# Patient Record
Sex: FEMALE | Race: BLACK OR AFRICAN AMERICAN | Employment: PART TIME | URBAN - METROPOLITAN AREA
[De-identification: names, ages, dates, MRNs, and addresses within clinical notes are randomized per-mention and may not be internally consistent; named-entity substitution may affect disease eponyms.]

---

## 2017-03-28 ENCOUNTER — HOSPITAL ENCOUNTER (EMERGENCY)
Age: 42
Discharge: HOME | End: 2017-03-28
Attending: EMERGENCY MEDICINE
Payer: MEDICARE

## 2017-03-28 ENCOUNTER — APPOINTMENT (OUTPATIENT)
Dept: GENERAL RADIOLOGY | Age: 42
End: 2017-03-28
Payer: MEDICARE

## 2017-03-28 VITALS
SYSTOLIC BLOOD PRESSURE: 121 MMHG | OXYGEN SATURATION: 100 % | TEMPERATURE: 97.4 F | BODY MASS INDEX: 22.96 KG/M2 | RESPIRATION RATE: 16 BRPM | DIASTOLIC BLOOD PRESSURE: 79 MMHG | HEART RATE: 72 BPM | WEIGHT: 134.48 LBS | HEIGHT: 64 IN

## 2017-03-28 DIAGNOSIS — S92.511A CLOSED DISPLACED FRACTURE OF PROXIMAL PHALANX OF LESSER TOE OF RIGHT FOOT, INITIAL ENCOUNTER: Primary | ICD-10-CM

## 2017-03-28 PROCEDURE — G0382 LEV 3 HOSP TYPE B ED VISIT: HCPCS

## 2017-03-28 PROCEDURE — 6370000000 HC RX 637 (ALT 250 FOR IP): Performed by: EMERGENCY MEDICINE

## 2017-03-28 PROCEDURE — 73630 X-RAY EXAM OF FOOT: CPT

## 2017-03-28 RX ORDER — HYDROCODONE BITARTRATE AND ACETAMINOPHEN 5; 325 MG/1; MG/1
1 TABLET ORAL ONCE
Status: COMPLETED | OUTPATIENT
Start: 2017-03-28 | End: 2017-03-28

## 2017-03-28 RX ORDER — HYDROCODONE BITARTRATE AND ACETAMINOPHEN 5; 325 MG/1; MG/1
1 TABLET ORAL EVERY 8 HOURS PRN
Qty: 10 TABLET | Refills: 0 | Status: SHIPPED | OUTPATIENT
Start: 2017-03-28 | End: 2018-01-29 | Stop reason: ALTCHOICE

## 2017-03-28 RX ADMIN — HYDROCODONE BITARTRATE AND ACETAMINOPHEN 1 TABLET: 5; 325 TABLET ORAL at 19:41

## 2017-03-28 ASSESSMENT — PAIN DESCRIPTION - FREQUENCY: FREQUENCY: CONTINUOUS

## 2017-03-28 ASSESSMENT — ENCOUNTER SYMPTOMS
SHORTNESS OF BREATH: 0
ABDOMINAL PAIN: 0
COUGH: 0
RHINORRHEA: 0
SORE THROAT: 0

## 2017-03-28 ASSESSMENT — PAIN SCALES - GENERAL
PAINLEVEL_OUTOF10: 10
PAINLEVEL_OUTOF10: 10

## 2017-03-28 ASSESSMENT — PAIN DESCRIPTION - ORIENTATION: ORIENTATION: LEFT;RIGHT

## 2017-03-28 ASSESSMENT — PAIN DESCRIPTION - LOCATION: LOCATION: FOOT

## 2017-03-28 ASSESSMENT — PAIN DESCRIPTION - DESCRIPTORS: DESCRIPTORS: SHARP

## 2017-03-28 ASSESSMENT — PAIN DESCRIPTION - PAIN TYPE: TYPE: ACUTE PAIN

## 2017-09-18 ENCOUNTER — APPOINTMENT (OUTPATIENT)
Dept: GENERAL RADIOLOGY | Age: 42
End: 2017-09-18
Payer: MEDICARE

## 2017-09-18 ENCOUNTER — HOSPITAL ENCOUNTER (EMERGENCY)
Age: 42
Discharge: HOME | End: 2017-09-18
Attending: EMERGENCY MEDICINE
Payer: MEDICARE

## 2017-09-18 VITALS
BODY MASS INDEX: 22.47 KG/M2 | HEART RATE: 62 BPM | SYSTOLIC BLOOD PRESSURE: 154 MMHG | HEIGHT: 64 IN | RESPIRATION RATE: 14 BRPM | WEIGHT: 131.6 LBS | DIASTOLIC BLOOD PRESSURE: 90 MMHG | TEMPERATURE: 98.9 F | OXYGEN SATURATION: 100 %

## 2017-09-18 DIAGNOSIS — L02.811 ABSCESS OF SCALP: Primary | ICD-10-CM

## 2017-09-18 DIAGNOSIS — Z76.0 ENCOUNTER FOR MEDICATION REFILL: ICD-10-CM

## 2017-09-18 DIAGNOSIS — J40 BRONCHITIS: ICD-10-CM

## 2017-09-18 PROCEDURE — 71020 XR CHEST STANDARD TWO VW: CPT

## 2017-09-18 PROCEDURE — 99283 EMERGENCY DEPT VISIT LOW MDM: CPT

## 2017-09-18 PROCEDURE — 2500000003 HC RX 250 WO HCPCS: Performed by: NURSE PRACTITIONER

## 2017-09-18 RX ORDER — VALSARTAN AND HYDROCHLOROTHIAZIDE 160; 12.5 MG/1; MG/1
1 TABLET, FILM COATED ORAL DAILY
Qty: 30 TABLET | Refills: 0 | Status: SHIPPED | OUTPATIENT
Start: 2017-09-18 | End: 2018-01-18 | Stop reason: SDUPTHER

## 2017-09-18 RX ORDER — CEPHALEXIN 500 MG/1
500 CAPSULE ORAL 3 TIMES DAILY
Qty: 30 CAPSULE | Refills: 0 | Status: SHIPPED | OUTPATIENT
Start: 2017-09-18 | End: 2017-09-28

## 2017-09-18 RX ORDER — ALBUTEROL SULFATE 90 UG/1
2 AEROSOL, METERED RESPIRATORY (INHALATION) EVERY 6 HOURS PRN
Qty: 1 INHALER | Refills: 0 | Status: SHIPPED | OUTPATIENT
Start: 2017-09-18 | End: 2021-09-03

## 2017-09-18 RX ORDER — ACETAMINOPHEN AND CODEINE PHOSPHATE 300; 30 MG/1; MG/1
1 TABLET ORAL EVERY 4 HOURS PRN
Qty: 15 TABLET | Refills: 0 | Status: SHIPPED | OUTPATIENT
Start: 2017-09-18 | End: 2018-01-29 | Stop reason: ALTCHOICE

## 2017-09-18 RX ORDER — BENZONATATE 100 MG/1
100 CAPSULE ORAL 3 TIMES DAILY PRN
Qty: 21 CAPSULE | Refills: 0 | Status: SHIPPED | OUTPATIENT
Start: 2017-09-18 | End: 2017-09-25

## 2017-09-18 RX ORDER — FLUCONAZOLE 150 MG/1
150 TABLET ORAL ONCE
Qty: 1 TABLET | Refills: 0 | Status: SHIPPED | OUTPATIENT
Start: 2017-09-18 | End: 2017-09-18

## 2017-09-18 RX ORDER — LIDOCAINE HYDROCHLORIDE 10 MG/ML
20 INJECTION, SOLUTION INFILTRATION; PERINEURAL ONCE
Status: COMPLETED | OUTPATIENT
Start: 2017-09-18 | End: 2017-09-18

## 2017-09-18 RX ORDER — DOXYCYCLINE HYCLATE 100 MG
100 TABLET ORAL 2 TIMES DAILY
Qty: 20 TABLET | Refills: 0 | Status: SHIPPED | OUTPATIENT
Start: 2017-09-18 | End: 2017-09-28

## 2017-09-18 RX ADMIN — LIDOCAINE HYDROCHLORIDE 20 ML: 10 INJECTION, SOLUTION INFILTRATION; PERINEURAL at 15:02

## 2017-09-18 ASSESSMENT — ENCOUNTER SYMPTOMS
COUGH: 1
WHEEZING: 0
SHORTNESS OF BREATH: 0
SINUS PRESSURE: 1
RHINORRHEA: 1
SORE THROAT: 0

## 2017-09-18 ASSESSMENT — PAIN SCALES - GENERAL
PAINLEVEL_OUTOF10: 0
PAINLEVEL_OUTOF10: 8

## 2017-09-18 ASSESSMENT — PAIN DESCRIPTION - LOCATION: LOCATION: HEAD

## 2017-09-18 ASSESSMENT — PAIN DESCRIPTION - PAIN TYPE: TYPE: ACUTE PAIN

## 2018-01-18 ENCOUNTER — HOSPITAL ENCOUNTER (EMERGENCY)
Age: 43
Discharge: HOME | End: 2018-01-18
Attending: EMERGENCY MEDICINE
Payer: MEDICARE

## 2018-01-18 VITALS
OXYGEN SATURATION: 98 % | HEIGHT: 64 IN | TEMPERATURE: 97.7 F | RESPIRATION RATE: 18 BRPM | HEART RATE: 88 BPM | DIASTOLIC BLOOD PRESSURE: 98 MMHG | SYSTOLIC BLOOD PRESSURE: 145 MMHG

## 2018-01-18 DIAGNOSIS — J06.9 UPPER RESPIRATORY TRACT INFECTION, UNSPECIFIED TYPE: Primary | ICD-10-CM

## 2018-01-18 PROCEDURE — 99283 EMERGENCY DEPT VISIT LOW MDM: CPT

## 2018-01-18 PROCEDURE — 6370000000 HC RX 637 (ALT 250 FOR IP): Performed by: NURSE PRACTITIONER

## 2018-01-18 RX ORDER — ACETAMINOPHEN 500 MG
1000 TABLET ORAL ONCE
Status: COMPLETED | OUTPATIENT
Start: 2018-01-18 | End: 2018-01-18

## 2018-01-18 RX ORDER — FLUTICASONE PROPIONATE 50 MCG
1 SPRAY, SUSPENSION (ML) NASAL DAILY
Qty: 1 BOTTLE | Refills: 0 | Status: SHIPPED | OUTPATIENT
Start: 2018-01-18 | End: 2018-01-29 | Stop reason: SDUPTHER

## 2018-01-18 RX ORDER — VALSARTAN AND HYDROCHLOROTHIAZIDE 160; 12.5 MG/1; MG/1
1 TABLET, FILM COATED ORAL DAILY
Qty: 30 TABLET | Refills: 0 | Status: SHIPPED | OUTPATIENT
Start: 2018-01-18 | End: 2018-01-29 | Stop reason: SDUPTHER

## 2018-01-18 RX ORDER — LORATADINE 10 MG/1
10 TABLET ORAL DAILY
Qty: 14 TABLET | Refills: 0 | Status: SHIPPED | OUTPATIENT
Start: 2018-01-18 | End: 2018-01-29 | Stop reason: SDUPTHER

## 2018-01-18 RX ORDER — ACETAMINOPHEN 500 MG
1000 TABLET ORAL EVERY 6 HOURS PRN
Qty: 30 TABLET | Refills: 0 | Status: SHIPPED | OUTPATIENT
Start: 2018-01-18 | End: 2018-01-29 | Stop reason: ALTCHOICE

## 2018-01-18 RX ADMIN — ACETAMINOPHEN 1000 MG: 500 TABLET ORAL at 10:37

## 2018-01-18 ASSESSMENT — ENCOUNTER SYMPTOMS
ABDOMINAL PAIN: 0
SINUS PRESSURE: 1
COUGH: 1
VOMITING: 0
NAUSEA: 0

## 2018-01-18 ASSESSMENT — PAIN DESCRIPTION - DESCRIPTORS: DESCRIPTORS: CONSTANT

## 2018-01-18 ASSESSMENT — PAIN DESCRIPTION - FREQUENCY: FREQUENCY: CONTINUOUS

## 2018-01-18 ASSESSMENT — PAIN DESCRIPTION - PROGRESSION: CLINICAL_PROGRESSION: NOT CHANGED

## 2018-01-18 ASSESSMENT — PAIN DESCRIPTION - LOCATION: LOCATION: GENERALIZED

## 2018-01-18 ASSESSMENT — PAIN SCALES - GENERAL
PAINLEVEL_OUTOF10: 10
PAINLEVEL_OUTOF10: 10

## 2018-01-18 ASSESSMENT — PAIN DESCRIPTION - PAIN TYPE: TYPE: ACUTE PAIN

## 2018-01-18 NOTE — ED PROVIDER NOTES
[facility] ED  Emergency Department Encounter  Mid Level Provider     Pt Name: Franklin Spring  MRN: [id number]  [place] 1975  Date of evaluation: 18  PCP:  No primary care provider on file. CHIEF COMPLAINT       Chief Complaint   Patient presents with    Generalized Body Aches     Pt reports generalized body aches for the past few days    URI     Pt reports nasal congestion, head congestion, and productive cough of green sputum for the past few days    Medication Refill     Pt reports that she needs her Diovan refilled       HISTORY OF PRESENT ILLNESS  (Location/Symptom, Timing/Onset, Context/Setting, Quality, Duration, Modifying Factors, Severity.)      Franklin Spring is a 36 y.o. female who presents with cough, cold, nasal congestion for 2 days. Patient with subjective fever, chills. Patient reports she has chronic myalgias from fibromyalgia. Patient is a daily smoker. Patient also requesting a refill on her prescription for Diovan. I spoke with patient regarding the need for PCP and asked her if I arrange an appointment which she attend and she reports yes. I will have  meet with her. Patient is alert without acute distress. PAST MEDICAL / SURGICAL / SOCIAL / FAMILY HISTORY      has a past medical history of Fibromyalgia; Headache; Hyperlipidemia; Lupus; and Thyroid disease. has a past surgical history that includes  section; Foot surgery; and Hysterectomy. Social History     Social History    Marital status: Single     Spouse name: N/A    Number of children: N/A    Years of education: N/A     Occupational History    Not on file.      Social History Main Topics    Smoking status: Current Every Day Smoker     Packs/day: 1.00     Types: Cigarettes    Smokeless tobacco: Not on file    Alcohol use No    Drug use: No    Sexual activity: Not on file     Other Topics Concern    Not on file     Social History Narrative    No narrative appears well-developed and well-nourished. No distress. HENT:   Head: Normocephalic and atraumatic. Nasal congestion   Eyes:   Conjunctivitis bilaterally, appears allergic   Neck: Normal range of motion. Neck supple. Cardiovascular: Normal rate, regular rhythm and normal heart sounds. Pulmonary/Chest: Effort normal and breath sounds normal. No respiratory distress. She has no wheezes. Abdominal: Soft. There is no tenderness. Neurological: She is alert and oriented to person, place, and time. Skin: Skin is warm and dry. Psychiatric: She has a normal mood and affect. Her behavior is normal. Judgment and thought content normal.   Nursing note and vitals reviewed. DIFFERENTIAL  DIAGNOSIS   URI, Encounter for medication refill    PLAN (Akira Anand / Kay Schaffer / EKG):  Orders Placed This Encounter   Procedures    Inpatient consult to Social Work       MEDICATIONS ORDERED:  Orders Placed This Encounter   Medications    valsartan-hydrochlorothiazide (DIOVAN HCT) 160-12.5 MG per tablet     Sig: Take 1 tablet by mouth daily     Dispense:  30 tablet     Refill:  0    fluticasone (FLONASE) 50 MCG/ACT nasal spray     Si spray by Nasal route daily     Dispense:  1 Bottle     Refill:  0    loratadine (CLARITIN) 10 MG tablet     Sig: Take 1 tablet by mouth daily     Dispense:  14 tablet     Refill:  0    acetaminophen (TYLENOL) tablet 1,000 mg    acetaminophen (APAP EXTRA STRENGTH) 500 MG tablet     Sig: Take 2 tablets by mouth every 6 hours as needed for Pain     Dispense:  30 tablet     Refill:  0       Controlled Substances Monitoring:      DIAGNOSTIC RESULTS / [facility]     RADIOLOGY:   I directly visualized (with the attending physician) the following  images and reviewed the radiologist interpretations:  No results found. No orders to display       LABS:  No results found for this visit on 18. EMERGENCY DEPARTMENT COURSE:  Patient advised of course of care.   Patient

## 2018-01-18 NOTE — ED NOTES
Pt arrived to ED alert and oriented x4. Pt reports generalized body aches for the past few days. Pt ambulates with steady gait and moving all extremities without difficulty. Pt reports URI symptoms stating that she has nasal congestion, head congestion, and a cough. Pt reports productive cough of green sputum, no cough noted in triage. Pt reports that she needs her Diovan refilled, reports that she does not have a doctor to refill it. RR even and unlabored. NAD noted. Will continue to monitor.      Josi Hassan RN  01/18/18 7039

## 2018-01-29 ENCOUNTER — OFFICE VISIT (OUTPATIENT)
Dept: INTERNAL MEDICINE | Age: 43
End: 2018-01-29
Payer: MEDICARE

## 2018-01-29 VITALS
WEIGHT: 130 LBS | BODY MASS INDEX: 22.2 KG/M2 | SYSTOLIC BLOOD PRESSURE: 116 MMHG | DIASTOLIC BLOOD PRESSURE: 74 MMHG | HEART RATE: 87 BPM | HEIGHT: 64 IN

## 2018-01-29 DIAGNOSIS — I10 ESSENTIAL HYPERTENSION: Primary | ICD-10-CM

## 2018-01-29 DIAGNOSIS — E03.9 HYPOTHYROIDISM, UNSPECIFIED TYPE: ICD-10-CM

## 2018-01-29 DIAGNOSIS — J30.9 ALLERGIC SINUSITIS: ICD-10-CM

## 2018-01-29 DIAGNOSIS — F17.200 SMOKING: ICD-10-CM

## 2018-01-29 DIAGNOSIS — Z13.1 DIABETES MELLITUS SCREENING: ICD-10-CM

## 2018-01-29 DIAGNOSIS — Z13.220 SCREENING FOR HYPERLIPIDEMIA: ICD-10-CM

## 2018-01-29 PROCEDURE — G8427 DOCREV CUR MEDS BY ELIG CLIN: HCPCS | Performed by: INTERNAL MEDICINE

## 2018-01-29 PROCEDURE — G8484 FLU IMMUNIZE NO ADMIN: HCPCS | Performed by: INTERNAL MEDICINE

## 2018-01-29 PROCEDURE — 4004F PT TOBACCO SCREEN RCVD TLK: CPT | Performed by: INTERNAL MEDICINE

## 2018-01-29 PROCEDURE — 99203 OFFICE O/P NEW LOW 30 MIN: CPT | Performed by: INTERNAL MEDICINE

## 2018-01-29 PROCEDURE — G8420 CALC BMI NORM PARAMETERS: HCPCS | Performed by: INTERNAL MEDICINE

## 2018-01-29 RX ORDER — NAPROXEN 500 MG/1
500 TABLET ORAL
COMMUNITY
Start: 2017-03-30 | End: 2018-01-29 | Stop reason: ALTCHOICE

## 2018-01-29 RX ORDER — AMITRIPTYLINE HYDROCHLORIDE 25 MG/1
25 TABLET, FILM COATED ORAL NIGHTLY
Qty: 30 TABLET | Refills: 3 | Status: SHIPPED | OUTPATIENT
Start: 2018-01-29 | End: 2018-02-20

## 2018-01-29 RX ORDER — LORATADINE 10 MG/1
10 TABLET ORAL DAILY
Qty: 14 TABLET | Refills: 0 | Status: SHIPPED | OUTPATIENT
Start: 2018-01-29 | End: 2021-09-03

## 2018-01-29 RX ORDER — VALSARTAN AND HYDROCHLOROTHIAZIDE 160; 12.5 MG/1; MG/1
1 TABLET, FILM COATED ORAL DAILY
Qty: 30 TABLET | Refills: 3 | Status: SHIPPED | OUTPATIENT
Start: 2018-01-29 | End: 2018-09-18 | Stop reason: SDUPTHER

## 2018-01-29 RX ORDER — FLUTICASONE PROPIONATE 50 MCG
1 SPRAY, SUSPENSION (ML) NASAL DAILY
Qty: 1 BOTTLE | Refills: 0 | Status: SHIPPED | OUTPATIENT
Start: 2018-01-29 | End: 2021-09-03

## 2018-01-29 NOTE — PROGRESS NOTES
Foot surgery; and Hysterectomy. Medications:    Current Outpatient Prescriptions   Medication Sig Dispense Refill    valsartan-hydrochlorothiazide (DIOVAN HCT) 160-12.5 MG per tablet Take 1 tablet by mouth daily 30 tablet 0    fluticasone (FLONASE) 50 MCG/ACT nasal spray 1 spray by Nasal route daily 1 Bottle 0    loratadine (CLARITIN) 10 MG tablet Take 1 tablet by mouth daily 14 tablet 0    acetaminophen (APAP EXTRA STRENGTH) 500 MG tablet Take 2 tablets by mouth every 6 hours as needed for Pain 30 tablet 0    albuterol sulfate HFA (PROVENTIL HFA) 108 (90 Base) MCG/ACT inhaler Inhale 2 puffs into the lungs every 6 hours as needed for Wheezing or Shortness of Breath 1 Inhaler 0    naproxen (NAPROSYN) 500 MG tablet Take 500 mg by mouth      acetaminophen-codeine (TYLENOL/CODEINE #3) 300-30 MG per tablet Take 1 tablet by mouth every 4 hours as needed for Pain 15 tablet 0    HYDROcodone-acetaminophen (NORCO) 5-325 MG per tablet Take 1 tablet by mouth every 8 hours as needed for Pain . 10 tablet 0     No current facility-administered medications for this visit. Allergies:  Bactrim [sulfamethoxazole-trimethoprim]    Social History:   reports that she has been smoking Cigarettes. She has a 23.00 pack-year smoking history. She has never used smokeless tobacco. She reports that she does not drink alcohol or use drugs. Family History: family history includes Heart Disease in her father, paternal grandfather, and paternal grandmother; High Blood Pressure in her maternal grandfather and mother; Stroke in her maternal grandmother. REVIEW OF SYSTEMS:      Constitutional: Negative for fever and fatigue. HENT: Negative for congestion and sore throat. Eyes: Negative for eye pain and visual disturbance. Respiratory: Negative for chest tightness and shortness of breath. Cardiovascular: Negative for chest pain and orthopnea .    Gastrointestinal: Negative for vomiting, abdominal pain, constipation and diarrhea. Endocrine: Negative for cold intolerance, heat intolerance, polydipsia and polyuria. Genitourinary: Negative for dysuria and frequency. Musculoskeletal: Negative for  Myalgia, . Neurological: Negative for dizziness, weakness and headaches. PHYSICAL EXAM:        /74 (Site: Left Arm, Position: Sitting, Cuff Size: Medium Adult)   Pulse 87   Ht 5' 4\" (1.626 m)   Wt 130 lb (59 kg)   BMI 22.31 kg/m²      General appearance - well appearing, not in  distress. Mental status - alert and cooperative . Head: Normocephalic, without obvious abnormality, atraumatic. Eye: PERRL, conjunctiva/corneas clear, EOM's intact. Neck - Supple, no significant adenopathy . Chest - Clear to auscultation, no wheezes, rales or rhonchi, symmetric air entry. Heart -  regular rhythm, normal S1, S2, no murmurs. Abdomen - Soft, nontender, nondistended, no masses or organomegaly. Neurological - Alert, oriented, normal speech, no focal findings or movement disorder noted. Musculoskeletal - No joint tenderness, deformity or swelling. Extremities -  No pedal edema, no clubbing or cyanosis. Labs:       Chemistry        Component Value Date/Time     08/16/2016 2350    K 3.8 08/16/2016 2350     08/16/2016 2350    CO2 22 08/16/2016 2350    BUN 15 08/16/2016 2350    CREATININE 0.78 08/16/2016 2350        Component Value Date/Time    CALCIUM 9.2 08/16/2016 2350    ALKPHOS 62 08/16/2016 2350    AST 18 08/16/2016 2350    ALT 13 08/16/2016 2350    BILITOT 0.16 (L) 08/16/2016 2350          No results for input(s): GLUCOSE in the last 72 hours.   Lab Results   Component Value Date    WBC 5.3 08/16/2016    HGB 12.0 08/16/2016    HCT 36.1 08/16/2016    MCV 90.2 08/16/2016     (L) 08/16/2016         Health Maintenance Due   Topic Date Due    HIV screen  09/24/1989    DTaP/Tdap/Td vaccine (1 - Tdap) 09/24/1993    Pneumococcal med risk (1 of 1 - PPSV23) 09/24/1993    Cervical cancer screen 09/24/1995    Lipid screen  09/24/2014    Potassium monitoring  08/16/2017    Creatinine monitoring  08/16/2017    Flu vaccine (1) 09/01/2017        ASSESSMENT AND PLAN    1. Essential hypertension    - CBC Auto Differential; Future  - Comprehensive Metabolic Panel; Future  - Microalbumin, Ur; Future  - valsartan-hydrochlorothiazide (DIOVAN HCT) 160-12.5 MG per tablet; Take 1 tablet by mouth daily  Dispense: 30 tablet; Refill: 3    2. Allergic sinusitis    - fluticasone (FLONASE) 50 MCG/ACT nasal spray; 1 spray by Nasal route daily  Dispense: 1 Bottle; Refill: 0  - loratadine (CLARITIN) 10 MG tablet; Take 1 tablet by mouth daily  Dispense: 14 tablet; Refill: 0    3. Screening for hyperlipidemia    - Lipid, Fasting; Future    4. Hypothyroidism, unspecified type    - TSH with Reflex; Future    5. Diabetes mellitus screening    - Hemoglobin A1C; Future    6. Smoking        · Labs as ordered. · Continue valsartan hydrochlorothiazide 160/12.5 mg daily. · Continue Flonase and loratadine. · Patient was advised to see a psychiatrist but she does not want . · Sleep hygiene was discussed with her. · Braxton Jeter received counseling on the following healthy behaviors: exercise, medication adherence and tobacco cessation  · Discussed use, benefit, and side effects of prescribed medications. Barriers to medication compliance addressed. All patient questions answered. Pt voiced understanding. · The return visit should be in 4 weeks      Rahul Joshua MD  Attending and Faculty Internal Medicine  [facility]  [place] [id number] [facility]  [address] [place] [address]  Dept: [phone]  1/29/18      This note is created with the assistance of a speech-recognition program. While intending to generate a document that actually reflects the content of the visit, the document can still have some mistakes which may not have been identified and corrected by editing.

## 2018-01-29 NOTE — PROGRESS NOTES
Visit Information    Have you changed or started any medications since your last visit including any over-the-counter medicines, vitamins, or herbal medicines? no   Are you having any side effects from any of your medications? -  no  Have you stopped taking any of your medications? Is so, why? -  no    Have you seen any other physician or provider since your last visit? No  Have you had any other diagnostic tests since your last visit? No  Have you been seen in the emergency room and/or had an admission to a hospital since we last saw you? Yes - Records Obtained  Have you had your routine dental cleaning in the past 6 months? no    Have you activated your [facility] account? If not, what are your barriers?  No: pending     No care team member to display    Medical History Review  Past Medical, Family, and Social History reviewed and does not contribute to the patient presenting condition    Health Maintenance   Topic Date Due    HIV screen  09/24/1989    DTaP/Tdap/Td vaccine (1 - Tdap) 09/24/1993    Pneumococcal med risk (1 of 1 - PPSV23) 09/24/1993    Cervical cancer screen  09/24/1995    Lipid screen  09/24/2014    Potassium monitoring  08/16/2017    Creatinine monitoring  08/16/2017    Flu vaccine (1) 09/01/2017

## 2018-02-08 ENCOUNTER — HOSPITAL ENCOUNTER (EMERGENCY)
Age: 43
Discharge: HOME | End: 2018-02-08
Attending: EMERGENCY MEDICINE
Payer: MEDICARE

## 2018-02-08 ENCOUNTER — APPOINTMENT (OUTPATIENT)
Dept: GENERAL RADIOLOGY | Age: 43
End: 2018-02-08
Payer: MEDICARE

## 2018-02-08 VITALS
TEMPERATURE: 97.8 F | OXYGEN SATURATION: 98 % | WEIGHT: 130 LBS | DIASTOLIC BLOOD PRESSURE: 80 MMHG | BODY MASS INDEX: 22.31 KG/M2 | HEART RATE: 89 BPM | RESPIRATION RATE: 15 BRPM | SYSTOLIC BLOOD PRESSURE: 114 MMHG

## 2018-02-08 DIAGNOSIS — J06.9 UPPER RESPIRATORY TRACT INFECTION, UNSPECIFIED TYPE: Primary | ICD-10-CM

## 2018-02-08 LAB
DIRECT EXAM: NORMAL
EKG ATRIAL RATE: 86 BPM
EKG P AXIS: 70 DEGREES
EKG P-R INTERVAL: 162 MS
EKG Q-T INTERVAL: 370 MS
EKG QRS DURATION: 94 MS
EKG QTC CALCULATION (BAZETT): 442 MS
EKG R AXIS: 62 DEGREES
EKG T AXIS: 36 DEGREES
EKG VENTRICULAR RATE: 86 BPM
Lab: NORMAL
POC TROPONIN I: 0 NG/ML (ref 0–0.1)
POC TROPONIN INTERP: NORMAL
SPECIMEN DESCRIPTION: NORMAL
STATUS: NORMAL

## 2018-02-08 PROCEDURE — 99285 EMERGENCY DEPT VISIT HI MDM: CPT

## 2018-02-08 PROCEDURE — 93005 ELECTROCARDIOGRAM TRACING: CPT

## 2018-02-08 PROCEDURE — 87804 INFLUENZA ASSAY W/OPTIC: CPT

## 2018-02-08 PROCEDURE — 6360000002 HC RX W HCPCS: Performed by: EMERGENCY MEDICINE

## 2018-02-08 PROCEDURE — 96374 THER/PROPH/DIAG INJ IV PUSH: CPT

## 2018-02-08 PROCEDURE — 84484 ASSAY OF TROPONIN QUANT: CPT

## 2018-02-08 PROCEDURE — 71046 X-RAY EXAM CHEST 2 VIEWS: CPT

## 2018-02-08 RX ORDER — BENZONATATE 100 MG/1
100 CAPSULE ORAL 3 TIMES DAILY PRN
Qty: 30 CAPSULE | Refills: 0 | Status: SHIPPED | OUTPATIENT
Start: 2018-02-08 | End: 2018-02-15

## 2018-02-08 RX ORDER — ACETAMINOPHEN 500 MG
1000 TABLET ORAL EVERY 8 HOURS PRN
Qty: 30 TABLET | Refills: 0 | Status: SHIPPED | OUTPATIENT
Start: 2018-02-08 | End: 2018-07-27

## 2018-02-08 RX ORDER — KETOROLAC TROMETHAMINE 30 MG/ML
30 INJECTION, SOLUTION INTRAMUSCULAR; INTRAVENOUS ONCE
Status: COMPLETED | OUTPATIENT
Start: 2018-02-08 | End: 2018-02-08

## 2018-02-08 RX ORDER — PSEUDOEPHEDRINE HYDROCHLORIDE 30 MG/1
30 TABLET ORAL EVERY 6 HOURS PRN
Qty: 20 TABLET | Refills: 0 | Status: SHIPPED | OUTPATIENT
Start: 2018-02-08 | End: 2018-02-15

## 2018-02-08 RX ADMIN — KETOROLAC TROMETHAMINE 30 MG: 30 INJECTION, SOLUTION INTRAMUSCULAR at 13:27

## 2018-02-08 ASSESSMENT — ENCOUNTER SYMPTOMS
STRIDOR: 0
TROUBLE SWALLOWING: 0
RHINORRHEA: 1
NAUSEA: 0
VOMITING: 0
COUGH: 1
SORE THROAT: 1
SINUS PRESSURE: 1
VOICE CHANGE: 0
DIARRHEA: 0
ABDOMINAL PAIN: 0
CONSTIPATION: 0
WHEEZING: 0
SHORTNESS OF BREATH: 0
PHOTOPHOBIA: 0

## 2018-02-08 ASSESSMENT — PAIN SCALES - GENERAL: PAINLEVEL_OUTOF10: 10

## 2018-02-08 NOTE — ED PROVIDER NOTES
[facility] ED  Emergency Department Encounter  Emergency Medicine Resident     Pt Name: Lydia Mcdaniel  MRN: [id number]  [place] 1975  Date of evaluation: 18  PCP:  No primary care provider on file. CHIEF COMPLAINT       Chief Complaint   Patient presents with    Palpitations     pt with c/o waking up today with watery eyes, nasal congestion, cough, chest pain and states her Zaire Mage is racing. \"    URI    Chest Pain       HISTORY OF PRESENT ILLNESS  (Location/Symptom, Timing/Onset, Context/Setting, Quality, Duration, Modifying Factors, Severity.)      Lydia Mcdaniel is a 36 y.o. female who presents with One-day history of viral flulike illness. Patient states that she woke up this morning with watery eyes, nasal congestion, rhinorrhea, cough, sore throat. Patient states that her cough is productive of phlegm. She denies any associated shortness of breath. She also reports generalized myalgias particularly in her upper back, neck and shoulders as well as her bilateral thighs. Patient also reports chest wall pain that is reproducible with palpation. She describes as a soreness in his electric shock. She states that she feels like it is associated with palpitations. She denies any lower extremity edema. She states that she feels some chills but denies any diaphoresis or fevers. She denies a significant cardiac illness and states that she has a history of  lupus and fibromyalgia. Patient denies any other acute complaints upon presentation to the emergency room today including abdominal pain, nausea, vomiting or any other complaints. PAST MEDICAL / SURGICAL / SOCIAL / FAMILY HISTORY      has a past medical history of Fibromyalgia; Headache; Hyperlipidemia; Hypertension; Hypothyroidism; Lupus; and Thyroid disease. has a past surgical history that includes  section; Foot surgery; and Hysterectomy.     Social History     Social History    Marital status: distension. There is no tenderness. There is no rebound and no guarding. Neurological: She is alert and oriented to person, place, and time. Skin: She is not diaphoretic. DIFFERENTIAL  DIAGNOSIS     PLAN (LABS / IMAGING / EKG):  Orders Placed This Encounter   Procedures    RAPID INFLUENZA A/B ANTIGENS    XR CHEST STANDARD (2 VW)    POCT troponin       MEDICATIONS ORDERED:  Orders Placed This Encounter   Medications    ketorolac (TORADOL) injection 30 mg    acetaminophen (APAP EXTRA STRENGTH) 500 MG tablet     Sig: Take 2 tablets by mouth every 8 hours as needed for Pain     Dispense:  30 tablet     Refill:  0    pseudoephedrine (DECONGESTANT) 30 MG tablet     Sig: Take 1 tablet by mouth every 6 hours as needed for Congestion     Dispense:  20 tablet     Refill:  0    benzonatate (TESSALON PERLES) 100 MG capsule     Sig: Take 1 capsule by mouth 3 times daily as needed for Cough     Dispense:  30 capsule     Refill:  0         DIAGNOSTIC RESULTS / EMERGENCY DEPARTMENT COURSE / MDM     LABS:  Results for orders placed or performed during the hospital encounter of 02/08/18   RAPID INFLUENZA A/B ANTIGENS   Result Value Ref Range    Specimen Description . NASOPHARYNGEAL SWAB     Special Requests NOT REPORTED     Direct Exam PRESUMPTIVE NEGATIVE for Influenza A + B antigens. Direct Exam       PCR testing to confirm this result is available upon request.  Specimen will be    Direct Exam        saved in the laboratory for 7 days. Please call [phone] if PCR testing is    Direct Exam  indicated. Direct Exam       [address] [phone]    Status FINAL 02/08/2018    POCT troponin   Result Value Ref Range    POC Troponin I 0.00 0.00 - 0.10 ng/mL    POC Troponin Interp       The Troponin-I (POC) results cannot be compared to the Troponin-T results.        RADIOLOGY:    Xr Chest Standard (2 Vw)    Result Date: 2/8/2018  EXAMINATION: TWO VIEWS OF THE CHEST 2/8/2018 1:38 pm COMPARISON: September 18, 2017 HISTORY: Reason for exam:->Cough, CP FINDINGS: The lungs are without acute focal process. There is no effusion or pneumothorax. The cardiomediastinal silhouette is normal. The osseous structures are intact without acute process. Stable negative chest.       EKG    EKG Interpretation    Interpreted by me    Rhythm: normal sinus   Rate: normal  Axis: normal  Ectopy: none  Conduction: normal  ST Segments: no acute change  T Waves: no acute change  Q Waves: none    Clinical Impression: nonspecific    All EKG's are interpreted by the Emergency Department Physician who either signs or Co-signs this chart in the absence of a cardiologist.    EMERGENCY DEPARTMENT COURSE:    Patient resting comfortably at bedside patient's symptoms consistent with viral flulike illness. Patient will be assessed with chest x-ray and EKG. Patient treated with Toradol    Results patient's imaging and laboratory results unremarkable. Patient will be discharged home with prescription for Tylenol, Sudafed and Tessalon Perles. Patient is in agreement with this plan. She has been encouraged to follow up as needed. PROCEDURES:  None    CONSULTS:  None    CRITICAL CARE:  None    FINAL IMPRESSION      1. Upper respiratory tract infection, unspecified type          DISPOSITION / PLAN     DISPOSITION      D/C    PATIENT REFERRED TO:  No follow-up provider specified.     DISCHARGE MEDICATIONS:  New Prescriptions    ACETAMINOPHEN (APAP EXTRA STRENGTH) 500 MG TABLET    Take 2 tablets by mouth every 8 hours as needed for Pain    BENZONATATE (TESSALON PERLES) 100 MG CAPSULE    Take 1 capsule by mouth 3 times daily as needed for Cough    PSEUDOEPHEDRINE (DECONGESTANT) 30 MG TABLET    Take 1 tablet by mouth every 6 hours as needed for Congestion       Lebron Phan MD  Emergency Medicine Resident    (Please note that portions of this note were completed with a voice recognition program.  Efforts were made to edit the dictations but occasionally words are mis-transcribed.)       Brodie Spatz, MD  Resident  02/08/18 2081

## 2018-02-20 ENCOUNTER — HOSPITAL ENCOUNTER (EMERGENCY)
Age: 43
Discharge: HOME | End: 2018-02-20
Attending: EMERGENCY MEDICINE
Payer: MEDICARE

## 2018-02-20 ENCOUNTER — APPOINTMENT (OUTPATIENT)
Dept: GENERAL RADIOLOGY | Age: 43
End: 2018-02-20
Payer: MEDICARE

## 2018-02-20 VITALS
DIASTOLIC BLOOD PRESSURE: 86 MMHG | OXYGEN SATURATION: 96 % | WEIGHT: 130 LBS | HEIGHT: 64 IN | BODY MASS INDEX: 22.2 KG/M2 | SYSTOLIC BLOOD PRESSURE: 133 MMHG | RESPIRATION RATE: 18 BRPM | HEART RATE: 77 BPM | TEMPERATURE: 97.7 F

## 2018-02-20 DIAGNOSIS — R07.89 CHEST WALL PAIN: Primary | ICD-10-CM

## 2018-02-20 LAB
EKG ATRIAL RATE: 81 BPM
EKG P AXIS: 69 DEGREES
EKG P-R INTERVAL: 176 MS
EKG Q-T INTERVAL: 384 MS
EKG QRS DURATION: 94 MS
EKG QTC CALCULATION (BAZETT): 446 MS
EKG R AXIS: 63 DEGREES
EKG T AXIS: -2 DEGREES
EKG VENTRICULAR RATE: 81 BPM

## 2018-02-20 PROCEDURE — 96372 THER/PROPH/DIAG INJ SC/IM: CPT

## 2018-02-20 PROCEDURE — 6370000000 HC RX 637 (ALT 250 FOR IP): Performed by: EMERGENCY MEDICINE

## 2018-02-20 PROCEDURE — 6360000002 HC RX W HCPCS: Performed by: EMERGENCY MEDICINE

## 2018-02-20 PROCEDURE — 71046 X-RAY EXAM CHEST 2 VIEWS: CPT

## 2018-02-20 PROCEDURE — 99285 EMERGENCY DEPT VISIT HI MDM: CPT

## 2018-02-20 PROCEDURE — 93005 ELECTROCARDIOGRAM TRACING: CPT

## 2018-02-20 RX ORDER — ACETAMINOPHEN AND CODEINE PHOSPHATE 300; 30 MG/1; MG/1
1 TABLET ORAL ONCE
Status: COMPLETED | OUTPATIENT
Start: 2018-02-20 | End: 2018-02-20

## 2018-02-20 RX ORDER — KETOROLAC TROMETHAMINE 30 MG/ML
30 INJECTION, SOLUTION INTRAMUSCULAR; INTRAVENOUS ONCE
Status: COMPLETED | OUTPATIENT
Start: 2018-02-20 | End: 2018-02-20

## 2018-02-20 RX ORDER — ACETAMINOPHEN AND CODEINE PHOSPHATE 300; 30 MG/1; MG/1
1 TABLET ORAL EVERY 6 HOURS PRN
Qty: 12 TABLET | Refills: 0 | Status: SHIPPED | OUTPATIENT
Start: 2018-02-20 | End: 2018-02-23

## 2018-02-20 RX ORDER — KETOROLAC TROMETHAMINE 30 MG/ML
30 INJECTION, SOLUTION INTRAMUSCULAR; INTRAVENOUS ONCE
Status: DISCONTINUED | OUTPATIENT
Start: 2018-02-20 | End: 2018-02-20

## 2018-02-20 RX ORDER — IBUPROFEN 600 MG/1
600 TABLET ORAL EVERY 6 HOURS PRN
Qty: 20 TABLET | Refills: 0 | Status: SHIPPED | OUTPATIENT
Start: 2018-02-20 | End: 2018-07-27

## 2018-02-20 RX ADMIN — KETOROLAC TROMETHAMINE 30 MG: 30 INJECTION, SOLUTION INTRAMUSCULAR at 05:50

## 2018-02-20 RX ADMIN — ACETAMINOPHEN AND CODEINE PHOSPHATE 1 TABLET: 300; 30 TABLET ORAL at 05:45

## 2018-02-20 ASSESSMENT — PAIN SCALES - GENERAL
PAINLEVEL_OUTOF10: 10

## 2018-02-20 ASSESSMENT — PAIN DESCRIPTION - ORIENTATION
ORIENTATION: RIGHT
ORIENTATION: RIGHT

## 2018-02-20 ASSESSMENT — PAIN DESCRIPTION - LOCATION
LOCATION: CHEST;SHOULDER
LOCATION: CHEST;SHOULDER

## 2018-02-20 ASSESSMENT — PAIN DESCRIPTION - DESCRIPTORS
DESCRIPTORS: ACHING;THROBBING;CONSTANT
DESCRIPTORS: ACHING;THROBBING;CONSTANT

## 2018-02-20 ASSESSMENT — PAIN DESCRIPTION - PAIN TYPE
TYPE: ACUTE PAIN
TYPE: ACUTE PAIN

## 2018-02-20 NOTE — ED PROVIDER NOTES
[address] ED  eMERGENCY dEPARTMENT eNCOUnter    Pt Name: Marianne Ruiz  MRN: [id number]  [place] 1975  Date of evaluation: 18  CHIEF COMPLAINT       Chief Complaint   Patient presents with    Shoulder Pain     right    Chest Pain     HISTORY OF PRESENT ILLNESS   HPI  29-year-old female with past medical history as listed below presents the ER with right anterior chest wall pain and shoulder pain. Patient states his symptoms been ongoing for several days. Pt states that the pain is made worse with palpation. Patient denies sharp pleuritic pain, shortness of breath, nausea or vomiting. Patient denies recent URI or cough. REVIEW OF SYSTEMS     Review of Systems   All other systems reviewed and are negative.     PAST MEDICAL HISTORY     Past Medical History:   Diagnosis Date    Fibromyalgia     Headache     migraines    Hyperlipidemia     Hypertension     Hypothyroidism     Lupus     Thyroid disease      SURGICAL HISTORY       Past Surgical History:   Procedure Laterality Date     SECTION      FOOT SURGERY      right    HYSTERECTOMY       CURRENT MEDICATIONS       Discharge Medication List as of 2018  5:46 AM      CONTINUE these medications which have NOT CHANGED    Details   acetaminophen (APAP EXTRA STRENGTH) 500 MG tablet Take 2 tablets by mouth every 8 hours as needed for Pain, Disp-30 tablet, R-0Print      valsartan-hydrochlorothiazide (DIOVAN HCT) 160-12.5 MG per tablet Take 1 tablet by mouth daily, Disp-30 tablet, R-3Normal      fluticasone (FLONASE) 50 MCG/ACT nasal spray 1 spray by Nasal route daily, Disp-1 Bottle, R-0Normal      loratadine (CLARITIN) 10 MG tablet Take 1 tablet by mouth daily, Disp-14 tablet, R-0Normal      albuterol sulfate HFA (PROVENTIL HFA) 108 (90 Base) MCG/ACT inhaler Inhale 2 puffs into the lungs every 6 hours as needed for Wheezing or Shortness of Breath, Disp-1 Inhaler, R-0Print           ALLERGIES     is allergic to bactrim [sulfamethoxazole-trimethoprim]. FAMILY HISTORY     indicated that the status of her mother is unknown. She indicated that the status of her father is unknown. She indicated that the status of her maternal grandmother is unknown. She indicated that the status of her maternal grandfather is unknown. She indicated that the status of her paternal grandmother is unknown. She indicated that the status of her paternal grandfather is unknown. SOCIAL HISTORY      reports that she has been smoking Cigarettes. She has a 23.00 pack-year smoking history. She has never used smokeless tobacco. She reports that she does not drink alcohol or use drugs. PHYSICAL EXAM     INITIAL VITALS: /86   Pulse 77   Temp 97.7 °F (36.5 °C) (Oral)   Resp 18   Ht 5' 4\" (1.626 m)   Wt 130 lb (59 kg)   SpO2 96%   BMI 22.31 kg/m²    Physical Exam   Constitutional: She is oriented to person, place, and time. She appears well-developed and well-nourished. No distress. HENT:   Head: Normocephalic and atraumatic. Nose: Nose normal.   Mouth/Throat: Oropharynx is clear and moist.   Eyes: Conjunctivae and EOM are normal. Pupils are equal, round, and reactive to light. Neck: Normal range of motion. Neck supple. Cardiovascular: Normal rate, regular rhythm, normal heart sounds and intact distal pulses. Exam reveals no gallop and no friction rub. No murmur heard. Pulmonary/Chest: Effort normal and breath sounds normal. No stridor. No respiratory distress. She has no wheezes. She has no rales. She exhibits tenderness. Abdominal: Soft. Bowel sounds are normal. She exhibits no distension. There is no tenderness. There is no rebound and no guarding. Musculoskeletal: Normal range of motion. She exhibits no edema, tenderness or deformity. Neurological: She is alert and oriented to person, place, and time. No cranial nerve deficit. Skin: Skin is warm and dry. No rash noted. She is not diaphoretic. No erythema.    Psychiatric: She has a normal mood and affect. Her behavior is normal. Judgment and thought content normal.   Nursing note and vitals reviewed. MEDICAL DECISION MAKING:   [facility]    Patient with mild improvement of symptoms. Informed patient to follow-up with primary care physician. Provided patient list.    Procedures    DIAGNOSTIC RESULTS   EKG: All EKG's are interpreted by the Emergency Department Physician who either signs or Co-signs this chart in the absence of a cardiologist.    Sinus @ 81, , QRS 94, QTc 446. Nonspecific ST changes. Abnormal EKG. RADIOLOGY:All plain film, CT, MRI, and formal ultrasound images (except ED bedside ultrasound) are read by the radiologist, see reports below, unless otherwise noted in MDM or here. XR CHEST STANDARD (2 VW)   Final Result   No radiographic evidence of acute cardiopulmonary disease. LABS: All lab results were reviewed by myself, and all abnormals are listed below. Labs Reviewed - No data to display  EMERGENCY DEPARTMENT COURSE:     Vitals:    Vitals:    02/20/18 0515   BP: 133/86   Pulse: 77   Resp: 18   Temp: 97.7 °F (36.5 °C)   TempSrc: Oral   SpO2: 96%   Weight: 130 lb (59 kg)   Height: 5' 4\" (1.626 m)     The patient was given the following medications while in the emergency department:  Orders Placed This Encounter   Medications    DISCONTD: ketorolac (TORADOL) injection 30 mg    acetaminophen-codeine (TYLENOL #3) 300-30 MG per tablet 1 tablet     Order Specific Question:   Please select a reason the therapeutic interchange was not accepted: Answer:   Bernadette Anderson for Pharmacy to [address] [place] [address]: sodium chloride (OCEAN, BABY AYR) 0.65 % nasal spray 1 spray    acetaminophen-codeine (TYLENOL/CODEINE #3) 300-30 MG per tablet     Sig: Take 1 tablet by mouth every 6 hours as needed for Pain for up to 3 days . Take lowest dose possible to manage pain.      Dispense:  12 tablet     Refill:  0    ibuprofen (ADVIL;MOTRIN) 600 MG tablet     Sig: Take 1

## 2018-04-27 ENCOUNTER — TELEPHONE (OUTPATIENT)
Dept: INTERNAL MEDICINE | Age: 43
End: 2018-04-27

## 2018-07-06 ENCOUNTER — TELEPHONE (OUTPATIENT)
Dept: INTERNAL MEDICINE | Age: 43
End: 2018-07-06

## 2018-07-27 ENCOUNTER — HOSPITAL ENCOUNTER (EMERGENCY)
Age: 43
Discharge: HOME | End: 2018-07-27
Attending: EMERGENCY MEDICINE
Payer: MEDICARE

## 2018-07-27 ENCOUNTER — APPOINTMENT (OUTPATIENT)
Dept: GENERAL RADIOLOGY | Age: 43
End: 2018-07-27
Payer: MEDICARE

## 2018-07-27 VITALS
HEART RATE: 80 BPM | TEMPERATURE: 98.6 F | SYSTOLIC BLOOD PRESSURE: 128 MMHG | OXYGEN SATURATION: 98 % | BODY MASS INDEX: 22.31 KG/M2 | RESPIRATION RATE: 16 BRPM | WEIGHT: 130 LBS | DIASTOLIC BLOOD PRESSURE: 87 MMHG

## 2018-07-27 DIAGNOSIS — M79.672 LEFT FOOT PAIN: Primary | ICD-10-CM

## 2018-07-27 PROCEDURE — 73630 X-RAY EXAM OF FOOT: CPT

## 2018-07-27 PROCEDURE — 96372 THER/PROPH/DIAG INJ SC/IM: CPT

## 2018-07-27 PROCEDURE — 6360000002 HC RX W HCPCS: Performed by: PHYSICIAN ASSISTANT

## 2018-07-27 PROCEDURE — 99283 EMERGENCY DEPT VISIT LOW MDM: CPT

## 2018-07-27 RX ORDER — KETOROLAC TROMETHAMINE 30 MG/ML
30 INJECTION, SOLUTION INTRAMUSCULAR; INTRAVENOUS ONCE
Status: COMPLETED | OUTPATIENT
Start: 2018-07-27 | End: 2018-07-27

## 2018-07-27 RX ORDER — HYDROCODONE BITARTRATE AND ACETAMINOPHEN 5; 325 MG/1; MG/1
1 TABLET ORAL EVERY 8 HOURS PRN
Qty: 6 TABLET | Refills: 0 | Status: SHIPPED | OUTPATIENT
Start: 2018-07-27 | End: 2018-07-29

## 2018-07-27 RX ORDER — IBUPROFEN 800 MG/1
800 TABLET ORAL EVERY 8 HOURS PRN
Qty: 20 TABLET | Refills: 0 | Status: SHIPPED | OUTPATIENT
Start: 2018-07-27 | End: 2019-07-09

## 2018-07-27 RX ADMIN — KETOROLAC TROMETHAMINE 30 MG: 30 INJECTION, SOLUTION INTRAMUSCULAR at 12:07

## 2018-07-27 ASSESSMENT — ENCOUNTER SYMPTOMS
VOMITING: 0
BACK PAIN: 0
WHEEZING: 0
COLOR CHANGE: 0
COUGH: 0
NAUSEA: 0

## 2018-07-27 ASSESSMENT — PAIN SCALES - GENERAL
PAINLEVEL_OUTOF10: 10
PAINLEVEL_OUTOF10: 10

## 2018-07-27 ASSESSMENT — PAIN DESCRIPTION - PAIN TYPE: TYPE: ACUTE PAIN

## 2018-07-27 ASSESSMENT — PAIN DESCRIPTION - ORIENTATION: ORIENTATION: LEFT

## 2018-07-27 ASSESSMENT — PAIN DESCRIPTION - LOCATION: LOCATION: FOOT

## 2018-07-27 NOTE — ED PROVIDER NOTES
[facility]  ED  Emergency Department Encounter  Mid Level Provider     Pt Name: Anastacio Joya  MRN: [id number]  [place] 1975  Date of evaluation: 18  PCP:  No primary care provider on file. CHIEF COMPLAINT       Chief Complaint   Patient presents with    Foot Injury     left foot, day before yesterday, rolled it then dropped something on it. HISTORY OF PRESENT ILLNESS  (Location/Symptom, Timing/Onset, Context/Setting, Quality, Duration, Modifying Factors, Severity.)      Anastacio Joya is a 36 y.o. female who presents with Left foot pain. Patient states that over the past week she has injured her left foot multiple times. The first time was at work when she dropped a car part on it, then she accidentally kicked a table while cleaning at home and then finally yesterday she hit it on a car door. Patient has not been taking anything for the pain. She denies any history of previous fracture. Patient has noted some swelling to the area. She does have a history of bunions. No fevers or chills. The pain is worse when she is walking. Patient reports that she has been told that she has gout in the past but then she was told that it wasn't really doubt that it was arthritis. PAST MEDICAL / SURGICAL / SOCIAL / FAMILY HISTORY      has a past medical history of Fibromyalgia; Headache; Hyperlipidemia; Hypertension; Hypothyroidism; Lupus; and Thyroid disease. has a past surgical history that includes  section; Foot surgery; and Hysterectomy. Social History     Social History    Marital status: Single     Spouse name: N/A    Number of children: N/A    Years of education: N/A     Occupational History    Not on file.      Social History Main Topics    Smoking status: Current Every Day Smoker     Packs/day: 1.00     Years: 23.00     Types: Cigarettes    Smokeless tobacco: Never Used    Alcohol use No    Drug use: No    Sexual activity: Not on file     Other Topics Concern    Not on file     Social History Narrative    No narrative on file       Family History   Problem Relation Age of Onset    High Blood Pressure Mother     Heart Disease Father     Stroke Maternal Grandmother     High Blood Pressure Maternal Grandfather     Heart Disease Paternal Grandmother     Heart Disease Paternal Grandfather        Allergies:  Bactrim [sulfamethoxazole-trimethoprim]    Home Medications:  Prior to Admission medications    Medication Sig Start Date End Date Taking? Authorizing Provider   ibuprofen (ADVIL;MOTRIN) 800 MG tablet Take 1 tablet by mouth every 8 hours as needed for Pain 7/27/18  Yes Romana Hoist, PA-C   HYDROcodone-acetaminophen (NORCO) 5-325 MG per tablet Take 1 tablet by mouth every 8 hours as needed for Pain for up to 2 days. . 7/27/18 7/29/18 Yes Romana Hoist, PA-C   valsartan-hydrochlorothiazide (DIOVAN HCT) 160-12.5 MG per tablet Take 1 tablet by mouth daily 1/29/18   Nishit Cosette Gosselin, MD   fluticasone (FLONASE) 50 MCG/ACT nasal spray 1 spray by Nasal route daily 1/29/18   Nishit Cosette Gosselin, MD   loratadine (CLARITIN) 10 MG tablet Take 1 tablet by mouth daily 1/29/18   Manuel Cool MD   albuterol sulfate HFA (PROVENTIL HFA) 108 (90 Base) MCG/ACT inhaler Inhale 2 puffs into the lungs every 6 hours as needed for Wheezing or Shortness of Breath 9/18/17   EFRAIN Faulkner CNP       patient's medication list has been reviewed as entered by the nursing staff. REVIEW OF SYSTEMS    (2-9 systems for level 4, 10 or more for level 5)      Review of Systems   Constitutional: Negative for chills and fever. Respiratory: Negative for cough and wheezing. Cardiovascular: Negative for chest pain and palpitations. Gastrointestinal: Negative for nausea and vomiting. Musculoskeletal: Positive for joint swelling and myalgias. Negative for back pain. Skin: Negative for color change and wound.        PHYSICAL EXAM   (up to 7 for level 4, 8 or more for level 5)      INITIAL VITALS:  weight is 130 lb (59 kg). Her temperature is 98.6 °F (37 °C). Her blood pressure is 128/87 and her pulse is 80. Her respiration is 16 and oxygen saturation is 98%. Physical Exam   Constitutional: She is oriented to person, place, and time. She appears well-developed and well-nourished. HENT:   Head: Normocephalic and atraumatic. Right Ear: External ear normal.   Left Ear: External ear normal.   Eyes: Right eye exhibits no discharge. Left eye exhibits no discharge. No scleral icterus. Neck: No tracheal deviation present. Pulmonary/Chest: Effort normal. No stridor. No respiratory distress. Musculoskeletal: Normal range of motion. She exhibits no edema. Over the MTP joint of the left great toe there is swelling with erythema. There is pain on palpation of this area. There is no pain on palpation of the rest of the foot or ankle. There is no lymphangitis. There is no swelling to the plantar surface of the foot. There are no lesions or ulcerations between the toes. Dorsalis pedis pulses palpable. Neurological: She is alert and oriented to person, place, and time. Coordination normal.   Skin: Skin is warm and dry. She is not diaphoretic. Psychiatric: She has a normal mood and affect. Her behavior is normal.       DIFFERENTIAL  DIAGNOSIS       Gout, fracture, sprain, contusion    PLAN (LABS / IMAGING / EKG):  Orders Placed This Encounter   Procedures    XR FOOT LEFT (MIN 3 VIEWS)    Post-op shoe       MEDICATIONS ORDERED:  Orders Placed This Encounter   Medications    ibuprofen (ADVIL;MOTRIN) 800 MG tablet     Sig: Take 1 tablet by mouth every 8 hours as needed for Pain     Dispense:  20 tablet     Refill:  0    HYDROcodone-acetaminophen (NORCO) 5-325 MG per tablet     Sig: Take 1 tablet by mouth every 8 hours as needed for Pain for up to 2 days. .     Dispense:  6 tablet     Refill:  0    ketorolac (TORADOL) injection 30 mg       Controlled Substances Monitoring:

## 2018-07-27 NOTE — ED TRIAGE NOTES
Patient arrived to unit with complaints of left foot injury,  Reports rolling foot day before yesterday, and then dropping a heavy objet on it. 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 Patient denies this should be workers comp. Alert and oriented, and ambulated to unit. Gait steady.

## 2018-09-18 DIAGNOSIS — I10 ESSENTIAL HYPERTENSION: ICD-10-CM

## 2018-09-19 RX ORDER — VALSARTAN AND HYDROCHLOROTHIAZIDE 160; 12.5 MG/1; MG/1
TABLET, FILM COATED ORAL
Qty: 30 TABLET | Refills: 0 | Status: SHIPPED | OUTPATIENT
Start: 2018-09-19 | End: 2021-09-03

## 2019-02-06 ENCOUNTER — APPOINTMENT (OUTPATIENT)
Dept: GENERAL RADIOLOGY | Age: 44
End: 2019-02-06
Payer: MEDICAID

## 2019-02-06 ENCOUNTER — HOSPITAL ENCOUNTER (EMERGENCY)
Age: 44
Discharge: HOME | End: 2019-02-06
Attending: EMERGENCY MEDICINE
Payer: MEDICAID

## 2019-02-06 VITALS
TEMPERATURE: 96.8 F | OXYGEN SATURATION: 99 % | SYSTOLIC BLOOD PRESSURE: 146 MMHG | BODY MASS INDEX: 22.31 KG/M2 | HEIGHT: 64 IN | DIASTOLIC BLOOD PRESSURE: 98 MMHG | HEART RATE: 84 BPM | RESPIRATION RATE: 16 BRPM

## 2019-02-06 DIAGNOSIS — R10.11 ABDOMINAL PAIN, RIGHT UPPER QUADRANT: ICD-10-CM

## 2019-02-06 DIAGNOSIS — R07.89 CHEST WALL PAIN: Primary | ICD-10-CM

## 2019-02-06 LAB
ABSOLUTE EOS #: 0.09 K/UL (ref 0–0.44)
ABSOLUTE IMMATURE GRANULOCYTE: <0.03 K/UL (ref 0–0.3)
ABSOLUTE LYMPH #: 1.95 K/UL (ref 1.1–3.7)
ABSOLUTE MONO #: 0.47 K/UL (ref 0.1–1.2)
ALBUMIN SERPL-MCNC: 4.3 G/DL (ref 3.5–5.2)
ALBUMIN/GLOBULIN RATIO: 1.3 (ref 1–2.5)
ALP BLD-CCNC: 67 U/L (ref 35–104)
ALT SERPL-CCNC: 12 U/L (ref 5–33)
ANION GAP SERPL CALCULATED.3IONS-SCNC: 12 MMOL/L (ref 9–17)
AST SERPL-CCNC: 18 U/L
BASOPHILS # BLD: 1 % (ref 0–2)
BASOPHILS ABSOLUTE: 0.04 K/UL (ref 0–0.2)
BILIRUB SERPL-MCNC: 0.19 MG/DL (ref 0.3–1.2)
BUN BLDV-MCNC: 9 MG/DL (ref 6–20)
BUN/CREAT BLD: ABNORMAL (ref 9–20)
CALCIUM SERPL-MCNC: 9.7 MG/DL (ref 8.6–10.4)
CHLORIDE BLD-SCNC: 105 MMOL/L (ref 98–107)
CO2: 22 MMOL/L (ref 20–31)
CREAT SERPL-MCNC: 0.68 MG/DL (ref 0.5–0.9)
DIFFERENTIAL TYPE: ABNORMAL
EKG ATRIAL RATE: 67 BPM
EKG P AXIS: 62 DEGREES
EKG P-R INTERVAL: 172 MS
EKG Q-T INTERVAL: 412 MS
EKG QRS DURATION: 94 MS
EKG QTC CALCULATION (BAZETT): 435 MS
EKG R AXIS: 50 DEGREES
EKG T AXIS: 25 DEGREES
EKG VENTRICULAR RATE: 67 BPM
EOSINOPHILS RELATIVE PERCENT: 2 % (ref 1–4)
GFR AFRICAN AMERICAN: >60 ML/MIN
GFR NON-AFRICAN AMERICAN: >60 ML/MIN
GFR SERPL CREATININE-BSD FRML MDRD: ABNORMAL ML/MIN/{1.73_M2}
GFR SERPL CREATININE-BSD FRML MDRD: ABNORMAL ML/MIN/{1.73_M2}
GLUCOSE BLD-MCNC: 94 MG/DL (ref 70–99)
HCT VFR BLD CALC: 41.2 % (ref 36.3–47.1)
HEMOGLOBIN: 13.4 G/DL (ref 11.9–15.1)
IMMATURE GRANULOCYTES: 0 %
LYMPHOCYTES # BLD: 38 % (ref 24–43)
MCH RBC QN AUTO: 29.6 PG (ref 25.2–33.5)
MCHC RBC AUTO-ENTMCNC: 32.5 G/DL (ref 28.4–34.8)
MCV RBC AUTO: 90.9 FL (ref 82.6–102.9)
MONOCYTES # BLD: 9 % (ref 3–12)
NRBC AUTOMATED: 0 PER 100 WBC
PDW BLD-RTO: 13.3 % (ref 11.8–14.4)
PLATELET # BLD: 137 K/UL (ref 138–453)
PLATELET ESTIMATE: ABNORMAL
PMV BLD AUTO: 12.7 FL (ref 8.1–13.5)
POTASSIUM SERPL-SCNC: 3.8 MMOL/L (ref 3.7–5.3)
RBC # BLD: 4.53 M/UL (ref 3.95–5.11)
RBC # BLD: ABNORMAL 10*6/UL
SEG NEUTROPHILS: 50 % (ref 36–65)
SEGMENTED NEUTROPHILS ABSOLUTE COUNT: 2.64 K/UL (ref 1.5–8.1)
SODIUM BLD-SCNC: 139 MMOL/L (ref 135–144)
TOTAL PROTEIN: 7.7 G/DL (ref 6.4–8.3)
TROPONIN INTERP: NORMAL
TROPONIN T: NORMAL NG/ML
TROPONIN, HIGH SENSITIVITY: <6 NG/L (ref 0–14)
WBC # BLD: 5.2 K/UL (ref 3.5–11.3)
WBC # BLD: ABNORMAL 10*3/UL

## 2019-02-06 PROCEDURE — 85025 COMPLETE CBC W/AUTO DIFF WBC: CPT

## 2019-02-06 PROCEDURE — 6360000002 HC RX W HCPCS: Performed by: STUDENT IN AN ORGANIZED HEALTH CARE EDUCATION/TRAINING PROGRAM

## 2019-02-06 PROCEDURE — 99285 EMERGENCY DEPT VISIT HI MDM: CPT

## 2019-02-06 PROCEDURE — 93005 ELECTROCARDIOGRAM TRACING: CPT

## 2019-02-06 PROCEDURE — 71046 X-RAY EXAM CHEST 2 VIEWS: CPT

## 2019-02-06 PROCEDURE — 80053 COMPREHEN METABOLIC PANEL: CPT

## 2019-02-06 PROCEDURE — 96374 THER/PROPH/DIAG INJ IV PUSH: CPT

## 2019-02-06 PROCEDURE — 6370000000 HC RX 637 (ALT 250 FOR IP): Performed by: STUDENT IN AN ORGANIZED HEALTH CARE EDUCATION/TRAINING PROGRAM

## 2019-02-06 PROCEDURE — 84484 ASSAY OF TROPONIN QUANT: CPT

## 2019-02-06 RX ORDER — CYCLOBENZAPRINE HCL 5 MG
5 TABLET ORAL 2 TIMES DAILY PRN
Qty: 5 TABLET | Refills: 0 | Status: SHIPPED | OUTPATIENT
Start: 2019-02-06 | End: 2019-02-08

## 2019-02-06 RX ORDER — ACETAMINOPHEN 325 MG/1
650 TABLET ORAL ONCE
Status: COMPLETED | OUTPATIENT
Start: 2019-02-06 | End: 2019-02-06

## 2019-02-06 RX ORDER — KETOROLAC TROMETHAMINE 30 MG/ML
30 INJECTION, SOLUTION INTRAMUSCULAR; INTRAVENOUS ONCE
Status: COMPLETED | OUTPATIENT
Start: 2019-02-06 | End: 2019-02-06

## 2019-02-06 RX ORDER — CYCLOBENZAPRINE HCL 10 MG
10 TABLET ORAL ONCE
Status: COMPLETED | OUTPATIENT
Start: 2019-02-06 | End: 2019-02-06

## 2019-02-06 RX ADMIN — ACETAMINOPHEN 650 MG: 325 TABLET ORAL at 03:20

## 2019-02-06 RX ADMIN — CYCLOBENZAPRINE HYDROCHLORIDE 10 MG: 10 TABLET, FILM COATED ORAL at 03:46

## 2019-02-06 RX ADMIN — KETOROLAC TROMETHAMINE 30 MG: 30 INJECTION INTRAMUSCULAR; INTRAVENOUS at 03:20

## 2019-02-06 ASSESSMENT — ENCOUNTER SYMPTOMS
BACK PAIN: 0
ABDOMINAL PAIN: 1
COUGH: 1
VOMITING: 0
SHORTNESS OF BREATH: 0
NAUSEA: 0
RHINORRHEA: 0
WHEEZING: 0
PHOTOPHOBIA: 0

## 2019-02-06 ASSESSMENT — PAIN SCALES - GENERAL
PAINLEVEL_OUTOF10: 10
PAINLEVEL_OUTOF10: 10

## 2019-02-06 ASSESSMENT — PAIN DESCRIPTION - LOCATION: LOCATION: CHEST

## 2019-02-06 ASSESSMENT — PAIN DESCRIPTION - PAIN TYPE: TYPE: ACUTE PAIN

## 2019-02-06 ASSESSMENT — PAIN DESCRIPTION - DESCRIPTORS: DESCRIPTORS: TIGHTNESS;ACHING

## 2019-02-20 ENCOUNTER — APPOINTMENT (OUTPATIENT)
Dept: GENERAL RADIOLOGY | Age: 44
End: 2019-02-20
Payer: MEDICARE

## 2019-02-20 ENCOUNTER — HOSPITAL ENCOUNTER (EMERGENCY)
Age: 44
Discharge: HOME | End: 2019-02-20
Attending: EMERGENCY MEDICINE
Payer: MEDICARE

## 2019-02-20 VITALS
SYSTOLIC BLOOD PRESSURE: 163 MMHG | DIASTOLIC BLOOD PRESSURE: 96 MMHG | HEIGHT: 64 IN | HEART RATE: 73 BPM | BODY MASS INDEX: 23.9 KG/M2 | RESPIRATION RATE: 18 BRPM | OXYGEN SATURATION: 99 % | TEMPERATURE: 98.5 F | WEIGHT: 140 LBS

## 2019-02-20 DIAGNOSIS — N39.0 URINARY TRACT INFECTION WITHOUT HEMATURIA, SITE UNSPECIFIED: ICD-10-CM

## 2019-02-20 DIAGNOSIS — R07.9 CHEST PAIN, UNSPECIFIED TYPE: Primary | ICD-10-CM

## 2019-02-20 LAB
-: ABNORMAL
AMORPHOUS: ABNORMAL
ANION GAP SERPL CALCULATED.3IONS-SCNC: 13 MMOL/L (ref 9–17)
BACTERIA: ABNORMAL
BILIRUBIN URINE: NEGATIVE
BUN BLDV-MCNC: 14 MG/DL (ref 6–20)
BUN/CREAT BLD: ABNORMAL (ref 9–20)
CALCIUM SERPL-MCNC: 9.4 MG/DL (ref 8.6–10.4)
CASTS UA: ABNORMAL /LPF (ref 0–2)
CASTS UA: ABNORMAL /LPF (ref 0–2)
CHLORIDE BLD-SCNC: 107 MMOL/L (ref 98–107)
CO2: 20 MMOL/L (ref 20–31)
COLOR: YELLOW
COMMENT UA: ABNORMAL
CREAT SERPL-MCNC: 0.69 MG/DL (ref 0.5–0.9)
CRYSTALS, UA: ABNORMAL /HPF
DIRECT EXAM: NORMAL
EPITHELIAL CELLS UA: ABNORMAL /HPF (ref 0–5)
GFR AFRICAN AMERICAN: >60 ML/MIN
GFR NON-AFRICAN AMERICAN: >60 ML/MIN
GFR SERPL CREATININE-BSD FRML MDRD: ABNORMAL ML/MIN/{1.73_M2}
GFR SERPL CREATININE-BSD FRML MDRD: ABNORMAL ML/MIN/{1.73_M2}
GLUCOSE BLD-MCNC: 102 MG/DL (ref 70–99)
GLUCOSE URINE: NEGATIVE
HCG(URINE) PREGNANCY TEST: NEGATIVE
HCT VFR BLD CALC: 41.4 % (ref 36.3–47.1)
HEMOGLOBIN: 13.6 G/DL (ref 11.9–15.1)
KETONES, URINE: NEGATIVE
LEUKOCYTE ESTERASE, URINE: ABNORMAL
Lab: NORMAL
MCH RBC QN AUTO: 29.6 PG (ref 25.2–33.5)
MCHC RBC AUTO-ENTMCNC: 32.9 G/DL (ref 28.4–34.8)
MCV RBC AUTO: 90.2 FL (ref 82.6–102.9)
MUCUS: ABNORMAL
NITRITE, URINE: NEGATIVE
NRBC AUTOMATED: 0 PER 100 WBC
OTHER OBSERVATIONS UA: ABNORMAL
PDW BLD-RTO: 13.9 % (ref 11.8–14.4)
PH UA: 5.5 (ref 5–8)
PLATELET # BLD: 181 K/UL (ref 138–453)
PMV BLD AUTO: 12.7 FL (ref 8.1–13.5)
POTASSIUM SERPL-SCNC: 4 MMOL/L (ref 3.7–5.3)
PROTEIN UA: NEGATIVE
RBC # BLD: 4.59 M/UL (ref 3.95–5.11)
RBC UA: ABNORMAL /HPF (ref 0–2)
RENAL EPITHELIAL, UA: ABNORMAL /HPF
SODIUM BLD-SCNC: 140 MMOL/L (ref 135–144)
SPECIFIC GRAVITY UA: 1.02 (ref 1–1.03)
SPECIMEN DESCRIPTION: NORMAL
TRICHOMONAS: ABNORMAL
TROPONIN INTERP: NORMAL
TROPONIN T: NORMAL NG/ML
TROPONIN, HIGH SENSITIVITY: <6 NG/L (ref 0–14)
TURBIDITY: CLEAR
URINE HGB: NEGATIVE
UROBILINOGEN, URINE: NORMAL
WBC # BLD: 3.5 K/UL (ref 3.5–11.3)
WBC UA: ABNORMAL /HPF (ref 0–5)
YEAST: ABNORMAL

## 2019-02-20 PROCEDURE — 84484 ASSAY OF TROPONIN QUANT: CPT

## 2019-02-20 PROCEDURE — 80048 BASIC METABOLIC PNL TOTAL CA: CPT

## 2019-02-20 PROCEDURE — 93005 ELECTROCARDIOGRAM TRACING: CPT

## 2019-02-20 PROCEDURE — 2580000003 HC RX 258: Performed by: EMERGENCY MEDICINE

## 2019-02-20 PROCEDURE — 99284 EMERGENCY DEPT VISIT MOD MDM: CPT

## 2019-02-20 PROCEDURE — 6370000000 HC RX 637 (ALT 250 FOR IP): Performed by: EMERGENCY MEDICINE

## 2019-02-20 PROCEDURE — 87804 INFLUENZA ASSAY W/OPTIC: CPT

## 2019-02-20 PROCEDURE — 84703 CHORIONIC GONADOTROPIN ASSAY: CPT

## 2019-02-20 PROCEDURE — 71046 X-RAY EXAM CHEST 2 VIEWS: CPT

## 2019-02-20 PROCEDURE — 81001 URINALYSIS AUTO W/SCOPE: CPT

## 2019-02-20 PROCEDURE — 85027 COMPLETE CBC AUTOMATED: CPT

## 2019-02-20 RX ORDER — ACETAMINOPHEN 500 MG
1000 TABLET ORAL ONCE
Status: COMPLETED | OUTPATIENT
Start: 2019-02-20 | End: 2019-02-20

## 2019-02-20 RX ORDER — CEPHALEXIN 500 MG/1
500 CAPSULE ORAL 4 TIMES DAILY
Qty: 28 CAPSULE | Refills: 0 | Status: SHIPPED | OUTPATIENT
Start: 2019-02-20 | End: 2019-02-27

## 2019-02-20 RX ORDER — CEPHALEXIN 500 MG/1
500 CAPSULE ORAL ONCE
Status: COMPLETED | OUTPATIENT
Start: 2019-02-20 | End: 2019-02-20

## 2019-02-20 RX ORDER — 0.9 % SODIUM CHLORIDE 0.9 %
1000 INTRAVENOUS SOLUTION INTRAVENOUS ONCE
Status: COMPLETED | OUTPATIENT
Start: 2019-02-20 | End: 2019-02-20

## 2019-02-20 RX ADMIN — SODIUM CHLORIDE 1000 ML: 9 INJECTION, SOLUTION INTRAVENOUS at 08:22

## 2019-02-20 RX ADMIN — ACETAMINOPHEN 1000 MG: 500 TABLET ORAL at 07:19

## 2019-02-20 RX ADMIN — CEPHALEXIN 500 MG: 500 CAPSULE ORAL at 08:53

## 2019-02-20 ASSESSMENT — ENCOUNTER SYMPTOMS
ABDOMINAL DISTENTION: 0
WHEEZING: 0
RHINORRHEA: 0
COUGH: 1
SHORTNESS OF BREATH: 0
VOMITING: 0
SORE THROAT: 0
NAUSEA: 1
CONSTIPATION: 0
DIARRHEA: 1

## 2019-02-20 ASSESSMENT — HEART SCORE
ECG: 0
ECG: 0

## 2019-02-20 ASSESSMENT — PAIN DESCRIPTION - LOCATION: LOCATION: CHEST

## 2019-02-20 ASSESSMENT — PAIN DESCRIPTION - PAIN TYPE: TYPE: ACUTE PAIN

## 2019-02-20 ASSESSMENT — PAIN SCALES - GENERAL
PAINLEVEL_OUTOF10: 10
PAINLEVEL_OUTOF10: 7

## 2019-02-21 LAB
EKG ATRIAL RATE: 72 BPM
EKG P AXIS: 69 DEGREES
EKG P-R INTERVAL: 184 MS
EKG Q-T INTERVAL: 422 MS
EKG QRS DURATION: 90 MS
EKG QTC CALCULATION (BAZETT): 462 MS
EKG R AXIS: 61 DEGREES
EKG T AXIS: 32 DEGREES
EKG VENTRICULAR RATE: 72 BPM

## 2019-03-17 ENCOUNTER — APPOINTMENT (OUTPATIENT)
Dept: GENERAL RADIOLOGY | Age: 44
End: 2019-03-17
Payer: MEDICARE

## 2019-03-17 ENCOUNTER — HOSPITAL ENCOUNTER (EMERGENCY)
Age: 44
Discharge: HOME | End: 2019-03-18
Attending: EMERGENCY MEDICINE
Payer: MEDICARE

## 2019-03-17 VITALS
RESPIRATION RATE: 20 BRPM | SYSTOLIC BLOOD PRESSURE: 103 MMHG | HEART RATE: 75 BPM | DIASTOLIC BLOOD PRESSURE: 68 MMHG | OXYGEN SATURATION: 99 % | TEMPERATURE: 98.1 F

## 2019-03-17 DIAGNOSIS — R07.89 CHEST WALL PAIN: Primary | ICD-10-CM

## 2019-03-17 LAB
ABSOLUTE EOS #: 0.04 K/UL (ref 0–0.44)
ABSOLUTE IMMATURE GRANULOCYTE: <0.03 K/UL (ref 0–0.3)
ABSOLUTE LYMPH #: 1.92 K/UL (ref 1.1–3.7)
ABSOLUTE MONO #: 0.51 K/UL (ref 0.1–1.2)
ANION GAP SERPL CALCULATED.3IONS-SCNC: 15 MMOL/L (ref 9–17)
BASOPHILS # BLD: 1 % (ref 0–2)
BASOPHILS ABSOLUTE: 0.03 K/UL (ref 0–0.2)
BUN BLDV-MCNC: 23 MG/DL (ref 6–20)
BUN/CREAT BLD: ABNORMAL (ref 9–20)
CALCIUM SERPL-MCNC: 9.4 MG/DL (ref 8.6–10.4)
CHLORIDE BLD-SCNC: 102 MMOL/L (ref 98–107)
CO2: 19 MMOL/L (ref 20–31)
CREAT SERPL-MCNC: 0.84 MG/DL (ref 0.5–0.9)
D-DIMER QUANTITATIVE: 0.33 MG/L FEU
DIFFERENTIAL TYPE: ABNORMAL
EOSINOPHILS RELATIVE PERCENT: 1 % (ref 1–4)
GFR AFRICAN AMERICAN: >60 ML/MIN
GFR NON-AFRICAN AMERICAN: >60 ML/MIN
GFR SERPL CREATININE-BSD FRML MDRD: ABNORMAL ML/MIN/{1.73_M2}
GFR SERPL CREATININE-BSD FRML MDRD: ABNORMAL ML/MIN/{1.73_M2}
GLUCOSE BLD-MCNC: 84 MG/DL (ref 70–99)
HCT VFR BLD CALC: 39.4 % (ref 36.3–47.1)
HEMOGLOBIN: 13 G/DL (ref 11.9–15.1)
IMMATURE GRANULOCYTES: 0 %
LYMPHOCYTES # BLD: 34 % (ref 24–43)
MCH RBC QN AUTO: 30.4 PG (ref 25.2–33.5)
MCHC RBC AUTO-ENTMCNC: 33 G/DL (ref 28.4–34.8)
MCV RBC AUTO: 92.1 FL (ref 82.6–102.9)
MONOCYTES # BLD: 9 % (ref 3–12)
NRBC AUTOMATED: 0 PER 100 WBC
PDW BLD-RTO: 13.7 % (ref 11.8–14.4)
PLATELET # BLD: 136 K/UL (ref 138–453)
PLATELET ESTIMATE: ABNORMAL
PMV BLD AUTO: 13.6 FL (ref 8.1–13.5)
POTASSIUM SERPL-SCNC: 4.1 MMOL/L (ref 3.7–5.3)
RBC # BLD: 4.28 M/UL (ref 3.95–5.11)
RBC # BLD: ABNORMAL 10*6/UL
SEG NEUTROPHILS: 56 % (ref 36–65)
SEGMENTED NEUTROPHILS ABSOLUTE COUNT: 3.17 K/UL (ref 1.5–8.1)
SODIUM BLD-SCNC: 136 MMOL/L (ref 135–144)
TROPONIN INTERP: NORMAL
TROPONIN T: NORMAL NG/ML
TROPONIN, HIGH SENSITIVITY: <6 NG/L (ref 0–14)
WBC # BLD: 5.7 K/UL (ref 3.5–11.3)
WBC # BLD: ABNORMAL 10*3/UL

## 2019-03-17 PROCEDURE — 80048 BASIC METABOLIC PNL TOTAL CA: CPT

## 2019-03-17 PROCEDURE — 6370000000 HC RX 637 (ALT 250 FOR IP): Performed by: EMERGENCY MEDICINE

## 2019-03-17 PROCEDURE — 93005 ELECTROCARDIOGRAM TRACING: CPT

## 2019-03-17 PROCEDURE — 85025 COMPLETE CBC W/AUTO DIFF WBC: CPT

## 2019-03-17 PROCEDURE — 99285 EMERGENCY DEPT VISIT HI MDM: CPT

## 2019-03-17 PROCEDURE — 84484 ASSAY OF TROPONIN QUANT: CPT

## 2019-03-17 PROCEDURE — 71046 X-RAY EXAM CHEST 2 VIEWS: CPT

## 2019-03-17 PROCEDURE — 85379 FIBRIN DEGRADATION QUANT: CPT

## 2019-03-17 RX ORDER — ASPIRIN 81 MG/1
324 TABLET, CHEWABLE ORAL ONCE
Status: COMPLETED | OUTPATIENT
Start: 2019-03-17 | End: 2019-03-17

## 2019-03-17 RX ORDER — LIDOCAINE 4 G/G
1 PATCH TOPICAL ONCE
Status: DISCONTINUED | OUTPATIENT
Start: 2019-03-17 | End: 2019-03-18 | Stop reason: HOSPADM

## 2019-03-17 RX ORDER — KETOROLAC TROMETHAMINE 30 MG/ML
30 INJECTION, SOLUTION INTRAMUSCULAR; INTRAVENOUS ONCE
Status: COMPLETED | OUTPATIENT
Start: 2019-03-17 | End: 2019-03-18

## 2019-03-17 RX ORDER — BENZONATATE 100 MG/1
100 CAPSULE ORAL 3 TIMES DAILY PRN
Status: DISCONTINUED | OUTPATIENT
Start: 2019-03-17 | End: 2019-03-18 | Stop reason: HOSPADM

## 2019-03-17 RX ADMIN — ASPIRIN 81 MG 324 MG: 81 TABLET ORAL at 23:11

## 2019-03-17 RX ADMIN — BENZONATATE 100 MG: 100 CAPSULE ORAL at 23:11

## 2019-03-17 ASSESSMENT — PAIN DESCRIPTION - LOCATION: LOCATION: CHEST

## 2019-03-17 ASSESSMENT — PAIN DESCRIPTION - PAIN TYPE: TYPE: ACUTE PAIN

## 2019-03-17 ASSESSMENT — PAIN DESCRIPTION - ORIENTATION: ORIENTATION: MID

## 2019-03-17 ASSESSMENT — PAIN DESCRIPTION - FREQUENCY: FREQUENCY: CONTINUOUS

## 2019-03-17 ASSESSMENT — PAIN DESCRIPTION - DESCRIPTORS: DESCRIPTORS: ACHING;TIGHTNESS;SHARP

## 2019-03-17 ASSESSMENT — PAIN SCALES - GENERAL: PAINLEVEL_OUTOF10: 8

## 2019-03-18 PROCEDURE — 96374 THER/PROPH/DIAG INJ IV PUSH: CPT

## 2019-03-18 PROCEDURE — 6370000000 HC RX 637 (ALT 250 FOR IP): Performed by: EMERGENCY MEDICINE

## 2019-03-18 PROCEDURE — 6360000002 HC RX W HCPCS: Performed by: EMERGENCY MEDICINE

## 2019-03-18 RX ORDER — BENZONATATE 100 MG/1
100 CAPSULE ORAL 3 TIMES DAILY PRN
Qty: 30 CAPSULE | Refills: 0 | Status: SHIPPED | OUTPATIENT
Start: 2019-03-18 | End: 2019-03-25

## 2019-03-18 RX ORDER — LIDOCAINE 50 MG/G
1 PATCH TOPICAL DAILY
Qty: 15 PATCH | Refills: 0 | Status: SHIPPED | OUTPATIENT
Start: 2019-03-18 | End: 2021-09-03

## 2019-03-18 RX ADMIN — KETOROLAC TROMETHAMINE 30 MG: 30 INJECTION, SOLUTION INTRAMUSCULAR at 00:04

## 2019-03-18 ASSESSMENT — ENCOUNTER SYMPTOMS
COUGH: 1
SINUS PRESSURE: 0
EYE REDNESS: 0
ABDOMINAL PAIN: 0
WHEEZING: 0
DIARRHEA: 0
NAUSEA: 0
CONSTIPATION: 0
SINUS PAIN: 0
VOMITING: 0
RHINORRHEA: 0
BACK PAIN: 0
APNEA: 0
COLOR CHANGE: 0
SHORTNESS OF BREATH: 1

## 2019-03-18 ASSESSMENT — HEART SCORE: ECG: 0

## 2019-03-18 ASSESSMENT — PAIN SCALES - GENERAL: PAINLEVEL_OUTOF10: 8

## 2019-03-19 LAB
EKG ATRIAL RATE: 80 BPM
EKG ATRIAL RATE: 87 BPM
EKG P AXIS: -80 DEGREES
EKG P AXIS: 57 DEGREES
EKG P-R INTERVAL: 166 MS
EKG P-R INTERVAL: 182 MS
EKG Q-T INTERVAL: 376 MS
EKG Q-T INTERVAL: 396 MS
EKG QRS DURATION: 94 MS
EKG QRS DURATION: 94 MS
EKG QTC CALCULATION (BAZETT): 452 MS
EKG QTC CALCULATION (BAZETT): 456 MS
EKG R AXIS: -58 DEGREES
EKG R AXIS: 45 DEGREES
EKG T AXIS: -50 DEGREES
EKG T AXIS: 28 DEGREES
EKG VENTRICULAR RATE: 80 BPM
EKG VENTRICULAR RATE: 87 BPM

## 2019-04-27 ENCOUNTER — APPOINTMENT (OUTPATIENT)
Dept: GENERAL RADIOLOGY | Age: 44
End: 2019-04-27
Payer: MEDICARE

## 2019-04-27 ENCOUNTER — HOSPITAL ENCOUNTER (EMERGENCY)
Age: 44
Discharge: HOME | End: 2019-04-27
Attending: EMERGENCY MEDICINE
Payer: MEDICARE

## 2019-04-27 VITALS
HEART RATE: 76 BPM | TEMPERATURE: 98.6 F | WEIGHT: 140 LBS | OXYGEN SATURATION: 98 % | SYSTOLIC BLOOD PRESSURE: 98 MMHG | HEIGHT: 64 IN | DIASTOLIC BLOOD PRESSURE: 70 MMHG | BODY MASS INDEX: 23.9 KG/M2 | RESPIRATION RATE: 21 BRPM

## 2019-04-27 DIAGNOSIS — B34.9 VIRAL ILLNESS: ICD-10-CM

## 2019-04-27 DIAGNOSIS — R07.89 CHEST WALL PAIN: Primary | ICD-10-CM

## 2019-04-27 LAB
ABSOLUTE EOS #: 0.05 K/UL (ref 0–0.4)
ABSOLUTE IMMATURE GRANULOCYTE: 0 K/UL (ref 0–0.3)
ABSOLUTE LYMPH #: 1.89 K/UL (ref 1–4.8)
ABSOLUTE MONO #: 0.23 K/UL (ref 0.1–0.8)
ANION GAP SERPL CALCULATED.3IONS-SCNC: 14 MMOL/L (ref 9–17)
BASOPHILS # BLD: 0 % (ref 0–2)
BASOPHILS ABSOLUTE: 0 K/UL (ref 0–0.2)
BUN BLDV-MCNC: 17 MG/DL (ref 6–20)
BUN/CREAT BLD: ABNORMAL (ref 9–20)
CALCIUM SERPL-MCNC: 9.2 MG/DL (ref 8.6–10.4)
CHLORIDE BLD-SCNC: 101 MMOL/L (ref 98–107)
CO2: 20 MMOL/L (ref 20–31)
CREAT SERPL-MCNC: 0.82 MG/DL (ref 0.5–0.9)
DIFFERENTIAL TYPE: NORMAL
EOSINOPHILS RELATIVE PERCENT: 1 % (ref 1–4)
GFR AFRICAN AMERICAN: >60 ML/MIN
GFR NON-AFRICAN AMERICAN: >60 ML/MIN
GFR SERPL CREATININE-BSD FRML MDRD: ABNORMAL ML/MIN/{1.73_M2}
GFR SERPL CREATININE-BSD FRML MDRD: ABNORMAL ML/MIN/{1.73_M2}
GLUCOSE BLD-MCNC: 103 MG/DL (ref 70–99)
HCT VFR BLD CALC: 41 % (ref 36.3–47.1)
HEMOGLOBIN: 13.2 G/DL (ref 11.9–15.1)
IMMATURE GRANULOCYTES: 0 %
LYMPHOCYTES # BLD: 41 % (ref 24–44)
MCH RBC QN AUTO: 29.5 PG (ref 25.2–33.5)
MCHC RBC AUTO-ENTMCNC: 32.2 G/DL (ref 28.4–34.8)
MCV RBC AUTO: 91.7 FL (ref 82.6–102.9)
MONOCYTES # BLD: 5 % (ref 1–7)
MORPHOLOGY: NORMAL
NRBC AUTOMATED: 0 PER 100 WBC
PDW BLD-RTO: 13.8 % (ref 11.8–14.4)
PLATELET # BLD: NORMAL K/UL (ref 138–453)
PLATELET ESTIMATE: NORMAL
PLATELET, FLUORESCENCE: 140 K/UL (ref 138–453)
PLATELET, IMMATURE FRACTION: 13.9 % (ref 1.1–10.3)
PMV BLD AUTO: NORMAL FL (ref 8.1–13.5)
POTASSIUM SERPL-SCNC: 4.3 MMOL/L (ref 3.7–5.3)
RBC # BLD: 4.47 M/UL (ref 3.95–5.11)
RBC # BLD: NORMAL 10*6/UL
SEG NEUTROPHILS: 53 % (ref 36–66)
SEGMENTED NEUTROPHILS ABSOLUTE COUNT: 2.43 K/UL (ref 1.8–7.7)
SODIUM BLD-SCNC: 135 MMOL/L (ref 135–144)
TROPONIN INTERP: NORMAL
TROPONIN T: NORMAL NG/ML
TROPONIN, HIGH SENSITIVITY: <6 NG/L (ref 0–14)
WBC # BLD: 4.6 K/UL (ref 3.5–11.3)
WBC # BLD: NORMAL 10*3/UL

## 2019-04-27 PROCEDURE — 6360000002 HC RX W HCPCS: Performed by: EMERGENCY MEDICINE

## 2019-04-27 PROCEDURE — 6370000000 HC RX 637 (ALT 250 FOR IP): Performed by: EMERGENCY MEDICINE

## 2019-04-27 PROCEDURE — 96375 TX/PRO/DX INJ NEW DRUG ADDON: CPT

## 2019-04-27 PROCEDURE — 93005 ELECTROCARDIOGRAM TRACING: CPT

## 2019-04-27 PROCEDURE — 85055 RETICULATED PLATELET ASSAY: CPT

## 2019-04-27 PROCEDURE — 71046 X-RAY EXAM CHEST 2 VIEWS: CPT

## 2019-04-27 PROCEDURE — 80048 BASIC METABOLIC PNL TOTAL CA: CPT

## 2019-04-27 PROCEDURE — 99285 EMERGENCY DEPT VISIT HI MDM: CPT

## 2019-04-27 PROCEDURE — 2580000003 HC RX 258: Performed by: EMERGENCY MEDICINE

## 2019-04-27 PROCEDURE — 84484 ASSAY OF TROPONIN QUANT: CPT

## 2019-04-27 PROCEDURE — 96374 THER/PROPH/DIAG INJ IV PUSH: CPT

## 2019-04-27 PROCEDURE — 85025 COMPLETE CBC W/AUTO DIFF WBC: CPT

## 2019-04-27 RX ORDER — BENZONATATE 100 MG/1
100 CAPSULE ORAL 3 TIMES DAILY PRN
Qty: 30 CAPSULE | Refills: 0 | Status: SHIPPED | OUTPATIENT
Start: 2019-04-27 | End: 2019-05-04

## 2019-04-27 RX ORDER — 0.9 % SODIUM CHLORIDE 0.9 %
1000 INTRAVENOUS SOLUTION INTRAVENOUS ONCE
Status: COMPLETED | OUTPATIENT
Start: 2019-04-27 | End: 2019-04-27

## 2019-04-27 RX ORDER — BENZONATATE 100 MG/1
200 CAPSULE ORAL ONCE
Status: COMPLETED | OUTPATIENT
Start: 2019-04-27 | End: 2019-04-27

## 2019-04-27 RX ORDER — IBUPROFEN 600 MG/1
600 TABLET ORAL EVERY 6 HOURS PRN
Qty: 30 TABLET | Refills: 0 | Status: SHIPPED | OUTPATIENT
Start: 2019-04-27 | End: 2019-07-09

## 2019-04-27 RX ORDER — KETOROLAC TROMETHAMINE 30 MG/ML
30 INJECTION, SOLUTION INTRAMUSCULAR; INTRAVENOUS ONCE
Status: COMPLETED | OUTPATIENT
Start: 2019-04-27 | End: 2019-04-27

## 2019-04-27 RX ORDER — ACETAMINOPHEN 325 MG/1
650 TABLET ORAL EVERY 6 HOURS PRN
Qty: 120 TABLET | Refills: 0 | Status: SHIPPED | OUTPATIENT
Start: 2019-04-27 | End: 2021-06-16 | Stop reason: SDUPTHER

## 2019-04-27 RX ORDER — ONDANSETRON 2 MG/ML
4 INJECTION INTRAMUSCULAR; INTRAVENOUS ONCE
Status: COMPLETED | OUTPATIENT
Start: 2019-04-27 | End: 2019-04-27

## 2019-04-27 RX ADMIN — ONDANSETRON 4 MG: 2 INJECTION INTRAMUSCULAR; INTRAVENOUS at 17:54

## 2019-04-27 RX ADMIN — KETOROLAC TROMETHAMINE 30 MG: 30 INJECTION, SOLUTION INTRAMUSCULAR at 17:54

## 2019-04-27 RX ADMIN — BENZONATATE 200 MG: 100 CAPSULE ORAL at 17:54

## 2019-04-27 RX ADMIN — SODIUM CHLORIDE 1000 ML: 9 INJECTION, SOLUTION INTRAVENOUS at 17:54

## 2019-04-27 ASSESSMENT — PAIN DESCRIPTION - PAIN TYPE: TYPE: ACUTE PAIN

## 2019-04-27 ASSESSMENT — ENCOUNTER SYMPTOMS
SORE THROAT: 0
ABDOMINAL PAIN: 0
CONSTIPATION: 0
SHORTNESS OF BREATH: 1
NAUSEA: 1
DIARRHEA: 0
COUGH: 1
VOMITING: 1
BACK PAIN: 0

## 2019-04-27 ASSESSMENT — PAIN DESCRIPTION - ORIENTATION: ORIENTATION: LEFT;MID

## 2019-04-27 ASSESSMENT — PAIN SCALES - GENERAL: PAINLEVEL_OUTOF10: 7

## 2019-04-27 ASSESSMENT — PAIN DESCRIPTION - FREQUENCY: FREQUENCY: CONTINUOUS

## 2019-04-27 ASSESSMENT — PAIN DESCRIPTION - LOCATION: LOCATION: CHEST

## 2019-04-27 ASSESSMENT — PAIN DESCRIPTION - DESCRIPTORS: DESCRIPTORS: ACHING;SHARP

## 2019-04-27 NOTE — ED PROVIDER NOTES
[facility]     Emergency Department     Faculty Attestation    I performed a history and physical examination of the patient and discussed management with the resident. I have reviewed and agree with the residents findings including all diagnostic interpretations, and treatment plans as written at the time of my review. Any areas of disagreement are noted on the chart. I was personally present for the key portions of any procedures. I have documented in the chart those procedures where I was not present during the key portions. Documentation of the HPI, Physical Exam and Medical Decision Making performed by gary is based on my personal performance of the HPI, PE and MDM. For Physician Assistant/ Nurse Practitioner cases/documentation I have personally evaluated this patient and have completed at least one if not all key elements of the E/M (history, physical exam, and MDM). Additional findings are as noted. Primary Care Physician: No primary care provider on file. History: This is a 39 y.o. female who presents to the Emergency Department with complaint of chest pain and shortness of breath. The patient states that she has these symptoms when he gets wet in the rain. His episode started earlier this morning. Scribes a sharp pain. She denies any cough. She last night she said she had nausea and vomiting. This morning she denies any nausea vomiting. Patient denies any abdominal pain. Patient denies any recent long travel. Physical:   height is 5' 4\" (1.626 m) and weight is 140 lb (63.5 kg). Her blood pressure is 92/64 and her pulse is 90. Her respiration is 20 and oxygen saturation is 97%.   Lungs are clear to auscultation bilaterally, heart regular rate and rhythm, abdomen is soft nontender    Impression: Chest pain, shortness of breath    Plan: Chest x-ray, EKG, CBC, BMP, troponin    The patient has been seen recently here and at another emergency department for some complaints. She had negative d-dimer at that time and a negative cardiac workup. EKG Interpretation    Interpreted by me  Normal sinus rhythm with ventricular rate of 88, normal WI interval, normal QRS duration, nonspecific T-wave abnormality, normal axis  Impression: Normal sinus rhythm, nonspecific T-wave abnormality  Compared to EKG of March 17, 2019      (Please note that portions of this note were completed with a voice recognition program.  Efforts were made to edit the dictations but occasionally words are mis-transcribed.)    Jackie Dumont.  Jamal Pappas MD, [facility]  Attending Emergency Medicine Physician        Marlene Drummond MD  04/27/19 Vikram Velázquez

## 2019-04-27 NOTE — ED TRIAGE NOTES
Pt ambultory to room 30 with c/o CP, SOB, and generalized body aches. Pt reports a hx of Lupus, and states \"it could be a flare. \" Pt reports she has been naseated, but denies emesis, diarrhea, or constipation. Pt reports abd pain on LLQ upon palpation. Pt reports she does not currently see a doctor or take medications for Lupus. Pt reports she has been experiencing these symptoms since Thursday. Pt placed on continuous cardiac monitoring, BP, Pulse ox. EKG obtained. IV established and labs drawn. Pt alert and oriented x4, talking in complete sentences, respirations even and unlabored. Pt acting age appropriate. White board updated, will continue to monitor.

## 2019-04-27 NOTE — ED PROVIDER NOTES
Yes Narciso Stevens MD   lidocaine (LIDODERM) 5 % Place 1 patch onto the skin daily 12 hours on, 12 hours off. 3/18/19   Clayton Maria MD   valsartan-hydrochlorothiazide (DIOVAN-HCT) 160-12.5 MG per tablet take 1 tablet once daily 9/19/18   Radha Odonnell MD   ibuprofen (ADVIL;MOTRIN) 800 MG tablet Take 1 tablet by mouth every 8 hours as needed for Pain 7/27/18   Anai Oropeza PA-C   fluticasone (FLONASE) 50 MCG/ACT nasal spray 1 spray by Nasal route daily 1/29/18   Anam Corral MD   loratadine (CLARITIN) 10 MG tablet Take 1 tablet by mouth daily 1/29/18   Anam Corral MD   albuterol sulfate HFA (PROVENTIL HFA) 108 (90 Base) MCG/ACT inhaler Inhale 2 puffs into the lungs every 6 hours as needed for Wheezing or Shortness of Breath 9/18/17   EFRAIN Morrison - CNP       REVIEW OF SYSTEMS    (2-9 systems for level 4, 10 or more for level 5)      Review of Systems   Constitutional: Positive for activity change, appetite change, chills and fatigue. Negative for fever. HENT: Negative for sore throat. Eyes: Negative for visual disturbance. Respiratory: Positive for cough and shortness of breath. Cardiovascular: Positive for chest pain. Negative for palpitations and leg swelling. Gastrointestinal: Positive for nausea and vomiting. Negative for abdominal pain, constipation and diarrhea. Genitourinary: Negative for dysuria. Musculoskeletal: Positive for myalgias. Negative for back pain and neck pain. Skin: Negative for rash. Neurological: Negative for dizziness, weakness and headaches. Psychiatric/Behavioral: Negative for confusion. PHYSICAL EXAM   (up to 7 for level 4, 8 or more for level 5)      INITIAL VITALS:   BP 98/70   Pulse 76   Temp 98.6 °F (37 °C) (Oral)   Resp 21   Ht 5' 4\" (1.626 m)   Wt 140 lb (63.5 kg)   SpO2 98%   BMI 24.03 kg/m²     Physical Exam   Constitutional: She is oriented to person, place, and time. She appears well-developed and well-nourished.  No distress. Ill but not toxic   HENT:   Head: Normocephalic and atraumatic. Mouth/Throat: Oropharynx is clear and moist.   Eyes: Pupils are equal, round, and reactive to light. Conjunctivae and EOM are normal.   Neck: Normal range of motion. Neck supple. No thyromegaly present. Cardiovascular: Normal rate, regular rhythm, normal heart sounds and intact distal pulses. Exam reveals no gallop and no friction rub. No murmur heard. Pulmonary/Chest: Effort normal and breath sounds normal. No respiratory distress. She exhibits tenderness (reproducible to anterior chest wall, and paraspinal thoracic ). Abdominal: Soft. There is no tenderness. Musculoskeletal: Normal range of motion. She exhibits no edema or tenderness. Neurological: She is alert and oriented to person, place, and time. No cranial nerve deficit or sensory deficit. Skin: Skin is warm and dry. Capillary refill takes less than 2 seconds. No rash noted. She is not diaphoretic. Psychiatric: Thought content normal.   Nursing note and vitals reviewed.       DIFFERENTIAL  DIAGNOSIS     PLAN (LABS / IMAGING / EKG):  Orders Placed This Encounter   Procedures    XR CHEST STANDARD (2 VW)    CBC WITH AUTO DIFFERENTIAL    Basic Metabolic Panel    Troponin    Immature Platelet Fraction    EKG 12 Lead    Insert peripheral IV       MEDICATIONS ORDERED:  Orders Placed This Encounter   Medications    0.9 % sodium chloride bolus    ketorolac (TORADOL) injection 30 mg    benzonatate (TESSALON) capsule 200 mg    ondansetron (ZOFRAN) injection 4 mg    ibuprofen (ADVIL;MOTRIN) 600 MG tablet     Sig: Take 1 tablet by mouth every 6 hours as needed for Pain     Dispense:  30 tablet     Refill:  0    acetaminophen (TYLENOL) 325 MG tablet     Sig: Take 2 tablets by mouth every 6 hours as needed for Pain     Dispense:  120 tablet     Refill:  0    benzonatate (TESSALON PERLES) 100 MG capsule     Sig: Take 1 capsule by mouth 3 times daily as needed for Cough Dispense:  30 capsule     Refill:  0       DDX: ACS, pericarditis, PE, aortic dissection, pneumonia, costochondritis, viral infection    DIAGNOSTIC RESULTS / EMERGENCY DEPARTMENT COURSE / MDM     LABS:  Results for orders placed or performed during the hospital encounter of 04/27/19   CBC WITH AUTO DIFFERENTIAL   Result Value Ref Range    WBC 4.6 3.5 - 11.3 k/uL    RBC 4.47 3.95 - 5.11 m/uL    Hemoglobin 13.2 11.9 - 15.1 g/dL    Hematocrit 41.0 36.3 - 47.1 %    MCV 91.7 82.6 - 102.9 fL    MCH 29.5 25.2 - 33.5 pg    MCHC 32.2 28.4 - 34.8 g/dL    RDW 13.8 11.8 - 14.4 %    Platelets See Reflexed IPF Result 138 - 453 k/uL    MPV NOT REPORTED 8.1 - 13.5 fL    NRBC Automated 0.0 0.0 per 100 WBC    Differential Type NOT REPORTED     WBC Morphology NOT REPORTED     RBC Morphology NOT REPORTED     Platelet Estimate NOT REPORTED     Immature Granulocytes 0 0 %    Seg Neutrophils 53 36 - 66 %    Lymphocytes 41 24 - 44 %    Monocytes 5 1 - 7 %    Eosinophils % 1 1 - 4 %    Basophils 0 0 - 2 %    Absolute Immature Granulocyte 0.00 0.00 - 0.30 k/uL    Segs Absolute 2.43 1.8 - 7.7 k/uL    Absolute Lymph # 1.89 1.0 - 4.8 k/uL    Absolute Mono # 0.23 0.1 - 0.8 k/uL    Absolute Eos # 0.05 0.0 - 0.4 k/uL    Basophils # 0.00 0.0 - 0.2 k/uL    Morphology Normal    Basic Metabolic Panel   Result Value Ref Range    Glucose 103 (H) 70 - 99 mg/dL    BUN 17 6 - 20 mg/dL    CREATININE 0.82 0.50 - 0.90 mg/dL    Bun/Cre Ratio NOT REPORTED 9 - 20    Calcium 9.2 8.6 - 10.4 mg/dL    Sodium 135 135 - 144 mmol/L    Potassium 4.3 3.7 - 5.3 mmol/L    Chloride 101 98 - 107 mmol/L    CO2 20 20 - 31 mmol/L    Anion Gap 14 9 - 17 mmol/L    GFR Non-African American >60 >60 mL/min    GFR African American >60 >60 mL/min    GFR Comment          GFR Staging NOT REPORTED    Troponin   Result Value Ref Range    Troponin, High Sensitivity <6 0 - 14 ng/L    Troponin T NOT REPORTED <0.03 ng/mL    Troponin Interp NOT REPORTED    Immature Platelet Fraction Result Value Ref Range    Platelet, Immature Fraction 13.9 (H) 1.1 - 10.3 %    Platelet, Fluorescence 140 138 - 453 k/uL       RADIOLOGY:  XR CHEST STANDARD (2 VW)   Final Result   Clear lungs. EKG  None    All EKG's are interpreted by the Emergency Department Physician who either signs or Co-signs this chart in the absence of a cardiologist.    MDM/EMERGENCY DEPARTMENT COURSE:  Patient is a 27-year-old female with history of lupus and hypertension presenting with cough, myalgias, fatigue, chest pain shortness of breath. On exam appears ill but not toxic. Borderline hypotensive, afebrile. Neurologically intact, heart sounds regular lungs clear bilaterally, no friction rub, chest wall is tender to palpation, abdomen soft nontender. Lower extremities no edema or calf tenderness. Impression is viral infection however will rule out ACS with troponin, EKG, give symptomatic medications and fluids and reassess. Will not pursue d-dimer at this time as patient denies any other risk factors and had negative d-dimer at outside hospital.    7:36 PM  Patient reassessed, resting comfortably, no distress. Blood pressure remains borderline hypotensive however resting comfortably and not tachycardic. Afebrile. X-ray no pneumonia. Labs are unremarkable. Negative troponin. Low risk for ACS. Likely viral syndrome, costochondritis. Patient was given follow-up information for family practice clinic, Tylenol and Motrin and Tessalon Perles. Instructed to drink lots of fluids, get rest.  Return if worsening symptoms or any other concerns. Stable for discharge. PROCEDURES:  None    CONSULTS:  None    CRITICAL CARE:  None    FINAL IMPRESSION      1. Chest wall pain    2.  Viral illness          DISPOSITION / PLAN     DISPOSITION  Decision to discharge      PATIENT REFERRED TO:  [facility]  [address]    If symptoms worsen    [address] [facility] [address] [place] [id number] [phone]  Schedule an appointment as soon as possible for a visit         DISCHARGE MEDICATIONS:  Discharge Medication List as of 4/27/2019  7:06 PM      START taking these medications    Details   !! ibuprofen (ADVIL;MOTRIN) 600 MG tablet Take 1 tablet by mouth every 6 hours as needed for Pain, Disp-30 tablet, R-0Print      acetaminophen (TYLENOL) 325 MG tablet Take 2 tablets by mouth every 6 hours as needed for Pain, Disp-120 tablet, R-0Print      benzonatate (TESSALON PERLES) 100 MG capsule Take 1 capsule by mouth 3 times daily as needed for Cough, Disp-30 capsule, R-0Print       !! - Potential duplicate medications found. Please discuss with provider. Lorraine Granados MD  Emergency Medicine Resident    (Please note that portions of thisnote were completed with a voice recognition program.  Efforts were made to edit the dictations but occasionally words are mis-transcribed. )        Lorraine Granados MD  Resident  04/27/19 [id number]

## 2019-04-29 LAB
EKG ATRIAL RATE: 88 BPM
EKG P AXIS: 63 DEGREES
EKG P-R INTERVAL: 164 MS
EKG Q-T INTERVAL: 370 MS
EKG QRS DURATION: 94 MS
EKG QTC CALCULATION (BAZETT): 447 MS
EKG R AXIS: 60 DEGREES
EKG T AXIS: 48 DEGREES
EKG VENTRICULAR RATE: 88 BPM

## 2019-05-11 ENCOUNTER — APPOINTMENT (OUTPATIENT)
Dept: GENERAL RADIOLOGY | Age: 44
End: 2019-05-11
Payer: MEDICARE

## 2019-05-11 ENCOUNTER — HOSPITAL ENCOUNTER (EMERGENCY)
Age: 44
Discharge: HOME | End: 2019-05-11
Attending: EMERGENCY MEDICINE
Payer: MEDICARE

## 2019-05-11 VITALS
DIASTOLIC BLOOD PRESSURE: 89 MMHG | BODY MASS INDEX: 22.88 KG/M2 | RESPIRATION RATE: 20 BRPM | TEMPERATURE: 98.2 F | OXYGEN SATURATION: 100 % | WEIGHT: 134 LBS | SYSTOLIC BLOOD PRESSURE: 131 MMHG | HEIGHT: 64 IN | HEART RATE: 68 BPM

## 2019-05-11 DIAGNOSIS — R07.9 CHEST PAIN, UNSPECIFIED TYPE: Primary | ICD-10-CM

## 2019-05-11 DIAGNOSIS — R07.89 CHEST WALL PAIN: ICD-10-CM

## 2019-05-11 LAB
-: NORMAL
ABSOLUTE EOS #: 0.05 K/UL (ref 0–0.44)
ABSOLUTE IMMATURE GRANULOCYTE: <0.03 K/UL (ref 0–0.3)
ABSOLUTE LYMPH #: 1.74 K/UL (ref 1.1–3.7)
ABSOLUTE MONO #: 0.49 K/UL (ref 0.1–1.2)
ANION GAP SERPL CALCULATED.3IONS-SCNC: 10 MMOL/L (ref 9–17)
BASOPHILS # BLD: 1 % (ref 0–2)
BASOPHILS ABSOLUTE: 0.04 K/UL (ref 0–0.2)
BUN BLDV-MCNC: 10 MG/DL (ref 6–20)
BUN/CREAT BLD: ABNORMAL (ref 9–20)
CALCIUM SERPL-MCNC: 9.2 MG/DL (ref 8.6–10.4)
CHLORIDE BLD-SCNC: 103 MMOL/L (ref 98–107)
CO2: 23 MMOL/L (ref 20–31)
CREAT SERPL-MCNC: 0.69 MG/DL (ref 0.5–0.9)
D-DIMER QUANTITATIVE: 0.27 MG/L FEU
DIFFERENTIAL TYPE: ABNORMAL
EOSINOPHILS RELATIVE PERCENT: 1 % (ref 1–4)
GFR AFRICAN AMERICAN: >60 ML/MIN
GFR NON-AFRICAN AMERICAN: >60 ML/MIN
GFR SERPL CREATININE-BSD FRML MDRD: ABNORMAL ML/MIN/{1.73_M2}
GFR SERPL CREATININE-BSD FRML MDRD: ABNORMAL ML/MIN/{1.73_M2}
GLUCOSE BLD-MCNC: 85 MG/DL (ref 70–99)
HCT VFR BLD CALC: 42.6 % (ref 36.3–47.1)
HEMOGLOBIN: 13.5 G/DL (ref 11.9–15.1)
IMMATURE GRANULOCYTES: 0 %
LYMPHOCYTES # BLD: 31 % (ref 24–43)
MCH RBC QN AUTO: 29.7 PG (ref 25.2–33.5)
MCHC RBC AUTO-ENTMCNC: 31.7 G/DL (ref 28.4–34.8)
MCV RBC AUTO: 93.6 FL (ref 82.6–102.9)
MONOCYTES # BLD: 9 % (ref 3–12)
NRBC AUTOMATED: 0 PER 100 WBC
PDW BLD-RTO: 14.6 % (ref 11.8–14.4)
PLATELET # BLD: ABNORMAL K/UL (ref 138–453)
PLATELET ESTIMATE: ABNORMAL
PLATELET, FLUORESCENCE: 178 K/UL (ref 138–453)
PLATELET, IMMATURE FRACTION: 8 % (ref 1.1–10.3)
PMV BLD AUTO: ABNORMAL FL (ref 8.1–13.5)
POTASSIUM SERPL-SCNC: 5.6 MMOL/L (ref 3.7–5.3)
RBC # BLD: 4.55 M/UL (ref 3.95–5.11)
RBC # BLD: ABNORMAL 10*6/UL
REASON FOR REJECTION: NORMAL
SEG NEUTROPHILS: 59 % (ref 36–65)
SEGMENTED NEUTROPHILS ABSOLUTE COUNT: 3.33 K/UL (ref 1.5–8.1)
SODIUM BLD-SCNC: 136 MMOL/L (ref 135–144)
TROPONIN INTERP: NORMAL
TROPONIN T: NORMAL NG/ML
TROPONIN, HIGH SENSITIVITY: <6 NG/L (ref 0–14)
WBC # BLD: 5.7 K/UL (ref 3.5–11.3)
WBC # BLD: ABNORMAL 10*3/UL
ZZ NTE CLEAN UP: ORDERED TEST: NORMAL
ZZ NTE WITH NAME CLEAN UP: SPECIMEN SOURCE: NORMAL

## 2019-05-11 PROCEDURE — 85379 FIBRIN DEGRADATION QUANT: CPT

## 2019-05-11 PROCEDURE — 84484 ASSAY OF TROPONIN QUANT: CPT

## 2019-05-11 PROCEDURE — 6370000000 HC RX 637 (ALT 250 FOR IP): Performed by: EMERGENCY MEDICINE

## 2019-05-11 PROCEDURE — 71046 X-RAY EXAM CHEST 2 VIEWS: CPT

## 2019-05-11 PROCEDURE — 85025 COMPLETE CBC W/AUTO DIFF WBC: CPT

## 2019-05-11 PROCEDURE — 93005 ELECTROCARDIOGRAM TRACING: CPT

## 2019-05-11 PROCEDURE — 80048 BASIC METABOLIC PNL TOTAL CA: CPT

## 2019-05-11 PROCEDURE — 99285 EMERGENCY DEPT VISIT HI MDM: CPT

## 2019-05-11 PROCEDURE — 6360000002 HC RX W HCPCS: Performed by: EMERGENCY MEDICINE

## 2019-05-11 PROCEDURE — 85055 RETICULATED PLATELET ASSAY: CPT

## 2019-05-11 PROCEDURE — 96374 THER/PROPH/DIAG INJ IV PUSH: CPT

## 2019-05-11 RX ORDER — NAPROXEN 250 MG/1
250 TABLET ORAL 2 TIMES DAILY WITH MEALS
Qty: 60 TABLET | Refills: 5 | Status: SHIPPED | OUTPATIENT
Start: 2019-05-11 | End: 2019-07-09

## 2019-05-11 RX ORDER — KETOROLAC TROMETHAMINE 15 MG/ML
15 INJECTION, SOLUTION INTRAMUSCULAR; INTRAVENOUS ONCE
Status: COMPLETED | OUTPATIENT
Start: 2019-05-11 | End: 2019-05-11

## 2019-05-11 RX ORDER — ACETAMINOPHEN 325 MG/1
650 TABLET ORAL ONCE
Status: COMPLETED | OUTPATIENT
Start: 2019-05-11 | End: 2019-05-11

## 2019-05-11 RX ADMIN — KETOROLAC TROMETHAMINE 15 MG: 15 INJECTION, SOLUTION INTRAMUSCULAR; INTRAVENOUS at 20:42

## 2019-05-11 RX ADMIN — ACETAMINOPHEN 650 MG: 325 TABLET ORAL at 17:43

## 2019-05-11 ASSESSMENT — PAIN DESCRIPTION - DESCRIPTORS
DESCRIPTORS: ACHING
DESCRIPTORS: SHARP

## 2019-05-11 ASSESSMENT — PAIN SCALES - GENERAL
PAINLEVEL_OUTOF10: 10
PAINLEVEL_OUTOF10: 8
PAINLEVEL_OUTOF10: 10
PAINLEVEL_OUTOF10: 3

## 2019-05-11 ASSESSMENT — ENCOUNTER SYMPTOMS
COUGH: 1
ALLERGIC/IMMUNOLOGIC NEGATIVE: 1
EYES NEGATIVE: 1
GASTROINTESTINAL NEGATIVE: 1

## 2019-05-11 ASSESSMENT — PAIN DESCRIPTION - LOCATION
LOCATION: ABDOMEN
LOCATION: CHEST

## 2019-05-11 ASSESSMENT — HEART SCORE: ECG: 1

## 2019-05-11 ASSESSMENT — PAIN DESCRIPTION - FREQUENCY: FREQUENCY: INTERMITTENT

## 2019-05-11 NOTE — ED PROVIDER NOTES
[facility]  ED  Emergency Department Encounter  EmergencyMedicine Resident     Pt Radha Dickinson  MRN: [id number]  [place] 1975  Date of evaluation: 19  PCP:  No primary care provider on file. CHIEF COMPLAINT       Chief Complaint   Patient presents with    Chest Pain       HISTORY OF PRESENT ILLNESS  (Location/Symptom, Timing/Onset, Context/Setting, Quality, Duration, Modifying Factors, Severity.)      Dannie Ferrara is a 39 y.o. female who presents with chief complaint chest pain. Patient states that chest pain is located in the midsternal region, sharp in nature, nonradiating. She states that chest pain started gradually about 2 months ago, is almost constant, comes at least once or twice a day, worsened with deep breaths, walking long distances, coughing. Patient denies any previous episodes of chest pain like this and states that symptomshave been getting worse since they started. She reports associated nonproductive cough for the past 3 months, intermittent episodes of diaphoresis which she says might be secondary to her menopause so she is going through right now. She denies any nausea, vomiting,  numbness, radiation to the back, worsening with exertion, syncopalepisodes, tearing or ripping nature, lower extremity swelling, recent surgery, previous history of blood clots,shortness of breath, hemoptysis, history of cancer, trauma, fever. PAST MEDICAL / SURGICAL / SOCIAL /FAMILY HISTORY      has a past medical history of Fibromyalgia, Headache, Hyperlipidemia, Hypertension, Hypothyroidism, Lupus, and Thyroid disease. has a past surgical history that includes  section; Foot surgery; and Hysterectomy.     Social History     Socioeconomic History    Marital status: Single     Spouse name: Not on file    Number of children: Not on file    Years of education: Not on file    Highest education level: Not on file   Occupational History    Not on file Social Needs    Financial resource strain: Not on file    Food insecurity:     Worry: Not on file     Inability: Not on file    Transportation needs:     Medical: Not on file     Non-medical: Not on file   Tobacco Use    Smoking status: Current Every Day Smoker     Packs/day: 0.50     Years: 23.00     Pack years: 11.50     Types: Cigarettes    Smokeless tobacco: Never Used   Substance and Sexual Activity    Alcohol use: No    Drug use: No    Sexual activity: Not on file   Lifestyle    Physical activity:     Days per week: Not on file     Minutes per session: Not on file    Stress: Not on file   Relationships    Social connections:     Talks on phone: Not on file     Gets together: Not on file     Attends [employer] service: Not on file     Active member of club or organization: Not on file     Attends meetings of clubs or organizations: Not on file     Relationship status: Not on file    Intimate partner violence:     Fear of current or ex partner: Not on file     Emotionally abused: Not on file     Physically abused: Not on file     Forced sexual activity: Not on file   Other Topics Concern    Not on file   Social History Narrative    Not on file       Family History   Problem Relation Age of Onset    High Blood Pressure Mother     Heart Disease Father     Stroke Maternal Grandmother     High Blood Pressure Maternal Grandfather     Heart Disease Paternal Grandmother     Heart Disease Paternal Grandfather        Allergies:  Bactrim [sulfamethoxazole-trimethoprim]    Home Medications:  Prior to Admission medications    Medication Sig Start Date End Date Taking?  Authorizing Provider   naproxen (NAPROSYN) 250 MG tablet Take 1 tablet by mouth 2 times daily (with meals) 5/11/19  Yes Shelli Schuster MD   valsartan-hydrochlorothiazide (DIOVAN-HCT) 160-12.5 MG per tablet take 1 tablet once daily 9/19/18  Yes Susy Quijano MD   ibuprofen (ADVIL;MOTRIN) 600 MG tablet Take 1 tablet by mouth every tracheal deviation present. Cardiovascular: Normal rate, regular rhythm and normal heart sounds. Exam reveals no gallop and no friction rub. No murmur heard. Pulmonary/Chest: No respiratory distress. She has no wheezes. She exhibits tenderness. She exhibits no mass, no laceration, no crepitus, no edema, no deformity, no swelling and no retraction. Abdominal: Soft. Bowel sounds are normal. She exhibits no distension. There is no tenderness. There is no guarding. Musculoskeletal: Normal range of motion. She exhibits no edema or deformity. Lymphadenopathy:     She has no cervical adenopathy. Neurological: She is alert and oriented to person, place, and time. No cranial nerve deficit. She exhibits normal muscle tone. Skin: Skin is warm and dry. No rash noted. She is not diaphoretic. Nursing note and vitals reviewed. DIFFERENTIAL  DIAGNOSIS     PLAN (LABS / IMAGING / EKG):  Orders Placed This Encounter   Procedures    XR CHEST STANDARD (2 VW)    D-Dimer, Quantitative    SPECIMEN REJECTION    PREVIOUS SPECIMEN    CBC Auto Differential    Basic Metabolic Panel    PREVIOUS SPECIMEN    Troponin    Immature Platelet Fraction    EKG 12 Lead       MEDICATIONSORDERED:  Orders Placed This Encounter   Medications    acetaminophen (TYLENOL) tablet 650 mg    naproxen (NAPROSYN) 250 MG tablet     Sig: Take 1 tablet by mouth 2 times daily (with meals)     Dispense:  60 tablet     Refill:  5    ketorolac (TORADOL) injection 15 mg       DDX: ACS, Pulmonary Embolism, Aortic Dissection, Pneumothorax, Musculoskeletal chest pain, Pneumonia, Neoplasm, Pericarditis, Tamponade    DIAGNOSTIC RESULTS / EMERGENCY DEPARTMENT COURSE / MDM     LABS:  Results for orders placed or performed during the hospital encounter of 05/11/19   D-Dimer, Quantitative   Result Value Ref Range    D-Dimer, Quant 0.27 mg/L FEU   SPECIMEN REJECTION   Result Value Ref Range    Specimen Source . BLOOD     Ordered Test CBC BMP TROP months . On presentation, blood pressure is 137/102, heart rate is 74,respiratory rate is 20, patient looks comfortable and is in no acute distress at this time. Physical exam is remarkable for midsternal tenderness to palpation pulsatile deformities noted. EKG is normal.  We have a obtained an EKG, we will proceed with a chest xray, CBC, BMP, troponin, d-dimer  treat with Tylenol and then re-assess. patient has a history of lupus, hypertension and is a smoker. RADIOLOGY:  Xr Chest Standard (2 Vw)    Result Date: 5/11/2019  EXAMINATION: TWO XRAY VIEWS OF THE CHEST 5/11/2019 5:44 pm COMPARISON: 427 HISTORY: ORDERING SYSTEM PROVIDED HISTORY: chest pain TECHNOLOGIST PROVIDED HISTORY: chest pain Ordering Physician Provided Reason for Exam: chest pain Acuity: Unknown Type of Exam: Unknown FINDINGS: Normal heart size and pulmonary vasculature. No focal consolidations, pleural effusions, or pneumothorax. Unremarkable. EKG  EKG Interpretation    Rhythm: normal sinus   Rate: normal  Axis: normal  Conduction: normal  ST Segments: normal  T Waves: normal  Q Waves: normal    Clinical Impression: Normal EKG. Dottie Lefort, MD      All EKG's are interpreted by the Emergency Department Physician who either signs or Co-signs this chart in the absence of a cardiologist.    Heart Score    Heart Score for chest pain patients  History: Slightly Suspicious  ECG: Non-Specifc repolarization disturbance/LBTB/PM  Patient Age: < 45 years  *Risk factors for Atherosclerotic disease: Cigarette smoking, Hypertension  Risk Factors: 1 or 2 risk factors  Troponin: < 1X normal limit  Heart Score Total: 2    Score 0 - 3 = 2.5%  MACE over next 6 wks = Discharge home  Score 4 - 6 = 20.3%  MACE over next 6 wks = Obs admit  Score 7 - 10 = 72.7%  MACE over next 6 wks = Early invasive Rx        PROCEDURES:  None    CONSULTS:  None    CRITICAL CARE:  None    FINAL IMPRESSION      1. Chest pain, unspecified type    2.  Chest wall pain DISPOSITION / PLAN     DISPOSITION Decision To Discharge 05/11/2019 08:21:32 PM      PATIENT REFERRED TO:  [facility] ED  [address] [place] [address]  [phone]  Go to   If symptoms worsen      DISCHARGE MEDICATIONS:  New Prescriptions    NAPROXEN (NAPROSYN) 250 MG TABLET    Take 1 tablet by mouth 2 times daily (with meals)       Manuela Dao MD  Emergency Medicine Resident    (Please note that portions ofthis note were completed with a voice recognition program.  Efforts were made to edit the dictations but occasionally words are mis-transcribed.)       Manuela Dao MD  Resident  05/11/19 2035

## 2019-05-11 NOTE — ED PROVIDER NOTES
[facility] ED     Emergency Department     Faculty Attestation        I performed a history and physical examination of the patient and discussed management with the resident. I reviewed the residents note and agree with the documented findings and plan of care. Any areas of disagreement are noted on the chart. I was personally present for the key portions of any procedures. I have documented in the chart those procedures where I was not present during the key portions. I have reviewed the emergency nurses triage note. I agree with the chief complaint, past medical history, past surgical history, allergies, medications, social and family history as documented unless otherwise noted below. For Physician Assistant/ Nurse Practitioner cases/documentation I have personally evaluated this patient and have completed at least one if not all key elements of the E/M (history, physical exam, and MDM). Additional findings are as noted. Vital Signs: BP: (!) 137/102  Pulse: 74  Resp: 20  Temp: 98.2 °F (36.8 °C)    PCP:  No primary care provider on file. Pertinent Comments:           EKG Interpretation    Interpreted by emergency department physician    Rhythm: normal sinus   Rate: normal at 76 bpm  Axis: normal  Conduction: normal  ST Segments: no acute change  T Waves: no acute change  Q Waves: no acute change    Clinical Impression:  nonspecific EKG. Critical Care  None      (Please note that portions of this note were completed with a voice recognition program. Efforts were made to edit the dictations but occasionally words are mis-transcribed.  Whenever words are used in this note in any gender, they shall be construed as though they were used in the gender appropriate to the circumstances; and whenever words are used in this note in the singular or plural form, they shall be construed as though they were used in the form appropriate to the

## 2019-05-13 LAB
EKG ATRIAL RATE: 76 BPM
EKG P AXIS: 75 DEGREES
EKG P-R INTERVAL: 184 MS
EKG Q-T INTERVAL: 418 MS
EKG QRS DURATION: 100 MS
EKG QTC CALCULATION (BAZETT): 470 MS
EKG R AXIS: 58 DEGREES
EKG T AXIS: 46 DEGREES
EKG VENTRICULAR RATE: 76 BPM

## 2019-07-09 ENCOUNTER — HOSPITAL ENCOUNTER (EMERGENCY)
Age: 44
Discharge: HOME | End: 2019-07-09
Attending: EMERGENCY MEDICINE
Payer: MEDICARE

## 2019-07-09 ENCOUNTER — APPOINTMENT (OUTPATIENT)
Dept: GENERAL RADIOLOGY | Age: 44
End: 2019-07-09
Payer: MEDICARE

## 2019-07-09 VITALS
TEMPERATURE: 98.1 F | HEIGHT: 64 IN | WEIGHT: 140 LBS | RESPIRATION RATE: 22 BRPM | OXYGEN SATURATION: 99 % | HEART RATE: 77 BPM | SYSTOLIC BLOOD PRESSURE: 103 MMHG | DIASTOLIC BLOOD PRESSURE: 71 MMHG | BODY MASS INDEX: 23.9 KG/M2

## 2019-07-09 DIAGNOSIS — S90.32XA CONTUSION OF LEFT FOOT, INITIAL ENCOUNTER: Primary | ICD-10-CM

## 2019-07-09 PROCEDURE — 99283 EMERGENCY DEPT VISIT LOW MDM: CPT

## 2019-07-09 PROCEDURE — 73630 X-RAY EXAM OF FOOT: CPT

## 2019-07-09 PROCEDURE — 96372 THER/PROPH/DIAG INJ SC/IM: CPT

## 2019-07-09 PROCEDURE — 6360000002 HC RX W HCPCS: Performed by: PHYSICIAN ASSISTANT

## 2019-07-09 RX ORDER — NAPROXEN 500 MG/1
500 TABLET ORAL 2 TIMES DAILY
Qty: 20 TABLET | Refills: 0 | OUTPATIENT
Start: 2019-07-09 | End: 2021-11-16

## 2019-07-09 RX ORDER — KETOROLAC TROMETHAMINE 15 MG/ML
15 INJECTION, SOLUTION INTRAMUSCULAR; INTRAVENOUS ONCE
Status: COMPLETED | OUTPATIENT
Start: 2019-07-09 | End: 2019-07-09

## 2019-07-09 RX ADMIN — KETOROLAC TROMETHAMINE 15 MG: 15 INJECTION, SOLUTION INTRAMUSCULAR; INTRAVENOUS at 11:39

## 2019-07-09 ASSESSMENT — PAIN SCALES - GENERAL
PAINLEVEL_OUTOF10: 10
PAINLEVEL_OUTOF10: 10

## 2019-07-09 ASSESSMENT — PAIN DESCRIPTION - DESCRIPTORS: DESCRIPTORS: DULL

## 2019-07-09 ASSESSMENT — PAIN DESCRIPTION - LOCATION: LOCATION: FOOT

## 2019-07-09 ASSESSMENT — ENCOUNTER SYMPTOMS
ABDOMINAL PAIN: 0
COLOR CHANGE: 0
SORE THROAT: 0
SHORTNESS OF BREATH: 0
COUGH: 0
BACK PAIN: 0
NAUSEA: 0
VOMITING: 0
DIARRHEA: 0

## 2019-07-09 ASSESSMENT — PAIN DESCRIPTION - ONSET: ONSET: AWAKENED FROM SLEEP

## 2019-07-09 ASSESSMENT — PAIN DESCRIPTION - FREQUENCY: FREQUENCY: CONTINUOUS

## 2019-07-09 ASSESSMENT — PAIN DESCRIPTION - PAIN TYPE: TYPE: ACUTE PAIN

## 2019-07-09 ASSESSMENT — PAIN DESCRIPTION - ORIENTATION: ORIENTATION: LEFT

## 2019-07-09 NOTE — ED PROVIDER NOTES
[facility]     Emergency Department     Faculty Attestation    I performed a history and physical examination of the patient and discussed management with the resident. I have reviewed and agree with the residents findings including all diagnostic interpretations, and treatment plans as written at the time of my review. Any areas of disagreement are noted on the chart. I was personally present for the key portions of any procedures. I have documented in the chart those procedures where I was not present during the key portions. For Physician Assistant/ Nurse Practitioner cases/documentation I have personally evaluated this patient and have completed at least one if not all key elements of the E/M (history, physical exam, and MDM). Additional findings are as noted. Patient presents emergency room complaint of foot pain after striking on the bed frame. She has tenderness across the toe. X-rays will be obtained. (Please note that portions of this note were completed with a voice recognition program.  Efforts were made to edit the dictations but occasionally words are mis-transcribed.)    Wing Russo.  Houston Nascimento MD, [facility]  Attending Emergency Medicine Physician         Melissa Gonzalez MD  07/09/19 1204
mouth every 6 hours as needed for Pain    ALBUTEROL SULFATE HFA (PROVENTIL HFA) 108 (90 BASE) MCG/ACT INHALER    Inhale 2 puffs into the lungs every 6 hours as needed for Wheezing or Shortness of Breath    FLUTICASONE (FLONASE) 50 MCG/ACT NASAL SPRAY    1 spray by Nasal route daily    LIDOCAINE (LIDODERM) 5 %    Place 1 patch onto the skin daily 12 hours on, 12 hours off. LORATADINE (CLARITIN) 10 MG TABLET    Take 1 tablet by mouth daily    VALSARTAN-HYDROCHLOROTHIAZIDE (DIOVAN-HCT) 160-12.5 MG PER TABLET    take 1 tablet once daily       ALLERGIES     Bactrim [sulfamethoxazole-trimethoprim]  Reviewed  FAMILY HISTORY           Problem Relation Age of Onset    High Blood Pressure Mother     Heart Disease Father     Stroke Maternal Grandmother     High Blood Pressure Maternal Grandfather     Heart Disease Paternal Grandmother     Heart Disease Paternal Grandfather      Family Status   Relation Name Status    Mother  (Not Specified)    Father  (Not Specified)    MGM  (Not Specified)    MGF  (Not Specified)    PGM  (Not Specified)    PGF  (Not Specified)        SOCIAL HISTORY      reports that she has been smoking cigarettes. She has a 11.50 pack-year smoking history. She has never used smokeless tobacco. She reports that she does not drink alcohol or use drugs. PHYSICAL EXAM    (up to 7 for level 4, 8 or more for level 5)     Vitals:    07/09/19 1130   BP: 103/71   Pulse: 77   Resp: 22   Temp: 98.1 °F (36.7 °C)   TempSrc: Oral   SpO2: 99%   Weight: 140 lb (63.5 kg)   Height: 5' 4\" (1.626 m)       Physical Exam   Constitutional: She is oriented to person, place, and time. She appears well-developed and well-nourished. No distress. HENT:   Head: Normocephalic and atraumatic. Eyes: Pupils are equal, round, and reactive to light. Conjunctivae and EOM are normal.   Neck: Normal range of motion. Neck supple. No meningeal signs.    Cardiovascular: Normal rate, regular rhythm, normal heart sounds and

## 2019-11-11 ENCOUNTER — HOSPITAL ENCOUNTER (EMERGENCY)
Age: 44
Discharge: HOME | End: 2019-11-11
Attending: EMERGENCY MEDICINE
Payer: MEDICARE

## 2019-11-11 VITALS
RESPIRATION RATE: 15 BRPM | HEART RATE: 79 BPM | OXYGEN SATURATION: 98 % | SYSTOLIC BLOOD PRESSURE: 107 MMHG | DIASTOLIC BLOOD PRESSURE: 74 MMHG | TEMPERATURE: 98.2 F

## 2019-11-11 DIAGNOSIS — H10.31 ACUTE CONJUNCTIVITIS OF RIGHT EYE, UNSPECIFIED ACUTE CONJUNCTIVITIS TYPE: Primary | ICD-10-CM

## 2019-11-11 DIAGNOSIS — H01.001 BLEPHARITIS OF RIGHT UPPER EYELID, UNSPECIFIED TYPE: ICD-10-CM

## 2019-11-11 PROCEDURE — 96374 THER/PROPH/DIAG INJ IV PUSH: CPT

## 2019-11-11 PROCEDURE — 6360000002 HC RX W HCPCS: Performed by: STUDENT IN AN ORGANIZED HEALTH CARE EDUCATION/TRAINING PROGRAM

## 2019-11-11 PROCEDURE — 99282 EMERGENCY DEPT VISIT SF MDM: CPT

## 2019-11-11 PROCEDURE — 6370000000 HC RX 637 (ALT 250 FOR IP): Performed by: STUDENT IN AN ORGANIZED HEALTH CARE EDUCATION/TRAINING PROGRAM

## 2019-11-11 RX ORDER — IBUPROFEN 800 MG/1
800 TABLET ORAL EVERY 8 HOURS PRN
Qty: 30 TABLET | Refills: 0 | Status: SHIPPED | OUTPATIENT
Start: 2019-11-11 | End: 2021-06-16 | Stop reason: SDUPTHER

## 2019-11-11 RX ORDER — ACETAMINOPHEN 500 MG
1000 TABLET ORAL ONCE
Status: COMPLETED | OUTPATIENT
Start: 2019-11-11 | End: 2019-11-11

## 2019-11-11 RX ORDER — ERYTHROMYCIN 5 MG/G
OINTMENT OPHTHALMIC ONCE
Status: COMPLETED | OUTPATIENT
Start: 2019-11-11 | End: 2019-11-11

## 2019-11-11 RX ORDER — KETOROLAC TROMETHAMINE 30 MG/ML
30 INJECTION, SOLUTION INTRAMUSCULAR; INTRAVENOUS ONCE
Status: COMPLETED | OUTPATIENT
Start: 2019-11-11 | End: 2019-11-11

## 2019-11-11 RX ORDER — ERYTHROMYCIN 5 MG/G
1 OINTMENT OPHTHALMIC EVERY 6 HOURS
Qty: 3.5 G | Refills: 0 | Status: SHIPPED | OUTPATIENT
Start: 2019-11-11 | End: 2019-11-18

## 2019-11-11 RX ADMIN — ERYTHROMYCIN: 5 OINTMENT OPHTHALMIC at 08:16

## 2019-11-11 RX ADMIN — KETOROLAC TROMETHAMINE 30 MG: 30 INJECTION, SOLUTION INTRAMUSCULAR at 08:11

## 2019-11-11 RX ADMIN — ACETAMINOPHEN 1000 MG: 500 TABLET ORAL at 08:11

## 2019-11-11 ASSESSMENT — PAIN DESCRIPTION - ONSET: ONSET: ON-GOING

## 2019-11-11 ASSESSMENT — ENCOUNTER SYMPTOMS
NAUSEA: 0
BACK PAIN: 0
EYE PAIN: 1
ABDOMINAL PAIN: 0
SHORTNESS OF BREATH: 0
EYE DISCHARGE: 1
EYE ITCHING: 1
COUGH: 1
EYE REDNESS: 1
SORE THROAT: 0
VOMITING: 0
RHINORRHEA: 0
PHOTOPHOBIA: 0

## 2019-11-11 ASSESSMENT — PAIN DESCRIPTION - PAIN TYPE: TYPE: ACUTE PAIN

## 2019-11-11 ASSESSMENT — PAIN DESCRIPTION - LOCATION: LOCATION: EYE

## 2019-11-11 ASSESSMENT — PAIN DESCRIPTION - ORIENTATION: ORIENTATION: RIGHT

## 2019-11-11 ASSESSMENT — PAIN DESCRIPTION - FREQUENCY: FREQUENCY: INTERMITTENT

## 2019-11-11 ASSESSMENT — PAIN SCALES - GENERAL
PAINLEVEL_OUTOF10: 6
PAINLEVEL_OUTOF10: 6

## 2019-11-11 ASSESSMENT — PAIN DESCRIPTION - DESCRIPTORS: DESCRIPTORS: ACHING

## 2021-06-16 ENCOUNTER — APPOINTMENT (OUTPATIENT)
Dept: GENERAL RADIOLOGY | Age: 46
End: 2021-06-16
Payer: MEDICARE

## 2021-06-16 ENCOUNTER — HOSPITAL ENCOUNTER (EMERGENCY)
Age: 46
Discharge: HOME | End: 2021-06-16
Attending: EMERGENCY MEDICINE
Payer: MEDICARE

## 2021-06-16 VITALS
SYSTOLIC BLOOD PRESSURE: 162 MMHG | DIASTOLIC BLOOD PRESSURE: 92 MMHG | RESPIRATION RATE: 16 BRPM | OXYGEN SATURATION: 98 % | TEMPERATURE: 97.2 F | HEART RATE: 82 BPM

## 2021-06-16 DIAGNOSIS — Z23 NEED FOR TETANUS BOOSTER: ICD-10-CM

## 2021-06-16 DIAGNOSIS — S61.216A LACERATION OF RIGHT LITTLE FINGER WITHOUT FOREIGN BODY WITHOUT DAMAGE TO NAIL, INITIAL ENCOUNTER: Primary | ICD-10-CM

## 2021-06-16 DIAGNOSIS — S61.214A LACERATION OF RIGHT RING FINGER WITHOUT FOREIGN BODY WITHOUT DAMAGE TO NAIL, INITIAL ENCOUNTER: ICD-10-CM

## 2021-06-16 PROCEDURE — 73130 X-RAY EXAM OF HAND: CPT

## 2021-06-16 PROCEDURE — 90715 TDAP VACCINE 7 YRS/> IM: CPT | Performed by: EMERGENCY MEDICINE

## 2021-06-16 PROCEDURE — 99283 EMERGENCY DEPT VISIT LOW MDM: CPT

## 2021-06-16 PROCEDURE — 29130 APPL FINGER SPLINT STATIC: CPT

## 2021-06-16 PROCEDURE — 6370000000 HC RX 637 (ALT 250 FOR IP): Performed by: EMERGENCY MEDICINE

## 2021-06-16 PROCEDURE — 90471 IMMUNIZATION ADMIN: CPT | Performed by: EMERGENCY MEDICINE

## 2021-06-16 PROCEDURE — 6360000002 HC RX W HCPCS: Performed by: EMERGENCY MEDICINE

## 2021-06-16 RX ORDER — CEPHALEXIN 500 MG/1
500 CAPSULE ORAL 2 TIMES DAILY
Qty: 14 CAPSULE | Refills: 0 | Status: SHIPPED | OUTPATIENT
Start: 2021-06-16 | End: 2021-06-23

## 2021-06-16 RX ORDER — ACETAMINOPHEN 500 MG
1000 TABLET ORAL ONCE
Status: COMPLETED | OUTPATIENT
Start: 2021-06-16 | End: 2021-06-16

## 2021-06-16 RX ORDER — IBUPROFEN 800 MG/1
800 TABLET ORAL ONCE
Status: COMPLETED | OUTPATIENT
Start: 2021-06-16 | End: 2021-06-16

## 2021-06-16 RX ORDER — ACETAMINOPHEN 500 MG
1000 TABLET ORAL EVERY 8 HOURS PRN
Qty: 30 TABLET | Refills: 0 | Status: SHIPPED | OUTPATIENT
Start: 2021-06-16 | End: 2021-09-03

## 2021-06-16 RX ORDER — IBUPROFEN 800 MG/1
800 TABLET ORAL EVERY 8 HOURS PRN
Qty: 30 TABLET | Refills: 0 | Status: SHIPPED | OUTPATIENT
Start: 2021-06-16 | End: 2021-09-03

## 2021-06-16 RX ADMIN — IBUPROFEN 800 MG: 800 TABLET, FILM COATED ORAL at 16:23

## 2021-06-16 RX ADMIN — TETANUS TOXOID, REDUCED DIPHTHERIA TOXOID AND ACELLULAR PERTUSSIS VACCINE, ADSORBED 0.5 ML: 5; 2.5; 8; 8; 2.5 SUSPENSION INTRAMUSCULAR at 16:24

## 2021-06-16 RX ADMIN — ACETAMINOPHEN 1000 MG: 500 TABLET ORAL at 16:23

## 2021-06-16 ASSESSMENT — PAIN SCALES - GENERAL: PAINLEVEL_OUTOF10: 10

## 2021-06-16 NOTE — ED NOTES
Discharge instructions given. Verbalized understanding. All questions answered.        Elida Pedroza RN  06/16/21 1215

## 2021-06-16 NOTE — ED PROVIDER NOTES
[facility]     Emergency Department     Faculty Attestation    I performed a history and physical examination of the patient and discussed management with the resident. I reviewed the residents note and agree with the documented findings and plan of care. Any areas of disagreement are noted on the chart. I was personally present for the key portions of any procedures. I have documented in the chart those procedures where I was not present during the key portions. I have reviewed the emergency nurses triage note. I agree with the chief complaint, past medical history, past surgical history, allergies, medications, social and family history as documented unless otherwise noted below. For Physician Assistant/ Nurse Practitioner cases/documentation I have personally evaluated this patient and have completed at least one if not all key elements of the E/M (history, physical exam, and MDM). Additional findings are as noted. I have personally seen and evaluated the patient. I find the patient's history and physical exam are consistent with the NP/PA documentation. I agree with the care provided, treatment rendered, disposition and follow-up plan. 41-year-old female presenting with laceration to the right fourth and fifth digits. She sustained these last night while tender as being a roast with 2  knife. She describes excruciating pain in her pinky finger, and difficulty bending the finger. No prior injury to this area. Exam:  General: Sitting on the bed, awake, alert and in no acute distress  Skin: Horizontal laceration across the right fourth and fifth digits, proximal to the PIP joint. Plan:  Given >12h from initial injury and possible contaminated wound with raw meat, discussed closure by secondary intention with the patient. Will keep area from gaping with Steri-Strips, but leave mostly open to prevent infection.   X-ray obtained showing no foreign bodies in the lacerations, no fracture. Will have her follow-up with hand surgery.         Alethea Sahu MD   Attending Emergency  Physician    (Please note that portions of this note were completed with a voice recognition program. Efforts were made to edit the dictations but occasionally words are mis-transcribed.)              Alethea Sahu MD  06/16/21 1926

## 2021-06-16 NOTE — ED NOTES
Medicated for pain to fingers on right hand  Pain 10/10    Ice pack given to patient earlier for right hand       Malathi Mueller RN  06/16/21 7575

## 2021-06-16 NOTE — ED PROVIDER NOTES
and Sexual Activity    Alcohol use: No    Drug use: No    Sexual activity: Not on file   Other Topics Concern    Not on file   Social History Narrative    Not on file     Social Determinants of Health     Financial Resource Strain:     Difficulty of Paying Living Expenses:    Food Insecurity:     Worried About Running Out of Food in the Last Year:     [address] in the Last Year:    Transportation Needs:     Lack of Transportation (Medical):  Lack of Transportation (Non-Medical):    Physical Activity:     Days of Exercise per Week:     Minutes of Exercise per Session:    Stress:     Feeling of Stress :    Social Connections:     Frequency of Communication with Friends and Family:     Frequency of Social Gatherings with Friends and Family:     Attends [employer] Services:     Active Member of Clubs or Organizations:     Attends Club or Organization Meetings:     Marital Status:    Intimate Partner Violence:     Fear of Current or Ex-Partner:     Emotionally Abused:     Physically Abused:     Sexually Abused:        Family History   Problem Relation Age of Onset    High Blood Pressure Mother     Heart Disease Father     Stroke Maternal Grandmother     High Blood Pressure Maternal Grandfather     Heart Disease Paternal Grandmother     Heart Disease Paternal Grandfather        Allergies:    Bactrim [sulfamethoxazole-trimethoprim]    Home Medications:  Prior to Admission medications    Medication Sig Start Date End Date Taking?  Authorizing Provider   acetaminophen (TYLENOL) 500 MG tablet Take 2 tablets by mouth every 8 hours as needed for Pain 6/16/21  Yes Carmen Beyer DO   ibuprofen (ADVIL;MOTRIN) 800 MG tablet Take 1 tablet by mouth every 8 hours as needed for Pain 6/16/21  Yes Carmen Beyer DO   cephALEXin (KEFLEX) 500 MG capsule Take 1 capsule by mouth 2 times daily for 7 days 6/16/21 6/23/21 Yes Carmen Beyer DO   naproxen (NAPROSYN) 500 MG tablet Take 1 tablet by mouth 2 times daily 7/9/19 6/16/21  [address], [place]   lidocaine (LIDODERM) 5 % Place 1 patch onto the skin daily 12 hours on, 12 hours off. 3/18/19   Susy Mariee MD   valsartan-hydrochlorothiazide (DIOVAN-HCT) 160-12.5 MG per tablet take 1 tablet once daily 9/19/18   Anam Barbosa MD   fluticasone (FLONASE) 50 MCG/ACT nasal spray 1 spray by Nasal route daily 1/29/18   Bebo Hunter MD   loratadine (CLARITIN) 10 MG tablet Take 1 tablet by mouth daily 1/29/18   Anam Barbosa MD   albuterol sulfate HFA (PROVENTIL HFA) 108 (90 Base) MCG/ACT inhaler Inhale 2 puffs into the lungs every 6 hours as needed for Wheezing or Shortness of Breath 9/18/17   EFRAIN Rosales - CNP       REVIEW OF SYSTEMS    (2-9 systems for level 4, 10 or more for level 5)    Review of Systems   Musculoskeletal: Positive for myalgias. Skin: Positive for wound. Allergic/Immunologic: Negative for immunocompromised state. Neurological: Negative for weakness and numbness. Hematological: Does not bruise/bleed easily. PHYSICAL EXAM   (up to 7 for level 4, 8 or more for level 5)    VITALS:   Vitals:    06/16/21 1535   BP: (!) 162/92   Pulse: 82   Resp: 16   Temp: 97.2 °F (36.2 °C)   TempSrc: Oral   SpO2: 98%       Physical Exam  Vitals and nursing note reviewed. Constitutional:       General: She is not in acute distress. Appearance: She is well-developed. She is not diaphoretic. HENT:      Head: Normocephalic and atraumatic. Eyes:      Conjunctiva/sclera: Conjunctivae normal.   Cardiovascular:      Rate and Rhythm: Normal rate and regular rhythm. Pulses: Normal pulses. Pulmonary:      Effort: Pulmonary effort is normal. No respiratory distress. Musculoskeletal:         General: Tenderness and signs of injury present. No swelling or deformity. Right hand: Laceration and tenderness present. No swelling, deformity or bony tenderness. Decreased range of motion. Normal strength. Normal sensation. Normal capillary refill. Normal pulse. Hands:       Cervical back: Normal range of motion. Comments: About 1 cm laceration over the proximal fifth digit on the palmar aspect and about half a centimeter laceration over the proximal fourth digit on the palmar aspect on the right hand with no active bleeding. No obvious foreign body on exploration. No tendon exposure or involvement. Range of motion limited secondary to pain but she is able to bend her finger both at the PIP and DIP   Skin:     General: Skin is warm and dry. Findings: Laceration and wound present. Neurological:      General: No focal deficit present. Mental Status: She is alert.    Psychiatric:         Behavior: Behavior normal.         DIFFERENTIAL  DIAGNOSIS   PLAN (LABS / IMAGING / EKG):  Orders Placed This Encounter   Procedures    XR HAND RIGHT (MIN 3 VIEWS)    Apply ice to affected area    Wound care    [facility] nursing order (specify)       MEDICATIONS ORDERED:  Orders Placed This Encounter   Medications    Tetanus-Diphth-Acell Pertussis (BOOSTRIX) injection 0.5 mL    acetaminophen (TYLENOL) tablet 1,000 mg    ibuprofen (ADVIL;MOTRIN) tablet 800 mg    acetaminophen (TYLENOL) 500 MG tablet     Sig: Take 2 tablets by mouth every 8 hours as needed for Pain     Dispense:  30 tablet     Refill:  0    ibuprofen (ADVIL;MOTRIN) 800 MG tablet     Sig: Take 1 tablet by mouth every 8 hours as needed for Pain     Dispense:  30 tablet     Refill:  0    cephALEXin (KEFLEX) 500 MG capsule     Sig: Take 1 capsule by mouth 2 times daily for 7 days     Dispense:  14 capsule     Refill:  0     DIAGNOSTIC RESULTS / EMERGENCYDEPARTMENT COURSE / MDM   LABS:  Labs Reviewed - No data to display    RADIOLOGY:  XR HAND RIGHT (MIN 3 VIEWS)    Result Date: 6/16/2021  EXAMINATION: THREE XRAY VIEWS OF THE RIGHT HAND 6/16/2021 4:11 pm COMPARISON: Previous three-view study of the right hand from 08/14/2016 HISTORY: [address] [facility] HISTORY: trauma, laceration, pain 5th digit TECHNOLOGIST PROVIDED HISTORY: trauma, laceration, pain Additional history of thyroid disease, fibromyalgia and lupus. FINDINGS: No acute fracture. No dislocation. No marked soft tissue swelling. Probable old boxer's fracture distal 5th metacarpal, but no significant soft tissue swelling, focal laceration or radiopaque foreign body appreciated. Mild unchanged deformity distal medial middle phalanx 4th digit. No marked soft tissue swelling elsewhere; 2 tiny radiodensities noted overlying the soft tissue adjacent to the 1st distal interphalangeal joint, which could represent tiny foreign bodies, on or within a skin crease. Tiny well corticated bony density along the medial aspect PIP 3rd digit may represent an old avulsion fracture. No significant radiographic abnormality 5th digit, as above. No fracture or dislocation. No marked soft tissue swelling. Findings including 2 tiny radiodensities thumb, as above. EMERGENCY DEPARTMENT COURSE:  ED Course as of Jun 16 1702   Wed Jun 16, 2021   1622 Wound washed out with sterile saline and betadine, steri strips applied, bandage applied. Will await XR hand to make decision about splinting     [AO]   1630 XR HAND RIGHT (MIN 3 VIEWS) [AO]      ED Course User Index  [AO] Vidal Gaitando 1721, DO       MDM  Number of Diagnoses or Management Options  Laceration of right little finger without foreign body without damage to nail, initial encounter  Laceration of right ring finger without foreign body without damage to nail, initial encounter  Need for tetanus booster  Diagnosis management comments: 54-year-old female presents emergency department with fourth and fifth right digit palmar aspect laceration that is greater than 12 hours old. Range of motion somewhat limited secondary to pain, however she is able to move at DIP and PIP. Area soaked in sterile saline and Betadine, wound explored. No tendon exposure or involvement.

## 2021-06-16 NOTE — ED NOTES
Bed: 36  Expected date:   Expected time:   Means of arrival:   Comments:     Lizzette Caicedo RN  06/16/21 4295

## 2021-07-10 ENCOUNTER — HOSPITAL ENCOUNTER (EMERGENCY)
Age: 46
Discharge: HOME | End: 2021-07-10
Attending: EMERGENCY MEDICINE
Payer: MEDICARE

## 2021-07-10 VITALS
HEIGHT: 64 IN | BODY MASS INDEX: 22.2 KG/M2 | SYSTOLIC BLOOD PRESSURE: 114 MMHG | OXYGEN SATURATION: 97 % | WEIGHT: 130 LBS | RESPIRATION RATE: 18 BRPM | HEART RATE: 62 BPM | DIASTOLIC BLOOD PRESSURE: 78 MMHG | TEMPERATURE: 97.9 F

## 2021-07-10 DIAGNOSIS — M54.2 NECK PAIN: Primary | ICD-10-CM

## 2021-07-10 DIAGNOSIS — M62.838 SPASM OF MUSCLE: ICD-10-CM

## 2021-07-10 PROCEDURE — 6360000002 HC RX W HCPCS: Performed by: STUDENT IN AN ORGANIZED HEALTH CARE EDUCATION/TRAINING PROGRAM

## 2021-07-10 PROCEDURE — 96372 THER/PROPH/DIAG INJ SC/IM: CPT

## 2021-07-10 PROCEDURE — 6370000000 HC RX 637 (ALT 250 FOR IP): Performed by: STUDENT IN AN ORGANIZED HEALTH CARE EDUCATION/TRAINING PROGRAM

## 2021-07-10 PROCEDURE — 99285 EMERGENCY DEPT VISIT HI MDM: CPT

## 2021-07-10 RX ORDER — PREDNISONE 10 MG/1
TABLET ORAL
Qty: 3 TABLET | Refills: 0 | Status: SHIPPED | OUTPATIENT
Start: 2021-07-10 | End: 2021-07-10 | Stop reason: SDUPTHER

## 2021-07-10 RX ORDER — PREDNISONE 20 MG/1
30 TABLET ORAL ONCE
Status: COMPLETED | OUTPATIENT
Start: 2021-07-10 | End: 2021-07-10

## 2021-07-10 RX ORDER — PREDNISONE 20 MG/1
TABLET ORAL
Qty: 6 TABLET | Refills: 0 | Status: SHIPPED | OUTPATIENT
Start: 2021-07-10 | End: 2021-07-20

## 2021-07-10 RX ORDER — CYCLOBENZAPRINE HCL 10 MG
5 TABLET ORAL NIGHTLY PRN
Qty: 5 TABLET | Refills: 0 | Status: SHIPPED | OUTPATIENT
Start: 2021-07-10 | End: 2021-07-20

## 2021-07-10 RX ORDER — ORPHENADRINE CITRATE 30 MG/ML
60 INJECTION INTRAMUSCULAR; INTRAVENOUS ONCE
Status: DISCONTINUED | OUTPATIENT
Start: 2021-07-10 | End: 2021-07-10

## 2021-07-10 RX ORDER — ORPHENADRINE CITRATE 30 MG/ML
30 INJECTION INTRAMUSCULAR; INTRAVENOUS ONCE
Status: DISCONTINUED | OUTPATIENT
Start: 2021-07-10 | End: 2021-07-10 | Stop reason: HOSPADM

## 2021-07-10 RX ADMIN — ORPHENADRINE CITRATE 30 MG: 30 INJECTION INTRAMUSCULAR; INTRAVENOUS at 07:09

## 2021-07-10 RX ADMIN — PREDNISONE 30 MG: 20 TABLET ORAL at 07:09

## 2021-07-10 ASSESSMENT — PAIN DESCRIPTION - FREQUENCY: FREQUENCY: CONTINUOUS

## 2021-07-10 ASSESSMENT — PAIN DESCRIPTION - PROGRESSION: CLINICAL_PROGRESSION: GRADUALLY WORSENING

## 2021-07-10 ASSESSMENT — PAIN DESCRIPTION - LOCATION: LOCATION: NECK

## 2021-07-10 ASSESSMENT — PAIN SCALES - GENERAL: PAINLEVEL_OUTOF10: 10

## 2021-07-10 ASSESSMENT — PAIN DESCRIPTION - PAIN TYPE: TYPE: CHRONIC PAIN

## 2021-07-10 ASSESSMENT — PAIN - FUNCTIONAL ASSESSMENT: PAIN_FUNCTIONAL_ASSESSMENT: PREVENTS OR INTERFERES SOME ACTIVE ACTIVITIES AND ADLS

## 2021-07-10 ASSESSMENT — PAIN DESCRIPTION - ONSET: ONSET: ON-GOING

## 2021-07-10 NOTE — ED PROVIDER NOTES
[facility]  ED  Emergency Department Encounter  EmergencyMedicine Resident     Pt Chidi Husain  MRN: [id number]  [place]tr[place] 1975  Date of evaluation: 7/10/21  PCP:  No primary care provider on file. This patient was evaluated in the Emergency Department for symptoms described in the history of present illness. The patient was evaluated in the context of the global COVID-19 pandemic, which necessitated consideration that the patient might be at risk for infection with the SARS-CoV-2 virus that causes COVID-19. Institutional protocols and algorithms that pertain to the evaluation of patients at risk for COVID-19 are in a state of rapid change based on information released by regulatory bodies including the CDC and federal and state organizations. These policies and algorithms were followed during the patient's care in the ED. CHIEF COMPLAINT       Chief Complaint   Patient presents with    Neck Pain    Back Pain       HISTORY OF PRESENT ILLNESS  (Location/Symptom, Timing/Onset, Context/Setting, Quality, Duration, Modifying Factors, Severity.)      Cristina Bright is a 54 y.o. female who presents with neck and back pain. She has a history of lupus and states that she has fibromyalgia. She states that the pain has been going on for several weeks to months. She says that she \"just cannot take it anymore\" however has not acutely worsened. Patient is not having any focal neurologic deficits, she is not having chest pain nausea vomiting dizziness drowsiness abdominal pain or other constitutional symptoms. She has not had any blurry vision. Her neck and back pain is limited to her paraspinal musculature, is nonradiating and not associated with symptoms. PAST MEDICAL / SURGICAL / SOCIAL / FAMILY HISTORY      has a past medical history of Fibromyalgia, Headache, Hyperlipidemia, Hypertension, Hypothyroidism, Lupus ([address] and Thyroid disease.        has a past surgical history that includes  section; Foot surgery; and Hysterectomy. Social History     Socioeconomic History    Marital status: Single     Spouse name: Not on file    Number of children: Not on file    Years of education: Not on file    Highest education level: Not on file   Occupational History    Not on file   Tobacco Use    Smoking status: Current Every Day Smoker     Packs/day: 0.50     Years: 23.00     Pack years: 11.50     Types: Cigarettes    Smokeless tobacco: Never Used   Substance and Sexual Activity    Alcohol use: No    Drug use: No    Sexual activity: Not on file   Other Topics Concern    Not on file   Social History Narrative    Not on file     Social Determinants of Health     Financial Resource Strain:     Difficulty of Paying Living Expenses:    Food Insecurity:     Worried About Running Out of Food in the Last Year:     Ran Out of Food in the Last Year:    Transportation Needs:     Lack of Transportation (Medical):      Lack of Transportation (Non-Medical):    Physical Activity:     Days of Exercise per Week:     Minutes of Exercise per Session:    Stress:     Feeling of Stress :    Social Connections:     Frequency of Communication with Friends and Family:     Frequency of Social Gatherings with Friends and Family:     Attends [employer] Services:     Active Member of Clubs or Organizations:     Attends Club or Organization Meetings:     Marital Status:    Intimate Partner Violence:     Fear of Current or Ex-Partner:     Emotionally Abused:     Physically Abused:     Sexually Abused:        Family History   Problem Relation Age of Onset    High Blood Pressure Mother     Heart Disease Father     Stroke Maternal Grandmother     High Blood Pressure Maternal Grandfather     Heart Disease Paternal Grandmother     Heart Disease Paternal Grandfather        Allergies:  Bactrim [sulfamethoxazole-trimethoprim]    Home Medications:  Prior to Admission medications    Medication Sig Start Date End Date Taking? Authorizing Provider   cyclobenzaprine (FLEXERIL) 10 MG tablet Take 0.5 tablets by mouth nightly as needed for Muscle spasms 7/10/21 7/20/21 Yes Pat Castillo DO   predniSONE (DELTASONE) 20 MG tablet Take 2 tablets by mouth once daily for 3 days 7/10/21 7/20/21 Yes Pat Castillo DO   acetaminophen (TYLENOL) 500 MG tablet Take 2 tablets by mouth every 8 hours as needed for Pain 6/16/21   Carmen Guzman DO   ibuprofen (ADVIL;MOTRIN) 800 MG tablet Take 1 tablet by mouth every 8 hours as needed for Pain 6/16/21   Carmen Guzman DO   naproxen (NAPROSYN) 500 MG tablet Take 1 tablet by mouth 2 times daily 7/9/19 6/16/21  [facility] Naif, PAJordynC   lidocaine (LIDODERM) 5 % Place 1 patch onto the skin daily 12 hours on, 12 hours off. 3/18/19   Cody Kirkpatrick MD   valsartan-hydrochlorothiazide (DIOVAN-HCT) 160-12.5 MG per tablet take 1 tablet once daily 9/19/18   Anam Colin MD   fluticasone (FLONASE) 50 MCG/ACT nasal spray 1 spray by Nasal route daily 1/29/18   Anam Colin MD   loratadine (CLARITIN) 10 MG tablet Take 1 tablet by mouth daily 1/29/18   Anam Colin MD   albuterol sulfate HFA (PROVENTIL HFA) 108 (90 Base) MCG/ACT inhaler Inhale 2 puffs into the lungs every 6 hours as needed for Wheezing or Shortness of Breath 9/18/17   Johan Spencer, APRN - CNP       REVIEW OF SYSTEMS    (2-9 systems for level 4, 10 or more for level 5)      Review of Systems    PHYSICAL EXAM   (up to 7 for level 4, 8 or more for level 5)      INITIAL VITALS:   /78   Pulse 62   Temp 97.9 °F (36.6 °C) (Oral)   Resp 18   Ht 5' 4\" (1.626 m)   Wt 130 lb (59 kg)   SpO2 97%   BMI 22.31 kg/m²     Physical Exam    DIFFERENTIAL  DIAGNOSIS     PLAN (LABS / IMAGING / EKG):  No orders of the defined types were placed in this encounter.       MEDICATIONS ORDERED:  Orders Placed This Encounter   Medications    DISCONTD: orphenadrine (NORFLEX) injection 60 mg    predniSONE (Gi Muncmartha) R-0Print      predniSONE (DELTASONE) 10 MG tablet Take 4 tablets by mouth once daily for 5 days, Disp-3 tablet, R-0Print             Royer Bar,   Emergency Medicine Resident    (Please note that portions of thisnote were completed with a voice recognition program.  Efforts were made to edit the dictations but occasionally words are mis-transcribed.)        Cassia Castorena DO  Resident  07/10/21 5830

## 2021-07-10 NOTE — ED PROVIDER NOTES
[facility]     Emergency Department     Faculty Attestation    I performed a history and physical examination of the patient and discussed management with the resident. I have reviewed and agree with the residents findings including all diagnostic interpretations, and treatment plans as written. Any areas of disagreement are noted on the chart. I was personally present for the key portions of any procedures. I have documented in the chart those procedures where I was not present during the key portions. I have reviewed the emergency nurses triage note. I agree with the chief complaint, past medical history, past surgical history, allergies, medications, social and family history as documented unless otherwise noted below. Documentation of the HPI, Physical Exam and Medical Decision Making performed by scribyana is based on my personal performance of the HPI, PE and MDM. For Physician Assistant/ Nurse Practitioner cases/documentation I have personally evaluated this patient and have completed at least one if not all key elements of the E/M (history, physical exam, and MDM). Additional findings are as noted.     55 yo F c/o diffuse neck & back pain without injury pt relates she has fibromyalgia & sees rheumatology at [facility], no fever, no vomit, no fall, no sensory change  \\ pt denies urinary retention or complaint, no hx ivda, no sensory changes,   pe Jacqueline RN escort for exam // vss  gcs 15, no cervical thoracic or lumbar spinal tenderness, crepitus or deformity, diffuse trapezius tenderness, no deformity, skin intact, no finding of infection or injury, abdomen non tender, no distension, no rigidity, hand grasp strong =,     -I feel pt having musculoskeletal type process, patient is afebrile atraumatic, will treat symptomatically, & stress specialist follow up    EKG Interpretation    Interpreted by me      CRITICAL CARE: There was a high probability of clinically significant/life threatening deterioration in this patient's condition which required my urgent intervention. Total critical care time was 0 minutes. This excludes any time for separately reportable procedures.        [address], DO  07/10/21 [facility] DO  07/10/21 2223

## 2021-07-10 NOTE — ED NOTES
Writer to bedside to wake pt up, pt notified she was discharged. Pt provided box lunch.       Arnulfo Estrada RN  07/10/21 1022

## 2021-07-10 NOTE — PROGRESS NOTES
I was assigned to this patient in error. I did not evaluate or treat this patient during this emergency department encounter.     Neri Batres DO, PGY-2  Emergency Medicine Resident  7/10/2021     6:40 AM

## 2021-07-10 NOTE — ED NOTES
Dr. Octavia Dover and Dr. Mary Antunez @ bedside to assess patient.       Mya Mcarthur RN  07/10/21 4014

## 2021-09-02 PROCEDURE — 10060 I&D ABSCESS SIMPLE/SINGLE: CPT

## 2021-09-02 PROCEDURE — 96374 THER/PROPH/DIAG INJ IV PUSH: CPT

## 2021-09-02 PROCEDURE — 99285 EMERGENCY DEPT VISIT HI MDM: CPT

## 2021-09-02 PROCEDURE — 96375 TX/PRO/DX INJ NEW DRUG ADDON: CPT

## 2021-09-02 PROCEDURE — 96376 TX/PRO/DX INJ SAME DRUG ADON: CPT

## 2021-09-03 ENCOUNTER — APPOINTMENT (OUTPATIENT)
Dept: CT IMAGING | Age: 46
End: 2021-09-03
Payer: MEDICARE

## 2021-09-03 ENCOUNTER — HOSPITAL ENCOUNTER (EMERGENCY)
Age: 46
Discharge: HOME | End: 2021-09-03
Attending: EMERGENCY MEDICINE
Payer: MEDICARE

## 2021-09-03 VITALS
SYSTOLIC BLOOD PRESSURE: 134 MMHG | OXYGEN SATURATION: 99 % | HEART RATE: 74 BPM | BODY MASS INDEX: 23.12 KG/M2 | DIASTOLIC BLOOD PRESSURE: 79 MMHG | RESPIRATION RATE: 19 BRPM | WEIGHT: 134.7 LBS | TEMPERATURE: 97 F

## 2021-09-03 DIAGNOSIS — L05.01 PILONIDAL CYST WITH ABSCESS: Primary | ICD-10-CM

## 2021-09-03 LAB
ABSOLUTE EOS #: 0.1 K/UL (ref 0–0.44)
ABSOLUTE IMMATURE GRANULOCYTE: <0.03 K/UL (ref 0–0.3)
ABSOLUTE LYMPH #: 2.29 K/UL (ref 1.1–3.7)
ABSOLUTE MONO #: 0.46 K/UL (ref 0.1–1.2)
ANION GAP SERPL CALCULATED.3IONS-SCNC: 11 MMOL/L (ref 9–17)
BASOPHILS # BLD: 1 % (ref 0–2)
BASOPHILS ABSOLUTE: 0.04 K/UL (ref 0–0.2)
BUN BLDV-MCNC: 16 MG/DL (ref 6–20)
BUN/CREAT BLD: ABNORMAL (ref 9–20)
CALCIUM SERPL-MCNC: 8.2 MG/DL (ref 8.6–10.4)
CHLORIDE BLD-SCNC: 106 MMOL/L (ref 98–107)
CO2: 24 MMOL/L (ref 20–31)
CREAT SERPL-MCNC: 0.65 MG/DL (ref 0.5–0.9)
DIFFERENTIAL TYPE: NORMAL
EOSINOPHILS RELATIVE PERCENT: 2 % (ref 1–4)
GFR AFRICAN AMERICAN: >60 ML/MIN
GFR NON-AFRICAN AMERICAN: >60 ML/MIN
GFR SERPL CREATININE-BSD FRML MDRD: ABNORMAL ML/MIN/{1.73_M2}
GFR SERPL CREATININE-BSD FRML MDRD: ABNORMAL ML/MIN/{1.73_M2}
GLUCOSE BLD-MCNC: 90 MG/DL (ref 70–99)
HCT VFR BLD CALC: 40.4 % (ref 36.3–47.1)
HEMOGLOBIN: 12.7 G/DL (ref 11.9–15.1)
IMMATURE GRANULOCYTES: 0 %
LYMPHOCYTES # BLD: 42 % (ref 24–43)
MCH RBC QN AUTO: 29.3 PG (ref 25.2–33.5)
MCHC RBC AUTO-ENTMCNC: 31.4 G/DL (ref 28.4–34.8)
MCV RBC AUTO: 93.3 FL (ref 82.6–102.9)
MONOCYTES # BLD: 8 % (ref 3–12)
NRBC AUTOMATED: 0 PER 100 WBC
PDW BLD-RTO: 13.7 % (ref 11.8–14.4)
PLATELET # BLD: 174 K/UL (ref 138–453)
PLATELET ESTIMATE: NORMAL
PMV BLD AUTO: 12.5 FL (ref 8.1–13.5)
POTASSIUM SERPL-SCNC: 4.2 MMOL/L (ref 3.7–5.3)
RBC # BLD: 4.33 M/UL (ref 3.95–5.11)
RBC # BLD: NORMAL 10*6/UL
SEG NEUTROPHILS: 47 % (ref 36–65)
SEGMENTED NEUTROPHILS ABSOLUTE COUNT: 2.61 K/UL (ref 1.5–8.1)
SODIUM BLD-SCNC: 141 MMOL/L (ref 135–144)
WBC # BLD: 5.5 K/UL (ref 3.5–11.3)
WBC # BLD: NORMAL 10*3/UL

## 2021-09-03 PROCEDURE — 80048 BASIC METABOLIC PNL TOTAL CA: CPT

## 2021-09-03 PROCEDURE — 6360000004 HC RX CONTRAST MEDICATION: Performed by: STUDENT IN AN ORGANIZED HEALTH CARE EDUCATION/TRAINING PROGRAM

## 2021-09-03 PROCEDURE — 72193 CT PELVIS W/DYE: CPT

## 2021-09-03 PROCEDURE — 96375 TX/PRO/DX INJ NEW DRUG ADDON: CPT

## 2021-09-03 PROCEDURE — 96376 TX/PRO/DX INJ SAME DRUG ADON: CPT

## 2021-09-03 PROCEDURE — 85025 COMPLETE CBC W/AUTO DIFF WBC: CPT

## 2021-09-03 PROCEDURE — 96374 THER/PROPH/DIAG INJ IV PUSH: CPT

## 2021-09-03 PROCEDURE — 6360000002 HC RX W HCPCS: Performed by: STUDENT IN AN ORGANIZED HEALTH CARE EDUCATION/TRAINING PROGRAM

## 2021-09-03 RX ORDER — ACETAMINOPHEN 500 MG
500 TABLET ORAL EVERY 6 HOURS PRN
Qty: 360 TABLET | Refills: 1 | Status: SHIPPED | OUTPATIENT
Start: 2021-09-03 | End: 2021-09-03 | Stop reason: SDUPTHER

## 2021-09-03 RX ORDER — CLINDAMYCIN HYDROCHLORIDE 300 MG/1
300 CAPSULE ORAL 3 TIMES DAILY
Qty: 21 CAPSULE | Refills: 0 | Status: SHIPPED | OUTPATIENT
Start: 2021-09-03 | End: 2021-09-10

## 2021-09-03 RX ORDER — FENTANYL CITRATE 50 UG/ML
25 INJECTION, SOLUTION INTRAMUSCULAR; INTRAVENOUS ONCE
Status: COMPLETED | OUTPATIENT
Start: 2021-09-03 | End: 2021-09-03

## 2021-09-03 RX ORDER — IBUPROFEN 400 MG/1
400 TABLET ORAL EVERY 6 HOURS PRN
Qty: 28 TABLET | Refills: 0 | Status: SHIPPED | OUTPATIENT
Start: 2021-09-03 | End: 2021-11-14

## 2021-09-03 RX ORDER — KETOROLAC TROMETHAMINE 30 MG/ML
30 INJECTION, SOLUTION INTRAMUSCULAR; INTRAVENOUS ONCE
Status: COMPLETED | OUTPATIENT
Start: 2021-09-03 | End: 2021-09-03

## 2021-09-03 RX ORDER — KETOROLAC TROMETHAMINE 15 MG/ML
15 INJECTION, SOLUTION INTRAMUSCULAR; INTRAVENOUS ONCE
Status: DISCONTINUED | OUTPATIENT
Start: 2021-09-03 | End: 2021-09-03

## 2021-09-03 RX ORDER — ACETAMINOPHEN 500 MG
500 TABLET ORAL EVERY 6 HOURS PRN
Qty: 28 TABLET | Refills: 0 | Status: SHIPPED | OUTPATIENT
Start: 2021-09-03 | End: 2021-11-14

## 2021-09-03 RX ADMIN — IOPAMIDOL 75 ML: 755 INJECTION, SOLUTION INTRAVENOUS at 03:11

## 2021-09-03 RX ADMIN — FENTANYL CITRATE 25 MCG: 50 INJECTION, SOLUTION INTRAMUSCULAR; INTRAVENOUS at 04:45

## 2021-09-03 RX ADMIN — KETOROLAC TROMETHAMINE 30 MG: 30 INJECTION, SOLUTION INTRAMUSCULAR; INTRAVENOUS at 00:48

## 2021-09-03 RX ADMIN — KETOROLAC TROMETHAMINE 30 MG: 30 INJECTION, SOLUTION INTRAMUSCULAR; INTRAVENOUS at 02:45

## 2021-09-03 ASSESSMENT — PAIN SCALES - GENERAL
PAINLEVEL_OUTOF10: 10

## 2021-09-03 ASSESSMENT — PAIN DESCRIPTION - FREQUENCY: FREQUENCY: CONTINUOUS

## 2021-09-03 ASSESSMENT — ENCOUNTER SYMPTOMS
SHORTNESS OF BREATH: 0
RECTAL PAIN: 0
BLOOD IN STOOL: 0
ABDOMINAL PAIN: 0
SORE THROAT: 0
DIARRHEA: 0
COUGH: 0
VOMITING: 0
RHINORRHEA: 0
NAUSEA: 0
ANAL BLEEDING: 0

## 2021-09-03 ASSESSMENT — PAIN DESCRIPTION - DESCRIPTORS: DESCRIPTORS: CONSTANT;SHARP

## 2021-09-03 ASSESSMENT — PAIN DESCRIPTION - PAIN TYPE: TYPE: ACUTE PAIN

## 2021-09-03 ASSESSMENT — PAIN DESCRIPTION - ORIENTATION: ORIENTATION: MID;UPPER

## 2021-09-03 ASSESSMENT — PAIN DESCRIPTION - LOCATION: LOCATION: BUTTOCKS

## 2021-09-03 NOTE — ED PROVIDER NOTES
[facility]     Emergency Department     Faculty Attestation    I performed a history and physical examination of the patient and discussed management with the resident. I have reviewed and agree with the residents findings including all diagnostic interpretations, and treatment plans as written. Any areas of disagreement are noted on the chart. I was personally present for the key portions of any procedures. I have documented in the chart those procedures where I was not present during the key portions. I have reviewed the emergency nurses triage note. I agree with the chief complaint, past medical history, past surgical history, allergies, medications, social and family history as documented unless otherwise noted below. Documentation of the HPI, Physical Exam and Medical Decision Making performed by archieibyana is based on my personal performance of the HPI, PE and MDM. For Physician Assistant/ Nurse Practitioner cases/documentation I have personally evaluated this patient and have completed at least one if not all key elements of the E/M (history, physical exam, and MDM). Additional findings are as noted. 53 yo F buttock swelling x 3 days, no fever, hx lupus,   pe vss Dr Audrey Smith escort for exam, 4 cm draining abscess superior to r buttock surrounding induration,   Pt non toxic,     Referred to pcp > [facility], abx started,   Small incision made over area of swelling \ fluctuance, drained small amount yellow pus, follow up stressed,     EKG Interpretation    Interpreted by me      CRITICAL CARE: There was a high probability of clinically significant/life threatening deterioration in this patient's condition which required my urgent intervention. Total critical care time was 5 minutes. This excludes any time for separately reportable procedures.        U-Landen 24, DO  09/03/21 [address], DO  09/03/21 0347

## 2021-09-03 NOTE — ED PROVIDER NOTES
[facility] ED  Emergency Department Encounter  EmergencyMedicine Resident     Pt Gage Ogden  MRN: [id number]  [place] 1975  Date of evaluation: 9/3/21  PCP:  No primary care provider on file. This patient was evaluated in the Emergency Department for symptoms described in the history of present illness. The patient was evaluated in the context of the global COVID-19 pandemic, which necessitated consideration that the patient might be at risk for infection with the SARS-CoV-2 virus that causes COVID-19. Institutional protocols and algorithms that pertain to the evaluation of patients at risk for COVID-19 are in a state of rapid change based on information released by regulatory bodies including the CDC and federal and state organizations. These policies and algorithms were followed during the patient's care in the ED. CHIEF COMPLAINT       Chief Complaint   Patient presents with    Abscess     thinks she was stung by a bee 3 days ago, continuous pain     HISTORY OF PRESENT ILLNESS  (Location/Symptom, Timing/Onset, Context/Setting, Quality, Duration, Modifying Factors, Severity.)      Rosana Cobb is a 54 y.o. female who presents with pain and drainage from wound site on her buttocks. Patient states she believes she was stung by a bee several days ago, as she felt a sharp shooting pain in the region which has persisted since that time. Patient has not taken any medications. Patient has a history of lupus, is not on any immunosuppressants. She states the area has been draining a thin bloody discharge for the past 2 days. Denying fevers at home, pain with bowel movements, diarrhea, blood in stool, abdominal pain, CP, SOB. Medical history of lupus, hypertension, hyperlipidemia. Not on any immunosuppressants, states she is compliant with all other medications. Allergy to Bactrim.     PAST MEDICAL / SURGICAL / SOCIAL / FAMILY HISTORY      has a past medical history of Grandfather        Allergies:  Bactrim [sulfamethoxazole-trimethoprim]    Home Medications:  Prior to Admission medications    Medication Sig Start Date End Date Taking? Authorizing Provider   clindamycin (CLEOCIN) 300 MG capsule Take 1 capsule by mouth 3 times daily for 7 days 9/3/21 9/10/21 Yes Ekaterina Edwards MD   ibuprofen (IBU) 400 MG tablet Take 1 tablet by mouth every 6 hours as needed for Pain 9/3/21 9/10/21 Yes Ekaterina Edwards MD   acetaminophen (TYLENOL) 500 MG tablet Take 1 tablet by mouth every 6 hours as needed for Pain 9/3/21 9/10/21 Yes Ekaterina Edwards MD   naproxen (NAPROSYN) 500 MG tablet Take 1 tablet by mouth 2 times daily 7/9/19 6/16/21  Charlie Reis PA-C       REVIEW OF SYSTEMS    (2-9 systems for level 4, 10 or more for level 5)      Review of Systems   Constitutional: Negative for chills and fever. HENT: Negative for congestion, rhinorrhea and sore throat. Eyes: Negative for visual disturbance. Respiratory: Negative for cough and shortness of breath. Cardiovascular: Negative for chest pain and palpitations. Gastrointestinal: Negative for abdominal pain, anal bleeding, blood in stool, diarrhea, nausea, rectal pain and vomiting. Genitourinary: Negative for pelvic pain. Musculoskeletal: Negative for arthralgias, gait problem, myalgias and neck pain. Skin: Positive for wound (Draining wound at buttocks in cleft). Negative for rash. Neurological: Negative for light-headedness and headaches. PHYSICAL EXAM   (up to 7 for level 4, 8 or more for level 5)      INITIAL VITALS:   /79   Pulse 74   Temp 97 °F (36.1 °C) (Temporal)   Resp 19   Wt 134 lb 11.2 oz (61.1 kg)   SpO2 99%   BMI 23.12 kg/m²     Physical Exam  Vitals reviewed. Constitutional:       General: She is not in acute distress. Appearance: Normal appearance. HENT:      Head: Normocephalic and atraumatic.       Nose: Nose normal.      Mouth/Throat:      Mouth: Mucous membranes are moist. Pharynx: Oropharynx is clear. Eyes:      Extraocular Movements: Extraocular movements intact. Pupils: Pupils are equal, round, and reactive to light. Cardiovascular:      Rate and Rhythm: Normal rate and regular rhythm. Heart sounds: Normal heart sounds. No murmur heard. Pulmonary:      Effort: Pulmonary effort is normal.      Breath sounds: Normal breath sounds. Abdominal:      General: Bowel sounds are normal.      Palpations: Abdomen is soft. Tenderness: There is no abdominal tenderness. There is no guarding or rebound. Genitourinary:     Comments: Pilonidal cyst with central deroofing and serosanguinous drainage. 4 cm area of erythema and fluctuance surrounding, high suspicion for abscess. Musculoskeletal:      Cervical back: Neck supple. Skin:     Capillary Refill: Capillary refill takes less than 2 seconds. Neurological:      General: No focal deficit present. Mental Status: She is alert and oriented to person, place, and time.        DIFFERENTIAL  DIAGNOSIS     PLAN (LABS / IMAGING / EKG):  Orders Placed This Encounter   Procedures    CT PELVIS W CONTRAST Additional Contrast? None    CBC WITH AUTO DIFFERENTIAL    BASIC METABOLIC PANEL    Insert peripheral IV     MEDICATIONS ORDERED:  Orders Placed This Encounter   Medications    DISCONTD: ketorolac (TORADOL) injection 15 mg    ketorolac (TORADOL) injection 30 mg    iopamidol (ISOVUE-370) 76 % injection 75 mL    ketorolac (TORADOL) injection 30 mg    fentaNYL (SUBLIMAZE) injection 25 mcg    clindamycin (CLEOCIN) 300 MG capsule     Sig: Take 1 capsule by mouth 3 times daily for 7 days     Dispense:  21 capsule     Refill:  0    DISCONTD: acetaminophen (TYLENOL) 500 MG tablet     Sig: Take 1 tablet by mouth every 6 hours as needed for Pain     Dispense:  360 tablet     Refill:  1    ibuprofen (IBU) 400 MG tablet     Sig: Take 1 tablet by mouth every 6 hours as needed for Pain     Dispense:  28 tablet Refill:  0    acetaminophen (TYLENOL) 500 MG tablet     Sig: Take 1 tablet by mouth every 6 hours as needed for Pain     Dispense:  28 tablet     Refill:  0     DDX: Pilonidal cyst vs abscess vs tj's gangrene vs cyst with sinus draining tract    DIAGNOSTIC RESULTS / EMERGENCY DEPARTMENT COURSE / MDM   LAB RESULTS:  Results for orders placed or performed during the hospital encounter of 09/03/21   CBC WITH AUTO DIFFERENTIAL   Result Value Ref Range    WBC 5.5 3.5 - 11.3 k/uL    RBC 4.33 3.95 - 5.11 m/uL    Hemoglobin 12.7 11.9 - 15.1 g/dL    Hematocrit 40.4 36.3 - 47.1 %    MCV 93.3 82.6 - 102.9 fL    MCH 29.3 25.2 - 33.5 pg    MCHC 31.4 28.4 - 34.8 g/dL    RDW 13.7 11.8 - 14.4 %    Platelets 475 985 - 086 k/uL    MPV 12.5 8.1 - 13.5 fL    NRBC Automated 0.0 0.0 per 100 WBC    Differential Type NOT REPORTED     Seg Neutrophils 47 36 - 65 %    Lymphocytes 42 24 - 43 %    Monocytes 8 3 - 12 %    Eosinophils % 2 1 - 4 %    Basophils 1 0 - 2 %    Immature Granulocytes 0 0 %    Segs Absolute 2.61 1.50 - 8.10 k/uL    Absolute Lymph # 2.29 1.10 - 3.70 k/uL    Absolute Mono # 0.46 0.10 - 1.20 k/uL    Absolute Eos # 0.10 0.00 - 0.44 k/uL    Basophils Absolute 0.04 0.00 - 0.20 k/uL    Absolute Immature Granulocyte <0.03 0.00 - 0.30 k/uL    WBC Morphology NOT REPORTED     RBC Morphology NOT REPORTED     Platelet Estimate NOT REPORTED    BASIC METABOLIC PANEL   Result Value Ref Range    Glucose 90 70 - 99 mg/dL    BUN 16 6 - 20 mg/dL    CREATININE 0.65 0.50 - 0.90 mg/dL    Bun/Cre Ratio NOT REPORTED 9 - 20    Calcium 8.2 (L) 8.6 - 10.4 mg/dL    Sodium 141 135 - 144 mmol/L    Potassium 4.2 3.7 - 5.3 mmol/L    Chloride 106 98 - 107 mmol/L    CO2 24 20 - 31 mmol/L    Anion Gap 11 9 - 17 mmol/L    GFR Non-African American >60 >60 mL/min    GFR African American >60 >60 mL/min    GFR Comment          GFR Staging NOT REPORTED        IMPRESSION: Unremarkable CBC and BMP    RADIOLOGY:  CT PELVIS W CONTRAST Additional Contrast? None    Result Date: 9/3/2021  EXAMINATION: CT OF THE PELVIS WITH CONTRAST 9/3/2021 3:10 am TECHNIQUE: CT of the pelvis was performed with the administration of intravenous contrast. Multiplanar reformatted images are provided for review. Dose modulation, iterative reconstruction, and/or weight based adjustment of the mA/kV was utilized to reduce the radiation dose to as low as reasonably achievable. COMPARISON: CT abdomen and pelvis with contrast August 16, 2016. HISTORY: ORDERING SYSTEM PROVIDED HISTORY: Pilonidal cyst, r/o Neymar's' TECHNOLOGIST PROVIDED HISTORY: Pilonidal cyst, r/o Neymar's' Decision Support Exception - unselect if not a suspected or confirmed emergency medical condition->Emergency Medical Condition (MA) Reason for Exam: Pilonidal cyst - R/0 Neymar's' Acuity: Unknown Type of Exam: Unknown FINDINGS: The urinary bladder is well distended and unremarkable in appearance. No evidence of pelvic free fluid is seen. The bowel loops are unremarkable in appearance without evidence of obstruction, distension or mucosal thickening. Midline posterior sacral/coccygeal region ovoid peripherally enhancing collection measuring 25 mm anteroposterior by 29 mm transverse by approximately 35 mm craniocaudad is noted with surrounding fat stranding within subcutaneous tissues identified. Bilateral inguinal lymphadenopathy is noted with largest node seen on the right measuring up to 14 mm in short axis diameter. The bones, skeletal muscle bundles, fascial planes and subcutaneous soft tissues are unremarkable in appearance. Midline posterior sacral/coccygeal region ovoid subcutaneous peripherally enhancing collection, with dimensions as discussed above, compatible with reported pilonidal sinus/cyst with suspected complication of abscess formation. Surrounding subcutaneous inflammation consistent with cellulitis.  Bilateral inguinal lymphadenopathy, as discussed above, possibly reactive in setting of infectious disease. Differential diagnostic considerations include association with inflammatory versus neoplastic disease process. EKG  None    All EKG's are interpreted by the Emergency Department Physician who either signs or Co-signs this chart in the absence of a cardiologist.    EMERGENCY DEPARTMENT COURSE:  ED Course as of Sep 03 0538   Fri Sep 03, 2021   0018 Patient presents with 3-day history of pilonidal cyst. No previous history of similar cysts. Pilonidal cyst has been draining for 2 days, serosanguinous drainage. Hx of lupus, not on any immunosuppressants. Denies any fevers. Not taking any medications for pain at this time, but says her pain is 9 out of 10. Mild fluctuance noted on exam with significant tenderness to palpation. 5 cm area of erythema and induration around cyst with central area of deroofing and active serosanguinous drainage. [JG]   [id number] Will obtain CT pelvis with contrast to rule out sinus draining tract and Neymar's gangrene. VSS. Afebrile. Given toradol for pain. [JG]   0327 CT pelvis with contrast completed, results pending. [JG]   0416 CT pelvis showing very small abscess without sinus draining tract or Neymar's gangrene. Will do a 1 cm stab incision here in the ED to allow for drainage. Patient given PCP follow up for f/u of abscess drainage. [JG]      ED Course User Index  [JG] Adriana Ventura MD     PROCEDURES:  Incision and Drainage Procedure Note    Performed by: Adriana Ventura MD    Indication: pilonidal cyst with abscess    Consent: The patient provided verbal consent for this procedure. Time out performed: Immediately prior to the procedure a \"time out\" was called to verify the correct patient, the correct procedure, equipment, support staff and site/side marked as required. Procedure: The patient was positioned appropriately and the skin over the incision site was prepped with betadine.  Local anesthesia was obtained by infiltration using 1% Lidocaine without epinephrine. An incision was then made over the greatest area of fluctuance and through the Subcutaneous tissue but did not enter the muscle layer. approximately 2 cc of purulent and serosanguineous material was expressed. Loculations were not present. The drainage cavity was then irrigated. The patients tetanus status needed updated but the patient refused. The patient tolerated the procedure well. Complications: None    CONSULTS:  None    CRITICAL CARE:  None    FINAL IMPRESSION      1.  Pilonidal cyst with abscess          DISPOSITION / PLAN     DISPOSITION  Discharged 9/3/2021 5:10 AM      PATIENT REFERRED TO:  Jo EASON  [facility] [address]  [phone]  Schedule an appointment as soon as possible for a visit in 3 days  As needed    [facility] [place] [address]  [phone]  Go to   If symptoms worsen      DISCHARGE MEDICATIONS:  New Prescriptions    ACETAMINOPHEN (TYLENOL) 500 MG TABLET    Take 1 tablet by mouth every 6 hours as needed for Pain    CLINDAMYCIN (CLEOCIN) 300 MG CAPSULE    Take 1 capsule by mouth 3 times daily for 7 days    IBUPROFEN (IBU) 400 MG TABLET    Take 1 tablet by mouth every 6 hours as needed for Pain       Martita Grande MD  Emergency Medicine Resident    (Please note that portions of thisnote were completed with a voice recognition program.  Efforts were made to edit the dictations but occasionally words are mis-transcribed.)       Martita Grande MD  Resident  09/03/21 [id number]

## 2021-09-03 NOTE — ED NOTES
Report given to [facility] RN  No change in patient status  Continues to rest quietly       Angela Hernandez [facility]  09/03/21 [id number]

## 2021-09-03 NOTE — ED NOTES
Patient to room 5, ambulatory.    Patient is a 54year old female who thinks she was stung by a bee 3 days ago in the cleft of her buttocks  She has had pain ever since  Patient complains of tenderness at site  Noted possible abcess to upper right buttock, right at cleft  Will continue to assess       Blu Stallings RN  09/03/21 [id number]

## 2021-09-03 NOTE — ED NOTES
Dr. Eun Hoffman and Dr. Brooke Henderson at bedside     [facility]  09/03/21 [id number]

## 2021-09-09 ENCOUNTER — APPOINTMENT (OUTPATIENT)
Dept: GENERAL RADIOLOGY | Age: 46
End: 2021-09-09
Payer: MEDICARE

## 2021-09-09 ENCOUNTER — HOSPITAL ENCOUNTER (EMERGENCY)
Age: 46
Discharge: HOME | End: 2021-09-09
Attending: EMERGENCY MEDICINE
Payer: MEDICARE

## 2021-09-09 VITALS
SYSTOLIC BLOOD PRESSURE: 133 MMHG | BODY MASS INDEX: 23.12 KG/M2 | DIASTOLIC BLOOD PRESSURE: 94 MMHG | OXYGEN SATURATION: 99 % | HEIGHT: 64 IN | HEART RATE: 75 BPM | RESPIRATION RATE: 16 BRPM | TEMPERATURE: 96.8 F

## 2021-09-09 DIAGNOSIS — M25.512 LEFT SHOULDER PAIN, UNSPECIFIED CHRONICITY: Primary | ICD-10-CM

## 2021-09-09 PROCEDURE — 6360000002 HC RX W HCPCS: Performed by: STUDENT IN AN ORGANIZED HEALTH CARE EDUCATION/TRAINING PROGRAM

## 2021-09-09 PROCEDURE — 99283 EMERGENCY DEPT VISIT LOW MDM: CPT

## 2021-09-09 PROCEDURE — 6370000000 HC RX 637 (ALT 250 FOR IP): Performed by: STUDENT IN AN ORGANIZED HEALTH CARE EDUCATION/TRAINING PROGRAM

## 2021-09-09 PROCEDURE — 96372 THER/PROPH/DIAG INJ SC/IM: CPT

## 2021-09-09 PROCEDURE — 71046 X-RAY EXAM CHEST 2 VIEWS: CPT

## 2021-09-09 PROCEDURE — 73030 X-RAY EXAM OF SHOULDER: CPT

## 2021-09-09 RX ORDER — KETOROLAC TROMETHAMINE 15 MG/ML
15 INJECTION, SOLUTION INTRAMUSCULAR; INTRAVENOUS ONCE
Status: COMPLETED | OUTPATIENT
Start: 2021-09-09 | End: 2021-09-09

## 2021-09-09 RX ORDER — ACETAMINOPHEN 500 MG
500 TABLET ORAL 4 TIMES DAILY PRN
Qty: 30 TABLET | Refills: 0 | Status: SHIPPED | OUTPATIENT
Start: 2021-09-09 | End: 2021-11-14

## 2021-09-09 RX ORDER — OXYCODONE HYDROCHLORIDE 5 MG/1
5 TABLET ORAL ONCE
Status: COMPLETED | OUTPATIENT
Start: 2021-09-09 | End: 2021-09-09

## 2021-09-09 RX ORDER — IBUPROFEN 600 MG/1
600 TABLET ORAL 4 TIMES DAILY PRN
Qty: 30 TABLET | Refills: 0 | Status: SHIPPED | OUTPATIENT
Start: 2021-09-09 | End: 2021-11-14

## 2021-09-09 RX ORDER — KETOROLAC TROMETHAMINE 15 MG/ML
15 INJECTION, SOLUTION INTRAMUSCULAR; INTRAVENOUS ONCE
Status: DISCONTINUED | OUTPATIENT
Start: 2021-09-09 | End: 2021-09-09

## 2021-09-09 RX ADMIN — KETOROLAC TROMETHAMINE 15 MG: 15 INJECTION, SOLUTION INTRAMUSCULAR; INTRAVENOUS at 14:44

## 2021-09-09 RX ADMIN — OXYCODONE HYDROCHLORIDE 5 MG: 5 TABLET ORAL at 14:37

## 2021-09-09 RX ADMIN — KETOROLAC TROMETHAMINE 15 MG: 15 INJECTION, SOLUTION INTRAMUSCULAR; INTRAVENOUS at 13:40

## 2021-09-09 ASSESSMENT — ENCOUNTER SYMPTOMS
BACK PAIN: 1
VOMITING: 0
STRIDOR: 0
SHORTNESS OF BREATH: 0
VOICE CHANGE: 0
COUGH: 0
ABDOMINAL PAIN: 0

## 2021-09-09 ASSESSMENT — PAIN DESCRIPTION - ONSET: ONSET: AWAKENED FROM SLEEP

## 2021-09-09 ASSESSMENT — PAIN DESCRIPTION - PAIN TYPE
TYPE: ACUTE PAIN
TYPE: ACUTE PAIN

## 2021-09-09 ASSESSMENT — PAIN DESCRIPTION - DESCRIPTORS: DESCRIPTORS: ACHING

## 2021-09-09 ASSESSMENT — PAIN SCALES - GENERAL
PAINLEVEL_OUTOF10: 8
PAINLEVEL_OUTOF10: 7
PAINLEVEL_OUTOF10: 10

## 2021-09-09 ASSESSMENT — PAIN DESCRIPTION - ORIENTATION: ORIENTATION: LEFT

## 2021-09-09 ASSESSMENT — PAIN DESCRIPTION - FREQUENCY
FREQUENCY: INTERMITTENT
FREQUENCY: INTERMITTENT

## 2021-09-09 ASSESSMENT — PAIN DESCRIPTION - LOCATION
LOCATION: SHOULDER
LOCATION: BACK

## 2021-09-09 NOTE — ED PROVIDER NOTES
[address] [facility]     Emergency Department     Faculty Attestation        I performed a history and physical examination of the patient and discussed management with the resident. I reviewed the residents note and agree with the documented findings and plan of care. Any areas of disagreement are noted on the chart. I was personally present for the key portions of any procedures. I have documented in the chart those procedures where I was not present during the key portions. I have reviewed the emergency nurses triage note. I agree with the chief complaint, past medical history, past surgical history, allergies, medications, social and family history as documented unless otherwise noted below. For Physician Assistant/ Nurse Practitioner cases/documentation I have personally evaluated this patient and have completed at least one if not all key elements of the E/M (history, physical exam, and MDM). Additional findings are as noted. Vital Signs: BP: (!) 133/94  Pulse: 75  Resp: 16  Temp: 96.8 °F (36 °C) SpO2: 99 %  PCP:  No primary care provider on file. Pertinent Comments:         Critical Care  None    This patient was evaluated in the Emergency Department for symptoms described in the history of present illness. He/she was evaluated in the context of the global COVID-19 pandemic, which necessitated consideration that the patient might be at risk for infection with the SARS-CoV-2 virus that causes COVID-19. Institutional protocols and algorithms that pertain to the evaluation of patients at risk for COVID-19 are in a state of rapid change based on information released by regulatory bodies including the CDC and federal and state organizations. These policies and algorithms were followed during the patient's care in the ED.     (Please note that portions of this note were completed with a voice recognition program. Efforts were made to edit the dictations but occasionally words are mis-transcribed.  Whenever words are used in this note in any gender, they shall be construed as though they were used in the gender appropriate to the circumstances; and whenever words are used in this note in the singular or plural form, they shall be construed as though they were used in the form appropriate to the circumstances.)    MD Page Wan  Attending Emergency Medicine Physician           Marian Green MD  09/09/21 9192

## 2021-09-09 NOTE — ED NOTES
This patient was assessed by the doctor only. Nurse processed and completed the orders from this doctor ie labs, meds, and/or EKG.         Zoraida Rich RN  09/09/21 [id number]

## 2021-09-09 NOTE — ED PROVIDER NOTES
[facility] ED  Emergency Department Encounter  Emergency Medicine Resident     Pt Name: Anne-Marie Garcia  MRN: [id number]  [place] 1975  Date of evaluation: 21  PCP:  No primary care provider on file. CHIEF COMPLAINT       Chief Complaint   Patient presents with    Shoulder Pain     pt stated she woke up with pain asked friend to run back and a bump was noticed so she came to ED to have it checked out       HISTORY OFPRESENT ILLNESS  (Location/Symptom, Timing/Onset, Context/Setting, Quality, Duration, Modifying Factors,Severity.)      Anne-Marie Garcia is a 54 y. o.yo female who presents with complaints of left thoracic pain, states that it started today. Denies any fall, that her friend was helping her massage her back because she was in pain, unsure if he saw should too hard. However the friend found soft tissue swelling over left thoracic and told friend that he she needs to bring her to the emergency room to be evaluated. Patient states that she she has baseline back soreness, however the pain is worse than before. She denies any current steroid use however she did states she has a history of lupus, last time she used steroids was 10 years ago. Denies any history of shingles, she did state that her mom had shingles years ago. Patient currently denying any chest pain, abdominal pain, nausea and vomiting, numbness or tingling upper and lower extremities. No weakness. She was able to ambulate. PAST MEDICAL / SURGICAL / SOCIAL / FAMILY HISTORY      has a past medical history of Fibromyalgia, Headache, Hyperlipidemia, Hypertension, Hypothyroidism, Lupus ([address] and Thyroid disease. has a past surgical history that includes  section; Foot surgery; and Hysterectomy.      Social History     Socioeconomic History    Marital status: Single     Spouse name: Not on file    Number of children: Not on file    Years of education: Not on file    Highest education level: Not needed for Pain 9/9/21  Yes Jose Ortiz MD   clindamycin (CLEOCIN) 300 MG capsule Take 1 capsule by mouth 3 times daily for 7 days 9/3/21 9/10/21  Gigi Bhatt MD   ibuprofen (IBU) 400 MG tablet Take 1 tablet by mouth every 6 hours as needed for Pain 9/3/21 9/10/21  Gigi Bhatt MD   acetaminophen (TYLENOL) 500 MG tablet Take 1 tablet by mouth every 6 hours as needed for Pain 9/3/21 9/10/21  Gigi Bhatt MD   naproxen (NAPROSYN) 500 MG tablet Take 1 tablet by mouth 2 times daily 7/9/19 6/16/21  Rodolfo Aranda PA-C       REVIEW OFSYSTEMS    (2-9 systems for level 4, 10 or more for level 5)      Review of Systems   Constitutional: Negative for appetite change, chills and fever. HENT: Negative for voice change. Respiratory: Negative for cough, shortness of breath and stridor. Cardiovascular: Negative for chest pain and leg swelling. Gastrointestinal: Negative for abdominal pain and vomiting. Musculoskeletal: Positive for back pain. Skin: Negative for rash and wound. Neurological: Negative for tremors and weakness. Psychiatric/Behavioral: Negative for behavioral problems and confusion. PHYSICAL EXAM   (up to 7 for level 4, 8 or more forlevel 5)      INITIAL VITALS:   ED Triage Vitals [09/09/21 1317]   BP Temp Temp Source Pulse Resp SpO2 Height Weight   (!) 133/94 96.8 °F (36 °C) Oral 75 16 99 % 5' 4\" (1.626 m) --       Physical Exam  Constitutional:       Appearance: Normal appearance. HENT:      Head: Normocephalic and atraumatic. Nose: Nose normal.      Mouth/Throat:      Mouth: Mucous membranes are moist.   Eyes:      Extraocular Movements: Extraocular movements intact. Pupils: Pupils are equal, round, and reactive to light. Cardiovascular:      Rate and Rhythm: Normal rate and regular rhythm. Pulmonary:      Effort: Pulmonary effort is normal. No respiratory distress. Abdominal:      General: There is no distension.       Palpations: Abdomen is soft. Tenderness: There is no abdominal tenderness. Musculoskeletal:         General: No swelling or tenderness. Cervical back: No rigidity or tenderness. Skin:     General: Skin is warm. Capillary Refill: Capillary refill takes less than 2 seconds. Coloration: Skin is not jaundiced. Neurological:      General: No focal deficit present. Mental Status: She is alert and oriented to person, place, and time. Psychiatric:         Mood and Affect: Mood normal.         Behavior: Behavior normal.         DIFFERENTIAL  DIAGNOSIS     PLAN (LABS / IMAGING / EKG):  Orders Placed This Encounter   Procedures    XR CHEST (2 VW)    XR SHOULDER LEFT (MIN 2 VIEWS)       MEDICATIONS ORDERED:  Orders Placed This Encounter   Medications    DISCONTD: ketorolac (TORADOL) injection 15 mg    ketorolac (TORADOL) injection 15 mg    ketorolac (TORADOL) injection 15 mg    oxyCODONE (ROXICODONE) immediate release tablet 5 mg    acetaminophen (TYLENOL) 500 MG tablet     Sig: Take 1 tablet by mouth 4 times daily as needed for Pain     Dispense:  30 tablet     Refill:  0    ibuprofen (ADVIL;MOTRIN) 600 MG tablet     Sig: Take 1 tablet by mouth 4 times daily as needed for Pain     Dispense:  30 tablet     Refill:  0       Initial MDM/Plan: 54 y.o. female who presents with left thoracic upper back pain. There was no area of rash, no color changes over her back, she does have left tissue swelling over left thoracic that is measuring 2X2 cm. No erythema no warmth or swelling concerning for cellulitis. No weakness or numbness in upper and lower extremities. Given that patient did have a history of lupus, and used steroids, she possibly have nontraumatic fractures secondary to osteoporosis. And thus will obtain checks x-ray 2 views to assess for any fracture. Patient given Toradol for pain control. If x-ray negative, plan to discharge her with Tylenol and ibuprofen for pain control.   She will follow up with PCP within 48 to 72 hours. DIAGNOSTIC RESULTS / EMERGENCYDEPARTMENT COURSE / MDM     LABS:  Labs Reviewed - No data to display      RADIOLOGY:  XR CHEST (2 VW)    Result Date: 9/9/2021  EXAMINATION: 3 XRAY VIEWS OF THE LEFT SHOULDER; TWO XRAY VIEWS OF THE CHEST 9/9/2021 2:07 pm COMPARISON: None. HISTORY: ORDERING SYSTEM PROVIDED HISTORY: shoulder pain TECHNOLOGIST PROVIDED HISTORY: shoulder pain FINDINGS: Chest: Cardiac and mediastinal silhouettes appear within normal limits for size. Pulmonary vascularity appears normal.  No focal infiltrate or pleural effusion is identified. No pneumothorax is seen. No acute cardiopulmonary process. No acute osseous abnormality is identified. Mild degenerative changes are seen in the spine. Left shoulder: No fracture or dislocation is identified. The left shoulder appears intact. The left chest wall appears intact as well. No AC joint separation is identified. No significant degenerative changes. No acute osseous abnormality or significant degenerative change in the left shoulder. Clear chest without acute cardiopulmonary process. XR SHOULDER LEFT (MIN 2 VIEWS)    Result Date: 9/9/2021  EXAMINATION: 3 XRAY VIEWS OF THE LEFT SHOULDER; TWO XRAY VIEWS OF THE CHEST 9/9/2021 2:07 pm COMPARISON: None. HISTORY: ORDERING SYSTEM PROVIDED HISTORY: shoulder pain TECHNOLOGIST PROVIDED HISTORY: shoulder pain FINDINGS: Chest: Cardiac and mediastinal silhouettes appear within normal limits for size. Pulmonary vascularity appears normal.  No focal infiltrate or pleural effusion is identified. No pneumothorax is seen. No acute cardiopulmonary process. No acute osseous abnormality is identified. Mild degenerative changes are seen in the spine. Left shoulder: No fracture or dislocation is identified. The left shoulder appears intact. The left chest wall appears intact as well. No AC joint separation is identified. No significant degenerative changes.      No acute osseous abnormality or significant degenerative change in the left shoulder. Clear chest without acute cardiopulmonary process. EKG      All EKG's are interpreted by the Emergency Department Physicianwho either signs or Co-signs this chart in the absence of a cardiologist.    EMERGENCY DEPARTMENT COURSE:  ED Course as of Sep 09 1753   Thu Sep 09, 2021   1430 Xray were negative for fracture, patient given another dose of Toradol and Roxicodone. Plan to discharge her with PCP follow-up. Given strict return precautions, she has no numbness and tingling, worsening pain patient expressed understanding. All her questions and concerns answered. [AN]      ED Course User Index  [AN] Ray Veras MD          PROCEDURES:  None    CONSULTS:  None    CRITICAL CARE:      FINAL IMPRESSION      1. Left shoulder pain, unspecified chronicity          DISPOSITION / PLAN     DISPOSITION Decision To Discharge 09/09/2021 02:21:23 PM      PATIENT REFERRED TO:  [facility]  [address] [address]  [phone]    If symptoms worsen      DISCHARGE MEDICATIONS:  Discharge Medication List as of 9/9/2021  2:29 PM      START taking these medications    Details   !! acetaminophen (TYLENOL) 500 MG tablet Take 1 tablet by mouth 4 times daily as needed for Pain, Disp-30 tablet, R-0Print      !! ibuprofen (ADVIL;MOTRIN) 600 MG tablet Take 1 tablet by mouth 4 times daily as needed for Pain, Disp-30 tablet, R-0Print       !! - Potential duplicate medications found. Please discuss with provider.           Ray Veras MD  Emergency Medicine Resident    (Please note that portions of this note were completed with a voice recognition program.Efforts were made to edit the dictations but occasionally words are mis-transcribed.)       Ray Veras MD  Resident  09/09/21 [id number]

## 2021-11-14 ENCOUNTER — HOSPITAL ENCOUNTER (EMERGENCY)
Age: 46
Discharge: HOME | End: 2021-11-14
Attending: EMERGENCY MEDICINE
Payer: MEDICARE

## 2021-11-14 VITALS
SYSTOLIC BLOOD PRESSURE: 187 MMHG | TEMPERATURE: 97.9 F | DIASTOLIC BLOOD PRESSURE: 123 MMHG | WEIGHT: 135 LBS | RESPIRATION RATE: 16 BRPM | BODY MASS INDEX: 23.05 KG/M2 | HEART RATE: 83 BPM | OXYGEN SATURATION: 97 % | HEIGHT: 64 IN

## 2021-11-14 DIAGNOSIS — R52 BODY ACHES: Primary | ICD-10-CM

## 2021-11-14 DIAGNOSIS — M79.7 FIBROMYALGIA: ICD-10-CM

## 2021-11-14 PROCEDURE — 99283 EMERGENCY DEPT VISIT LOW MDM: CPT

## 2021-11-14 PROCEDURE — 6360000002 HC RX W HCPCS: Performed by: STUDENT IN AN ORGANIZED HEALTH CARE EDUCATION/TRAINING PROGRAM

## 2021-11-14 PROCEDURE — 6370000000 HC RX 637 (ALT 250 FOR IP): Performed by: STUDENT IN AN ORGANIZED HEALTH CARE EDUCATION/TRAINING PROGRAM

## 2021-11-14 PROCEDURE — 96372 THER/PROPH/DIAG INJ SC/IM: CPT

## 2021-11-14 RX ORDER — ORPHENADRINE CITRATE 30 MG/ML
60 INJECTION INTRAMUSCULAR; INTRAVENOUS ONCE
Status: COMPLETED | OUTPATIENT
Start: 2021-11-14 | End: 2021-11-14

## 2021-11-14 RX ORDER — VALSARTAN AND HYDROCHLOROTHIAZIDE 160; 12.5 MG/1; MG/1
1 TABLET, FILM COATED ORAL DAILY
Qty: 90 TABLET | Refills: 0 | Status: SHIPPED | OUTPATIENT
Start: 2021-11-14 | End: 2021-11-16

## 2021-11-14 RX ORDER — VALSARTAN 160 MG/1
160 TABLET ORAL DAILY
Qty: 90 TABLET | Refills: 0 | Status: SHIPPED | OUTPATIENT
Start: 2021-11-14 | End: 2021-11-16

## 2021-11-14 RX ORDER — IBUPROFEN 800 MG/1
800 TABLET ORAL ONCE
Status: COMPLETED | OUTPATIENT
Start: 2021-11-14 | End: 2021-11-14

## 2021-11-14 RX ADMIN — IBUPROFEN 800 MG: 800 TABLET, FILM COATED ORAL at 06:04

## 2021-11-14 RX ADMIN — ORPHENADRINE CITRATE 60 MG: 30 INJECTION INTRAMUSCULAR; INTRAVENOUS at 06:04

## 2021-11-14 ASSESSMENT — PAIN DESCRIPTION - LOCATION: LOCATION: BACK

## 2021-11-14 ASSESSMENT — ENCOUNTER SYMPTOMS
VOMITING: 0
SORE THROAT: 0
COUGH: 1
BACK PAIN: 0
TROUBLE SWALLOWING: 0
SHORTNESS OF BREATH: 0
ABDOMINAL DISTENTION: 0
CONSTIPATION: 0
ABDOMINAL PAIN: 0
DIARRHEA: 0
CHEST TIGHTNESS: 0

## 2021-11-14 ASSESSMENT — PAIN SCALES - GENERAL: PAINLEVEL_OUTOF10: 7

## 2021-11-14 NOTE — ED NOTES
Patient presents to ED with c/o generalized pain, worse in mid back, states it woke her up last night and then subsided. Pain 8/10. Patient alert oriented x 3, resp e/u, skin PWD, NADN.      Abdirahman Velazco RN  11/14/21 [id number]

## 2021-11-14 NOTE — ED PROVIDER NOTES
[facility] ED     Emergency Department     Faculty Attestation        I performed a history and physical examination of the patient and discussed management with the resident. I reviewed the residents note and agree with the documented findings and plan of care. Any areas of disagreement are noted on the chart. I was personally present for the key portions of any procedures. I have documented in the chart those procedures where I was not present during the key portions. I have reviewed the emergency nurses triage note. I agree with the chief complaint, past medical history, past surgical history, allergies, medications, social and family history as documented unless otherwise noted below. For mid-level providers such as nurse practitioners as well as physicians assistants:    I have personally seen and evaluated the patient. I find the patient's history and physical exam are consistent with NP/PA documentation. I agree with the care provided, treatment rendered, disposition, & follow-up plan. Additional findings are as noted. Vital Signs: BP (!) 187/123   Pulse 83   Temp 97.9 °F (36.6 °C)   Resp 16   Ht 5' 4\" (1.626 m)   Wt 135 lb (61.2 kg)   SpO2 97%   BMI 23.17 kg/m²   PCP:  No primary care provider on file. Pertinent Comments:     Patient complains of chronic pain from fibromyalgia she states she feels like she is having a fibromyalgia flare with entire body aches.   Denies fevers, chills, cough or chest pain or shortness of breath she is otherwise afebrile nontoxic on exam      Critical Care  None          Sonia Singleton MD    Attending Emergency Medicine Physician              Vana Krabbe, MD  11/14/21 6546

## 2021-11-14 NOTE — ED PROVIDER NOTES
[facility] ED  Emergency Department Encounter  EmergencyMedicine Resident     Pt Gladys Alrdidge  MRN: [id number]  [place] 1975  Date of evaluation: 21  PCP:  No primary care provider on file. This patient was evaluated in the Emergency Department for symptoms described in the history of present illness. The patient was evaluated in the context of the global COVID-19 pandemic, which necessitated consideration that the patient might be at risk for infection with the SARS-CoV-2 virus that causes COVID-19. Institutional protocols and algorithms that pertain to the evaluation of patients at risk for COVID-19 are in a state of rapid change based on information released by regulatory bodies including the CDC and federal and state organizations. These policies and algorithms were followed during the patient's care in the ED. CHIEF COMPLAINT       Chief Complaint   Patient presents with    Generalized Body Aches       HISTORY OF PRESENT ILLNESS  (Location/Symptom, Timing/Onset, Context/Setting, Quality, Duration, Modifying Factors, Severity.)      Gorge Brantley is a 51 y.o. female who presents with generalized body pains. Patient states that this feels like her fibromyalgia pain, but worse. She states that she woke up this morning with increased pain and even difficulty standing up out of bed. She denies any weakness, numbness, tingling or incontinence. Patient denies any fall or trauma. Patient states that the pain gets worse when it is cold, rainy weather weather changes. PAST MEDICAL / SURGICAL / SOCIAL / FAMILY HISTORY      has a past medical history of Fibromyalgia, Headache, Hyperlipidemia, Hypertension, Hypothyroidism, Lupus ([address] and Thyroid disease. has a past surgical history that includes  section; Foot surgery; and Hysterectomy.       Social History     Socioeconomic History    Marital status: Single     Spouse name: Not on file    Number of children: Not on file    Years of education: Not on file    Highest education level: Not on file   Occupational History    Not on file   Tobacco Use    Smoking status: Current Every Day Smoker     Packs/day: 0.50     Years: 23.00     Pack years: 11.50     Types: Cigarettes    Smokeless tobacco: Never Used   Substance and Sexual Activity    Alcohol use: No    Drug use: No    Sexual activity: Not on file   Other Topics Concern    Not on file   Social History Narrative    Not on file     Social Determinants of Health     Financial Resource Strain:     Difficulty of Paying Living Expenses: Not on file   Food Insecurity:     Worried About Running Out of Food in the Last Year: Not on file    George of Food in the Last Year: Not on file   Transportation Needs:     Lack of Transportation (Medical): Not on file    Lack of Transportation (Non-Medical):  Not on file   Physical Activity:     Days of Exercise per Week: Not on file    Minutes of Exercise per Session: Not on file   Stress:     Feeling of Stress : Not on file   Social Connections:     Frequency of Communication with Friends and Family: Not on file    Frequency of Social Gatherings with Friends and Family: Not on file    Attends [employer] Services: Not on file    Active Member of [address] [place] [address] [employer] or Organizations: Not on file    Attends Club or Organization Meetings: Not on file    Marital Status: Not on file   Intimate Partner Violence:     Fear of Current or Ex-Partner: Not on file    Emotionally Abused: Not on file    Physically Abused: Not on file    Sexually Abused: Not on file   Housing Stability:     Unable to Pay for Housing in the Last Year: Not on file    Number of [place] in the Last Year: Not on file    Unstable Housing in the Last Year: Not on file       Family History   Problem Relation Age of Onset    High Blood Pressure Mother     Heart Disease Father     Stroke Maternal Grandmother     High Blood Pressure Maternal Grandfather     Heart Disease Paternal Grandmother     Heart Disease Paternal Grandfather        Allergies:  Bactrim [sulfamethoxazole-trimethoprim]    Home Medications:  Prior to Admission medications    Medication Sig Start Date End Date Taking? Authorizing Provider   valsartan-hydroCHLOROthiazide (DIOVAN-HCT) 160-12.5 MG per tablet Take 1 tablet by mouth daily 11/14/21  Yes Aide Berg MD   valsartan (DIOVAN) 160 MG tablet Take 1 tablet by mouth daily 11/14/21 11/14/21  Aide Berg MD   naproxen (NAPROSYN) 500 MG tablet Take 1 tablet by mouth 2 times daily 7/9/19 6/16/21  Lc Pradhan PA-C       REVIEW OF SYSTEMS    (2-9 systems for level 4, 10 or more for level 5)      Review of Systems   Constitutional: Negative for chills and fever. HENT: Negative for ear pain, postnasal drip, sore throat and trouble swallowing. Eyes: Negative for visual disturbance. Respiratory: Positive for cough. Negative for chest tightness and shortness of breath. Cardiovascular: Negative for chest pain. Gastrointestinal: Negative for abdominal distention, abdominal pain, constipation, diarrhea and vomiting. Genitourinary: Negative for difficulty urinating, dysuria, flank pain and vaginal discharge. Musculoskeletal: Positive for myalgias. Negative for back pain and neck pain. Neurological: Negative for weakness, numbness and headaches. Psychiatric/Behavioral: Negative for confusion. PHYSICAL EXAM   (up to 7 for level 4, 8 or more for level 5)      INITIAL VITALS:   BP (!) 187/123   Pulse 83   Temp 97.9 °F (36.6 °C)   Resp 16   Ht 5' 4\" (1.626 m)   Wt 135 lb (61.2 kg)   SpO2 97%   BMI 23.17 kg/m²     Physical Exam  Constitutional:       General: She is awake. Appearance: Normal appearance. HENT:      Head: Normocephalic and atraumatic.       Right Ear: External ear normal.      Left Ear: External ear normal.      Nose: Nose normal.   Eyes:      General: Lids are normal.      Extraocular Movements: Extraocular movements intact. Cardiovascular:      Rate and Rhythm: Normal rate. Pulses: Normal pulses. Heart sounds: Normal heart sounds. Pulmonary:      Effort: Pulmonary effort is normal.      Breath sounds: Normal breath sounds. Abdominal:      Palpations: Abdomen is soft. Tenderness: There is no abdominal tenderness. Musculoskeletal:         General: Normal range of motion. Cervical back: Normal range of motion. Comments: Normal range of motion, strength and sensation intact in all extremities. Patient has diffuse tenderness, increased tenderness in the lumbar paraspinals. Skin:     Capillary Refill: Capillary refill takes less than 2 seconds. Neurological:      Mental Status: She is alert and oriented to person, place, and time. Sensory: Sensation is intact. Motor: Motor function is intact. Psychiatric:         Mood and Affect: Mood normal.         Behavior: Behavior is cooperative. DIFFERENTIAL  DIAGNOSIS     PLAN (LABS / IMAGING / EKG):  No orders of the defined types were placed in this encounter. MEDICATIONS ORDERED:  Orders Placed This Encounter   Medications    orphenadrine (NORFLEX) injection 60 mg    ibuprofen (ADVIL;MOTRIN) tablet 800 mg    DISCONTD: valsartan (DIOVAN) 160 MG tablet     Sig: Take 1 tablet by mouth daily     Dispense:  90 tablet     Refill:  0    valsartan-hydroCHLOROthiazide (DIOVAN-HCT) 160-12.5 MG per tablet     Sig: Take 1 tablet by mouth daily     Dispense:  90 tablet     Refill:  0       DDX: Fibromyalgia, muscle spasms, muscle strain    MDM: 51 y.o. female presents today with generalized body pain that is chronic. She states that the pain got worse today due to the temperature changes. Patient was given Norflex and 800 of ibuprofen and feels comfortable going home.   She states that she is also out of her hypertensive medications, will discharge with a prescription for her regular hypertensive medications. Denies any headaches or visual changes. Be discharged with strict return precautions and told [facility] since she does not have a PCP anymore. DIAGNOSTIC RESULTS / EMERGENCY DEPARTMENT COURSE / MDM   LAB RESULTS:  No results found for this visit on 11/14/21. RADIOLOGY:  No orders to display       PROCEDURES:  None    CONSULTS:  None    CRITICAL CARE:  None    FINAL IMPRESSION      1. Body aches    2.  Fibromyalgia          DISPOSITION / PLAN     DISPOSITION Decision To Discharge 11/14/2021 06:08:21 AM      PATIENT REFERRED TO:  [address] [place] [id number] [phone]  Schedule an appointment as soon as possible for a visit         DISCHARGE MEDICATIONS:  Discharge Medication List as of 11/14/2021  6:10 AM          John Barragan MD  Emergency Medicine Resident    (Please note that portions of thisnote were completed with a voice recognition program.  Efforts were made to edit the dictations but occasionally words are mis-transcribed.)       John Barragan MD  Resident  11/14/21 6532

## 2021-11-16 ENCOUNTER — OFFICE VISIT (OUTPATIENT)
Dept: FAMILY MEDICINE CLINIC | Age: 46
End: 2021-11-16
Payer: MEDICARE

## 2021-11-16 VITALS
BODY MASS INDEX: 21.42 KG/M2 | DIASTOLIC BLOOD PRESSURE: 103 MMHG | HEART RATE: 66 BPM | WEIGHT: 124.8 LBS | SYSTOLIC BLOOD PRESSURE: 158 MMHG

## 2021-11-16 DIAGNOSIS — I10 ESSENTIAL HYPERTENSION: Primary | ICD-10-CM

## 2021-11-16 DIAGNOSIS — R22.2 MASS ON BACK: ICD-10-CM

## 2021-11-16 DIAGNOSIS — G89.29 OTHER CHRONIC PAIN: ICD-10-CM

## 2021-11-16 DIAGNOSIS — Z13.220 SCREENING FOR HYPERLIPIDEMIA: ICD-10-CM

## 2021-11-16 PROCEDURE — G8420 CALC BMI NORM PARAMETERS: HCPCS | Performed by: STUDENT IN AN ORGANIZED HEALTH CARE EDUCATION/TRAINING PROGRAM

## 2021-11-16 PROCEDURE — 4004F PT TOBACCO SCREEN RCVD TLK: CPT | Performed by: STUDENT IN AN ORGANIZED HEALTH CARE EDUCATION/TRAINING PROGRAM

## 2021-11-16 PROCEDURE — G8427 DOCREV CUR MEDS BY ELIG CLIN: HCPCS | Performed by: STUDENT IN AN ORGANIZED HEALTH CARE EDUCATION/TRAINING PROGRAM

## 2021-11-16 PROCEDURE — G8484 FLU IMMUNIZE NO ADMIN: HCPCS | Performed by: STUDENT IN AN ORGANIZED HEALTH CARE EDUCATION/TRAINING PROGRAM

## 2021-11-16 PROCEDURE — 99203 OFFICE O/P NEW LOW 30 MIN: CPT | Performed by: STUDENT IN AN ORGANIZED HEALTH CARE EDUCATION/TRAINING PROGRAM

## 2021-11-16 RX ORDER — HYDROCHLOROTHIAZIDE 25 MG/1
25 TABLET ORAL EVERY MORNING
Qty: 30 TABLET | Refills: 0 | Status: SHIPPED | OUTPATIENT
Start: 2021-11-16 | End: 2021-12-12

## 2021-11-16 RX ORDER — ACETAMINOPHEN 500 MG
500 TABLET ORAL 4 TIMES DAILY PRN
Qty: 120 TABLET | Refills: 0 | Status: SHIPPED | OUTPATIENT
Start: 2021-11-16 | End: 2022-08-05

## 2021-11-16 SDOH — ECONOMIC STABILITY: FOOD INSECURITY: WITHIN THE PAST 12 MONTHS, THE FOOD YOU BOUGHT JUST DIDN'T LAST AND YOU DIDN'T HAVE MONEY TO GET MORE.: NEVER TRUE

## 2021-11-16 SDOH — ECONOMIC STABILITY: FOOD INSECURITY: WITHIN THE PAST 12 MONTHS, YOU WORRIED THAT YOUR FOOD WOULD RUN OUT BEFORE YOU GOT MONEY TO BUY MORE.: NEVER TRUE

## 2021-11-16 ASSESSMENT — PATIENT HEALTH QUESTIONNAIRE - PHQ9
1. LITTLE INTEREST OR PLEASURE IN DOING THINGS: 0
2. FEELING DOWN, DEPRESSED OR HOPELESS: 0
SUM OF ALL RESPONSES TO PHQ9 QUESTIONS 1 & 2: 0
SUM OF ALL RESPONSES TO PHQ QUESTIONS 1-9: 0

## 2021-11-16 ASSESSMENT — ENCOUNTER SYMPTOMS
NAUSEA: 0
VOMITING: 0
DIARRHEA: 0
SHORTNESS OF BREATH: 0
ABDOMINAL PAIN: 0

## 2021-11-16 ASSESSMENT — SOCIAL DETERMINANTS OF HEALTH (SDOH): HOW HARD IS IT FOR YOU TO PAY FOR THE VERY BASICS LIKE FOOD, HOUSING, MEDICAL CARE, AND HEATING?: NOT VERY HARD

## 2021-11-16 NOTE — PROGRESS NOTES
usSubjective:    Christopher Mariee is a 51 y.o. female with  has a past medical history of Fibromyalgia, Headache, Hyperlipidemia, Hypertension, Hypothyroidism, Lupus ([address] and Thyroid disease. Presented to the office today for:  Chief Complaint   Patient presents with   Marily Garcia     has recent ed visit and was told BP is high        HPI    51year old female here today to establish care. Was recently in the ER and was found to have high blood pressure. Was asked to follow up with a PCP. No other acute concerns at this time. Essential hypertension - Stated has previous hx of HTN and was on diovan however stopped taking it a couple of years ago. Currently asymptomatic. BP today elevated 148/103. Will start HCTZ 25 mg PO daily at this time and follow up in 4 weeks. Patient also has 2 small masses on her back. 1 on left upper back and 1 right upper back. Left approximately 3-4 cm in size, non-tender, no erythema. Right, approximately 2-3 cm in size, nontender, no erythema. Will get US to evaluation. Patient also stated she has chronic pain all over her body. Stated has been going on for 15 years. Stated was diagnosed with fibromyalgia. Would like something for pain. Okay with tylenol and patient agreeable for referral to pain management. Review of Systems   Constitutional: Negative for chills and fever. Respiratory: Negative for shortness of breath. Cardiovascular: Negative for chest pain. Gastrointestinal: Negative for abdominal pain, diarrhea, nausea and vomiting.                  The patient has a   Family History   Problem Relation Age of Onset    High Blood Pressure Mother     Heart Disease Father     Stroke Maternal Grandmother     High Blood Pressure Maternal Grandfather     Heart Disease Paternal Grandmother     Heart Disease Paternal Grandfather        Objective:    BP (!) 158/103   Pulse 66   Wt 124 lb 12.8 oz (56.6 kg)   BMI 21.42 kg/m²    BP Readings from Last 3 Encounters:   11/16/21 (!) 158/103   11/14/21 (!) 187/123   09/09/21 (!) 133/94       Physical Exam  HENT:      Head: Normocephalic and atraumatic. Cardiovascular:      Rate and Rhythm: Normal rate and regular rhythm. Pulses: Normal pulses. Heart sounds: Normal heart sounds. Pulmonary:      Effort: Pulmonary effort is normal. No respiratory distress. Breath sounds: Normal breath sounds. No wheezing. Abdominal:      Palpations: Abdomen is soft. Tenderness: There is no abdominal tenderness. There is no guarding. Skin:     General: Skin is warm and dry. Comments: Mass on left back and right back   Neurological:      General: No focal deficit present. Mental Status: She is alert and oriented to person, place, and time. Lab Results   Component Value Date    WBC 5.5 09/03/2021    HGB 12.7 09/03/2021    HCT 40.4 09/03/2021     09/03/2021    ALT 12 02/06/2019    AST 18 02/06/2019     09/03/2021    K 4.2 09/03/2021     09/03/2021    CREATININE 0.65 09/03/2021    BUN 16 09/03/2021    CO2 24 09/03/2021     Lab Results   Component Value Date    CALCIUM 8.2 (L) 09/03/2021     No results found for: LDLCALC, LDLCHOLESTEROL, LDLDIRECT    Assessment and Plan:    1. Essential hypertension  - BP 639750  - hydroCHLOROthiazide (HYDRODIURIL) 25 MG tablet; Take 1 tablet by mouth every morning  Dispense: 30 tablet; Refill: 0    2. Screening for hyperlipidemia  - Lipid Panel; Future    3. Other chronic pain  - [facility]  - acetaminophen (TYLENOL) 500 MG tablet; Take 1 tablet by mouth 4 times daily as needed for Pain  Dispense: 120 tablet; Refill: 0    4.  Mass on back  - US HEAD NECK SOFT TISSUE THYROID; Future          Requested Prescriptions     Signed Prescriptions Disp Refills    hydroCHLOROthiazide (HYDRODIURIL) 25 MG tablet 30 tablet 0     Sig: Take 1 tablet by mouth every morning    acetaminophen (TYLENOL) 500 MG tablet 120 tablet 0     Sig: Take 1 tablet by mouth 4 times daily as needed for Pain       Medications Discontinued During This Encounter   Medication Reason    valsartan-hydroCHLOROthiazide (DIOVAN-HCT) 160-12.5 MG per tablet LIST CLEANUP    valsartan (DIOVAN) 160 MG tablet LIST CLEANUP    naproxen (NAPROSYN) 500 MG tablet LIST CLEANUP       Sruthi received counseling on the following healthy behaviors: nutrition, exercise and medication adherence    Discussed use,benefit, and side effects of prescribed medications. Barriers to medication compliance addressed. All patient questions answered. Pt voiced understanding. Return in about 4 weeks (around 12/14/2021) for Hypertension. Disclaimer: Some orall of this note was transcribed using voice-recognition software. This may cause typographical errors occasionally. Although all effort is made to fix these errors, please do not hesitate to contact our office if there Amira Ivan concern with the understanding of this note.

## 2021-11-16 NOTE — PROGRESS NOTES
Attending Physician Statement  I have discussed the care of Nell Carrie, including pertinent history and exam findings,  with the resident. I have reviewed the key elements of all parts of the encounter with the resident. I agree with the assessment, plan and orders as documented by the resident.   (Feroz Cervantes)    Brynn Elmore MD

## 2021-11-23 ENCOUNTER — TELEPHONE (OUTPATIENT)
Dept: PAIN MANAGEMENT | Age: 46
End: 2021-11-23

## 2021-11-23 NOTE — TELEPHONE ENCOUNTER
Writer attempted to schedule new consult. Voicemail message left. Per Dr. Helena Dubose interventions only/ no narcotics.

## 2021-11-24 ENCOUNTER — TELEPHONE (OUTPATIENT)
Dept: PAIN MANAGEMENT | Age: 46
End: 2021-11-24

## 2021-11-24 NOTE — TELEPHONE ENCOUNTER
[facility]. Attempted to outreach pt. To schedule consultation. Pt. Informed that Dr. Tatiana Del Cid would see for interventions only. Pt. Stated, there's no sense of me scheduling I am in pain and need narcotics. \"  Pt. Then hung up on writer.

## 2021-12-12 DIAGNOSIS — I10 ESSENTIAL HYPERTENSION: ICD-10-CM

## 2021-12-12 RX ORDER — HYDROCHLOROTHIAZIDE 25 MG/1
25 TABLET ORAL EVERY MORNING
Qty: 30 TABLET | Refills: 0 | Status: SHIPPED | OUTPATIENT
Start: 2021-12-12 | End: 2022-01-18

## 2021-12-12 NOTE — TELEPHONE ENCOUNTER
Ben Request for pending medication.     Last Visit Date: 11/16/21  Next Visit Date:  Future Appointments   Date Time Provider Karina Richardson   12/16/2021  3:45 PM Ramesh Conteh MD [address] [place] [address] Maintenance   Topic Date Due    Hepatitis C screen  Never done    Pneumococcal 0-64 years Vaccine (1 of 2 - PPSV23) Never done    COVID-19 Vaccine (1) Never done    HIV screen  Never done    Lipid screen  Never done    Colon cancer screen colonoscopy  Never done    Flu vaccine (1) Never done    Potassium monitoring  09/03/2022    Creatinine monitoring  09/03/2022    DTaP/Tdap/Td vaccine (2 - Td or Tdap) 06/16/2031    Hepatitis A vaccine  Aged Out    Hepatitis B vaccine  Aged Out    Hib vaccine  Aged Out    Meningococcal (ACWY) vaccine  Aged Out       No results found for: LABA1C          ( goal A1C is < 7)   No results found for: LABMICR  No results found for: LDLCHOLESTEROL, LDLCALC    (goal LDL is <100)   AST (U/L)   Date Value   02/06/2019 18     ALT (U/L)   Date Value   02/06/2019 12     BUN (mg/dL)   Date Value   09/03/2021 16     BP Readings from Last 3 Encounters:   11/16/21 (!) 158/103   11/14/21 (!) 187/123   09/09/21 (!) 133/94          (goal 120/80)    All Future Testing planned in [facility]  Lab Frequency Next Occurrence   Lipid Panel Once 12/16/2021    HEAD NECK SOFT TISSUE THYROID Once 11/16/2022       Next Visit Date:  Future Appointments   Date Time Provider Karina Richardson   12/16/2021  3:45 PM Ramesh Conteh MD [facility]         Patient Active Problem List:     Essential hypertension     Smoking     Screening for hyperlipidemia     Other chronic pain     Mass on back

## 2021-12-16 PROBLEM — Z13.220 SCREENING FOR HYPERLIPIDEMIA: Status: RESOLVED | Noted: 2021-11-16 | Resolved: 2021-12-16

## 2022-01-18 DIAGNOSIS — I10 ESSENTIAL HYPERTENSION: ICD-10-CM

## 2022-01-18 RX ORDER — HYDROCHLOROTHIAZIDE 25 MG/1
25 TABLET ORAL EVERY MORNING
Qty: 30 TABLET | Refills: 0 | Status: SHIPPED | OUTPATIENT
Start: 2022-01-18

## 2022-08-05 ENCOUNTER — HOSPITAL ENCOUNTER (EMERGENCY)
Age: 47
Discharge: HOME | End: 2022-08-05
Attending: EMERGENCY MEDICINE
Payer: MEDICARE

## 2022-08-05 ENCOUNTER — APPOINTMENT (OUTPATIENT)
Dept: GENERAL RADIOLOGY | Age: 47
End: 2022-08-05
Payer: MEDICARE

## 2022-08-05 VITALS
SYSTOLIC BLOOD PRESSURE: 125 MMHG | TEMPERATURE: 98 F | RESPIRATION RATE: 18 BRPM | HEART RATE: 83 BPM | BODY MASS INDEX: 21.53 KG/M2 | HEIGHT: 64 IN | WEIGHT: 126.1 LBS | OXYGEN SATURATION: 97 % | DIASTOLIC BLOOD PRESSURE: 85 MMHG

## 2022-08-05 DIAGNOSIS — K85.90 ACUTE PANCREATITIS WITHOUT INFECTION OR NECROSIS, UNSPECIFIED PANCREATITIS TYPE: Primary | ICD-10-CM

## 2022-08-05 LAB
ABSOLUTE EOS #: 0.06 K/UL (ref 0–0.44)
ABSOLUTE IMMATURE GRANULOCYTE: <0.03 K/UL (ref 0–0.3)
ABSOLUTE LYMPH #: 1.93 K/UL (ref 1.1–3.7)
ABSOLUTE MONO #: 0.44 K/UL (ref 0.1–1.2)
ALBUMIN SERPL-MCNC: 4.5 G/DL (ref 3.5–5.2)
ALBUMIN/GLOBULIN RATIO: 1.5 (ref 1–2.5)
ALP BLD-CCNC: 62 U/L (ref 35–104)
ALT SERPL-CCNC: 10 U/L (ref 5–33)
ANION GAP SERPL CALCULATED.3IONS-SCNC: 12 MMOL/L (ref 9–17)
AST SERPL-CCNC: 18 U/L
BASOPHILS # BLD: 1 % (ref 0–2)
BASOPHILS ABSOLUTE: 0.04 K/UL (ref 0–0.2)
BILIRUB SERPL-MCNC: 0.28 MG/DL (ref 0.3–1.2)
BILIRUBIN URINE: NEGATIVE
BUN BLDV-MCNC: 8 MG/DL (ref 6–20)
CALCIUM SERPL-MCNC: 9.1 MG/DL (ref 8.6–10.4)
CHLORIDE BLD-SCNC: 107 MMOL/L (ref 98–107)
CO2: 21 MMOL/L (ref 20–31)
COLOR: YELLOW
CREAT SERPL-MCNC: 0.7 MG/DL (ref 0.5–0.9)
EOSINOPHILS RELATIVE PERCENT: 2 % (ref 1–4)
EPITHELIAL CELLS UA: NORMAL /HPF (ref 0–5)
GFR AFRICAN AMERICAN: >60 ML/MIN
GFR NON-AFRICAN AMERICAN: >60 ML/MIN
GFR SERPL CREATININE-BSD FRML MDRD: ABNORMAL ML/MIN/{1.73_M2}
GLUCOSE BLD-MCNC: 84 MG/DL (ref 70–99)
GLUCOSE URINE: NEGATIVE
HCT VFR BLD CALC: 37.8 % (ref 36.3–47.1)
HEMOGLOBIN: 13.4 G/DL (ref 11.9–15.1)
IMMATURE GRANULOCYTES: 0 %
KETONES, URINE: NEGATIVE
LEUKOCYTE ESTERASE, URINE: ABNORMAL
LIPASE: 94 U/L (ref 13–60)
LYMPHOCYTES # BLD: 51 % (ref 24–43)
MCH RBC QN AUTO: 30.8 PG (ref 25.2–33.5)
MCHC RBC AUTO-ENTMCNC: 35.4 G/DL (ref 28.4–34.8)
MCV RBC AUTO: 86.9 FL (ref 82.6–102.9)
MONOCYTES # BLD: 12 % (ref 3–12)
NITRITE, URINE: NEGATIVE
NRBC AUTOMATED: 0 PER 100 WBC
PDW BLD-RTO: 14.1 % (ref 11.8–14.4)
PH UA: 6 (ref 5–8)
PLATELET # BLD: 140 K/UL (ref 138–453)
PMV BLD AUTO: 13 FL (ref 8.1–13.5)
POTASSIUM SERPL-SCNC: 3.3 MMOL/L (ref 3.7–5.3)
PROTEIN UA: NEGATIVE
RBC # BLD: 4.35 M/UL (ref 3.95–5.11)
RBC UA: NORMAL /HPF (ref 0–4)
SARS-COV-2, RAPID: NOT DETECTED
SEG NEUTROPHILS: 34 % (ref 36–65)
SEGMENTED NEUTROPHILS ABSOLUTE COUNT: 1.28 K/UL (ref 1.5–8.1)
SODIUM BLD-SCNC: 140 MMOL/L (ref 135–144)
SPECIFIC GRAVITY UA: 1 (ref 1–1.03)
SPECIMEN DESCRIPTION: NORMAL
TOTAL PROTEIN: 7.5 G/DL (ref 6.4–8.3)
TROPONIN, HIGH SENSITIVITY: <6 NG/L (ref 0–14)
TSH SERPL DL<=0.05 MIU/L-ACNC: 4.78 UIU/ML (ref 0.3–5)
TURBIDITY: CLEAR
URINE HGB: NEGATIVE
UROBILINOGEN, URINE: NORMAL
WBC # BLD: 3.8 K/UL (ref 3.5–11.3)
WBC UA: NORMAL /HPF (ref 0–5)

## 2022-08-05 PROCEDURE — 71045 X-RAY EXAM CHEST 1 VIEW: CPT

## 2022-08-05 PROCEDURE — 87635 SARS-COV-2 COVID-19 AMP PRB: CPT

## 2022-08-05 PROCEDURE — 83690 ASSAY OF LIPASE: CPT

## 2022-08-05 PROCEDURE — 85025 COMPLETE CBC W/AUTO DIFF WBC: CPT

## 2022-08-05 PROCEDURE — 80053 COMPREHEN METABOLIC PANEL: CPT

## 2022-08-05 PROCEDURE — 99285 EMERGENCY DEPT VISIT HI MDM: CPT

## 2022-08-05 PROCEDURE — 6360000002 HC RX W HCPCS: Performed by: STUDENT IN AN ORGANIZED HEALTH CARE EDUCATION/TRAINING PROGRAM

## 2022-08-05 PROCEDURE — 84484 ASSAY OF TROPONIN QUANT: CPT

## 2022-08-05 PROCEDURE — 93005 ELECTROCARDIOGRAM TRACING: CPT | Performed by: STUDENT IN AN ORGANIZED HEALTH CARE EDUCATION/TRAINING PROGRAM

## 2022-08-05 PROCEDURE — 84443 ASSAY THYROID STIM HORMONE: CPT

## 2022-08-05 PROCEDURE — 96374 THER/PROPH/DIAG INJ IV PUSH: CPT

## 2022-08-05 PROCEDURE — 81001 URINALYSIS AUTO W/SCOPE: CPT

## 2022-08-05 RX ORDER — KETOROLAC TROMETHAMINE 30 MG/ML
30 INJECTION, SOLUTION INTRAMUSCULAR; INTRAVENOUS ONCE
Status: COMPLETED | OUTPATIENT
Start: 2022-08-05 | End: 2022-08-05

## 2022-08-05 RX ORDER — IBUPROFEN 800 MG/1
800 TABLET ORAL EVERY 8 HOURS PRN
Qty: 21 TABLET | Refills: 0 | Status: SHIPPED | OUTPATIENT
Start: 2022-08-05 | End: 2022-08-12

## 2022-08-05 RX ORDER — ONDANSETRON 4 MG/1
4 TABLET, ORALLY DISINTEGRATING ORAL EVERY 8 HOURS PRN
Qty: 20 TABLET | Refills: 0 | Status: SHIPPED | OUTPATIENT
Start: 2022-08-05

## 2022-08-05 RX ORDER — ACETAMINOPHEN 500 MG
500 TABLET ORAL EVERY 6 HOURS PRN
Qty: 28 TABLET | Refills: 0 | Status: SHIPPED | OUTPATIENT
Start: 2022-08-05 | End: 2022-08-12

## 2022-08-05 RX ADMIN — KETOROLAC TROMETHAMINE 30 MG: 30 INJECTION, SOLUTION INTRAMUSCULAR at 05:50

## 2022-08-05 ASSESSMENT — PAIN SCALES - GENERAL: PAINLEVEL_OUTOF10: 10

## 2022-08-05 ASSESSMENT — PAIN DESCRIPTION - FREQUENCY: FREQUENCY: CONTINUOUS

## 2022-08-05 ASSESSMENT — PAIN - FUNCTIONAL ASSESSMENT: PAIN_FUNCTIONAL_ASSESSMENT: 0-10

## 2022-08-05 ASSESSMENT — PAIN DESCRIPTION - LOCATION: LOCATION: HEAD;CHEST

## 2022-08-05 ASSESSMENT — PAIN DESCRIPTION - DESCRIPTORS: DESCRIPTORS: NAGGING

## 2022-08-05 NOTE — ED PROVIDER NOTES
for Pain 8/5/22 8/12/22 Yes Kiana Powers MD   hydroCHLOROthiazide (HYDRODIURIL) 25 MG tablet take 1 tablet by mouth every morning 1/18/22   Anthony Yeh MD       REVIEW OF SYSTEMS    (2-9 systems for level 4, 10 or more for level 5)      Review of Systems   Constitutional:  Positive for appetite change. Negative for chills, fatigue and fever. HENT:  Negative for congestion, rhinorrhea and sore throat. Eyes:  Negative for visual disturbance. Respiratory:  Negative for cough and shortness of breath. Cardiovascular:  Negative for chest pain. Gastrointestinal:  Positive for abdominal pain and nausea. Negative for diarrhea and vomiting. Genitourinary:  Negative for dysuria, flank pain, hematuria, vaginal bleeding and vaginal discharge. Musculoskeletal:  Negative for arthralgias, myalgias, neck pain and neck stiffness. Skin:  Negative for rash. Neurological:  Negative for dizziness, tremors, syncope, weakness, light-headedness, numbness and headaches. All other systems reviewed and are negative. PHYSICAL EXAM   (up to 7 for level 4, 8 or more for level 5)      INITIAL VITALS:   /85   Pulse 83   Temp 98 °F (36.7 °C) (Oral)   Resp 18   Ht 5' 4\" (1.626 m)   Wt 126 lb 1.7 oz (57.2 kg)   SpO2 97%   BMI 21.65 kg/m²     Physical Exam  Vitals reviewed. Constitutional:       General: She is not in acute distress. Appearance: She is not toxic-appearing. HENT:      Head: Normocephalic and atraumatic. Mouth/Throat:      Mouth: Mucous membranes are moist.      Pharynx: Oropharynx is clear. Eyes:      Extraocular Movements: Extraocular movements intact. Pupils: Pupils are equal, round, and reactive to light. Cardiovascular:      Rate and Rhythm: Normal rate and regular rhythm. Pulses: Normal pulses. Heart sounds: Normal heart sounds. Pulmonary:      Effort: Pulmonary effort is normal.      Breath sounds: Normal breath sounds.  No decreased breath sounds, wheezing, rhonchi or rales. Abdominal:      Palpations: Abdomen is soft. Tenderness: There is no abdominal tenderness. There is no guarding or rebound. Musculoskeletal:         General: Normal range of motion. Cervical back: Normal range of motion and neck supple. Right lower leg: No edema. Left lower leg: No edema. Skin:     Capillary Refill: Capillary refill takes less than 2 seconds. Coloration: Skin is not pale. Findings: No rash. Neurological:      General: No focal deficit present. Mental Status: She is alert and oriented to person, place, and time. Motor: No weakness.        DIFFERENTIAL  DIAGNOSIS     PLAN (LABS / IMAGING / EKG):  Orders Placed This Encounter   Procedures    COVID-19, Rapid    XR CHEST PORTABLE    CBC with Auto Differential    Comprehensive Metabolic Panel    Lipase    Urinalysis with Reflex to Culture    TSH with Reflex    Microscopic Urinalysis    EKG 12 Lead       MEDICATIONS ORDERED:  Orders Placed This Encounter   Medications    ketorolac (TORADOL) injection 30 mg    ondansetron (ZOFRAN ODT) 4 MG disintegrating tablet     Sig: Take 1 tablet by mouth every 8 hours as needed for Nausea     Dispense:  20 tablet     Refill:  0    acetaminophen (TYLENOL) 500 MG tablet     Sig: Take 1 tablet by mouth every 6 hours as needed for Pain     Dispense:  28 tablet     Refill:  0    ibuprofen (ADVIL;MOTRIN) 800 MG tablet     Sig: Take 1 tablet by mouth every 8 hours as needed for Pain     Dispense:  21 tablet     Refill:  0       DDX: GERD, PUD, pancreatitis, cholecystitis, GB colic, cholangitis, Ohto-Beuw-Lrbgwk, ACS/ MI, pneumonia, SBO, DKA, AAA, mesenteric ischemia, perforated viscous, acute gastroenteritis, NSAP, pyelonephritis, kidney stone, appendicitis, hernia, UTI, constipation, ectopic, ovarian torsion, ovarian cyst, PID, tuboovarian abscess, period/ fibroid    As a part of this differential, I evaluated for the presence of diaphoresis,  chest pain (including the QRST of the chest pain), Shortness of Breath or Dyspnea, BP in both arms, and the presence of systemic and peripheral edema. I evaluated the patient's blood glucose status and urinalysis. I also calculated a McIntyre's criteria score, the Avalarado Score, and the BISAP to consider acute pancreatitis and acute appendicitis. The relevant score is documented below. Ransons criteria: WBC>16, age >49, glucose>200, AST>80, LDH>350  Evaluate for: EtOH abuse, ACS risk factors, point tenderness, rebound, guarding, Tiwari sign, GB US (stone, sono Tiwari, wall thick>3mm, CBD>6mm)      Henson Score for appendicitis  Migratory R iliac Fossa Pain 2   Anorexia (loss of appetite) or ketones in the urine 1   Nausea or vomiting  1   Tenderness in R iliac fossa 2   Rebound to R iliac fossa 1   Fever of 37.3 °C/ 99.1 F +  1   Leukocytosis > 24090 WBC 1   Neutrophilia, or an increase in % of neutrophils in WBC 1   TOTAL 2/10      Clinical Decision: Probability of an acute appendicitis   <3 no CT 5-6 is consistent with diagnosis of acute appendicitis    4-6 CT 7-8 indicates a probable appendicitis   7-8 Surgery consult 9-10 indicates a very probable acute appendicitis     BISAP Score for pancreatitis  BUN >25  1   Impaired mental status  1   SIRS criteria (HR >90, T 100.4, 36, RR >20/ CO2 <32, WBC >12, <4) 1   Age >60 1   Pleural Effusion  1   TOTAL 0/5      Probability of an acute appendicitis   > 0 increasing risk of mortality   3 to 4 mortality increasing significantly    > 4 mortality rate of 22%. DIAGNOSTIC RESULTS / EMERGENCY DEPARTMENT COURSE / MDM   LAB RESULTS:  Results for orders placed or performed during the hospital encounter of 08/05/22   COVID-19, Rapid    Specimen: Nasopharyngeal Swab   Result Value Ref Range    Specimen Description . NASOPHARYNGEAL SWAB     SARS-CoV-2, Rapid Not Detected Not Detected   Troponin   Result Value Ref Range    Troponin, High Sensitivity <6 0 - 14 ng/L   CBC Normal Normal    Nitrite, Urine NEGATIVE NEGATIVE    Leukocyte Esterase, Urine TRACE (A) NEGATIVE   TSH with Reflex   Result Value Ref Range    TSH 4.78 0.30 - 5.00 uIU/mL   Microscopic Urinalysis   Result Value Ref Range    WBC, UA 0 TO 2 0 - 5 /HPF    RBC, UA 0 TO 2 0 - 4 /HPF    Epithelial Cells UA 2 TO 5 0 - 5 /HPF   EKG 12 Lead   Result Value Ref Range    Ventricular Rate 78 BPM    Atrial Rate 78 BPM    P-R Interval 170 ms    QRS Duration 96 ms    Q-T Interval 422 ms    QTc Calculation (Bazett) 481 ms    P Axis 117 degrees    R Axis 137 degrees    T Axis 175 degrees       IMPRESSION: 25-year-old female presents with several days of epigastric pain. Patient with history of pancreatitis, states the symptoms feel very similar to previous episodes pancreatitis. Vital signs stable on arrival.  Abdomen soft and nontender in all quadrants. Patient tolerating some minimal p.o. intake at home. Patient resting comfortably on the bed on examination. Lab work-up showing mildly elevated lipase at 94. Lab work including CBC, CMP, troponin, EKG, TSH, UA, COVID test negative. Patient was given Toradol per patient request.  Discussed that patient could receive IV antibiotics, however at this time patient feels she is able to tolerate p.o. intake and does not wish for IV antibiotics. Observed patient at bedside tolerating significant amount of p.o. intake. Will discharge patient with close outpatient follow-up. Discussed with patient that she should follow-up with her primary care provider over the next few days, discussed return precautions at length. Patient voiced understanding and agreement with plan.     RADIOLOGY:  XR CHEST PORTABLE    Result Date: 8/5/2022  EXAMINATION: ONE XRAY VIEW OF THE CHEST 8/5/2022 4:46 am COMPARISON: 09/09/2021 HISTORY: ORDERING SYSTEM PROVIDED HISTORY: SOB TECHNOLOGIST PROVIDED HISTORY: SOB Reason for Exam: Upright port, SOB FINDINGS: There is no consolidation, pleural effusion, or pneumothorax. Cardiac silhouette is not enlarged and there is no pulmonary vascular congestion. Mediastinum and gurwinder are within normal limits. Bony thorax is unremarkable. No acute cardiopulmonary process. EKG  EKG: normal EKG, normal sinus rhythm. All EKG's are interpreted by the Emergency Department Physician who either signs or Co-signs this chart in the absence of a cardiologist.    EMERGENCY DEPARTMENT COURSE:  ED Course as of 08/10/22 0535   Fri Aug 05, 2022   0518 CXR negative [JG]   0550 Lipase(!): 94 [JG]   0559 Patient tolerating PO intake [JG]      ED Course User Index  [JG] Janette Gilliam MD       CONSULTS:  None    FINAL IMPRESSION      1.  Acute pancreatitis without infection or necrosis, unspecified pancreatitis type          DISPOSITION / PLAN     DISPOSITION Decision To Discharge 08/05/2022 06:01:24 AM      PATIENT REFERRED TO:  [facility] ED  [address] [place] [address]  [phone]  Go to   As needed, If symptoms worsen    [address] [place] [address] Primary Care  [place] [address]  [phone]  Go to   As needed    DISCHARGE MEDICATIONS:  Discharge Medication List as of 8/5/2022  6:03 AM          Janette Gilliam MD  Emergency Medicine Resident    (Please note that portions of thisnote were completed with a voice recognition program.  Efforts were made to edit the dictations but occasionally words are mis-transcribed.)        Janette Gilliam MD  Resident  08/10/22 [id number]

## 2022-08-05 NOTE — DISCHARGE INSTRUCTIONS
DO not drink anything that has alcohol in it. Avoid eating any spicy food, milk type products or drinks that have caffeine in it. Take all medications as prescribed. For pain use acetaminophen (Tylenol) or ibuprofen (Motrin / Advil), unless prescribed medications that have acetaminophen or ibuprofen (or similar medications) in it. You can take over the counter acetaminophen tablets (1 - 2 tablets of the 500-mg strength every 6 hours) or ibuprofen tablets (2 tablets every 4 hours). PLEASE RETURN TO THE EMERGENCY DEPARTMENT IMMEDIATELY for worsening symptoms, throwing up blood, black stools, or if you develop any concerning symptoms such as: high fever not relieved by acetaminophen (Tylenol) and/or ibuprofen (Motrin / Advil), chills, shortness of breath, chest pain, feeling of your heart fluttering or racing, persistent nausea and/or vomiting, vomiting up blood, blood in your stool, loss of consciousness, numbness, weakness or tingling in the arms or legs or change in color of the extremities, changes in mental status, persistent headache, blurry vision loss of bladder / bowel control, unable to follow up with your physician, or other any other care or concern.

## 2022-08-07 LAB
EKG ATRIAL RATE: 78 BPM
EKG P AXIS: 117 DEGREES
EKG P-R INTERVAL: 170 MS
EKG Q-T INTERVAL: 422 MS
EKG QRS DURATION: 96 MS
EKG QTC CALCULATION (BAZETT): 481 MS
EKG R AXIS: 137 DEGREES
EKG T AXIS: 175 DEGREES
EKG VENTRICULAR RATE: 78 BPM

## 2022-08-07 PROCEDURE — 93010 ELECTROCARDIOGRAM REPORT: CPT | Performed by: INTERNAL MEDICINE

## 2022-08-10 ASSESSMENT — ENCOUNTER SYMPTOMS
COUGH: 0
VOMITING: 0
SHORTNESS OF BREATH: 0
RHINORRHEA: 0
NAUSEA: 1
ABDOMINAL PAIN: 1
SORE THROAT: 0
DIARRHEA: 0

## 2022-08-11 NOTE — ED PROVIDER NOTES
[address] [facility]  Emergency Department  Faculty Attestation       I performed a history and physical examination of the patient and discussed management with the resident. I reviewed the residents note and agree with the documented findings including all diagnostic interpretations and plan of care. Any areas of disagreement are noted on the chart. I was personally present for the key portions of any procedures. I have documented in the chart those procedures where I was not present during the key portions. I have reviewed the emergency nurses triage note. I agree with the chief complaint, past medical history, past surgical history, allergies, medications, social and family history as documented unless otherwise noted below. Documentation of the HPI, Physical Exam and Medical Decision Making performed by scribyana is based on my personal performance of the HPI, PE and MDM. For Physician Assistant/ Nurse Practitioner cases/documentation I have personally evaluated this patient and have completed at least one if not all key elements of the E/M (history, physical exam, and MDM). Additional findings are as noted. Pertinent Comments     Primary Care Physician: No primary care provider on file. History: This is a 51 y.o. female who presents to the Emergency Department with complaint of epigastric abdominal pain for approximately 3 days with progressive worsening over the last 24 hours history of pancreatitis and states this feels somewhat similar. Has had nausea and vomiting but states that is feeling somewhat improved from the vomiting perspective. No chest pain. No headaches.     Physical:    ED Triage Vitals   BP Temp Temp Source Heart Rate Resp SpO2 Height Weight   08/05/22 0344 08/05/22 0344 08/05/22 0344 08/05/22 0344 08/05/22 0558 08/05/22 0344 08/05/22 0344 08/05/22 0344   125/85 98 °F (36.7 °C) Oral 83 18 97 % 5' 4\" (1.626 m) 126 lb 1.7 oz (57.2 kg)        General: alert, well appearing, and in no distress. HENT: normocephalic, moist mucus membranes  Eyes: pupils equal and reactive, extraocular eye movements intact. No scleral icterus. Neck: normal ROM, trachea midline   Heart:  normal rate, regular rhythm, no murmurs, rubs, clicks or gallops. Chest: clear to auscultation, no wheezes, rales or rhonchi, symmetric air entry. Abdomen: Soft nondistended with mild epigastric and left upper quadrant tenderness but no rebound or guarding  Extremities: no obvious deformities, normal ROM of all 4 extremities, no edema of bilateral lower extremities  Neurological: alert, oriented, normal speech, no focal findings or movement disorder noted   Skin: normal coloration and turgor, no rashes or jaundice on exposed skin     MDM/Plan:   Patient found to have pancreatitis. Improving. Of note, resident note says that patient was offered IV antibiotics. However this was a dictation error and was supposed to say IV antiemetics. Patient's remainder of labs negative, abdomen is nonperitoneal.  She is tolerating p.o. intake at this time. Given prescription for analgesics as well as antiemetics.   Okay for discharge         Critical Care Time: None     Lorie Long MD  Attending Emergency Physician        Lorie Long MD  08/11/22 1941

## 2022-09-29 NOTE — TELEPHONE ENCOUNTER
Please address the medication refill and close the encounter. If I can be of assistance, please route to the applicable pool. Thank you. Last visit: 11/16/21  Last Med refill: 12/21/21  Does patient have enough medication for 72 hours: No:     Next Visit Date:  No future appointments.     Health Maintenance   Topic Date Due    Hepatitis C screen  Never done    COVID-19 Vaccine (1) Never done    Pneumococcal 0-64 years Vaccine (1 of 2 - PPSV23) Never done    HIV screen  Never done    Lipid screen  Never done    Colon cancer screen colonoscopy  Never done    Flu vaccine (1) Never done    Potassium monitoring  09/03/2022    Creatinine monitoring  09/03/2022    Depression Screen  11/16/2022    DTaP/Tdap/Td vaccine (2 - Td or Tdap) 06/16/2031    Hepatitis A vaccine  Aged Out    Hepatitis B vaccine  Aged Out    Hib vaccine  Aged Out    Meningococcal (ACWY) vaccine  Aged Out       No results found for: LABA1C          ( goal A1C is < 7)   No results found for: LABMICR  No results found for: LDLCHOLESTEROL, LDLCALC    (goal LDL is <100)   AST (U/L)   Date Value   02/06/2019 18     ALT (U/L)   Date Value   02/06/2019 12     BUN (mg/dL)   Date Value   09/03/2021 16     BP Readings from Last 3 Encounters:   11/16/21 (!) 158/103   11/14/21 (!) 187/123   09/09/21 (!) 133/94          (goal 120/80)    All Future Testing planned in [facility]  Lab Frequency Next Occurrence   Lipid Panel Once 11/16/2022   US HEAD NECK SOFT TISSUE THYROID Once 11/16/2022               Patient Active Problem List:     Essential hypertension     Smoking     Other chronic pain     Mass on back Include Location In Plan?: No Detail Level: Zone

## 2022-10-06 ENCOUNTER — HOSPITAL ENCOUNTER (EMERGENCY)
Age: 47
Discharge: HOME | End: 2022-10-07
Attending: EMERGENCY MEDICINE
Payer: MEDICARE

## 2022-10-06 VITALS
OXYGEN SATURATION: 97 % | TEMPERATURE: 97.3 F | HEIGHT: 64 IN | BODY MASS INDEX: 21.34 KG/M2 | RESPIRATION RATE: 16 BRPM | DIASTOLIC BLOOD PRESSURE: 68 MMHG | WEIGHT: 125 LBS | HEART RATE: 98 BPM | SYSTOLIC BLOOD PRESSURE: 104 MMHG
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
## 2022-10-06 PROCEDURE — 96372 THER/PROPH/DIAG INJ SC/IM: CPT

## 2022-10-06 PROCEDURE — 6370000000 HC RX 637 (ALT 250 FOR IP)

## 2022-10-06 PROCEDURE — 99284 EMERGENCY DEPT VISIT MOD MDM: CPT

## 2022-10-06 PROCEDURE — 6360000002 HC RX W HCPCS

## 2022-10-06 RX ORDER — ACETAMINOPHEN 500 MG
1000 TABLET ORAL ONCE
Status: COMPLETED | OUTPATIENT
Start: 2022-10-06 | End: 2022-10-06

## 2022-10-06 RX ORDER — ORPHENADRINE CITRATE 30 MG/ML
60 INJECTION INTRAMUSCULAR; INTRAVENOUS ONCE
Status: COMPLETED | OUTPATIENT
Start: 2022-10-06 | End: 2022-10-06

## 2022-10-06 RX ADMIN — ORPHENADRINE CITRATE 60 MG: 30 INJECTION INTRAMUSCULAR; INTRAVENOUS at 23:17

## 2022-10-06 RX ADMIN — ACETAMINOPHEN 1000 MG: 500 TABLET ORAL at 23:16

## 2022-10-06 ASSESSMENT — PAIN SCALES - GENERAL
PAINLEVEL_OUTOF10: 10
PAINLEVEL_OUTOF10: 10

## 2022-10-06 ASSESSMENT — ENCOUNTER SYMPTOMS
RHINORRHEA: 0
SORE THROAT: 0
BACK PAIN: 1
WHEEZING: 0
PHOTOPHOBIA: 0
ABDOMINAL PAIN: 0
CONSTIPATION: 0
NAUSEA: 0
SHORTNESS OF BREATH: 0
DIARRHEA: 0
VOMITING: 0
COUGH: 0

## 2022-10-06 ASSESSMENT — PAIN - FUNCTIONAL ASSESSMENT: PAIN_FUNCTIONAL_ASSESSMENT: 0-10

## 2022-10-06 ASSESSMENT — PAIN DESCRIPTION - DESCRIPTORS: DESCRIPTORS: ACHING

## 2022-10-06 ASSESSMENT — PAIN DESCRIPTION - LOCATION: LOCATION: GENERALIZED

## 2022-10-07 ENCOUNTER — APPOINTMENT (OUTPATIENT)
Dept: CT IMAGING | Age: 47
End: 2022-10-07
Payer: MEDICARE

## 2022-10-07 PROCEDURE — 72125 CT NECK SPINE W/O DYE: CPT

## 2022-10-07 PROCEDURE — 70450 CT HEAD/BRAIN W/O DYE: CPT

## 2022-10-07 RX ORDER — IBUPROFEN 600 MG/1
600 TABLET ORAL EVERY 6 HOURS PRN
Qty: 30 TABLET | Refills: 0 | Status: SHIPPED | OUTPATIENT
Start: 2022-10-07

## 2022-10-07 NOTE — ED NOTES
Pt to ED via triage a/o x4 with c/o fall down approx 4 steps. Pt stated she did hit her head but denies any LOC. Pt denies any use of blood thinners. Pt stated she is having generalized pain over her entire body. Pt stated she has hx of lupous and chronic pain, HTN.  Pt VSS, call light is in reach       CRISTIANO Stringer RN  10/06/22 1063

## 2022-10-07 NOTE — ED NOTES
Sw made attempt to compete transportation request via [facility]. Per PA they need her previous address to confirm her identity that they have on file. Patient refused to give SW old address.       Tanja Shankar [facility]  10/07/22 [id number]

## 2022-10-07 NOTE — DISCHARGE INSTRUCTIONS
You are seen today in the emergency department after your fall. We got scans of your head and your neck. The scan showed a minimally displaced fracture of the nasal bone. We feel you are now safe for discharge home. We have provided you a prescription of Motrin. Please take 1 tablet every 6 hours as needed for pain. We suggest that you follow-up with her ear nose and throat doctors within the next 24 to 40 hours. Please call their office schedule appointment. Please also call your PCP to schedule an appointment within the next 24 to 48 hours. Please return emergency room if develop chest pain, shortness of breath, abdominal pain, nausea, vomiting, diarrhea, weakness, loss of consciousness, not behaving her normal self, or any other concerns.
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
## 2022-10-07 NOTE — ED NOTES
Pt updated on POC, pt updated on delay in CT readings due to [place] radiology not being able to see PACs at this time      Marcus Banks RN  10/07/22 9920

## 2022-10-07 NOTE — ED NOTES
Report received from [facility], all questions answered     Brooklynn Jurado RN  10/07/22 8606

## 2022-10-07 NOTE — ED PROVIDER NOTES
[facility] ED  Emergency Department Encounter  Emergency Medicine Resident     Pt Hemant Bautista  MRN: [id number]  [place] 1975  Date of evaluation: 10/6/22  PCP:  No primary care provider on file. CHIEF COMPLAINT       No chief complaint on file. HISTORY OF PRESENT ILLNESS  (Location/Symptom, Timing/Onset, Context/Setting, Quality, Duration, Modifying Factors, Severity.)      Marce Adan is a 49 y.o. female who presents with a fall. Patient states that she tripped and fell down 4-5 stairs earlier this evening, sliding down the stairs and eventually hitting her head and face on the washing machine at the bottom of the stairs. Patient states she is pain \"all over her body, everything hurts\". She states she has headache, facial pain, bilateral arm, hand, thigh, leg, foot pain. All of her pain is \"miserable\". Patient denies losing consciousness, nausea, vomiting. Patient states she was able to walk within the incident, was able to walk into the emergency department. Patient states she has past medical history of lupus and hypertension however she does not take any medications    PAST MEDICAL / SURGICAL / SOCIAL / FAMILY HISTORY      has a past medical history of Fibromyalgia, Headache, Hyperlipidemia, Hypertension, Hypothyroidism, Lupus ([facility] [address] and Thyroid disease. has a past surgical history that includes  section; Foot surgery; and Hysterectomy.       Social History     Socioeconomic History    Marital status: Single     Spouse name: Not on file    Number of children: Not on file    Years of education: Not on file    Highest education level: Not on file   Occupational History    Not on file   Tobacco Use    Smoking status: Every Day     Packs/day: 0.50     Years: 23.00     Pack years: 11.50     Types: Cigarettes    Smokeless tobacco: Never   Substance and Sexual Activity    Alcohol use: No    Drug use: No    Sexual activity: Not on file   Other Topics Concern    Not on file   Social History Narrative    Not on file     Social Determinants of Health     Financial Resource Strain: Low Risk     Difficulty of Paying Living Expenses: Not very hard   Food Insecurity: No Food Insecurity    Worried About Running Out of Food in the Last Year: Never true    Ran Out of Food in the Last Year: Never true   Transportation Needs: Not on file   Physical Activity: Not on file   Stress: Not on file   Social Connections: Not on file   Intimate Partner Violence: Not on file   Housing Stability: Not on file       Family History   Problem Relation Age of Onset    High Blood Pressure Mother     Heart Disease Father     Stroke Maternal Grandmother     High Blood Pressure Maternal Grandfather     Heart Disease Paternal Grandmother     Heart Disease Paternal Grandfather        Allergies:  Bactrim [sulfamethoxazole-trimethoprim]    Home Medications:  Prior to Admission medications    Medication Sig Start Date End Date Taking? Authorizing Provider   ibuprofen (ADVIL;MOTRIN) 600 MG tablet Take 1 tablet by mouth every 6 hours as needed for Pain 10/7/22  Yes Miguelangel Jean MD   ondansetron (ZOFRAN ODT) 4 MG disintegrating tablet Take 1 tablet by mouth every 8 hours as needed for Nausea 8/5/22   Ney Hendrickson MD   acetaminophen (TYLENOL) 500 MG tablet Take 1 tablet by mouth every 6 hours as needed for Pain 8/5/22 8/12/22  Ney Hendrickson MD   ibuprofen (ADVIL;MOTRIN) 800 MG tablet Take 1 tablet by mouth every 8 hours as needed for Pain 8/5/22 8/12/22  Ney Hendrickson MD   hydroCHLOROthiazide (HYDRODIURIL) 25 MG tablet take 1 tablet by mouth every morning 1/18/22   Grace Tran MD       REVIEW OF SYSTEMS    (2-9 systems for level 4, 10 or more for level 5)      Review of Systems   Constitutional:  Negative for chills, fatigue and fever. HENT:  Negative for congestion, rhinorrhea and sore throat. Eyes:  Negative for photophobia and visual disturbance. Respiratory:  Negative for cough, shortness of breath and wheezing. Cardiovascular:  Negative for chest pain and palpitations. Gastrointestinal:  Negative for abdominal pain, constipation, diarrhea, nausea and vomiting. Genitourinary:  Negative for difficulty urinating, frequency and urgency. Musculoskeletal:  Positive for arthralgias, back pain, myalgias and neck pain. Skin:  Negative for wound. Neurological:  Positive for headaches. Negative for dizziness, syncope and weakness. PHYSICAL EXAM   (up to 7 for level 4, 8 or more for level 5)      INITIAL VITALS:   /68   Pulse 98   Temp 97.3 °F (36.3 °C) (Oral)   Resp 16   Ht 5' 4\" (1.626 m)   Wt 125 lb (56.7 kg)   SpO2 97%   BMI 21.46 kg/m²     Physical Exam  Constitutional:       Appearance: Normal appearance. She is normal weight. HENT:      Head: Normocephalic and atraumatic. Nose: No congestion or rhinorrhea. Mouth/Throat:      Mouth: Mucous membranes are moist.      Pharynx: Oropharynx is clear. Eyes:      Extraocular Movements: Extraocular movements intact. Pupils: Pupils are equal, round, and reactive to light. Cardiovascular:      Rate and Rhythm: Normal rate and regular rhythm. Pulses: Normal pulses. Heart sounds: Normal heart sounds. Pulmonary:      Effort: Pulmonary effort is normal. No respiratory distress. Breath sounds: Normal breath sounds. Abdominal:      General: Abdomen is flat. Palpations: Abdomen is soft. Tenderness: There is no abdominal tenderness. Musculoskeletal:         General: Tenderness present. No swelling, deformity or signs of injury. Normal range of motion. Cervical back: Normal range of motion. Tenderness present. Comments: Patient complains of diffuse musculoskeletal tenderness on bilateral upper extremities, head, neck.   Patient has midline tenderness and is complaining of headache without nausea, vomiting, altered mental status   Skin: General: Skin is warm and dry. Capillary Refill: Capillary refill takes less than 2 seconds. Neurological:      General: No focal deficit present. Mental Status: She is alert and oriented to person, place, and time. Mental status is at baseline. Psychiatric:         Mood and Affect: Mood normal.         Behavior: Behavior normal.       DIFFERENTIAL  DIAGNOSIS     PLAN (LABS / IMAGING / EKG):  Orders Placed This Encounter   Procedures    CT HEAD WO CONTRAST    CT CERVICAL SPINE WO CONTRAST       MEDICATIONS ORDERED:  Orders Placed This Encounter   Medications    acetaminophen (TYLENOL) tablet 1,000 mg    orphenadrine (NORFLEX) injection 60 mg    ibuprofen (ADVIL;MOTRIN) 600 MG tablet     Sig: Take 1 tablet by mouth every 6 hours as needed for Pain     Dispense:  30 tablet     Refill:  0       DDX: Fall, head injury, MSK injury    MDM: Patient is a 79-year-old female with past medical history of lupus presents after a fall. Patient states she fell down several stairs, hitting her head. Patient denies loss of consciousness. Patient is GCS 15, nontoxic-appearing, nonacute distress, speaking full sentences, ambulating under own power, tolerating room secretions. Patient states that her \"entire body is 10 out of 10 pain\" and any palpation elicits severe pain causing her to yell. Due to the nature of injury and difficult determining entry based on exam will obtain CT head and CT C-spine to rule out significant injury. No obvious deformity or injury on extremities. If images are negative, will discharge patient home with return precautions, follow-up with PCP, Tylenol for pain. DIAGNOSTIC RESULTS / EMERGENCY DEPARTMENT COURSE / MDM   LAB RESULTS:  No results found for this visit on 10/06/22. RADIOLOGY:  CT HEAD WO CONTRAST   Final Result   1. No acute intracranial abnormality is identified.    2. Minimally displaced left nasal bone fracture with adjacent probable small   amount of blood products [id number]  [phone]  Schedule an appointment as soon as possible for a visit in 1 day      DISCHARGE MEDICATIONS:  New Prescriptions    IBUPROFEN (ADVIL;MOTRIN) 600 MG TABLET    Take 1 tablet by mouth every 6 hours as needed for Pain       Lee Ann Marcelo MD  Emergency Medicine Resident    (Please note that portions of thisnote were completed with a voice recognition program.  Efforts were made to edit the dictations but occasionally words are mis-transcribed.)       Lee Ann Marcelo MD  Resident  10/07/22 0674

## 2022-10-07 NOTE — ED PROVIDER NOTES
[facility]   Emergency Department  Emergency Medicine Attending Sign-out     Care of Loida Alexandre was assumed from previous attending Dr. Jo Ann Savage and is being seen for No chief complaint on file. .  The patient's initial evaluation and plan have been discussed with the prior provider who initially evaluated the patient. Attestation  I was available and discussed any additional care issues that arose and coordinated the management plans with the resident(s) caring for the patient during my duty period. Any areas of disagreement with resident's documentation of care or procedures are noted on the chart. I was personally present for the key portions of any/all procedures, during my duty period. I have documented in the chart those procedures where I was not present during the key portions. BRIEF PATIENT SUMMARY/MDM COURSE PER INITIAL PROVIDER:   RECENT VITALS:     Temp: 97.3 °F (36.3 °C),  Heart Rate: 98, Resp: 16, BP: 104/68, SpO2: 97 %    This patient is a 49 y.o. Female with fall on multiple stairs complaining of significant headache. Awaiting imaging. DIAGNOSTICS/MEDICATIONS:     MEDICATIONS GIVEN:  ED Medication Orders (From admission, onward)      Start Ordered     Status Ordering Provider    10/06/22 2315 10/06/22 2304  orphenadrine (NORFLEX) injection 60 mg  ONCE         Last MAR action: Given - by WASHINGTON SMIENTAL on 10/06/22 at [address] [facility]    10/06/22 2245 10/06/22 2241  acetaminophen (TYLENOL) tablet 1,000 mg  ONCE         Last MAR action: Given - by Obdulia Portillo on 10/06/22 at [address] [facility], [address] - No data to display    RADIOLOGY  No results found.     [employer] / Ivy Manzo MD  Emergency Medicine Attending  [facility]       Felicia Craven MD  10/07/22 1671

## 2023-01-04 ENCOUNTER — HOSPITAL ENCOUNTER (EMERGENCY)
Age: 48
Discharge: PSYCHIATRIC OR SUBSTANCE USE FACILITY | End: 2023-01-05
Attending: EMERGENCY MEDICINE
Payer: MEDICARE

## 2023-01-04 DIAGNOSIS — F32.A DEPRESSION WITH SUICIDAL IDEATION: Primary | ICD-10-CM

## 2023-01-04 DIAGNOSIS — R45.851 DEPRESSION WITH SUICIDAL IDEATION: Primary | ICD-10-CM

## 2023-01-04 PROCEDURE — G0480 DRUG TEST DEF 1-7 CLASSES: HCPCS

## 2023-01-04 PROCEDURE — 87635 SARS-COV-2 COVID-19 AMP PRB: CPT

## 2023-01-04 PROCEDURE — 87804 INFLUENZA ASSAY W/OPTIC: CPT

## 2023-01-04 PROCEDURE — 99285 EMERGENCY DEPT VISIT HI MDM: CPT

## 2023-01-04 PROCEDURE — 80053 COMPREHEN METABOLIC PANEL: CPT

## 2023-01-04 PROCEDURE — 85025 COMPLETE CBC W/AUTO DIFF WBC: CPT

## 2023-01-04 PROCEDURE — 80307 DRUG TEST PRSMV CHEM ANLYZR: CPT

## 2023-01-04 ASSESSMENT — PAIN - FUNCTIONAL ASSESSMENT: PAIN_FUNCTIONAL_ASSESSMENT: NONE - DENIES PAIN

## 2023-01-05 ENCOUNTER — HOSPITAL ENCOUNTER (INPATIENT)
Age: 48
LOS: 8 days | Discharge: HOME | End: 2023-01-13
Attending: PSYCHIATRY & NEUROLOGY | Admitting: PSYCHIATRY & NEUROLOGY
Payer: MEDICARE

## 2023-01-05 VITALS
HEART RATE: 71 BPM | RESPIRATION RATE: 19 BRPM | DIASTOLIC BLOOD PRESSURE: 72 MMHG | SYSTOLIC BLOOD PRESSURE: 107 MMHG | OXYGEN SATURATION: 100 % | TEMPERATURE: 97.5 F

## 2023-01-05 PROBLEM — R45.851 DEPRESSION WITH SUICIDAL IDEATION: Status: ACTIVE | Noted: 2023-01-05

## 2023-01-05 PROBLEM — F32.A DEPRESSION WITH SUICIDAL IDEATION: Status: ACTIVE | Noted: 2023-01-05

## 2023-01-05 LAB
ABSOLUTE EOS #: 0.05 K/UL (ref 0–0.44)
ABSOLUTE IMMATURE GRANULOCYTE: <0.03 K/UL (ref 0–0.3)
ABSOLUTE LYMPH #: 1.99 K/UL (ref 1.1–3.7)
ABSOLUTE MONO #: 0.38 K/UL (ref 0.1–1.2)
ALBUMIN SERPL-MCNC: 4.2 G/DL (ref 3.5–5.2)
ALBUMIN/GLOBULIN RATIO: 1.4 (ref 1–2.5)
ALP BLD-CCNC: 66 U/L (ref 35–104)
ALT SERPL-CCNC: 31 U/L (ref 5–33)
AMPHETAMINE SCREEN URINE: NEGATIVE
ANION GAP SERPL CALCULATED.3IONS-SCNC: 10 MMOL/L (ref 9–17)
AST SERPL-CCNC: 32 U/L
BARBITURATE SCREEN URINE: NEGATIVE
BASOPHILS # BLD: 1 % (ref 0–2)
BASOPHILS ABSOLUTE: 0.04 K/UL (ref 0–0.2)
BENZODIAZEPINE SCREEN, URINE: NEGATIVE
BILIRUB SERPL-MCNC: 0.2 MG/DL (ref 0.3–1.2)
BUN BLDV-MCNC: 12 MG/DL (ref 6–20)
CALCIUM SERPL-MCNC: 9.3 MG/DL (ref 8.6–10.4)
CANNABINOID SCREEN URINE: POSITIVE
CHLORIDE BLD-SCNC: 106 MMOL/L (ref 98–107)
CO2: 26 MMOL/L (ref 20–31)
COCAINE METABOLITE, URINE: POSITIVE
CREAT SERPL-MCNC: 0.65 MG/DL (ref 0.5–0.9)
EOSINOPHILS RELATIVE PERCENT: 1 % (ref 1–4)
ETHANOL PERCENT: <0.01 %
ETHANOL: <10 MG/DL
FENTANYL URINE: NEGATIVE
FLU A ANTIGEN: NEGATIVE
FLU B ANTIGEN: NEGATIVE
GFR SERPL CREATININE-BSD FRML MDRD: >60 ML/MIN/1.73M2
GLUCOSE BLD-MCNC: 89 MG/DL (ref 70–99)
HCT VFR BLD CALC: 37.5 % (ref 36.3–47.1)
HEMOGLOBIN: 12.1 G/DL (ref 11.9–15.1)
IMMATURE GRANULOCYTES: 0 %
LYMPHOCYTES # BLD: 42 % (ref 24–43)
MCH RBC QN AUTO: 29.6 PG (ref 25.2–33.5)
MCHC RBC AUTO-ENTMCNC: 32.3 G/DL (ref 28.4–34.8)
MCV RBC AUTO: 91.7 FL (ref 82.6–102.9)
METHADONE SCREEN, URINE: NEGATIVE
MONOCYTES # BLD: 8 % (ref 3–12)
NRBC AUTOMATED: 0 PER 100 WBC
OPIATES, URINE: NEGATIVE
OXYCODONE SCREEN URINE: NEGATIVE
PDW BLD-RTO: 14.1 % (ref 11.8–14.4)
PHENCYCLIDINE, URINE: NEGATIVE
PLATELET # BLD: 177 K/UL (ref 138–453)
PMV BLD AUTO: 12.6 FL (ref 8.1–13.5)
POTASSIUM SERPL-SCNC: 4 MMOL/L (ref 3.7–5.3)
RBC # BLD: 4.09 M/UL (ref 3.95–5.11)
SARS-COV-2, RAPID: NOT DETECTED
SEG NEUTROPHILS: 48 % (ref 36–65)
SEGMENTED NEUTROPHILS ABSOLUTE COUNT: 2.23 K/UL (ref 1.5–8.1)
SODIUM BLD-SCNC: 142 MMOL/L (ref 135–144)
SPECIMEN DESCRIPTION: NORMAL
TEST INFORMATION: ABNORMAL
TOTAL PROTEIN: 7.3 G/DL (ref 6.4–8.3)
WBC # BLD: 4.7 K/UL (ref 3.5–11.3)

## 2023-01-05 PROCEDURE — 6360000002 HC RX W HCPCS: Performed by: PSYCHIATRY & NEUROLOGY

## 2023-01-05 PROCEDURE — 1240000000 HC EMOTIONAL WELLNESS R&B

## 2023-01-05 PROCEDURE — 99222 1ST HOSP IP/OBS MODERATE 55: CPT | Performed by: INTERNAL MEDICINE

## 2023-01-05 PROCEDURE — 6370000000 HC RX 637 (ALT 250 FOR IP): Performed by: NURSE PRACTITIONER

## 2023-01-05 RX ORDER — HALOPERIDOL 5 MG/1
5 TABLET ORAL EVERY 6 HOURS PRN
Status: DISCONTINUED | OUTPATIENT
Start: 2023-01-05 | End: 2023-01-05

## 2023-01-05 RX ORDER — VILAZODONE HYDROCHLORIDE 10 MG/1
10 TABLET ORAL DAILY
Status: ON HOLD | COMMUNITY
End: 2023-01-12 | Stop reason: HOSPADM

## 2023-01-05 RX ORDER — HALOPERIDOL 5 MG/ML
5 INJECTION INTRAMUSCULAR EVERY 6 HOURS PRN
Status: DISCONTINUED | OUTPATIENT
Start: 2023-01-05 | End: 2023-01-05

## 2023-01-05 RX ORDER — FLUOXETINE 10 MG/1
10 CAPSULE ORAL DAILY
Status: DISCONTINUED | OUTPATIENT
Start: 2023-01-05 | End: 2023-01-06

## 2023-01-05 RX ORDER — POLYETHYLENE GLYCOL 3350 17 G/17G
17 POWDER, FOR SOLUTION ORAL DAILY PRN
Status: DISCONTINUED | OUTPATIENT
Start: 2023-01-05 | End: 2023-01-13 | Stop reason: HOSPADM

## 2023-01-05 RX ORDER — HALOPERIDOL 5 MG/ML
5 INJECTION INTRAMUSCULAR EVERY 6 HOURS PRN
Status: DISCONTINUED | OUTPATIENT
Start: 2023-01-05 | End: 2023-01-13 | Stop reason: HOSPADM

## 2023-01-05 RX ORDER — LORAZEPAM 2 MG/ML
2 INJECTION INTRAMUSCULAR EVERY 6 HOURS PRN
Status: DISCONTINUED | OUTPATIENT
Start: 2023-01-05 | End: 2023-01-13 | Stop reason: HOSPADM

## 2023-01-05 RX ORDER — DIPHENHYDRAMINE HYDROCHLORIDE 50 MG/ML
50 INJECTION INTRAMUSCULAR; INTRAVENOUS EVERY 6 HOURS PRN
Status: DISCONTINUED | OUTPATIENT
Start: 2023-01-05 | End: 2023-01-05

## 2023-01-05 RX ORDER — DIPHENHYDRAMINE HYDROCHLORIDE 50 MG/ML
50 INJECTION INTRAMUSCULAR; INTRAVENOUS EVERY 6 HOURS PRN
Status: DISCONTINUED | OUTPATIENT
Start: 2023-01-05 | End: 2023-01-13 | Stop reason: HOSPADM

## 2023-01-05 RX ORDER — ACETAMINOPHEN 325 MG/1
650 TABLET ORAL EVERY 4 HOURS PRN
Status: DISCONTINUED | OUTPATIENT
Start: 2023-01-05 | End: 2023-01-13 | Stop reason: HOSPADM

## 2023-01-05 RX ORDER — TRAZODONE HYDROCHLORIDE 50 MG/1
50 TABLET ORAL NIGHTLY PRN
Status: DISCONTINUED | OUTPATIENT
Start: 2023-01-05 | End: 2023-01-13 | Stop reason: HOSPADM

## 2023-01-05 RX ORDER — LORAZEPAM 1 MG/1
2 TABLET ORAL EVERY 6 HOURS PRN
Status: DISCONTINUED | OUTPATIENT
Start: 2023-01-05 | End: 2023-01-13 | Stop reason: HOSPADM

## 2023-01-05 RX ORDER — HYDROXYZINE 50 MG/1
50 TABLET, FILM COATED ORAL 3 TIMES DAILY PRN
Status: DISCONTINUED | OUTPATIENT
Start: 2023-01-05 | End: 2023-01-13 | Stop reason: HOSPADM

## 2023-01-05 RX ORDER — MAGNESIUM HYDROXIDE/ALUMINUM HYDROXICE/SIMETHICONE 120; 1200; 1200 MG/30ML; MG/30ML; MG/30ML
30 SUSPENSION ORAL EVERY 6 HOURS PRN
Status: DISCONTINUED | OUTPATIENT
Start: 2023-01-05 | End: 2023-01-13 | Stop reason: HOSPADM

## 2023-01-05 RX ORDER — HALOPERIDOL 5 MG/1
5 TABLET ORAL EVERY 6 HOURS PRN
Status: DISCONTINUED | OUTPATIENT
Start: 2023-01-05 | End: 2023-01-13 | Stop reason: HOSPADM

## 2023-01-05 RX ORDER — IBUPROFEN 400 MG/1
400 TABLET ORAL EVERY 6 HOURS PRN
Status: DISCONTINUED | OUTPATIENT
Start: 2023-01-05 | End: 2023-01-13 | Stop reason: HOSPADM

## 2023-01-05 RX ORDER — OLANZAPINE 5 MG/1
5 TABLET ORAL NIGHTLY
Status: DISCONTINUED | OUTPATIENT
Start: 2023-01-05 | End: 2023-01-06

## 2023-01-05 RX ADMIN — DIPHENHYDRAMINE HYDROCHLORIDE 50 MG: 50 INJECTION, SOLUTION INTRAMUSCULAR; INTRAVENOUS at 13:57

## 2023-01-05 RX ADMIN — NICOTINE POLACRILEX 2 MG: 2 GUM, CHEWING BUCCAL at 10:13

## 2023-01-05 RX ADMIN — HYDROXYZINE HYDROCHLORIDE 50 MG: 50 TABLET, FILM COATED ORAL at 10:13

## 2023-01-05 RX ADMIN — LORAZEPAM 2 MG: 2 INJECTION INTRAMUSCULAR; INTRAVENOUS at 13:57

## 2023-01-05 RX ADMIN — HALOPERIDOL LACTATE 5 MG: 5 INJECTION, SOLUTION INTRAMUSCULAR at 13:57

## 2023-01-05 ASSESSMENT — ENCOUNTER SYMPTOMS
SORE THROAT: 0
ABDOMINAL PAIN: 0
EYE REDNESS: 0
BACK PAIN: 0
VOMITING: 0
TROUBLE SWALLOWING: 0
DIARRHEA: 0
COUGH: 0
SINUS PAIN: 0
COLOR CHANGE: 0
SINUS PRESSURE: 0
PHOTOPHOBIA: 0
NAUSEA: 0
CONSTIPATION: 0
SHORTNESS OF BREATH: 0
CHEST TIGHTNESS: 0

## 2023-01-05 ASSESSMENT — SLEEP AND FATIGUE QUESTIONNAIRES
DO YOU USE A SLEEP AID: NO
SLEEP PATTERN: DIFFICULTY FALLING ASLEEP;DISTURBED/INTERRUPTED SLEEP;RESTLESSNESS
AVERAGE NUMBER OF SLEEP HOURS: 3
DO YOU HAVE DIFFICULTY SLEEPING: YES

## 2023-01-05 ASSESSMENT — LIFESTYLE VARIABLES
HOW MANY STANDARD DRINKS CONTAINING ALCOHOL DO YOU HAVE ON A TYPICAL DAY: PATIENT DOES NOT DRINK
HOW OFTEN DO YOU HAVE A DRINK CONTAINING ALCOHOL: NEVER

## 2023-01-05 ASSESSMENT — PATIENT HEALTH QUESTIONNAIRE - PHQ9: SUM OF ALL RESPONSES TO PHQ QUESTIONS 1-9: 18

## 2023-01-05 NOTE — BH NOTE
[address] [facility]  Admission Note     Admission Type:   Admission Type: Voluntary    Reason for admission:  Reason for Admission: suicidal thoughts to run into traffic      Addictive Behavior:   Addictive Behavior  In the Past 3 Months, Have You Felt or Has Someone Told You That You Have a Problem With  : None    Medical Problems:   Past Medical History:   Diagnosis Date    Fibromyalgia     Headache     migraines    Hyperlipidemia     Hypertension     Hypothyroidism     Lupus ([facility] [address]     Thyroid disease        Status EXAM:  Mental Status and Behavioral Exam  Normal: No  Level of Assistance: Independent/Self  Facial Expression: Flat, Worried, Sad  Affect: Appropriate  Level of Consciousness: Alert  Frequency of Checks: 4 times per hour, close  Mood:Normal: No  Mood: Depressed, Anxious, Sad  Motor Activity:Normal: Yes  Eye Contact: Good  Observed Behavior: Preoccupied, Cooperative  Sexual Misconduct History: Current - no  Preception: [place] to person, [place] to time, [place] to place, [place] to situation  Attention:Normal: No  Attention: Distractible  Thought Processes: Circumstantial  Thought Content:Normal: No  Thought Content: Preoccupations  Depression Symptoms: Feelings of hopelessess, Impaired concentration, Feelings of helplessness, Loss of interest, Change in energy level, Sleep disturbance  Anxiety Symptoms: Generalized  Maria Teresa Symptoms: No problems reported or observed. Hallucinations: Auditory (comment)  Delusions: No  Memory:Normal: No  Memory: Poor recent  Insight and Judgment: No  Insight and Judgment: Poor judgment, Poor insight    Tobacco Screening:  Practical Counseling, on admission, jayme X, if applicable and completed (first 3 are required if patient doesn't refuse):             ( x) Recognizing danger situations (included triggers and roadblocks)                    ( x) Coping skills (new ways to manage stress,relaxation techniques, changing routine, distraction) (x ) Basic information about quitting (benefits of quitting, techniques in how to quit, available resources  ( ) Referral for counseling faxed to [facility]                                                                                                                   ( ) Patient refused counseling  ( ) Patient has not smoked in the last 30 days    Metabolic Screening:    No results found for: LABA1C    No results found for: CHOL  No results found for: TRIG  No results found for: HDL  No components found for: LDLCAL  No results found for: LABVLDL      Body mass index is 21.8 kg/m². BP Readings from Last 2 Encounters:   23 101/77   23 107/72           Pt admitted with followings belongings:  Dental Appliances: None  Vision - Corrective Lenses: None  Hearing Aid: None  Jewelry: None  Body Piercings Removed: N/A  Clothing: Pants, Shirt, Undergarments  Other Valuables: Purse    Patient came in voluntarily from [facility] having suicidal thoughts to run into traffic, daughter  in November. Occasional alcohol use, occasional crack use. Hearing voices telling \"kill herself. \" poor sleep. Takes Renay Hearn, gets filled at [employer] on [facility] and [place].      Derrick Fischer

## 2023-01-05 NOTE — ED PROVIDER NOTES
[facility]  ED  Emergency Department Encounter  Emergency Medicine Resident     Pt Name: Ken Muir  MRN: [id number]  [place] 1975  Date of evaluation: 23  PCP:  No primary care provider on file. CHIEF COMPLAINT       Chief Complaint   Patient presents with    Suicidal       HISTORY OFPRESENT ILLNESS  (Location/Symptom, Timing/Onset, Context/Setting, Quality, Duration, Modifying Factors,Severity.)      Ken Muir is a 49 y. o.yo female who presents with concerns for depression and suicidality. Patient states she has been under a lot of stress as her daughter passed away in November of this year. She states since then she has been on the decline and worsening mental status. Patient states that she did use alcohol this evening states she had a half a can of beer. She denies any drug use. She does state that she will kill herself in any way possible. She denies any self-harm this evening. She denies any chest pain, shortness of breath nausea or vomiting. She does admit to history of bipolar and states she is on medication for that. PAST MEDICAL / SURGICAL / SOCIAL / FAMILY HISTORY      has a past medical history of Fibromyalgia, Headache, Hyperlipidemia, Hypertension, Hypothyroidism, Lupus ([address] and Thyroid disease. has a past surgical history that includes  section; Foot surgery; and Hysterectomy.      Social History     Socioeconomic History    Marital status: Single     Spouse name: Not on file    Number of children: Not on file    Years of education: Not on file    Highest education level: Not on file   Occupational History    Not on file   Tobacco Use    Smoking status: Every Day     Packs/day: 0.50     Years: 23.00     Pack years: 11.50     Types: Cigarettes    Smokeless tobacco: Never   Substance and Sexual Activity    Alcohol use: No    Drug use: Yes     Types: Marijuana Maurita Handsome)     Comment: Deysi-puffs    Sexual activity: Not on file   Other Topics Concern    Not on file   Social History Narrative    Not on file     Social Determinants of Health     Financial Resource Strain: Not on file   Food Insecurity: Not on file   Transportation Needs: Not on file   Physical Activity: Not on file   Stress: Not on file   Social Connections: Not on file   Intimate Partner Violence: Not on file   Housing Stability: Not on file       Family History   Problem Relation Age of Onset    High Blood Pressure Mother     Heart Disease Father     Stroke Maternal Grandmother     High Blood Pressure Maternal Grandfather     Heart Disease Paternal Grandmother     Heart Disease Paternal Grandfather         Allergies:  Bactrim [sulfamethoxazole-trimethoprim]    Home Medications:  Prior to Admission medications    Medication Sig Start Date End Date Taking? Authorizing Provider   ibuprofen (ADVIL;MOTRIN) 600 MG tablet Take 1 tablet by mouth every 6 hours as needed for Pain 10/7/22   Adrien Do MD   ondansetron (ZOFRAN ODT) 4 MG disintegrating tablet Take 1 tablet by mouth every 8 hours as needed for Nausea 8/5/22   Farooq Poe MD   acetaminophen (TYLENOL) 500 MG tablet Take 1 tablet by mouth every 6 hours as needed for Pain 8/5/22 8/12/22  Farooq Poe MD   ibuprofen (ADVIL;MOTRIN) 800 MG tablet Take 1 tablet by mouth every 8 hours as needed for Pain 8/5/22 8/12/22  Farooq Poe MD   hydroCHLOROthiazide (HYDRODIURIL) 25 MG tablet take 1 tablet by mouth every morning 1/18/22   Nel Price MD       REVIEW OFSYSTEMS    (2-9 systems for level 4, 10 or more for level 5)      Review of Systems   Constitutional:  Negative for chills, diaphoresis, fatigue and fever. HENT:  Negative for congestion, sinus pressure, sinus pain, sore throat and trouble swallowing. Eyes:  Negative for photophobia, redness and visual disturbance. Respiratory:  Negative for cough, chest tightness and shortness of breath.     Cardiovascular:  Negative for chest pain, palpitations and leg swelling. Gastrointestinal:  Negative for abdominal pain, constipation, diarrhea, nausea and vomiting. Genitourinary:  Negative for difficulty urinating, dysuria, flank pain and urgency. Musculoskeletal:  Negative for back pain, neck pain and neck stiffness. Skin:  Negative for color change, pallor and rash. Neurological:  Negative for dizziness, tremors, speech difficulty, weakness, light-headedness and headaches. Psychiatric/Behavioral:  Positive for suicidal ideas. PHYSICAL EXAM   (up to 7 for level 4, 8 or more forlevel 5)      INITIAL VITALS:   ED Triage Vitals [01/04/23 2300]   BP Temp Temp Source Heart Rate Resp SpO2 Height Weight   (!) 156/108 97 °F (36.1 °C) Oral 87 18 99 % -- --       Physical Exam  Constitutional:       General: She is not in acute distress. Appearance: She is not ill-appearing. HENT:      Head: Normocephalic and atraumatic. Right Ear: External ear normal.      Left Ear: External ear normal.      Nose: Nose normal. No rhinorrhea. Eyes:      Extraocular Movements: Extraocular movements intact. Pupils: Pupils are equal, round, and reactive to light. Cardiovascular:      Rate and Rhythm: Normal rate and regular rhythm. Pulses: Normal pulses. Pulmonary:      Effort: Pulmonary effort is normal.      Breath sounds: Normal breath sounds. Abdominal:      Palpations: Abdomen is soft. Tenderness: There is no abdominal tenderness. Musculoskeletal:         General: No swelling. Normal range of motion. Cervical back: Normal range of motion and neck supple. Skin:     General: Skin is warm and dry. Neurological:      General: No focal deficit present. Mental Status: She is alert and oriented to person, place, and time. Motor: No weakness. Psychiatric:         Attention and Perception: Attention normal.         Mood and Affect: Affect is tearful.          Speech: Speech normal.         Behavior: Behavior normal.         Thought Content: Thought content includes suicidal ideation. Thought content does not include homicidal ideation. Thought content includes suicidal plan. Thought content does not include homicidal plan. DIFFERENTIAL  DIAGNOSIS     PLAN (LABS / IMAGING / EKG):  Orders Placed This Encounter   Procedures    RAPID INFLUENZA A/B ANTIGENS    COVID-19, Rapid    CBC with Auto Differential    CMP    ETOH    Urine Drug Screen       MEDICATIONS ORDERED:  No orders of the defined types were placed in this encounter. DDX: Alcohol intoxication, drug ingestion, suicidal ideation, depression    Initial MDM/Plan: 49 y.o. female who presents with concerns for suicidality with a plan. Patient states that she has been increasingly depressed and struggling with the death of her daughter that occurred in November. States that things are worse than they ever have been tonight. She states that she used alcohol this evening and denies any other medical concerns. DIAGNOSTIC RESULTS / EMERGENCYDEPARTMENT COURSE / MDM     LABS:  Labs Reviewed   COMPREHENSIVE METABOLIC PANEL - Abnormal; Notable for the following components:       Result Value    AST 32 (*)     Total Bilirubin 0.2 (*)     All other components within normal limits   URINE DRUG SCREEN - Abnormal; Notable for the following components:    Cocaine Metabolite, Urine POSITIVE (*)     Cannabinoid Scrn, Ur POSITIVE (*)     All other components within normal limits   RAPID INFLUENZA A/B ANTIGENS   COVID-19, RAPID   CBC WITH AUTO DIFFERENTIAL   ETHANOL         RADIOLOGY:  No results found.       EKG      All EKG's are interpreted by the Emergency Department Physicianwho either signs or Co-signs this chart in the absence of a cardiologist.    EMERGENCY DEPARTMENT COURSE:  ED Course as of 01/05/23 0401   Wed Jan 04, 2023   2353 Patient reports increased depression and suicidality since her daughter's death in November. [CD]   Thu Jan 05, 2023   0030 SARS-CoV-2, Rapid: Not Detected [CD]   0030 Flu A and B negative [CD]   0032 CBC wnl [CD]   3429 Patient medically clear for [facility] at this time. [CD]      ED Course User Index  [CD] Jonathan Kelley DO          PROCEDURES:  None    CONSULTS:  None    CRITICAL CARE:  45 minutes    FINAL IMPRESSION      1. Depression with suicidal ideation          DISPOSITION / PLAN     DISPOSITION Decision To Transfer 01/05/2023 12:54:55 AM      PATIENT REFERRED TO:  No follow-up provider specified.     DISCHARGE MEDICATIONS:  New Prescriptions    No medications on file       Jonathan Kelley DO  Emergency Medicine Resident    (Please note that portions of this note were completed with a voice recognition program.Efforts were made to edit the dictations but occasionally words are mis-transcribed.)        Jonathan Kelley DO  Resident  01/05/23 9548

## 2023-01-05 NOTE — CARE COORDINATION
BHI Biopsychosocial Assessment    Current Level of Psychosocial Functioning     Independent xxx  Dependent    Minimal Assist     Comments:    Psychosocial High Risk Factors (check all that apply)    Unable to obtain meds   Chronic illness/pain    Substance abuse xx  Lack of Family Support xx  Financial stress   Isolation   Inadequate Community Resources  Suicide attempt(s)  Not taking medications xx  Victim of crime   Developmental Delay  Unable to manage personal needs    Age 72 or older   Homeless  No transportation   Readmission within 30 days  Unemployment  Traumatic Event    Comments:   Psychiatric Advanced Directives: none reported     Family to Involve in Treatment: pt reports supportive family, states mom is supportive however has \"beginning stages\" of dementia     Sexual Orientation:  n/a    Patient Strengths: pt is [facility], is pursuing AOD treatment     Patient Barriers: pt has positive screen for cocaine, pt has no income        Opiate Education Provided:  pt denies, positive screen for cocaine       CMHC/mental health history: pt is linked with [facility]     Plan of Care   medication management, group/individual therapies, family meetings, psycho -education, treatment team meetings to assist with stabilization    Initial Discharge Plan:  pt states she wants to pursue AOD recovery housing at [facility] Summary:  Monique Zhu is a 49year old who has been admitted to [address] [place] [address] with suicidal ideations, pt reports this is following the death of her daughter 11/2022. Pt reports she lives with her mother, pt states she has lost 3 aunts and 1 uncle over the last few years which has affected both she and  her mother. Pt reports ongoing mental health as well as physical health concerns, states she was diagnosed with lupus several years ago, pt states \"I need to be heavily medicated, I don't want to remember or think about nothing. \"  Pt reports the death of her daughter in a motor vehicle crash in November 2022. Pt reports she has 2 grandchildren that she has seen much less since her daughter's death and being away from the children is impactful on her. Pt states she has 2 other children, ages 23 and 21. Pt reports she has history of crack cocaine and marijuana abuse, states she has had treatment at [facility] and has left that program due to conflict with staff, pt offered AOD resource folder and SW offered support in AOD recovery programming. Pt expresses interest in [facility]. Pt denies legal concerns.

## 2023-01-05 NOTE — BH NOTE
Emergency PRN Medication Administration Note:      Patient is agitated and disruptive as evidenced by patient slamming doors loudly multiple times and yelling inappropriately inside her room after her hair wig was successfully removed by staff. Staff and security attempted to find and relieve the distress by Talking to patient, Refocusing on new activity, Offering suggestions, and Administer PRN medications. Patient's behaviors continued to escalate out of control. Patient remained verbally aggressive cursing and swearing. Patient assisted to seclusion room by security and staff. Patient easily accepted PRN medications. Medication Administered as prescribed: (Ativan 2 mg, IM route. Haldol 5 mg IM Route, Benadryl 50 mg IM route at 1357 PM. Patient Tolerated medication administration. Will continue to monitor, offer support, and reassess.

## 2023-01-05 NOTE — PROGRESS NOTES
Pharmacy Medication History Note      List of current medications patient is taking is complete. Source of information: [facility] record, [address] [facility]    Changes made to medication list:  Medications removed (include reason, ex. therapy complete or physician discontinued, noncompliance):  Hydrochlorothiazide (not filled since 2022)  Ondansetron (therapy completed)  APAP (rx )  Ibuprofen 800 mg (rx )    Medications added/doses adjusted:  Viibryd 10mg daily    Current Home Medication List at Time of Admission:  Prior to Admission medications    Medication Sig   vilazodone hcl 10 MG TABS Take 10 mg by mouth daily   ibuprofen (ADVIL;MOTRIN) 600 MG tablet Take 1 tablet by mouth every 6 hours as needed for Pain         Please let me know if you have any questions about this encounter. Thank you!     Electronically signed by Jyoti Frederick on 2023 at 9:56 AM

## 2023-01-05 NOTE — GROUP NOTE
Group Therapy Note    Date: 1/5/2023    Group Start Time: 0900  Group End Time: 0920  Group Topic: Community Meeting    [facility]    [address] [address] Meeting Group Note        Date: January 5, 2023 Start Time: 9am  End Time: 10am      Number of Participants in Group & Unit Census:  15/21    Topic: Goals Group    Goal of Group: To establish a daily goal.       Comments:     Patient did not participate in [employer] group, despite staff encouragement and explanation of benefits. Patient remain seclusive to self. Q15 minute safety checks maintained for patient safety and will continue to encourage patient to attend unit programming.        Group Therapy Note    Attendees: 15/21

## 2023-01-05 NOTE — H&P
Department of Psychiatry  Attending Physician Psychiatric Assessment     Reason for Admission to Psychiatric Unit:  A mental disorder causing major disability in social, interpersonal, occupational, and/or educational functioning that is leading to dangerous or life-threatening functioning, and that can only be addressed in an acute inpatient setting   Concerns about patient's safety in the community    CHIEF COMPLAINT: Suicidal ideation    History obtained from: Patient, electronic medical record          HISTORY OF PRESENT ILLNESS:    Jono Willams is a 49 y.o. female who has a past medical history of Fibromyalgia, Headache, Hyperlipidemia, Hypertension, Hypothyroidism, Lupus,Thyroid disease, polysubstance abuse : Crack/cocaine and cannabis use disorder, and mental illness patient presented to the [facility] ED with suicidal ideation to run into traffic. Per ED documenation, \"SW met with pt for BHI assessment. Pt reports she is currently experiencing suicidal ideations, denies homicidal ideation. Pt reports \"I feel like jumping in front of some cars\", reporting \"it's always to jump in traffic, I would die instantly\". Pt reports she is struggling to cope with the loss of her daughter from November 2022. Pt reports after her daughter passed away, she was admitted to [facility] psychiatry from December 19-26 or 27, does not recall specific discharge date. Pt reports \"I feel worse now than I did when I was at [facility]". Pt reports past mental health diagnosis of bipolar disorder. Pt reports she is prescribed mental health medications of buspirone, trazodone, \"and one that starts with a V, it's new\". Pt reports she is compliant with her medications. Pt reports she \"kind of\" has a history of suicidal ideation. Pt denies hallucinations/delusions. Pt reports substance use history of crack/cocaine, marijuana, and alcohol use.  Pt reports most recently drank 1/2 of a beer during the day, as well as used crack and marijuana around 2-3pm. Pt reports she has been attending [facility] for substance use treatment, but has not been for a few weeks. Pt reports she currently lives with her mother on [facility] in [place], [place]. Pt reports it is a safe environment, but her relationship with her mother can be \"shakir\". Pt denies legal issues. \"    Per prior documentation patient has been admitted to [facility] for suicidal ideation about the death of her daughter in November 2022. Patient reports living with mother and states to have drug with grief of multiple family members last few years. Per prior documentation patient has 2 grandchildren who similar since death of her daughter. She has 2 other children, ages 23 and 21. Patient struggles with crack cocaine and marijuana abuse which she has received treatment and self recovery and left treatment a few weeks ago. It is noted the patient has had an encounter at [address] [place] [address] on 12/19/2022. Writer attempted to engage with patient multiple times to complete intake assessment however unsuccessful. Patient is seen in the milieu, loud, agitated and highly focused on her wig. She refused to talk to writer in regards to mental illness and subsequently resulted in admission. Nursing staff reports the patient is refusing to take off wig which is past shoulderlength. Writer attempted to engage with patient and discussed the policy and safety rist however, patient not willing to engage and continue to proceed making a phone call. I was unsuccessful at placing intake assessment due to patient receiving emergency medication at 1357 due to agitation and disruptive behaviors. Per EMR documentation, \"Patient is agitated and disruptive as evidenced by patient slamming doors loudly multiple times and yelling inappropriately inside her room after her hair wig was successfully removed by staff.  Staff and security attempted to find and relieve the distress by Talking to patient, Refocusing on new activity, Offering suggestions, and Administer PRN medications. Patient's behaviors continued to escalate out of control. Patient remained verbally aggressive cursing and swearing. Patient assisted to seclusion room by security and staff. Patient easily accepted PRN medications. Medication Administered as prescribed: (Ativan 2 mg, IM route. Haldol 5 mg IM Route, Benadryl 50 mg IM route at 1357 PM. Patient Tolerated medication administration. Will continue to monitor, offer support, and reassess\"    Will attempt to engage tomorrow. Unable to assess if substance abuse. Per prior documentation urine toxin positive for THC, and cocaine on 12/19/2022 per care everywhere. Blood alcohol level negative, urine tox screen positive for cannabis and cocaine on admission. PDMP reviewed, no activity noted. There is risk for decompensation thus patient warrants inpatient hospitalization due to instability of symptoms and safety concern. History of head trauma: [] Yes [x] unknown    History of seizures: [] Yes [x] unknown    History of violence or aggression: [] Yes [x] unknown         PSYCHIATRIC HISTORY:  [x] Yes [] No    Currently follows with [facility]  Unable to assess lifetime suicide attempts  Endorses prior psychiatric hospital admissions, states being discharged from [facility] in December    Home Medication Compliance: [] Yes [x] unknown    Past psychiatric medications includes: shanell Fernandez    Adverse reactions from psychotropic medications: [] Yes [x] unknown         Lifetime Psychiatric Review of Systems: Unable to assess due to patient not willing to engage         Depression:      Anxiety:      Panic Attacks:      Maria Teresa or Hypomania:      Phobias:      Obsessions and Compulsions: Body or Vocal Tics:      Visual Hallucinations:       Auditory Hallucinations: Endorses     Delusions/Paranoia:      PTSD:     Past Medical History:        Diagnosis Date    Fibromyalgia Headache     migraines    Hyperlipidemia     Hypertension     Hypothyroidism     Lupus ([facility] [address]     Thyroid disease        Past Surgical History:        Procedure Laterality Date     SECTION      FOOT SURGERY      right    HYSTERECTOMY (CERVIX STATUS UNKNOWN)         Allergies:  Bactrim [sulfamethoxazole-trimethoprim]         Social History: Unable to assess due to patient refusing  Born in:   Family:   Highest Level of Education:   Occupation:   Marital Status:   Children:   Residence:   Stressors: Daughter  November, on mental illness, substance abuse  Patient Assets/Supportive Factors: Willing to seek help         DRUG USE HISTORY  Social History     Tobacco Use   Smoking Status Every Day    Packs/day: 0.50    Years: 23.00    Pack years: 11.50    Types: Cigarettes   Smokeless Tobacco Never     Social History     Substance and Sexual Activity   Alcohol Use No     Social History     Substance and Sexual Activity   Drug Use Yes    Types: Marijuana (Weed)    Comment: Deysi-puffs                LEGAL HISTORY:   HISTORY OF INCARCERATION: [] Yes [x] unknown, unable to assess due to patient refusing    Family History:       Problem Relation Age of Onset    High Blood Pressure Mother     Heart Disease Father     Stroke Maternal Grandmother     High Blood Pressure Maternal Grandfather     Heart Disease Paternal Grandmother     Heart Disease Paternal Grandfather        Psychiatric Family History: Unable to assess due to patient refusing  Patient known psychiatric family history.      Suicides in family: [] Yes [x] unknown    Substance use in family: [] Yes [x] known         PHYSICAL EXAM:  Vitals:  /67   Pulse 61   Temp 97.9 °F (36.6 °C) (Oral)   Resp 14   Ht 5' 4\" (1.626 m)   Wt 127 lb (57.6 kg)   BMI 21.80 kg/m²     LABS:  Labs reviewed: [x] Yes    Lipid panel from [facility] noted on 2022  Last EKG in EMR reviewed: [x] Yes  2022        Review of Systems   Constitutional: Negative for chills and weight loss. HENT: Negative for ear pain and nosebleeds. Eyes: Negative for blurred vision and photophobia. Respiratory: Negative for cough, shortness of breath and wheezing. Cardiovascular: Negative for chest pain and palpitations. Gastrointestinal: Negative for abdominal pain, diarrhea and vomiting. Genitourinary: Negative for dysuria and urgency. Musculoskeletal: Negative for falls and joint pain. Skin: Negative for itching and rash. Neurological: Negative for tremors, seizures and weakness. Endo/Heme/Allergies: Does not bruise/bleed easily. Physical Exam:   Constitutional:  Appears well-developed and well-nourished, no acute distress. HENT:   Head: Normocephalic and atraumatic. Eyes: Conjunctivae are normal. Right eye exhibits no discharge. Left eye exhibits no discharge. No scleral icterus. Neck: Normal range of motion. Neck supple. Pulmonary/Chest:  No respiratory distress or accessory muscle use, no wheezing. Cardiac: Regular rate and rhythm. Abdominal: Soft. Non-tender. Exhibits no distension. Musculoskeletal: Normal range of motion. Exhibits no edema. Neurological: cranial nerves II-XII grossly in tact, normal gait and station. Skin: Skin is warm and dry. Patient is not diaphoretic. No erythema. Mental Status Examination:    Level of consciousness: Awake and alert  Appearance:  Appropriate attire, seated in chair, fair grooming   Behavior/Motor: Unapproachable, does not wish to engage with interviewer, lack of motor agitation  Attitude toward examiner: Uncooperative, distractible, poor eye contact   Speech: Normal rate, volume, and loud irritable tone  Mood: Anxious  Affect: Mood congruent, blunted  Thought processes:  Goal directed, linear  Thought content: Active suicidal ideations, with a  current plan or intent, unable to contract for safety off the unit.                Unable to assess homicidal ideations               Endorses auditory hallucinations              Unable to assess  delusions/ paranoia  Cognition:  Oriented to self, location, time, situation  Concentration: Distractible  Memory: Intact  Insight &Judgment: Poor         DSM-5 Diagnosis    Principal Problem: Depression with suicidal ideation    Cannabis use disorder  Stimulant use disorder (cocaine)    Psychosocial and Contextual factors:  Financial   Occupational   Relationship   Legal   Living situation   Educational     Past Medical History:   Diagnosis Date    Fibromyalgia     Headache     migraines    Hyperlipidemia     Hypertension     Hypothyroidism     Lupus ([facility] [address]     Thyroid disease         TREATMENT CONSIDERATIONS    Continue inpatient psychiatric treatment. Home medications reviewed. Medications as discussed with attending:  Start Zyprexa 5 mg p.o. at bedtime and Prozac 10 mg p.o. daily  Monitor need and frequency of PRN medications. Attempt to develop insight. Follow-up daily while inpatient. Reviewed risks and benefits as well as potential side effects with patient. CONSULT:  [x] Yes [] No  Internal medicine for medical management/medical H&P      Risk Management: close watch per standard protocol      Psychotherapy: participation in milieu and group and individual sessions with Attending Physician,  and Physician Assistant/CNP      Estimated length of stay:  2-14 days      GENERAL PATIENT/FAMILY EDUCATION  Patient will understand basic signs and symptoms, patient will understand benefits/risks and potential side effects from proposed medications, and patient will understand their role in recovery. Family is not active in patient's care. Patient assets that may be helpful during treatment include: Intent to participate and engage in treatment, sufficient fund of knowledge and intellect to understand and utilize treatments. Goals:    1) Remission of suicidal ideation. 2) Stabilization of symptoms prior to discharge.   3) Establish efficacy and tolerability of medications. Behavioral Services  Medicare Certification     Admission Day 1  I certify that this patient's inpatient psychiatric hospital admission is medically necessary for:    x (1) treatment which could reasonably be expected to improve this patient's condition, or    x (2) diagnostic study or its equivalent. Time Spent: 60 minutes    Brynn Woody is a 49 y.o. female being evaluated face to face    --EFRAIN Weeks CNP on 1/5/2023 at 9:42 AM    An electronic signature was used to authenticate this note.

## 2023-01-05 NOTE — PLAN OF CARE
Problem: Safety - Violent/Self-destructive Restraint  Goal: Remains free of injury from restraints (Restraint for Violent/Self-Destructive Behavior)  Description: INTERVENTIONS:  1. Determine that de-escalation and other, less restrictive measures have been tried or would not be effective before applying the restraint  2. Identify and document the criteria for restraint  3. Evaluate the patient's condition at the time of restraint application  4. Inform patient/family regarding the reason for restraint/seclusion  5. Q2H: Monitor comfort, nutrition and hydration needs  6. Q15M: Perform safety checks including skin, circulation, sensory, respiratory and psychological status  7. Ensure continuous observation  8.  Identify and implement measures to help patient regain control, assess readiness for release and initiate progressive release per policy  Outcome: Progressing  Flowsheets (Taken 1/5/2023 0572)  Remains Free of Injury from Restraints (Restraint for Violent/Self-destructive Behavior):   Ensure continuous observation   Every 2 hours: Monitor comfort, nutrition and hydration needs   Every 15 minutes: Perform safety checks including skin, circulation, sensory, respiratory and psychological status   Identify and implement measures to help patient regain control, assess readiness for release and initiate progressive release per policy   Evaluate the patient's condition at the time of restraint application   Inform patient/family regarding the reason for restraint/seclusion   Identify and document the criteria for restraint   Determine that de-escalation and other, less restrictive measures have been tried or would not be effective before applying the restraint

## 2023-01-05 NOTE — BH NOTE
During rounds it has been witnessed that while patient is sleeping wig has been removed from her head. For safety purposes staff went into room to attempt to remove the wig but unable to visualize where the wig is located.

## 2023-01-05 NOTE — BH NOTE
Patient's refusal  to remove her \"long ponytail wig\" this morning. Patient offered acceptable alternatives by staff to remove her \"long ponytail wig\" including a \"shorter lucila wig\" and \"putting her hair up with a shower cap. \" Patient became agitated and verbally aggressive yelling at staff multiple times. Patient observed swearing and cursing when patient was told politely by staff that her wig is not listed on acceptable belongings list on behavioral health unit.

## 2023-01-05 NOTE — BH NOTE
Patient given tobacco quitline number 10228455929 at this time, refusing to call at this time, states \" I just dont want to quit now\"- patient given information as to the dangers of long term tobacco use. Continue to reinforce the importance of tobacco cessation.

## 2023-01-05 NOTE — ED NOTES
Pt given cherry popsicle, marilyn crackers, 3 juices and vanilla ice cream     Imer Sigala  01/05/23 5912

## 2023-01-05 NOTE — CARE COORDINATION
Met with patient with nurse manager and charge nurse to discuss alternatives to wig as the length of the wig presents a safety risk and patient had reported today that she is still suicidal.  Have discussed with physician and will follow up with provider after this conversation. Patient states that she will not take off the wig. \"You take it, it will be assault\". \"I am not taking the wig off\". Patient states \"It is glued on\" but when attempts to clarify were made, patient becomes angry and reports that she will not take it off. Writer stated that she has been offered alternatives of having someone bring in a shorter wig, shower cap and stocking cap to maintain her current needs. She continues to repeat \"I am not taking my wig off\". Attempted to discuss alternatives as our policy states that the length of the hair is a SI risk and patient reports \"my plan is to jump in traffic and I can't do that here\". Attempted to explain that SI individuals can change plan and she kept cutting writer off stating if the wig taken, it would be assault. She then states \" Call security\" repeatedly and attempts to further discuss are not an option due to patients behaviors and statements. Patient does not appear to be willing to have any discussion related to possible options and it was clearly discussed that the length was the issue for safety of all on the unit including the patient. Identified that this will be discussed with her provider. Patient states \"go ahead and talk with him, I am not taking my wig off get security\". Discussed with additional staff who has good rapport with patient to address and result the same. Will follow up with care team for further direction.

## 2023-01-05 NOTE — ED PROVIDER NOTES
[address] [facility]     Emergency Department     Faculty Attestation    I performed a history and physical examination of the patient and discussed management with the resident. I reviewed the resident´s note and agree with the documented findings and plan of care. Any areas of disagreement are noted on the chart. I was personally present for the key portions of any procedures. I have documented in the chart those procedures where I was not present during the key portions. I have reviewed the emergency nurses triage note. I agree with the chief complaint, past medical history, past surgical history, allergies, medications, social and family history as documented unless otherwise noted below. For Physician Assistant/ Nurse Practitioner cases/documentation I have personally evaluated this patient and have completed at least one if not all key elements of the E/M (history, physical exam, and MDM). Additional findings are as noted. Awake alert and oriented, clinically intoxicated, skin warm and dry, no ataxia or nystagmus, no muscle rigidity, cranial nerves intact, cerebellar testing normal, good airway, no meningeal signs.        Harry Norris MD  01/04/23 4267

## 2023-01-05 NOTE — ED NOTES
[facility] here to  patient and transfer to [facility].       Magalie Queen RN  01/05/23 5694

## 2023-01-05 NOTE — ED NOTES
[facility]OM[facility] scheduled pt transport via [facility].  ETA 3:45-4am.      JACINTO Adair  01/05/23 0303

## 2023-01-05 NOTE — BH NOTE
Patient was assisted to seclusion room by staff and security at [address]. Psychiatrist Dr. Shiv Morales and Nurse Practitioner made aware. Received seclusion order from psychiatrist. Patient advocate present. Continued to offered available alternatives patient remains agitated and disruptive.

## 2023-01-05 NOTE — BH NOTE
While in dayroom helping patient's with filling out their menus patient was heard making threatening comments to me regarding the wig. \"See bitch I still have my wig, try and take it now. \"

## 2023-01-05 NOTE — BH NOTE
Emergency Medication Follow-Up Note:    PRN medication of IM injection of B 52 was effective as evidence by patient resting comfortably in seclusion room. Patient able to regain behavioral control. Patient provided clean blanket and pillow for comfort. Patient denies medication side effects. Will continue to monitor and provide support as needed.

## 2023-01-05 NOTE — BH NOTE
Post Restraint and Seclusion Patient Debriefing    Did debriefing occur? Yes      If No, why not? [] Patient refused  [] Patient is unavailable for debriefing due to:  [] Patient debriefing not completed due to clinical contraindication of:    What events led to the seclusion/restraint incident? Patient agitated, refused to give up her wig, behaviors not redirectable, IM injection B 52 administered. Did being restrained or in seclusion help you regain control of your behavior? yes      Did you feel safe while you were in restraint or seclusion:      [] Very Safe  [x] Safe  [] Somewhat Safe  [] Not Safe     Did you have the chance to gain control of your behavior before you were secluded or restrained? yes    If Yes, how:    [x] Offered Medication  [x] Talked with  [x] Given Time Out      [x] Offered alternatives to restraint/seclusion     During the restraint or seclusion process were you offered medicine to help you gain control? yes      Were your physical and emotional needs met, and your privacy rights addressed while you were in restraints/seclusion? yes      How can we assist you in remaining restraint or seclusion free in the future? Is there anything else you would like to share regarding this restraint/seclusion episode? Patient consented to family or significant other to participate in debriefing yes    Patient's guardian participated in debriefing (when applicable) N/A    Patient's guardian unable to participate in debriefing (when applicable) N/A    Staff: Were modifications to the treatment plan completed?  yes

## 2023-01-05 NOTE — BH NOTE
Patient overheard stating to security Pietro anibal are not taking my wig, I lost my daughter and I am suicidal.\" Patient is highly focused on her wig and continuously making snide comments towards staff.

## 2023-01-05 NOTE — ED NOTES
Pt to ED via triage with suicidal ideation. Pt states her daughter passed away recently and she is unable to move past it and would like some coping mechanisms. Pt states she would kill herself in anyway possible if she needs, including jumping in front of a car. Pt states that she uses \"hillary-puffs\" and marijuana to cope. Pt is in paper scrubs on stretcher at this time.       Chago Sanchez RN  01/05/23 7416

## 2023-01-05 NOTE — H&P
[facility] hospital Internal Medicine    HISTORY AND PHYSICAL EXAMINATION/ CONSULT NOTE            Date:   2023  Patient name:  Tomasa Bailey  Date of admission:  2023  4:57 AM  MRN:   [id number]  Account:  [de-identified]  YOB: 1975  PCP:    No primary care provider on file. Room:   [address] [place] [address]  Code Status:    Full Code    Physician Requesting Consult: Sona Melendez MD    Reason for Consult: History and physical, medical management    Chief Complaint:     No chief complaint on file. Medical management    History Obtained From:     Patient, EMR, nursing staff    History of Present Illness:     54-year-old female admitted for depression with suicidal ideation  Medical history significant for hypertension hypothyroid lupus, fibromyalgia    HTN  Onset more than 2 years ago  [place]: mild to mod  Usually controlled with current po meds  Not associated with headaches or blurry vision  No chest pain      Past Medical History:     Past Medical History:   Diagnosis Date    Fibromyalgia     Headache     migraines    Hyperlipidemia     Hypertension     Hypothyroidism     Lupus ([address]     Thyroid disease         Past Surgical History:     Past Surgical History:   Procedure Laterality Date     SECTION      FOOT SURGERY      right    HYSTERECTOMY (CERVIX STATUS UNKNOWN)          Medications Prior to Admission:     Prior to Admission medications    Medication Sig Start Date End Date Taking?  Authorizing Provider   ibuprofen (ADVIL;MOTRIN) 600 MG tablet Take 1 tablet by mouth every 6 hours as needed for Pain 10/7/22   Tr Khanna MD   ondansetron (ZOFRAN ODT) 4 MG disintegrating tablet Take 1 tablet by mouth every 8 hours as needed for Nausea 22   Ramiro Escudero MD   acetaminophen (TYLENOL) 500 MG tablet Take 1 tablet by mouth every 6 hours as needed for Pain 22  Ramiro Escudero MD   ibuprofen (ADVIL;MOTRIN) 800 MG tablet Take 1 tablet by mouth every 8 hours as needed for Pain 22  Nathan Hayward MD   hydroCHLOROthiazide (HYDRODIURIL) 25 MG tablet take 1 tablet by mouth every morning 22   Jonas Wilburn MD        Allergies:     Bactrim [sulfamethoxazole-trimethoprim]    Social History:     Tobacco:    reports that she has been smoking cigarettes. She has a 11.50 pack-year smoking history. She has never used smokeless tobacco.  Alcohol:      reports no history of alcohol use. Drug Use:  reports current drug use. Drug: Marijuana Carlos Albertoell Goldberg). Family History:     Family History   Problem Relation Age of Onset    High Blood Pressure Mother     Heart Disease Father     Stroke Maternal Grandmother     High Blood Pressure Maternal Grandfather     Heart Disease Paternal Grandmother     Heart Disease Paternal Grandfather        Review of Systems:     Positive and Negative as described in HPI. Denies any shortness of breath or cough  Denies chest pain or palpitations  Denies abdominal pain, diarrhea vomiting  Denies any new numbness tremors or weakness. 10 point review of systems was negative other than mentioned above    Physical Exam:     /67   Pulse 61   Temp 97.9 °F (36.6 °C) (Oral)   Resp 14   Ht 5' 4\" (1.626 m)   Wt 127 lb (57.6 kg)   BMI 21.80 kg/m²   Temp (24hrs), Av.5 °F (36.4 °C), Min:97 °F (36.1 °C), Max:98.1 °F (36.7 °C)    No results for input(s): POCGLU in the last 72 hours. No intake or output data in the 24 hours ending 23 0943    General Appearance:  alert, well appearing, and in no acute distress  Head:  normocephalic, atraumatic.   Eye: no icterus, redness, pupils equal and reactive, extraocular eye movements intact, conjunctiva clear  Ear: normal external ear, no discharge, hearing intact  Nose:  no drainage noted  Mouth: mucous membranes moist  Neck: supple, no carotid bruits, thyroid not palpable  Lungs: Bilateral equal air entry, clear to ausculation, no wheezing, rales or rhonchi, normal effort  Cardiovascular: normal rate, regular rhythm, no murmur, gallop, rub. Abdomen: Soft, nontender, nondistended, normal bowel sounds, no hepatomegaly or splenomegaly  Neurologic: There are no new focal motor or sensory deficits, normal muscle tone and bulk, no abnormal sensation, normal speech, cranial nerves II through XII grossly intact  Skin: No gross lesions, rashes, bruising or bleeding on exposed skin area  Extremities:  No joint swelling, no pedal edema or calf pain with palpation      Investigations:      Laboratory Testing:  Recent Results (from the past 24 hour(s))   RAPID INFLUENZA A/B ANTIGENS    Collection Time: 01/04/23 11:30 PM    Specimen: Nasopharyngeal   Result Value Ref Range    Flu A Antigen NEGATIVE NEGATIVE    Flu B Antigen NEGATIVE NEGATIVE       Imaging/Diagonstics:  Recent data reviewed    Assessment :      Primary Problem  Depression with suicidal ideation    Active Hospital Problems    Diagnosis Date Noted    Depression with suicidal ideation [F32. A, R45.851] 01/05/2023     Priority: Medium       Plan:     Reason for consult: General medical management     Depression with suicidal ideation-management per psychiatry  History of substance abuse-urine positive for cannabinoid and cocaine  Hypertension-patient reports she is supposed to be on Diovan-HCTZ however ran out of medication and has not had it for quite some time reports blood pressure being high when she checked it at a pharmacy prior to admission however blood pressure running low today-we will observe without medication for now  History of hypothyroid-patient not on medication last TSH 4.55 on 12/19/2022  Lupus- in remission per pateint  Labs and vitals reviewed-we will check hep C  DVT prophylaxis-not required, patient is mobile    Consultations:   Kandi Salinas MD  1/5/2023  9:43 AM    Copy sent to No primary care provider on file.     Please note that this chart was generated using voice recognition Dragon dictation software. Although every effort was made to ensure the accuracy of this automated transcription, some errors in transcription may have occurred.

## 2023-01-05 NOTE — BH NOTE
Opened door to seclusion room at 1451 PM. Patient observed resting comfortably on mattress. Respirations even, unlabored. Both eyes closed. Offered fluids and food for comfort. Patient declined.

## 2023-01-05 NOTE — ED NOTES
SW met with pt for BHI assessment. Pt reports she is currently experiencing suicidal ideations, denies homicidal ideation. Pt reports \"I feel like jumping in front of some cars\", reporting \"it's always to jump in traffic, I would die instantly\". Pt reports she is struggling to cope with the loss of her daughter from November 2022. Pt reports after her daughter passed away, she was admitted to [facility] psychiatry from December 19-26 or 27, does not recall specific discharge date. Pt reports \"I feel worse now than I did when I was at [facility]". Pt reports past mental health diagnosis of bipolar disorder. Pt reports she is prescribed mental health medications of buspirone, trazodone, \"and one that starts with a V, it's new\". Pt reports she is compliant with her medications. Pt reports she \"kind of\" has a history of suicidal ideation. Pt denies hallucinations/delusions. Pt reports substance use history of crack/cocaine, marijuana, and alcohol use. Pt reports most recently drank 1/2 of a beer during the day, as well as used crack and marijuana around 2-3pm. Pt reports she has been attending [facility] for substance use treatment, but has not been for a few weeks. Pt reports she currently lives with her mother on [facility] in [place], [place]. Pt reports it is a safe environment, but her relationship with her mother can be \"shakir\". Pt denies legal issues. SW to contact [facility] for potential BHI placement.       JACINTO Ramirez  01/05/23 1273

## 2023-01-05 NOTE — GROUP NOTE
Group Therapy Note    Date: 1/5/2023    Group Start Time: 1330  Group End Time: 1335  Group Topic: Psychoeducation    LA[facility]A [facility] OLIVIA    CINDI Reyes        Group Therapy Note    Attendees: 8/21    Psych-Ed/Relapse Prevention Group Note        Date: January 5, 2023 Start Time: 1:30pm  End Time: 2:15pm      Number of Participants in Group & Unit Census:  8/21    Topic: coping skills, interpersonal skills, self-expression    Goal of Group: To improve coping skills awareness and interpersonal skills through collaborating with others and demonstrating self-expression. Comments:     Patient did not participate in Psych-Ed/Relapse Prevention group, despite staff encouragement and explanation of benefits. Patient remain seclusive to self. Q15 minute safety checks maintained for patient safety and will continue to encourage patient to attend unit programming.         Signature:  CINDI Reyes

## 2023-01-05 NOTE — BH NOTE
[address] [place] [address]  Initial Interdisciplinary Treatment Plan NO      Original treatment plan Date & Time: 1/5/23 1328 PM    Admission Type:  Admission Type: Voluntary    Reason for admission:   Reason for Admission: suicidal thoughts to run into traffic    Estimated Length of Stay:  5-7days  Estimated Discharge Date: to be determined by physician    PATIENT STRENGTHS:  Patient Strengths:   Patient Strengths and Limitations:Limitations: Difficult relationships / poor social skills, Difficulty problem solving/relies on others to help solve problems  Addictive Behavior: Addictive Behavior  In the Past 3 Months, Have You Felt or Has Someone Told You That You Have a Problem With  : None  Medical Problems:  Past Medical History:   Diagnosis Date    Fibromyalgia     Headache     migraines    Hyperlipidemia     Hypertension     Hypothyroidism     Lupus ([facility] [address]     Thyroid disease      Status EXAM:Mental Status and Behavioral Exam  Normal: No  Level of Assistance: Independent/Self  Facial Expression: Flat, Hostile, Avoids Gaze  Affect: Inappropriate, Unstable  Level of Consciousness: Alert  Frequency of Checks: 4 times per hour, close  Mood:Normal: No  Mood: Anxious, Irritable, Angry, Suspicious, Labile  Motor Activity:Normal: No  Motor Activity: Increased, Agitated  Eye Contact: Poor  Observed Behavior: Preoccupied, Agitated, Threatening, Guarded  Sexual Misconduct History: Current - no  Preception: [place] to person, [place] to time, [place] to place, [place] to situation  Attention:Normal: No  Attention: Distractible  Thought Processes: Circumstantial  Thought Content:Normal: No  Thought Content: Paranoia, Delusions, Preoccupations  Depression Symptoms: Increased irritability  Anxiety Symptoms: Generalized  Maria Teresa Symptoms: Labile, Pressured speech  Hallucinations: None  Delusions: Yes  Delusions: Paranoid  Memory:Normal: No  Memory: Poor recent  Insight and Judgment: No  Insight and Judgment: Poor insight, Poor judgment    EDUCATION:   Learner Progress Toward Treatment Goals: reviewed group plans and strategies for care    Method:group therapy, medication compliance, individualized assessments and care planning    Outcome: needs reinforcement    PATIENT GOALS: to be discussed with patient within 72 hours    PLAN/TREATMENT RECOMMENDATIONS:     continue group therapy , medications compliance, goal setting, individualized assessments and care, continue to monitor pt on unit      SHORT-TERM GOALS: patient refused   Time frame for Short-Term Goals: 5-7 days    LONG-TERM GOALS: patient refused   Time frame for Long-Term Goals: 6 months  Members Present in Team Meeting: See Signature Sheet    Keren Rowan RN

## 2023-01-05 NOTE — BH NOTE
Patient came in with wig on head, refusing to take it off. Charge nurse notified, stated they will discuss it with [address] wanded with metal detector along with body.

## 2023-01-05 NOTE — ED NOTES
[] [place]    [] [address]    [x]  [facility] ASSESSMENT      Y  N     [x] [] In the past two weeks have you had thoughts of hurting yourself in any way? [x] [] In the past two weeks have you had thoughts that you would be better off dead? [] [x] Have you made a suicide attempt in the past two months? [x] [] Do you have a plan for hurting yourself or suicide? [] [x] Presence of hallucinations/voices related to hurting himself or herself or someone else. SUICIDE/SECURITY WATCH PRECAUTION CHECKLIST     Orders    [x]  Suicide/Security Watch Precautions initiated as checked below:   1/5/23 1:14 AM EST BH31/BH31B    [x] Notified physician:  Karin Hauser MD  1/5/23 1:14 AM EST    [x] Orders obtained as appropriate:     [x] 1:1 Observer     [] Psych Consult     [] Psych Consult    Name:  Date:  Time:    [x] 1:1 Observer, Notified by:  Flaquita Garner RN    Contact Nurse Supervisor    [x] Remove all personal clothes from room and place in snap/paper gown/pants. Slipper only    [x] Remove all personal belongings from room and secured away from patient. Documentation    [x] Initiate Suicide/Security Watch Precaution Flow Sheet    [x] Initiate individualized Care Plan/Problem    [x] Document why precautions initiated on flow sheet (Initiate Nursing Care Plan/Problem)    [x] 1:1 Observer in place; instructions provided. Suicide precautions require observer be within arms length. [x] Nurse-Observer Communication Hand-off initiated by RN, reviewed with Observer. Subsequently used as Hand Off between Observers. [x] Initiate every 15 minute observations per observer as delegated by the RN.     [x] Initiate RN assessment and documentation    Environmental Scan  Search Criteria and Process: OPTIONAL, see Search Policy    [] Reason for search: Suicidal    [x] Nursing in presence of second person to search patient    [x] Patient notified of reason for body assessment and belongings search:     Persons present during search: Tech, security   Results of search and disposition: Belongings placed in a locker        Searchers Name: Tech and security     These items or items similar should be removed from the room:   [x] Chairs   [x] Telephone   [x] Trash cans and liners   [x] Plastic utensils (order Patient Safety tray)   [x] Empty or remove Sharps containers   [x] All personal clothing/belongings removed   [x] All unnecessary lead wires, electrical cords, draw cords, etc.   [x] Flowers and plants   [x] Double check for lighters, matches, razors, any glass items etc that can be used as weapons. Person completing Checklist: Ely Tabor RN       GENERAL INFORMATION     Y  N     [x] [] Has the patient been informed that they are on a watch and what that means? [x] [] Can the patient get out of Bed without nursing assistance? [x] [] Can the patient use the restroom without nursing assistance? [x] [] Can the patient walk the halls to [place] their legs? \"   [] [x] Does the patient have metal utensils? [x] [] Have the patient's belongings been placed out of control of the patient? [x] [] Have the patient and his/her belongings been checked for contraband? [x] [] Is the patient under any visitor restrictions? If Yes, explain: Suicidal   [] [x] Is the patient under an alias? Alias Name:   Authorized visitors (no more than two are to be on the list)   Name/Relationship:   Name/Relationship:    Name of Staff member that you  Received this information from?: Self    General Description:    [place] BH30/BH26B female 49 y.o. Admission weight:      Race: []  [x] Black  []   []   [] [place] [] Other  Facial Hair:  [] Yes  [x] No  If yes, please describe: Identifying Marks (i.e. Visible tattoos, scars, etc... ):     NURSING CARE PLAN    Nursing Diagnosis: Risk of Self Directed Harm  [] Actual  [x] Potential  Date Started: 1/5/23 Etiological Factors: (related to)  [x] Expressed or implied suicidal ideation/behavior  [] Depression  [] Suicide attempt      [] Low self-esteem  [] Hallucinations      [] Feeling of Hopelessness  [x] Substance abuse or withdrawal    [] Dysfunctional family  [x] Major traumatic event, eg., divorce, etc   [x] Excessive stress/anxiety    1/5/23    Expected Outcomes    Patient will:   [x] Patient will remain safe for the duration of their stay   [x] Patient's environment will be safe, eg. Free of potential suicide weapons   [] Verbalize Recovery from suicidal episode and improvement in self-worth   [x] Discuss feeling that precipitated suicide attempt/thoughts/behavior   [] Will describe available resources for crisis prevention and management   [] Will verbalize positive coping skills     Nursing Intervention   [x] Assessment and Observations hourly   [x] Suicide Precautions implemented with patient, should be 1:1 observation   [x] Document observation [id number] and RN assessment hourly   [] Consult physician for:    [] Psychiatric consult    [] Pharmacological therapy    [] Other:    [x] Patient search completed by security   [x] Initiated appropriate safety protocols by removing from the patient's environment anything that could be used to inflict self injury, eg. Order safe tray, snap gown, etc   [x] Maintain open, warm, caring, non-judgmental attitude/manner towards patient   [] Discuss advantages and disadvantages of existing coping methods/skills   [x] Assist and educate patient with identifying present strengths and coping skills   [x] Keep patient informed regarding plan of care and provide clear concise explanations. Provide the patient/family education information as well as telephone numbers and other information about crisis centers, hot lines, and counselors.     Discharge Planning:   [] Referral  [] Groups [] Health agencies  [] Other:            Elvin Andrea RN  01/05/23 1349

## 2023-01-05 NOTE — ED NOTES
Pt accepted to [facility] for BHI placement 5 days. Pt accepted by NP Adrian Lucas, working alongside Dr. Anam Kat. Pt informed of acceptance to [facility]. Pt signed voluntary admissions form and faxed to [facility]. Will schedule transport as soon as bed number is provided.       Radha Hill, [facility]  01/05/23 [address] [facility], [facility]  01/05/23 9822

## 2023-01-05 NOTE — PROGRESS NOTES
Behavioral Services  Medicare Certification Upon Admission    I certify that this patient's inpatient psychiatric hospital admission is medically necessary for:    [x] (1) Treatment which could reasonably be expected to improve this patient's condition,       [x] (2) Or for diagnostic study;     AND     [x](2) The inpatient psychiatric services are provided while the individual is under the care of a physician and are included in the individualized plan of care.     Estimated length of stay/service 4-7 days    Plan for post-hospital care home with outpatient [facility] f/u    Electronically signed by Lisa Meza MD on 1/5/2023 at 5:28 PM

## 2023-01-05 NOTE — GROUP NOTE
Group Therapy Note    Date: 1/5/2023    Group Start Time: 1100  Group End Time: 1140  Group Topic: Cognitive Skills    LAILA BAKER    CINDI Jaimes        Group Therapy Note    Attendees: 9/21     Patient's Goal:  To improve memory recall and interpersonal skills through collaborating with peers and concentrating on a presented task. Notes:  Patient attended and participated in the last 10 minutes of group. Patient was able to collaborate with peers and concentrate on a presented task. Patient was pleasant and cooperative. Status After Intervention:  Improved    Participation Level:  Active Listener and Interactive    Participation Quality: Appropriate, Attentive, Sharing and Supportive       Speech:  normal      Thought Process/Content: Logical  Linear      Affective Functioning: Congruent      Mood: euthymic      Level of consciousness:  Alert and Attentive      Response to Learning: Able to verbalize current knowledge/experience, Capable of insight, and Progressing to goal      Endings: None Reported    Modes of Intervention: Socialization, Exploration, and Activity      Discipline Responsible: Psychoeducational Specialist      Signature:  CINDI Jaimes

## 2023-01-06 PROBLEM — F33.2 MDD (MAJOR DEPRESSIVE DISORDER), RECURRENT SEVERE, WITHOUT PSYCHOSIS (HCC): Status: ACTIVE | Noted: 2023-01-06

## 2023-01-06 LAB — HEPATITIS C ANTIBODY: NONREACTIVE

## 2023-01-06 PROCEDURE — 6370000000 HC RX 637 (ALT 250 FOR IP): Performed by: INTERNAL MEDICINE

## 2023-01-06 PROCEDURE — 99232 SBSQ HOSP IP/OBS MODERATE 35: CPT | Performed by: INTERNAL MEDICINE

## 2023-01-06 PROCEDURE — 6370000000 HC RX 637 (ALT 250 FOR IP): Performed by: NURSE PRACTITIONER

## 2023-01-06 PROCEDURE — 6370000000 HC RX 637 (ALT 250 FOR IP): Performed by: PSYCHIATRY & NEUROLOGY

## 2023-01-06 PROCEDURE — 86803 HEPATITIS C AB TEST: CPT

## 2023-01-06 PROCEDURE — 1240000000 HC EMOTIONAL WELLNESS R&B

## 2023-01-06 PROCEDURE — 36415 COLL VENOUS BLD VENIPUNCTURE: CPT

## 2023-01-06 RX ORDER — MIRTAZAPINE 15 MG/1
7.5 TABLET, FILM COATED ORAL NIGHTLY
Status: DISCONTINUED | OUTPATIENT
Start: 2023-01-06 | End: 2023-01-10

## 2023-01-06 RX ORDER — HYDROCHLOROTHIAZIDE 25 MG/1
25 TABLET ORAL DAILY
Status: DISCONTINUED | OUTPATIENT
Start: 2023-01-06 | End: 2023-01-13 | Stop reason: HOSPADM

## 2023-01-06 RX ORDER — DULOXETIN HYDROCHLORIDE 20 MG/1
20 CAPSULE, DELAYED RELEASE ORAL DAILY
Status: DISCONTINUED | OUTPATIENT
Start: 2023-01-06 | End: 2023-01-08

## 2023-01-06 RX ADMIN — DULOXETINE HYDROCHLORIDE 20 MG: 20 CAPSULE, DELAYED RELEASE ORAL at 12:34

## 2023-01-06 RX ADMIN — HYDROCHLOROTHIAZIDE 25 MG: 25 TABLET ORAL at 16:00

## 2023-01-06 RX ADMIN — HYDROXYZINE HYDROCHLORIDE 50 MG: 50 TABLET, FILM COATED ORAL at 20:08

## 2023-01-06 RX ADMIN — TRAZODONE HYDROCHLORIDE 50 MG: 50 TABLET ORAL at 20:58

## 2023-01-06 RX ADMIN — MIRTAZAPINE 7.5 MG: 15 TABLET, FILM COATED ORAL at 20:58

## 2023-01-06 RX ADMIN — ACETAMINOPHEN 650 MG: 325 TABLET ORAL at 12:34

## 2023-01-06 NOTE — GROUP NOTE
Group Therapy Note    Date: 1/6/2023    Group Start Time: 1430  Group End Time: 8497  Group Topic: Cognitive Skills    [facility]    Alphonso Courser, CTRS        Group Therapy Note    Attendees: 10/18     Patient's Goal: To increase social interaction and to improve/practice problem solving, concentration, and communication skills. Notes: Pt participated fully in group task . Pt was able to improve/practice problem solving, concentration, and communication skills. Status After Intervention:  Improved     Participation Level:  Active Listener and Interactive      Participation Quality: Appropriate, Attentive, sharing     Speech:  Normal      Thought Process/Content: Logical, linear     Affective Functioning: Congruent, brightens     Mood: Mostly Euthymic, humorous, friendly (Pt did, at start of group call a peer, who was intrusive and instigating her, a name but did redirect and stayed in behavioral control)     Level of consciousness:  Alert, and Attentive      Response to Learning: Able to verbalize some current knowledge/experience, able to verbalize/acknowlwedge new learning , and Progressing to goal        Endings: None Reported     Modes of Intervention: Education, Support, Socialization, Exploration, Clarifying and Problem-solving        Discipline Responsible: Psychoeducational Specialist        Signature:  Sam Chase, [facility]

## 2023-01-06 NOTE — PLAN OF CARE
Problem: Self Harm/Suicidality  Goal: Will have no self-injury during hospital stay  Description: INTERVENTIONS:  1. Ensure constant observer at bedside with Q15M safety checks  2. Maintain a safe environment  3. Secure patient belongings  4. Ensure family/visitors adhere to safety recommendations  5. Ensure safety tray has been added to patient's diet order  6. Every shift and PRN: Re-assess suicidal risk via Frequent Screener    1/6/2023 0919 by Shane Kyle LPN  Outcome: Progressing   Patient denies any thoughts of self harm. Patient is isolative and stays in room. Problem: Coping  Goal: Pt/Family able to verbalize concerns and demonstrate effective coping strategies  Description: INTERVENTIONS:  1. Assist patient/family to identify coping skills, available support systems and cultural and spiritual values  2. Provide emotional support, including active listening and acknowledgement of concerns of patient and caregivers  3. Reduce environmental stimuli, as able  4. Instruct patient/family in relaxation techniques, as appropriate  5. Assess for spiritual pain/suffering and initiate Spiritual Care, Psychosocial Clinical Specialist consults as needed  1/6/2023 0919 by Shane Kyle LPN  Outcome: Progressing   Patient gives minimal answers during assessments. Every 15 min checks maintained for pt safety.

## 2023-01-06 NOTE — BH NOTE
Patient transferred to [facility] from PICU. Report given to unit nurse. Belongings collected and transferred with patient. Patient oriented to new unit.

## 2023-01-06 NOTE — BH NOTE
BEHAVIORAL SERVICES: One - Hour In- Person Review  For Management of Violent or Self - Destructive Behavior    Seclusion/Restraint:  seclusion    Reason for Intervention: agitated, aggressive behavior, seclusion for the safety of others    Response to Intervention:  unreceptive to interventions remains agitated    Medical Record reviewed and discussed precipitating events/behaviors with RN initiating Intervention:  Yes    Patient Medical Status:  Vital Signs: refused  Respiratory Status: no acute distress noted  Circulatory Status: papable  Skin Integrity: no areas noted    Orientation: oriented to person, place, and time/date    Mood/Affect: angry, irritable, and labile    Speech: Loud and Pressured    Thought Content: normal    Thought Processes: Goal-Directed    Rationale for continued use of intervention: safety of others due to remains agitated and disruptive    Rationale for discontinuing intervention: Patient is able to: ability to follow direction, absence of aggression or disruptive behavior    One Hour Review Evaluation Physician Notification:  Dr Thomason

## 2023-01-06 NOTE — PROGRESS NOTES
[facility] Internal Medicine    HISTORY AND PHYSICAL EXAMINATION/ CONSULT NOTE            Date:   2023  Patient name:  Malorie Hurt  Date of admission:  2023  4:57 AM  MRN:   [id number]  Account:  [de-identified]  YOB: 1975  PCP:    No primary care provider on file. Room:   [address] [place] [address]  Code Status:    Full Code    Physician Requesting Consult: Joycie Brittle, MD    Reason for Consult: History and physical, medical management    Chief Complaint:     No chief complaint on file. Medical management    History Obtained From:     Patient, EMR, nursing staff    History of Present Illness:     65-year-old female admitted for depression with suicidal ideation  Medical history significant for hypertension hypothyroid lupus, fibromyalgia    HTN  Onset more than 2 years ago  [place]: mild to mod  Usually controlled with current po meds  Not associated with headaches or blurry vision  No chest pain    Patient continue headache  As per RN, some of the blood pressure readings are more than 140  Patient was on anti-hypertensive Meds at One point in past     Past Medical History:     Past Medical History:   Diagnosis Date    Fibromyalgia     Headache     migraines    Hyperlipidemia     Hypertension     Hypothyroidism     Lupus ([facility] [address]     Thyroid disease         Past Surgical History:     Past Surgical History:   Procedure Laterality Date     SECTION      FOOT SURGERY      right    HYSTERECTOMY (CERVIX STATUS UNKNOWN)          Medications Prior to Admission:     Prior to Admission medications    Medication Sig Start Date End Date Taking?  Authorizing Provider   vilazodone hcl 10 MG TABS Take 10 mg by mouth daily   Yes Historical Provider, MD   ibuprofen (ADVIL;MOTRIN) 600 MG tablet Take 1 tablet by mouth every 6 hours as needed for Pain 10/7/22   Abhilash Barajas MD        Allergies:     Bactrim [sulfamethoxazole-trimethoprim]    Social History:     Tobacco: reports that she has been smoking cigarettes. She has a 11.50 pack-year smoking history. She has never used smokeless tobacco.  Alcohol:      reports no history of alcohol use. Drug Use:  reports current drug use. Drug: Marijuana Rico Montanez). Family History:     Family History   Problem Relation Age of Onset    High Blood Pressure Mother     Heart Disease Father     Stroke Maternal Grandmother     High Blood Pressure Maternal Grandfather     Heart Disease Paternal Grandmother     Heart Disease Paternal Grandfather        Review of Systems:     Positive and Negative as described in HPI. Denies any shortness of breath or cough  Denies chest pain or palpitations  Denies abdominal pain, diarrhea vomiting  Denies any new numbness tremors or weakness. 10 point review of systems was negative other than mentioned above    Physical Exam:     /67   Pulse 61   Temp 97.9 °F (36.6 °C) (Oral)   Resp 14   Ht 5' 4\" (1.626 m)   Wt 127 lb (57.6 kg)   BMI 21.80 kg/m²   No data recorded. No results for input(s): POCGLU in the last 72 hours. No intake or output data in the 24 hours ending 01/06/23 1417    General Appearance:  alert, well appearing, and in no acute distress  Head:  normocephalic, atraumatic. Eye: no icterus, redness, pupils equal and reactive, extraocular eye movements intact, conjunctiva clear  Ear: normal external ear, no discharge, hearing intact  Nose:  no drainage noted  Mouth: mucous membranes moist  Neck: supple, no carotid bruits, thyroid not palpable  Lungs: Bilateral equal air entry, clear to ausculation, no wheezing, rales or rhonchi, normal effort  Cardiovascular: normal rate, regular rhythm, no murmur, gallop, rub.   Abdomen: Soft, nontender, nondistended, normal bowel sounds, no hepatomegaly or splenomegaly  Neurologic: There are no new focal motor or sensory deficits, normal muscle tone and bulk, no abnormal sensation, normal speech, cranial nerves II through XII grossly intact  Skin: No gross lesions, rashes, bruising or bleeding on exposed skin area  Extremities:  No joint swelling, no pedal edema or calf pain with palpation      Investigations:      Laboratory Testing:  Recent Results (from the past 24 hour(s))   Hepatitis C Antibody    Collection Time: 01/06/23  7:44 AM   Result Value Ref Range    Hepatitis C Ab NONREACTIVE NONREACTIVE       Imaging/Diagonstics:  Recent data reviewed    Assessment :      Primary Problem  Depression with suicidal ideation    Active Hospital Problems    Diagnosis Date Noted    Depression with suicidal ideation [F32. A, R45.851] 01/05/2023     Priority: Medium       Plan:     Reason for consult: General medical management     Depression with suicidal ideation-management per psychiatry  History of substance abuse-urine positive for cannabinoid and cocaine  Hypertension-patient reports she is supposed to be on Diovan-HCTZ however ran out of medication and has not had it for quite some time reports blood pressure being high when she checked it at a pharmacy prior to admission however blood pressure running low today-we will observe without medication for now  History of hypothyroid-patient not on medication last TSH 4.55 on 12/19/2022  Lupus- in remission per pateint  Labs and vitals reviewed-we will check hep C  DVT prophylaxis-not required, patient is mobile  1/6  Blood pressure as per nursing report was running high, although I could not see in the system( probably has not entered by this time )   Patient had headache  Will start patient on hydrochlorothiazide and monitor  Consultations:   Tim Whyte MD  1/6/2023  2:17 PM    Copy sent to No primary care provider on file. Please note that this chart was generated using voice recognition Dragon dictation software. Although every effort was made to ensure the accuracy of this automated transcription, some errors in transcription may have occurred.

## 2023-01-06 NOTE — ED PROVIDER NOTES
[facility]   Emergency Department  Emergency Medicine Attending Sign-out     Care of Dannie Mills was assumed from previous attending Dr. Kingsley Portillo and is being seen for Suicidal  .  The patient's initial evaluation and plan have been discussed with the prior provider who initially evaluated the patient. Attestation  I was available and discussed any additional care issues that arose and coordinated the management plans with the resident(s) caring for the patient during my duty period. Any areas of disagreement with resident's documentation of care or procedures are noted on the chart. I was personally present for the key portions of any/all procedures, during my duty period. I have documented in the chart those procedures where I was not present during the key portions. BRIEF PATIENT SUMMARY/MDM COURSE PER INITIAL PROVIDER:   RECENT VITALS:     Temp: 97.5 °F (36.4 °C),  Heart Rate: 71, Resp: 19, BP: 107/72, SpO2: 100 %    This patient is a 49 y.o. Female with suicidal ideations.   Awating pscyhiatric admission      OUTSTANDING [employer]KS / Remigio Hoyos MD  Emergency Medicine Attending  [facility]        Dick Haddad MD  01/06/23 9174

## 2023-01-06 NOTE — PLAN OF CARE
Problem: Coping  Goal: Pt/Family able to verbalize concerns and demonstrate effective coping strategies  Description: INTERVENTIONS:  1. Assist patient/family to identify coping skills, available support systems and cultural and spiritual values  2. Provide emotional support, including active listening and acknowledgement of concerns of patient and caregivers  3. Reduce environmental stimuli, as able  4. Instruct patient/family in relaxation techniques, as appropriate  5. Assess for spiritual pain/suffering and initiate Spiritual Care, Psychosocial Clinical Specialist consults as needed  Outcome: Progressing   She rests in her room quietly this shift  Problem: Self Harm/Suicidality  Goal: Will have no self-injury during hospital stay  Description: INTERVENTIONS:  1. Ensure constant observer at bedside with Q15M safety checks  2. Maintain a safe environment  3. Secure patient belongings  4. Ensure family/visitors adhere to safety recommendations  5. Ensure safety tray has been added to patient's diet order  6. Every shift and PRN: Re-assess suicidal risk via Frequent Screener    Outcome: Progressing   Pt denies thoughts of harming themself and verbally agrees to remain safe while on the unit.  No self harming behaviors are noted this shift

## 2023-01-06 NOTE — PROGRESS NOTES
Patient was provided with options for a head covering, and selected a blue winter-style cap, with approval from  and RN manager. Physician, Charge Nurse, RN, and managers alerted to patient being provided hat this morning. Patient accepting of the hat, expressed sincere appreciation and apologetic for events the previous day.      Patient is allowed to wear only this provided hat on unit

## 2023-01-06 NOTE — PROGRESS NOTES
Daily Progress Note  1/6/2023    Patient Name: Jing Velazco    CHIEF COMPLAINT:  Suicidal ideation          SUBJECTIVE:      Patient is seen today for a follow up assessment. Patient has not received emergency medication yesterday at 1358. She was started on Prozac and Zyprexa does refuse medication is due to irritability when on Prozac in the past per attending. Patient was started on Cymbalta 20 mg p.o. daily and Remeron 7.5 mg p.o. at bedtime. Patient's has been compliant with scheduled oral medication since and states that to have found Cymbalta beneficial in the past.  On approach patient is calm, pleasant and cooperative with assessment however is racially preoccupied. Patient is somewhat focused on weight which was taken away yesterday but is excepted to head she is currently wearing. She states feeling much better compared to yesterday. She voices having some anxiety and depression in \"back of my mind\" however does not think about it. She voices improvement in suicidal ideation while on the unit but states unable to contract for safety out in the community. Patient voices \"being alone out in the community makes me think about wanting to hurt myself all the time\". She voices currently living with mom who she states unsure if mom struggling with forgetfulness which is causing for her not to get along. Patient denies any auditory visual hallucination. She states goal is to be linked with [facility] recovery program upon disposition and reports actively engaging with social work. Patient is seen out in milieu, coloring which she states helped her with coping skills. She is encouraged to engage in group programming on the unit. Patient reports adequate oral intake and sleep. She denies any medication side effects or any other concerns at this time. Patient has been in behavioral control thus has transitioned care to [facility] Unit she states being happy about the unit change.   Although modest improvement in symptoms patient not a candidate for lower level of care due to instability of symptoms and safety concerns out in the community. Appetite:  [x] Adequate/Unchanged  [] Increased  [] Decreased      Sleep:       [x] Adequate/Unchanged  [] Fair  [] Poor      Compliant with scheduled medications: [x] Yes  [] No    Received emergency medications in past 24 hrs: [x] Yes   [] No    Medication Side Effects: Denies         Mental Status Exam  Level of consciousness: Alert and awake   Appearance: Appropriate attire for setting, seated in chair, with fair  grooming and hygiene   Behavior/Motor: Approachable, no psychomotor abnormalities engages with interviewer, excepting to Interact  Attitude toward examiner: Cooperative, attentive, good eye contact  Speech: spontaneous, normal rate, normal volume, and well articulated   Mood: Patient reports \"better\". Affect: Congruent  Thought processes: linear, goal directed, and coherent   Thought content: Denies homicidal ideation  Suicidal Ideation: Reports improvement in suicidal ideations while on the unit, to contracts for safety off the unit. Delusions: No evidence of delusions. Perceptual Disturbance: Patient does not appear to be responding to internal stimuli. Patient denies any auditory visual hallucination  Cognition: Oriented to self, location, time, and situation  Memory: intact  Insight: poor   Judgement: poor     Data   height is 5' 4\" (1.626 m) and weight is 127 lb (57.6 kg). Her oral temperature is 97.9 °F (36.6 °C). Her blood pressure is 108/67 and her pulse is 61. Her respiration is 14. Labs:   Admission on 01/05/2023   Component Date Value Ref Range Status    Hepatitis C Ab 01/06/2023 NONREACTIVE  NONREACTIVE Final    Comment:       The hepatitis C procedure used in our laboratory is a Chemiluminescent test specific for   three recombinant HCV antigens.   A negative anti-HCV result indicates that the antibodies to   hepatitis C virus are not present at this time. Individuals with reactive anti-HCV should be considered infected and infectious until proven   otherwise. Confirmation of all equivocal or reactive results is recommended by ordering   HCV RNA by PCR. Admission on 01/04/2023, Discharged on 01/05/2023   Component Date Value Ref Range Status    WBC 01/04/2023 4.7  3.5 - 11.3 k/uL Final    RBC 01/04/2023 4.09  3.95 - 5.11 m/uL Final    Hemoglobin 01/04/2023 12.1  11.9 - 15.1 g/dL Final    Hematocrit 01/04/2023 37.5  36.3 - 47.1 % Final    MCV 01/04/2023 91.7  82.6 - 102.9 fL Final    MCH 01/04/2023 29.6  25.2 - 33.5 pg Final    MCHC 01/04/2023 32.3  28.4 - 34.8 g/dL Final    RDW 01/04/2023 14.1  11.8 - 14.4 % Final    Platelets 71/06/5918 177  138 - 453 k/uL Final    MPV 01/04/2023 12.6  8.1 - 13.5 fL Final    NRBC Automated 01/04/2023 0.0  0.0 per 100 WBC Final    Seg Neutrophils 01/04/2023 48  36 - 65 % Final    Lymphocytes 01/04/2023 42  24 - 43 % Final    Monocytes 01/04/2023 8  3 - 12 % Final    Eosinophils % 01/04/2023 1  1 - 4 % Final    Basophils 01/04/2023 1  0 - 2 % Final    Immature Granulocytes 01/04/2023 0  0 % Final    Segs Absolute 01/04/2023 2.23  1.50 - 8.10 k/uL Final    Absolute Lymph # 01/04/2023 1.99  1.10 - 3.70 k/uL Final    Absolute Mono # 01/04/2023 0.38  0.10 - 1.20 k/uL Final    Absolute Eos # 01/04/2023 0.05  0.00 - 0.44 k/uL Final    Basophils Absolute 01/04/2023 0.04  0.00 - 0.20 k/uL Final    Absolute Immature Granulocyte 01/04/2023 <0.03  0.00 - 0.30 k/uL Final    Glucose 01/04/2023 89  70 - 99 mg/dL Final    BUN 01/04/2023 12  6 - 20 mg/dL Final    Creatinine 01/04/2023 0.65  0.50 - 0.90 mg/dL Final    Est, Glom Filt Rate 01/04/2023 >60  >60 mL/min/1.73m2 Final    Comment:       Effective Oct 3, 2022        These results are not intended for use in patients <25years of age. eGFR results are calculated without a race factor using the 2021 CKD-EPI equation.   Careful clinical correlation is recommended, particularly when comparing to results   calculated using previous equations. The CKD-EPI equation is less accurate in patients with extremes of muscle mass, extra-renal   metabolism of creatine, excessive creatine ingestion, or following therapy that affects   renal tubular secretion.       Calcium 01/04/2023 9.3  8.6 - 10.4 mg/dL Final    Sodium 01/04/2023 142  135 - 144 mmol/L Final    Potassium 01/04/2023 4.0  3.7 - 5.3 mmol/L Final    Chloride 01/04/2023 106  98 - 107 mmol/L Final    CO2 01/04/2023 26  20 - 31 mmol/L Final    Anion Gap 01/04/2023 10  9 - 17 mmol/L Final    Alkaline Phosphatase 01/04/2023 66  35 - 104 U/L Final    ALT 01/04/2023 31  5 - 33 U/L Final    AST 01/04/2023 32 (A)  <32 U/L Final    Total Bilirubin 01/04/2023 0.2 (A)  0.3 - 1.2 mg/dL Final    Total Protein 01/04/2023 7.3  6.4 - 8.3 g/dL Final    Albumin 01/04/2023 4.2  3.5 - 5.2 g/dL Final    Albumin/Globulin Ratio 01/04/2023 1.4  1.0 - 2.5 Final    Ethanol 01/04/2023 <10  <10 mg/dL Final    Ethanol percent 01/04/2023 <0.010  <0.010 % Final    Amphetamine Screen, Ur 01/04/2023 NEGATIVE  NEGATIVE Final    Comment:       (Positive cutoff 1000 ng/mL)                  Barbiturate Screen, Ur 01/04/2023 NEGATIVE  NEGATIVE Final    Comment:       (Positive cutoff 200 ng/mL)                  Benzodiazepine Screen, Urine 01/04/2023 NEGATIVE  NEGATIVE Final    Comment:       (Positive cutoff 200 ng/mL)                  Cocaine Metabolite, Urine 01/04/2023 POSITIVE (A)  NEGATIVE Final    Comment:       (Positive cutoff 300 ng/mL)                  Methadone Screen, Urine 01/04/2023 NEGATIVE  NEGATIVE Final    Comment:       (Positive cutoff 300 ng/mL)                  Opiates, Urine 01/04/2023 NEGATIVE  NEGATIVE Final    Comment:       (Positive cutoff 300 ng/mL)                  Phencyclidine, Urine 01/04/2023 NEGATIVE  NEGATIVE Final    Comment:       (Positive cutoff 25 ng/mL)                  Cannabinoid Scrn, Ur 01/04/2023 POSITIVE (A) NEGATIVE Final    Comment:       (Positive cutoff 50 ng/mL)                  Oxycodone Screen, Ur 01/04/2023 NEGATIVE  NEGATIVE Final    Comment:       (Positive cutoff 100 ng/mL)                  Fentanyl, Ur 01/04/2023 NEGATIVE  NEGATIVE Final    Comment:       (Positive cutoff  5 ng/ml)            Test Information 01/04/2023 Assay provides medical screening only. The absence of expected drug(s) and/or metabolite(s) may indicate diluted or adulterated urine, limitations of testing or timing of collection. Final    Comment: Testing for legal purposes should be confirmed by another method. To request confirmation   of test result, please call the lab within 7 days of sample submission. Flu A Antigen 01/04/2023 NEGATIVE  NEGATIVE Final    for Influenza A Antigen    Flu B Antigen 01/04/2023 NEGATIVE  NEGATIVE Final    for Influenza B Antigen. Specimen Description 01/04/2023 . NASOPHARYNGEAL SWAB   Final    SARS-CoV-2, Rapid 01/04/2023 Not Detected  Not Detected Final    Comment:       Rapid NAAT:  The specimen is NEGATIVE for SARS-CoV-2, the novel coronavirus associated with   COVID-19. The ID NOW COVID-19 assay is designed to detect the virus that causes COVID-19 in patients   with signs and symptoms of infection who are suspected of COVID-19. An individual without symptoms of COVID-19 and who is not shedding SARS-CoV-2 virus would   expect to have a negative (not detected) result in this assay. Negative results should be treated as presumptive and, if inconsistent with clinical signs   and symptoms or necessary for patient management,  should be tested with an alternative molecular assay. Negative results do not preclude   SARS-CoV-2 infection and   should not be used as the sole basis for patient management decisions.          Fact sheet for Healthcare Providers: http://www.yi-kris.alirio/  Fact sheet for Patients: http://www.yi-kris.alirio/        Methodology: Isothermal Nucleic Acid Amplification           Reviewed patient's current plan of care and vital signs with nursing staff. Labs reviewed: [x] Yes  Last EKG in EMR reviewed: [x] Yes  8/5/2022     Medications  Current Facility-Administered Medications: DULoxetine (CYMBALTA) extended release capsule 20 mg, 20 mg, Oral, Daily  mirtazapine (REMERON) tablet 7.5 mg, 7.5 mg, Oral, Nightly  hydroCHLOROthiazide (HYDRODIURIL) tablet 25 mg, 25 mg, Oral, Daily  acetaminophen (TYLENOL) tablet 650 mg, 650 mg, Oral, Q4H PRN  ibuprofen (ADVIL;MOTRIN) tablet 400 mg, 400 mg, Oral, Q6H PRN  polyethylene glycol (GLYCOLAX) packet 17 g, 17 g, Oral, Daily PRN  aluminum & magnesium hydroxide-simethicone (MAALOX) 200-200-20 MG/5ML suspension 30 mL, 30 mL, Oral, Q6H PRN  hydrOXYzine HCl (ATARAX) tablet 50 mg, 50 mg, Oral, TID PRN  traZODone (DESYREL) tablet 50 mg, 50 mg, Oral, Nightly PRN  nicotine polacrilex (NICORETTE) gum 2 mg, 2 mg, Oral, Q1H PRN  haloperidol lactate (HALDOL) injection 5 mg, 5 mg, IntraMUSCular, Q6H PRN **AND** LORazepam (ATIVAN) injection 2 mg, 2 mg, IntraMUSCular, Q6H PRN **AND** diphenhydrAMINE (BENADRYL) injection 50 mg, 50 mg, IntraMUSCular, Q6H PRN  haloperidol (HALDOL) tablet 5 mg, 5 mg, Oral, Q6H PRN **AND** LORazepam (ATIVAN) tablet 2 mg, 2 mg, Oral, Q6H PRN    ASSESSMENT  Depression with suicidal ideation         PLAN  Patient symptoms are: Showing modest signs of improvement  Discontinue Prozac and Zyprexa, start Cymbalta 20 mg p.o. daily and Remeron 7.5 mg p.o. at bedtime  Monitor need and frequency of PRN medications. Encourage participation in groups and milieu. Attempt to develop insight. Psycho-education conducted. Supportive Therapy conducted. Probable discharge is per attending physician. Follow-up daily while inpatient.    Transfer out of PICU, to [facility] Unit since in behavioral control  Patient agreeable to [facility] recovery program upon disposition    Patient continues to be monitored in the inpatient psychiatric facility at [facility] for safety and stabilization. Patient continues to need, on a daily basis, active treatment furnished directly by or requiring the supervision of inpatient psychiatric personnel. Electronically signed by EFRAIN Terrazas CNP on 1/6/2023 at 5:31 PM    **This report has been created using voice recognition software. It may contain minor errors which are inherent in voice recognition technology. **

## 2023-01-07 PROCEDURE — 6370000000 HC RX 637 (ALT 250 FOR IP): Performed by: PSYCHIATRY & NEUROLOGY

## 2023-01-07 PROCEDURE — 6370000000 HC RX 637 (ALT 250 FOR IP): Performed by: INTERNAL MEDICINE

## 2023-01-07 PROCEDURE — 1240000000 HC EMOTIONAL WELLNESS R&B

## 2023-01-07 PROCEDURE — 6370000000 HC RX 637 (ALT 250 FOR IP): Performed by: NURSE PRACTITIONER

## 2023-01-07 RX ADMIN — HYDROCHLOROTHIAZIDE 25 MG: 25 TABLET ORAL at 08:18

## 2023-01-07 RX ADMIN — DULOXETINE HYDROCHLORIDE 20 MG: 20 CAPSULE, DELAYED RELEASE ORAL at 08:18

## 2023-01-07 RX ADMIN — MIRTAZAPINE 7.5 MG: 15 TABLET, FILM COATED ORAL at 20:46

## 2023-01-07 RX ADMIN — TRAZODONE HYDROCHLORIDE 50 MG: 50 TABLET ORAL at 20:45

## 2023-01-07 RX ADMIN — HYDROXYZINE HYDROCHLORIDE 50 MG: 50 TABLET, FILM COATED ORAL at 20:45

## 2023-01-07 NOTE — PLAN OF CARE
Patient has been out in the milieu social with peers. Patient is irritable with labile mood. Patient was compliant with scheduled medications this evening. Patient denies any suicidal thoughts at this time. Patient agrees to come talk with staff if having any thoughts to harm herself this shift. 15 min rounds continued for patient safety. Problem: Self Harm/Suicidality  Goal: Will have no self-injury during hospital stay  Description: INTERVENTIONS:  1. Ensure constant observer at bedside with Q15M safety checks  2. Maintain a safe environment  3. Secure patient belongings  4. Ensure family/visitors adhere to safety recommendations  5. Ensure safety tray has been added to patient's diet order  6. Every shift and PRN: Re-assess suicidal risk via Frequent Screener    Outcome: Progressing     Problem: Coping  Goal: Pt/Family able to verbalize concerns and demonstrate effective coping strategies  Description: INTERVENTIONS:  1. Assist patient/family to identify coping skills, available support systems and cultural and spiritual values  2. Provide emotional support, including active listening and acknowledgement of concerns of patient and caregivers  3. Reduce environmental stimuli, as able  4. Instruct patient/family in relaxation techniques, as appropriate  5.  Assess for spiritual pain/suffering and initiate Spiritual Care, Psychosocial Clinical Specialist consults as needed  Outcome: Progressing

## 2023-01-07 NOTE — GROUP NOTE
Group Therapy Note    Date: 1/7/2023    Group Start Time: 0900  Group End Time: 0920  Group Topic: Community Meeting    LAILA Davis        Group Therapy Note    Attendees:10/21       Patient's Goal:  Talk with mom to apologize     Status After Intervention:  Unchanged    Participation Level:  Active Listener and Interactive    Participation Quality: Appropriate and Attentive      Speech:  normal      Thought Process/Content: Linear      Affective Functioning: Congruent      Mood: euthymic      Level of consciousness:  Alert and Oriented x4      Response to Learning: Able to verbalize current knowledge/experience and Able to verbalize/acknowledge new learning      Endings: None Reported    Modes of Intervention: Education and Support      Discipline Responsible: [address] [facility]      Signature:  Sunitha Becerril

## 2023-01-07 NOTE — PLAN OF CARE
Bp better  C/w HCTZ  Thank you for the consult. Medicine will sign off. Please do not hesitate to contact us for any issues or concerns.    Keiry Sarmiento MD

## 2023-01-07 NOTE — GROUP NOTE
Group Therapy Note    Date: 1/7/2023    Group Start Time: 1330  Group End Time: 1511  Group Topic: Cognitive Skills    [facility]    CINDI Martin        Group Therapy Note    Attendees: 11/22       Patient's Goal:  to improve concentration / improve social skills     Notes:   pt was pleasant and participated well     Status After Intervention:  Improved    Participation Level:  Active Listener and Interactive    Participation Quality: Appropriate, Attentive, and Supportive      Speech:  normal      Thought Process/Content: Logical      Affective Functioning: Congruent      Mood: euthymic      Level of consciousness:  Alert      Response to Learning: Able to verbalize current knowledge/experience and Progressing to goal      Endings: None Reported    Modes of Intervention: Support, Socialization, and Activity      Discipline Responsible: Psychoeducational Specialist      Signature:  Francisco J Brunson

## 2023-01-07 NOTE — PROGRESS NOTES
Daily Progress Note  1/7/2023    Patient Name: Alfred Viera    CHIEF COMPLAINT:  Suicidal ideation          SUBJECTIVE:      Patient is seen today for a follow up assessment. He has been compliant with scheduled medication. Patient has in behavioral control and not required emergency medication last 24 hours. On assessment patient is brighter and hopeful about future. She voices improvement in suicidal ideation while on the unit however unable to contract for safety on the community. States concerned that she would think of anything to harm herself out in the community. Patient denies any homicidal ideation. She does endorse intrusive thoughts to harm self. Patient denies auditory or visual hallucinations. Patient is seen in milieu social with peers and engaging in group programming. Patient reports having night sweats which she states is due to her going through menopause. Patient denies any medication side effects or any other concerns at this time. Nursing staff informed of patient blood pressure 130/95 and patient voices being on unknown blood pressure medication in the past.  Per nursing staff internal medicine updated and to monitor currently. Patient denies any other concerns at this time. Although modest improvement patient not a candidate for lower level of care due to instability symptoms and safety.     Appetite:  [x] Adequate/Unchanged  [] Increased  [] Decreased      Sleep:       [x] Adequate/Unchanged  [] Fair  [] Poor      Compliant with scheduled medications: [x] Yes  [] No    Received emergency medications in past 24 hrs: [x] Yes   [] No    Medication Side Effects: Denies         Mental Status Exam  Level of consciousness: Alert and awake   Appearance: Appropriate attire for setting, seated in chair, with fair  grooming and hygiene   Behavior/Motor: Approachable, no psychomotor abnormalities engages with interviewer, excepting to Interact  Attitude toward examiner: Cooperative, attentive, good eye contact  Speech: spontaneous, normal rate, normal volume, and well articulated   Mood: Patient reports \"better\". Affect: Congruent  Thought processes: linear, goal directed, and coherent   Thought content: Denies homicidal ideation  Suicidal Ideation: Reports improvement in suicidal ideations while on the unit, to contracts for safety off the unit. Delusions: No evidence of delusions. Perceptual Disturbance: Patient does not appear to be responding to internal stimuli. Patient denies any auditory visual hallucination  Cognition: Oriented to self, location, time, and situation  Memory: intact  Insight: poor   Judgement: poor     Data   height is 5' 4\" (1.626 m) and weight is 127 lb (57.6 kg). Her temporal temperature is 97.2 °F (36.2 °C). Her blood pressure is 130/95 (abnormal) and her pulse is 69. Her respiration is 14. Labs:   Admission on 01/05/2023   Component Date Value Ref Range Status    Hepatitis C Ab 01/06/2023 NONREACTIVE  NONREACTIVE Final    Comment:       The hepatitis C procedure used in our laboratory is a Chemiluminescent test specific for   three recombinant HCV antigens. A negative anti-HCV result indicates that the antibodies to   hepatitis C virus are not present at this time. Individuals with reactive anti-HCV should be considered infected and infectious until proven   otherwise. Confirmation of all equivocal or reactive results is recommended by ordering   HCV RNA by PCR. Reviewed patient's current plan of care and vital signs with nursing staff.     Labs reviewed: [x] Yes  Last EKG in EMR reviewed: [x] Yes  8/5/2022     Medications  Current Facility-Administered Medications: DULoxetine (CYMBALTA) extended release capsule 20 mg, 20 mg, Oral, Daily  mirtazapine (REMERON) tablet 7.5 mg, 7.5 mg, Oral, Nightly  hydroCHLOROthiazide (HYDRODIURIL) tablet 25 mg, 25 mg, Oral, Daily  acetaminophen (TYLENOL) tablet 650 mg, 650 mg, Oral, Q4H PRN  ibuprofen (ADVIL;MOTRIN) tablet 400 mg, 400 mg, Oral, Q6H PRN  polyethylene glycol (GLYCOLAX) packet 17 g, 17 g, Oral, Daily PRN  aluminum & magnesium hydroxide-simethicone (MAALOX) 200-200-20 MG/5ML suspension 30 mL, 30 mL, Oral, Q6H PRN  hydrOXYzine HCl (ATARAX) tablet 50 mg, 50 mg, Oral, TID PRN  traZODone (DESYREL) tablet 50 mg, 50 mg, Oral, Nightly PRN  nicotine polacrilex (NICORETTE) gum 2 mg, 2 mg, Oral, Q1H PRN  haloperidol lactate (HALDOL) injection 5 mg, 5 mg, IntraMUSCular, Q6H PRN **AND** LORazepam (ATIVAN) injection 2 mg, 2 mg, IntraMUSCular, Q6H PRN **AND** diphenhydrAMINE (BENADRYL) injection 50 mg, 50 mg, IntraMUSCular, Q6H PRN  haloperidol (HALDOL) tablet 5 mg, 5 mg, Oral, Q6H PRN **AND** LORazepam (ATIVAN) tablet 2 mg, 2 mg, Oral, Q6H PRN    ASSESSMENT  Depression with suicidal ideation         PLAN  Patient symptoms are: Showing modest signs of improvement  Continue current medication and monitor   Monitor need and frequency of PRN medications. Encourage participation in groups and milieu. Attempt to develop insight. Psycho-education conducted. Supportive Therapy conducted. Probable discharge is per attending physician. Follow-up daily while inpatient. Patient agreeable to [facility] recovery program upon disposition    Patient continues to be monitored in the inpatient psychiatric facility at [facility] for safety and stabilization. Patient continues to need, on a daily basis, active treatment furnished directly by or requiring the supervision of inpatient psychiatric personnel. Electronically signed by EFRAIN Terrazas CNP on 1/7/2023 at 3:42 PM    **This report has been created using voice recognition software. It may contain minor errors which are inherent in voice recognition technology. **

## 2023-01-07 NOTE — PLAN OF CARE
Problem: Self Harm/Suicidality  Goal: Will have no self-injury during hospital stay  Description: INTERVENTIONS:  1. Ensure constant observer at bedside with Q15M safety checks  2. Maintain a safe environment  3. Secure patient belongings  4. Ensure family/visitors adhere to safety recommendations  5. Ensure safety tray has been added to patient's diet order  6. Every shift and PRN: Re-assess suicidal risk via Frequent Screener    1/7/2023 1654 by Eda Garrison LPN  Outcome: Progressing  Flowsheets (Taken 1/7/2023 1654)  Will have no self-injury during hospital stay:   Ensure constant observer at bedside with Q15M safety checks   Maintain a safe environment   Secure patient belongings  1/7/2023 1313 by Lazarus Barth, RN  Outcome: Progressing  1/7/2023 0304 by Jordyn Kiser RN  Outcome: Progressing     Problem: Coping  Goal: Pt/Family able to verbalize concerns and demonstrate effective coping strategies  Description: INTERVENTIONS:  1. Assist patient/family to identify coping skills, available support systems and cultural and spiritual values  2. Provide emotional support, including active listening and acknowledgement of concerns of patient and caregivers  3. Reduce environmental stimuli, as able  4. Instruct patient/family in relaxation techniques, as appropriate  5.  Assess for spiritual pain/suffering and initiate Spiritual Care, Psychosocial Clinical Specialist consults as needed  1/7/2023 1654 by Eda Garrison LPN  Outcome: Progressing  Flowsheets (Taken 1/7/2023 1654)  Patient/family able to verbalize anxieties, fears, and concerns, and demonstrate effective coping:   Assist patient/family to identify coping skills, available support systems and cultural and spiritual values   Provide emotional support, including active listening and acknowledgement of concerns of patient and caregivers   Reduce environmental stimuli, as able  1/7/2023 1313 by Lazarus Barth, RN  Outcome: Progressing  1/7/2023 0304 by Marian Gaytan RN  Outcome: Progressing

## 2023-01-07 NOTE — PLAN OF CARE
[address] [place] [address]  Day 3 Interdisciplinary Treatment Plan NOTE    Review Date & Time: 1/7/2023   1:14    Admission Type:   Admission Type: Voluntary    Reason for admission:  Reason for Admission: suicidal thoughts to run into traffic  Estimated Length of Stay: 5-7 days  Estimated Discharge Date Update: to be determined by physician    PATIENT STRENGTHS:  Patient Strengths    Patient Strengths and Limitations:Limitations: Difficult relationships / poor social skills, Difficulty problem solving/relies on others to help solve problems  Addictive Behavior:Addictive Behavior  In the Past 3 Months, Have You Felt or Has Someone Told You That You Have a Problem With  : None  Medical Problems:  Past Medical History:   Diagnosis Date    Fibromyalgia     Headache     migraines    Hyperlipidemia     Hypertension     Hypothyroidism     Lupus ([facility] [address]     Thyroid disease        Risk:  Fall Risk   Sidney Scale Sidney Scale Score: 22  BVC    Change in scores no Changes to plan of Care no    Status EXAM:   Mental Status and Behavioral Exam  Normal: No  Level of Assistance: Independent/Self  Facial Expression: Flat, Hostile, Avoids Gaze  Affect: Unstable, Inappropriate  Level of Consciousness: Alert  Frequency of Checks: 4 times per hour, close  Mood:Normal: Yes  Mood: Anxious, Irritable, Suspicious  Motor Activity:Normal: Yes  Motor Activity: Increased, Agitated  Eye Contact: Poor  Observed Behavior: Guarded  Sexual Misconduct History: Current - no  Preception: Perry to person, Perry to time  Attention:Normal: No  Attention: Distractible  Thought Processes: Circumstantial  Thought Content:Normal: No  Thought Content: Preoccupations  Depression Symptoms: Increased irritability, Isolative, Loss of interest  Anxiety Symptoms: Generalized  Maria Teresa Symptoms: No problems reported or observed.   Hallucinations: None  Delusions: No  Delusions: Paranoid  Memory:Normal: No  Memory: Poor recent  Insight and Judgment: No  Insight and Judgment: Poor judgment, Poor insight    Daily Assessment Last Entry:                Daily Nutrition (WDL): Within Defined Limits  Level of Assistance: Independent/Self    Patient Monitoring:  Frequency of Checks: 4 times per hour, close    Psychiatric Symptoms:   Depression Symptoms  Depression Symptoms: Increased irritability, Isolative, Loss of interest  Anxiety Symptoms  Anxiety Symptoms: Generalized  Maria Teresa Symptoms  Maria Teresa Symptoms: No problems reported or observed. Suicide Risk CSSR-S:  1) Within the past month, have you wished you were dead or wished you could go to sleep and not wake up? : Yes  2) Have you actually had any thoughts of killing yourself? : Yes  3) Have you been thinking about how you might kill yourself? : Yes  5) Have you started to work out or worked out the details of how to kill yourself? Do you intend to carry out this plan? : No  6) Have you ever done anything, started to do anything, or prepared to do anything to end your life?: No  Change in Result no Change in Plan of care no      EDUCATION:   EDUCATION:   Learner Progress Toward Treatment Goals: Reviewed results and recommendations of this team, Reviewed group plan and strategies, Reviewed signs, symptoms and risk of self harm and violent behavior, Reviewed goals and plan of care    Method:small group, individual verbal education    Outcome:verbalized by patient, but needs reinforcement to obtain goals    PATIENT GOALS:  Short term:\"Get back on my meds and stay away from here\"  Long term: \"Keep on my treatment and stay away from here\"    PLAN/TREATMENT RECOMMENDATIONS UPDATE: continue with group therapies, increased socialization, continue planning for after discharge goals, continue with medication compliance    SHORT-TERM GOALS UPDATE:   Time frame for Short-Term Goals: 5-7 days    LONG-TERM GOALS UPDATE:   Time frame for Long-Term Goals: 6 months  Members Present in Team Meeting: See Signature Sheet    Contreras Alonzo RN

## 2023-01-08 PROCEDURE — 1240000000 HC EMOTIONAL WELLNESS R&B

## 2023-01-08 PROCEDURE — 6370000000 HC RX 637 (ALT 250 FOR IP): Performed by: NURSE PRACTITIONER

## 2023-01-08 PROCEDURE — 6370000000 HC RX 637 (ALT 250 FOR IP): Performed by: PSYCHIATRY & NEUROLOGY

## 2023-01-08 PROCEDURE — 6370000000 HC RX 637 (ALT 250 FOR IP)

## 2023-01-08 PROCEDURE — 6370000000 HC RX 637 (ALT 250 FOR IP): Performed by: INTERNAL MEDICINE

## 2023-01-08 RX ORDER — DULOXETIN HYDROCHLORIDE 30 MG/1
30 CAPSULE, DELAYED RELEASE ORAL DAILY
Status: DISCONTINUED | OUTPATIENT
Start: 2023-01-09 | End: 2023-01-10

## 2023-01-08 RX ORDER — NICOTINE 21 MG/24HR
1 PATCH, TRANSDERMAL 24 HOURS TRANSDERMAL DAILY
Status: DISCONTINUED | OUTPATIENT
Start: 2023-01-08 | End: 2023-01-13 | Stop reason: HOSPADM

## 2023-01-08 RX ADMIN — MIRTAZAPINE 7.5 MG: 15 TABLET, FILM COATED ORAL at 22:35

## 2023-01-08 RX ADMIN — HYDROCHLOROTHIAZIDE 25 MG: 25 TABLET ORAL at 08:48

## 2023-01-08 RX ADMIN — TRAZODONE HYDROCHLORIDE 50 MG: 50 TABLET ORAL at 22:35

## 2023-01-08 RX ADMIN — DULOXETINE HYDROCHLORIDE 20 MG: 20 CAPSULE, DELAYED RELEASE ORAL at 08:48

## 2023-01-08 RX ADMIN — HYDROXYZINE HYDROCHLORIDE 50 MG: 50 TABLET, FILM COATED ORAL at 22:35

## 2023-01-08 ASSESSMENT — LIFESTYLE VARIABLES
HOW OFTEN DO YOU HAVE A DRINK CONTAINING ALCOHOL: NEVER
HOW MANY STANDARD DRINKS CONTAINING ALCOHOL DO YOU HAVE ON A TYPICAL DAY: PATIENT DOES NOT DRINK

## 2023-01-08 NOTE — GROUP NOTE
Group Therapy Note    Date: 1/8/2023    Group Start Time: 0940  Group End Time: 1010  Group Topic: [address] [facility]    Amador Anderson RN        Group Therapy Note    Attendees: 10/23       Patient did not participate in [employer] group, despite staff encouragement and explanation of benefits. Patient remain seclusive to self. Q15 minute safety checks maintained for patient safety and will continue to encourage patient to attend unit programming.        Signature:  Amador Anderson RN

## 2023-01-08 NOTE — PROGRESS NOTES
Daily Progress Note  2023    Patient Name: Alfred Viera    CHIEF COMPLAINT:  Suicidal ideation          SUBJECTIVE:      Patient is seen today for a follow up assessment. She has been compliant with scheduled medication. Patient has in behavioral control and not required emergency medication last 24 hours. Nursing staff reports the patient is loud and intrusive at times but redirectable. Assessment patient reports anxiety and depression. She states having panic attack yesterday while in the room alone due to thinking about daughter's  which was on 2023. When asked if she nursing staff she states her labetalol. Patient reports having sleep and intrusive thoughts to kill self as result of thinking about her daughter. Patient denies any current plan however unable to contract for safety out in the community. Patient denies any visual or auditory hallucinations. Reports to be tolerating Cymbalta well with no side effects and is open to titrate. Patient is requesting to add nicotine patch instead of gum due to craving of nicotine. Patient not a candidate for lower level of care due to instability symptoms and safety. Appetite:  [x] Adequate/Unchanged  [] Increased  [] Decreased      Sleep:       [] Adequate/Unchanged  [] Fair  [x] Poor      Compliant with scheduled medications: [x] Yes  [] No    Received emergency medications in past 24 hrs: [] Yes   [x] No    Medication Side Effects: Denies         Mental Status Exam  Level of consciousness: Alert and awake   Appearance: Appropriate attire for setting, seated in chair, with fair  grooming and hygiene   Behavior/Motor: Approachable, no psychomotor abnormalities engages with interviewer, excepting to Interact  Attitude toward examiner: Cooperative, attentive, good eye contact  Speech: spontaneous, normal rate, normal volume, and well articulated   Mood: Patient reports \"anxiety and depression\".   Affect: Congruent  Thought processes: linear, goal directed, and coherent   Thought content: Denies homicidal ideation  Suicidal Ideation: Reports improvement in suicidal ideations while on the unit, unable to to contracts for safety off the unit. Patient reports intrusive thoughts harm self  Delusions: No evidence of delusions. Perceptual Disturbance: Patient does not appear to be responding to internal stimuli. Patient denies any auditory visual hallucination  Cognition: Oriented to self, location, time, and situation  Memory: intact  Insight: poor   Judgement: poor     Data   height is 5' 4\" (1.626 m) and weight is 127 lb (57.6 kg). Her temperature is 98 °F (36.7 °C). Her blood pressure is 132/88 and her pulse is 72. Her respiration is 14. Labs:   Admission on 01/05/2023   Component Date Value Ref Range Status    Hepatitis C Ab 01/06/2023 NONREACTIVE  NONREACTIVE Final    Comment:       The hepatitis C procedure used in our laboratory is a Chemiluminescent test specific for   three recombinant HCV antigens. A negative anti-HCV result indicates that the antibodies to   hepatitis C virus are not present at this time. Individuals with reactive anti-HCV should be considered infected and infectious until proven   otherwise. Confirmation of all equivocal or reactive results is recommended by ordering   HCV RNA by PCR. Reviewed patient's current plan of care and vital signs with nursing staff.     Labs reviewed: [x] Yes  Last EKG in EMR reviewed: [x] Yes  8/5/2022     Medications  Current Facility-Administered Medications: nicotine (NICODERM CQ) 14 MG/24HR 1 patch, 1 patch, TransDERmal, Daily  [START ON 1/9/2023] DULoxetine (CYMBALTA) extended release capsule 30 mg, 30 mg, Oral, Daily  mirtazapine (REMERON) tablet 7.5 mg, 7.5 mg, Oral, Nightly  hydroCHLOROthiazide (HYDRODIURIL) tablet 25 mg, 25 mg, Oral, Daily  acetaminophen (TYLENOL) tablet 650 mg, 650 mg, Oral, Q4H PRN  ibuprofen (ADVIL;MOTRIN) tablet 400 mg, 400 mg, Oral, Q6H PRN  polyethylene glycol (GLYCOLAX) packet 17 g, 17 g, Oral, Daily PRN  aluminum & magnesium hydroxide-simethicone (MAALOX) 200-200-20 MG/5ML suspension 30 mL, 30 mL, Oral, Q6H PRN  hydrOXYzine HCl (ATARAX) tablet 50 mg, 50 mg, Oral, TID PRN  traZODone (DESYREL) tablet 50 mg, 50 mg, Oral, Nightly PRN  haloperidol lactate (HALDOL) injection 5 mg, 5 mg, IntraMUSCular, Q6H PRN **AND** LORazepam (ATIVAN) injection 2 mg, 2 mg, IntraMUSCular, Q6H PRN **AND** diphenhydrAMINE (BENADRYL) injection 50 mg, 50 mg, IntraMUSCular, Q6H PRN  haloperidol (HALDOL) tablet 5 mg, 5 mg, Oral, Q6H PRN **AND** LORazepam (ATIVAN) tablet 2 mg, 2 mg, Oral, Q6H PRN    ASSESSMENT  Depression with suicidal ideation         PLAN  Patient symptoms are: Showing modest signs of improvement  Titrate Cymbalta up to 30 mg p.o. daily starting tomorrow  Add nicotine 14 mg/24-hour patch  Monitor need and frequency of PRN medications. Encourage participation in groups and milieu. Attempt to develop insight. Psycho-education conducted. Supportive Therapy conducted. Probable discharge is per attending physician. Follow-up daily while inpatient. Patient agreeable to [facility] recovery program upon disposition    Patient continues to be monitored in the inpatient psychiatric facility at [facility] for safety and stabilization. Patient continues to need, on a daily basis, active treatment furnished directly by or requiring the supervision of inpatient psychiatric personnel. Electronically signed by EFRAIN Cox CNP on 1/8/2023 at 5:11 PM    **This report has been created using voice recognition software. It may contain minor errors which are inherent in voice recognition technology. **

## 2023-01-08 NOTE — PLAN OF CARE
Problem: Self Harm/Suicidality  Goal: Will have no self-injury during hospital stay  Description: INTERVENTIONS:  1. Ensure constant observer at bedside with Q15M safety checks  2. Maintain a safe environment  3. Secure patient belongings  4. Ensure family/visitors adhere to safety recommendations  5. Ensure safety tray has been added to patient's diet order  6. Every shift and PRN: Re-assess suicidal risk via Frequent Screener    1/7/2023 2210 by Ruthann Reynoso LPN  Outcome: Progressing     Problem: Coping  Goal: Pt/Family able to verbalize concerns and demonstrate effective coping strategies  Description: INTERVENTIONS:  1. Assist patient/family to identify coping skills, available support systems and cultural and spiritual values  2. Provide emotional support, including active listening and acknowledgement of concerns of patient and caregivers  3. Reduce environmental stimuli, as able  4. Instruct patient/family in relaxation techniques, as appropriate  5.  Assess for spiritual pain/suffering and initiate Spiritual Care, Psychosocial Clinical Specialist consults as needed  1/7/2023 2210 by Ruthann Reynoso LPN  Outcome: Progressing

## 2023-01-08 NOTE — GROUP NOTE
Group Therapy Note    Date: 1/8/2023    Group Start Time: 1400  Group End Time: 3359  Group Topic: Psychoeducation    [facility] Neff, JAVIERS        Group Therapy Note    Attendees: 11/22       Patient's Goal:  To improve interpersonal skills and memory recall through self-expression and responding to prompts. Notes:  Patient attended and participated in group. Patient was able to demonstrate self-expression and respond to prompts. Patient entered group irritable and perseverating related to her treatment and the treatment of others on the unit. Patient responded to redirection. Status After Intervention:  Improved    Participation Level: Active Listener and Interactive. Patient intermittently monopolizing as evidenced by, talking over peers throughout group - responded to redirection     Participation Quality: Attentive and Sharing      Speech:  hyper verbal - responded to redirection       Thought Process/Content: Logical to task. Perseverating related to her treatment and the treatment of others on the unit at the beginning of group. Patient responded to redirection. Affective Functioning: Congruent      Mood: irritable upon entry to group. Euthymic once group activity began.        Level of consciousness:  Alert and Preoccupied      Response to Learning: Able to retain information, Capable of insight, and Progressing to goal      Endings: None Reported    Modes of Intervention: Socialization, Exploration, and Activity      Discipline Responsible: Psychoeducational Specialist      Signature:  Portia Neff, [address]

## 2023-01-08 NOTE — PLAN OF CARE
Problem: Self Harm/Suicidality  Goal: Will have no self-injury during hospital stay  Description: INTERVENTIONS:  1. Ensure constant observer at bedside with Q15M safety checks  2. Maintain a safe environment  3. Secure patient belongings  4. Ensure family/visitors adhere to safety recommendations  5. Ensure safety tray has been added to patient's diet order  6. Every shift and PRN: Re-assess suicidal risk via Frequent Screener    Outcome: Progressing  Flowsheets (Taken 1/8/2023 1532)  Will have no self-injury during hospital stay:   Ensure constant observer at bedside with Q15M safety checks   Maintain a safe environment   Secure patient belongings     Problem: Coping  Goal: Pt/Family able to verbalize concerns and demonstrate effective coping strategies  Description: INTERVENTIONS:  1. Assist patient/family to identify coping skills, available support systems and cultural and spiritual values  2. Provide emotional support, including active listening and acknowledgement of concerns of patient and caregivers  3. Reduce environmental stimuli, as able  4. Instruct patient/family in relaxation techniques, as appropriate  5.  Assess for spiritual pain/suffering and initiate Spiritual Care, Psychosocial Clinical Specialist consults as needed  Outcome: Progressing  Flowsheets (Taken 1/8/2023 1532)  Patient/family able to verbalize anxieties, fears, and concerns, and demonstrate effective coping:   Assist patient/family to identify coping skills, available support systems and cultural and spiritual values   Provide emotional support, including active listening and acknowledgement of concerns of patient and caregivers   Reduce environmental stimuli, as able

## 2023-01-09 PROCEDURE — 99232 SBSQ HOSP IP/OBS MODERATE 35: CPT | Performed by: PSYCHIATRY & NEUROLOGY

## 2023-01-09 PROCEDURE — 6370000000 HC RX 637 (ALT 250 FOR IP)

## 2023-01-09 PROCEDURE — 6370000000 HC RX 637 (ALT 250 FOR IP): Performed by: NURSE PRACTITIONER

## 2023-01-09 PROCEDURE — 6360000002 HC RX W HCPCS: Performed by: PSYCHIATRY & NEUROLOGY

## 2023-01-09 PROCEDURE — 6370000000 HC RX 637 (ALT 250 FOR IP): Performed by: INTERNAL MEDICINE

## 2023-01-09 PROCEDURE — 1240000000 HC EMOTIONAL WELLNESS R&B

## 2023-01-09 PROCEDURE — 6370000000 HC RX 637 (ALT 250 FOR IP): Performed by: PSYCHIATRY & NEUROLOGY

## 2023-01-09 RX ADMIN — DULOXETINE HYDROCHLORIDE 30 MG: 30 CAPSULE, DELAYED RELEASE ORAL at 08:39

## 2023-01-09 RX ADMIN — ACETAMINOPHEN 650 MG: 325 TABLET ORAL at 18:27

## 2023-01-09 RX ADMIN — DIPHENHYDRAMINE HYDROCHLORIDE 50 MG: 50 INJECTION, SOLUTION INTRAMUSCULAR; INTRAVENOUS at 10:41

## 2023-01-09 RX ADMIN — MIRTAZAPINE 7.5 MG: 15 TABLET, FILM COATED ORAL at 21:14

## 2023-01-09 RX ADMIN — HALOPERIDOL LACTATE 5 MG: 5 INJECTION, SOLUTION INTRAMUSCULAR at 10:41

## 2023-01-09 RX ADMIN — LORAZEPAM 2 MG: 2 INJECTION INTRAMUSCULAR; INTRAVENOUS at 10:41

## 2023-01-09 RX ADMIN — HYDROXYZINE HYDROCHLORIDE 50 MG: 50 TABLET, FILM COATED ORAL at 21:14

## 2023-01-09 RX ADMIN — HYDROCHLOROTHIAZIDE 25 MG: 25 TABLET ORAL at 08:39

## 2023-01-09 RX ADMIN — TRAZODONE HYDROCHLORIDE 50 MG: 50 TABLET ORAL at 21:14

## 2023-01-09 RX ADMIN — HYDROXYZINE HYDROCHLORIDE 50 MG: 50 TABLET, FILM COATED ORAL at 09:22

## 2023-01-09 ASSESSMENT — PAIN SCALES - GENERAL
PAINLEVEL_OUTOF10: 5
PAINLEVEL_OUTOF10: 0

## 2023-01-09 ASSESSMENT — PAIN DESCRIPTION - DESCRIPTORS: DESCRIPTORS: ACHING

## 2023-01-09 ASSESSMENT — PAIN DESCRIPTION - LOCATION: LOCATION: KNEE

## 2023-01-09 NOTE — BH NOTE
Emergency PRN Medication Administration Note:      Patient is Agitated and Disruptive as evidence by yelling out at staff, patient being verbally aggressive towards staff and other patients . Staff attempted to find and relieve the distress by Talking to patient, Refocusing on new activity, Offering suggestions, and Checking for undiagnosed pain Patient is currently continuing to escalate. Medication Administered as prescribed: Haldol 5 mg IM, Benadryl 50 mg, Ativan 2 mg IM. Patient Tolerated medication administration. Will continue to monitor, offer support, and reassess.

## 2023-01-09 NOTE — GROUP NOTE
Group Therapy Note    Date: 1/9/2023    Group Start Time: 1100  Group End Time: 1150  Group Topic: Cognitive Skills    CINDI Lewis        Group Therapy Note    Attendees: 10/23     Topic: Problem solving, concentration, and communication skills. Goal of Group: To increase social interaction and to improve/practice problem solving, concentration, and communication skills. Comments:      Patient did not participate Cognitive Skills Group at 11:00, despite staff encouragement and explanation of benefits. Patient remains seclusive to self , resting in room during group time. Q15 minute safety checks maintained for patient safety and will continue to encourage patient to attend unit programming.             Discipline Responsible: Psychoeducational Specialist        Signature:  Ennis Snellen

## 2023-01-09 NOTE — GROUP NOTE
Group Therapy Note    Date: 1/9/2023    Group Start Time: 1430  Group End Time: 3095  Group Topic: Cognitive Skills    [facility]    Jose Solano, CINDI        Group Therapy Note    Attendees: 4/17     Topic: Mindfulness ,and explore resources, skills to work on positive change moving forward. Goal of Group: To increase social interaction and to explore mindfulness as a means to identifying thoughts, feelings and actions/reactions in present and explore resources, skills to work on positive change moving forward. Comments:      Patient did not participate Cognitive Skills Group at 14:30, despite staff encouragement and explanation of benefits. Patient remains seclusive to self , resting in room during group time. Q15 minute safety checks maintained for patient safety and will continue to encourage patient to attend unit programming.             Discipline Responsible: Psychoeducational Specialist        Signature:  Francis Puga

## 2023-01-09 NOTE — PLAN OF CARE
Problem: Self Harm/Suicidality  Goal: Will have no self-injury during hospital stay  Description: INTERVENTIONS:  1. Ensure constant observer at bedside with Q15M safety checks  2. Maintain a safe environment  3. Secure patient belongings  4. Ensure family/visitors adhere to safety recommendations  5. Ensure safety tray has been added to patient's diet order  6. Every shift and PRN: Re-assess suicidal risk via Frequent Screener    1/8/2023 2238 by Carlos Woody LPN  Outcome: Progressing     Problem: Coping  Goal: Pt/Family able to verbalize concerns and demonstrate effective coping strategies  Description: INTERVENTIONS:  1. Assist patient/family to identify coping skills, available support systems and cultural and spiritual values  2. Provide emotional support, including active listening and acknowledgement of concerns of patient and caregivers  3. Reduce environmental stimuli, as able  4. Instruct patient/family in relaxation techniques, as appropriate  5.  Assess for spiritual pain/suffering and initiate Spiritual Care, Psychosocial Clinical Specialist consults as needed  1/8/2023 2238 by Carlos Woody LPN  Outcome: Progressing

## 2023-01-09 NOTE — CARE COORDINATION
SOCIAL SERVICE NOTE: SW meets with PT on this date to discuss discharge Planning on this date. Pt reports that she thought she was ready to go, but reports that she feels more Depressed today and plans on talking with her provider regarding this. Pt reports that she does not wish to go back to the [facility] for follow up appointments and is requesting to pursue inpatient AOD treatment at [facility]. PT reports that she contacted them  by phone and they requested that her clinical paperwork be sent to them. PT requests paperwork to be sent and SW coordinates with PT care team who will fax pt paperwork as she requested.

## 2023-01-09 NOTE — PROGRESS NOTES
Daily Progress Note  Santy Hills MD  1/9/2023  CHIEF COMPLAINT: Depression    Reviewed patient's current plan of care and vital signs with nursing staff. Sleep:  several hours last night  Attending groups: No: Isolating    SUBJECTIVE:    Patient is resting in her room. She was somewhat irritable and not willing to engage in meaningful conversation. According to staff she has been isolative and withdrawn. She is denying side effects to medication. According to notes reviewed patient has been maintaining suicidal ideation and not able to contract for safety. Discussed with patient monitoring on current medication for now as she just had Cymbalta increased    Mental Status Exam  Level of consciousness:  Within normal limits  Appearance: Hospital attire, lying in bed, fair hygiene   behavior/Motor: Patient withdrawn, poor participation  Attitude toward examiner: Indifferent, absent eye contact  Speech: Minimal speech   mood: Depressed  Affect: Flat and withdrawn  Thought processes: Brief linear responses to close ended questions thought content:  denies homicidal ideation  Suicidal Ideation: Endorses suicidal ideation  Delusions:  no evidence of delusions  Perceptual Disturbance:  denies any perceptual disturbance  Cognition: Oriented to self and circumstance   memory: age appropriate  Insight & Judgement: Limited  Medication side effects:  denies       Data   height is 5' 4\" (1.626 m) and weight is 127 lb (57.6 kg). Her temporal temperature is 97.2 °F (36.2 °C). Her blood pressure is 115/87 and her pulse is 80. Her respiration is 16. Labs:   Admission on 01/05/2023   Component Date Value Ref Range Status    Hepatitis C Ab 01/06/2023 NONREACTIVE  NONREACTIVE Final    Comment:       The hepatitis C procedure used in our laboratory is a Chemiluminescent test specific for   three recombinant HCV antigens.   A negative anti-HCV result indicates that the antibodies to   hepatitis C virus are not present at this time.  Individuals with reactive anti-HCV should be considered infected and infectious until proven   otherwise. Confirmation of all equivocal or reactive results is recommended by ordering   HCV RNA by PCR. Medications  Current Facility-Administered Medications: nicotine (NICODERM CQ) 14 MG/24HR 1 patch, 1 patch, TransDERmal, Daily  DULoxetine (CYMBALTA) extended release capsule 30 mg, 30 mg, Oral, Daily  mirtazapine (REMERON) tablet 7.5 mg, 7.5 mg, Oral, Nightly  hydroCHLOROthiazide (HYDRODIURIL) tablet 25 mg, 25 mg, Oral, Daily  acetaminophen (TYLENOL) tablet 650 mg, 650 mg, Oral, Q4H PRN  ibuprofen (ADVIL;MOTRIN) tablet 400 mg, 400 mg, Oral, Q6H PRN  polyethylene glycol (GLYCOLAX) packet 17 g, 17 g, Oral, Daily PRN  aluminum & magnesium hydroxide-simethicone (MAALOX) 200-200-20 MG/5ML suspension 30 mL, 30 mL, Oral, Q6H PRN  hydrOXYzine HCl (ATARAX) tablet 50 mg, 50 mg, Oral, TID PRN  traZODone (DESYREL) tablet 50 mg, 50 mg, Oral, Nightly PRN  haloperidol lactate (HALDOL) injection 5 mg, 5 mg, IntraMUSCular, Q6H PRN **AND** LORazepam (ATIVAN) injection 2 mg, 2 mg, IntraMUSCular, Q6H PRN **AND** diphenhydrAMINE (BENADRYL) injection 50 mg, 50 mg, IntraMUSCular, Q6H PRN  haloperidol (HALDOL) tablet 5 mg, 5 mg, Oral, Q6H PRN **AND** LORazepam (ATIVAN) tablet 2 mg, 2 mg, Oral, Q6H PRN    ASSESSMENT  Depression with suicidal ideation     PLAN  Patient s symptoms   show no change  Monitor recently adjusted Cymbalta for now  Attempt to develop insight  Psycho-education conducted. Supportive Therapy conducted. Probable discharge is undetermined at this time  Follow-up daily while in the inpatient unit      Electronically signed by Adriana Roblero MD on 1/9/23 at 1:34 PM EST    **This report has been created using voice recognition software. It may contain minor errors which are inherent in voice recognition technology. **

## 2023-01-09 NOTE — PLAN OF CARE
Problem: Self Harm/Suicidality  Goal: Will have no self-injury during hospital stay  Description: INTERVENTIONS:  1. Ensure constant observer at bedside with Q15M safety checks  2. Maintain a safe environment  3. Secure patient belongings  4. Ensure family/visitors adhere to safety recommendations  5. Ensure safety tray has been added to patient's diet order  6. Every shift and PRN: Re-assess suicidal risk via Frequent Screener  1/9/2023 1439 by Murphy Cline  Outcome: Progressing  Safe environment maintained and patient remains free from self-harm. Writer provided emotional support and reassurance. Writer also encouraged patient to attend unit programming and socialize with peers. Agreeable to seeking staff should thoughts to harm self or others arise. Q15min checks for safety. Problem: Coping  Goal: Pt/Family able to verbalize concerns and demonstrate effective coping strategies  Description: INTERVENTIONS:  1. Assist patient/family to identify coping skills, available support systems and cultural and spiritual values  2. Provide emotional support, including active listening and acknowledgement of concerns of patient and caregivers  3. Reduce environmental stimuli, as able  4. Instruct patient/family in relaxation techniques, as appropriate  5. Assess for spiritual pain/suffering and initiate Spiritual Care, Psychosocial Clinical Specialist consults as needed  1/9/2023 1439 by Murphy Cline  Outcome: Progressing  Patient denies adequate coping skills. Educated on coping skills such as deep breathing, coloring and listening to music. Encouraged to attend unit programming and interact with peers and staff to increase coping skills.

## 2023-01-10 PROCEDURE — 6370000000 HC RX 637 (ALT 250 FOR IP): Performed by: PSYCHIATRY & NEUROLOGY

## 2023-01-10 PROCEDURE — 6370000000 HC RX 637 (ALT 250 FOR IP): Performed by: NURSE PRACTITIONER

## 2023-01-10 PROCEDURE — 6370000000 HC RX 637 (ALT 250 FOR IP): Performed by: INTERNAL MEDICINE

## 2023-01-10 PROCEDURE — 1240000000 HC EMOTIONAL WELLNESS R&B

## 2023-01-10 PROCEDURE — 6370000000 HC RX 637 (ALT 250 FOR IP)

## 2023-01-10 RX ORDER — MIRTAZAPINE 15 MG/1
15 TABLET, FILM COATED ORAL NIGHTLY
Status: DISCONTINUED | OUTPATIENT
Start: 2023-01-10 | End: 2023-01-13 | Stop reason: HOSPADM

## 2023-01-10 RX ORDER — DULOXETIN HYDROCHLORIDE 20 MG/1
40 CAPSULE, DELAYED RELEASE ORAL DAILY
Status: DISCONTINUED | OUTPATIENT
Start: 2023-01-11 | End: 2023-01-13 | Stop reason: HOSPADM

## 2023-01-10 RX ADMIN — DULOXETINE HYDROCHLORIDE 30 MG: 30 CAPSULE, DELAYED RELEASE ORAL at 08:05

## 2023-01-10 RX ADMIN — MIRTAZAPINE 15 MG: 15 TABLET, FILM COATED ORAL at 21:10

## 2023-01-10 RX ADMIN — LORAZEPAM 2 MG: 1 TABLET ORAL at 10:09

## 2023-01-10 RX ADMIN — HYDROCHLOROTHIAZIDE 25 MG: 25 TABLET ORAL at 08:05

## 2023-01-10 RX ADMIN — HALOPERIDOL 5 MG: 5 TABLET ORAL at 10:09

## 2023-01-10 RX ADMIN — ACETAMINOPHEN 650 MG: 325 TABLET ORAL at 10:29

## 2023-01-10 RX ADMIN — HYDROXYZINE HYDROCHLORIDE 50 MG: 50 TABLET, FILM COATED ORAL at 21:10

## 2023-01-10 RX ADMIN — TRAZODONE HYDROCHLORIDE 50 MG: 50 TABLET ORAL at 21:10

## 2023-01-10 ASSESSMENT — PAIN SCALES - GENERAL
PAINLEVEL_OUTOF10: 3
PAINLEVEL_OUTOF10: 10

## 2023-01-10 ASSESSMENT — PAIN DESCRIPTION - PAIN TYPE: TYPE: ACUTE PAIN

## 2023-01-10 ASSESSMENT — PAIN DESCRIPTION - LOCATION: LOCATION: HEAD

## 2023-01-10 NOTE — BH NOTE
At this time patient received an phone call that caused to experience agitation and began to raise her voice. Patient received Ativan 2mg and Haldol 5mg orally. Patient was accepting and tolerated medication well. Joseph Smith will continue to monitor/assess patient behavior and offer support if needed.

## 2023-01-10 NOTE — PLAN OF CARE
Problem: Self Harm/Suicidality  Goal: Will have no self-injury during hospital stay  Description: INTERVENTIONS:  1. Ensure constant observer at bedside with Q15M safety checks  2. Maintain a safe environment  3. Secure patient belongings  4. Ensure family/visitors adhere to safety recommendations  5. Ensure safety tray has been added to patient's diet order  6. Every shift and PRN: Re-assess suicidal risk via Frequent Screener    1/10/2023 1023 by Emi Hair LPN  Outcome: Progressing  1/9/2023 2201 by Lulu Ambrocio LPN  Outcome: Progressing     Patient explained she is experiencing depression and anxiety and rates it 10/10, trouble sleeping due to racing thoughts. Patient is agitated, irritable , medication complaint, out and social, and blunt affect. Problem: Coping  Goal: Pt/Family able to verbalize concerns and demonstrate effective coping strategies  Description: INTERVENTIONS:  1. Assist patient/family to identify coping skills, available support systems and cultural and spiritual values  2. Provide emotional support, including active listening and acknowledgement of concerns of patient and caregivers  3. Reduce environmental stimuli, as able  4. Instruct patient/family in relaxation techniques, as appropriate  5.  Assess for spiritual pain/suffering and initiate Spiritual Care, Psychosocial Clinical Specialist consults as needed  1/10/2023 1023 by Emi Hair LPN  Outcome: Progressing  1/9/2023 2201 by Lulu Ambrocio LPN  Outcome: Progressing     Problem: Pain  Goal: Verbalizes/displays adequate comfort level or baseline comfort level  Outcome: Progressing

## 2023-01-10 NOTE — PLAN OF CARE
Problem: Self Harm/Suicidality  Goal: Will have no self-injury during hospital stay  Description: INTERVENTIONS:  1. Ensure constant observer at bedside with Q15M safety checks  2. Maintain a safe environment  3. Secure patient belongings  4. Ensure family/visitors adhere to safety recommendations  5. Ensure safety tray has been added to patient's diet order  6. Every shift and PRN: Re-assess suicidal risk via Frequent Screener       1/9/2023 2100 by Cristela Acosta LPN  Outcome: Progressing     Patient admits to fleeting suicidal ideations at this time but contracts for safety and denies any plan to act on them while inpatient. Patients behavior is controlled and she is medication compliant. Patient safety checks are maintained every 15 minutes. Problem: Coping  Goal: Pt/Family able to verbalize concerns and demonstrate effective coping strategies  Description: INTERVENTIONS:  1. Assist patient/family to identify coping skills, available support systems and cultural and spiritual values  2. Provide emotional support, including active listening and acknowledgement of concerns of patient and caregivers  3. Reduce environmental stimuli, as able  4. Instruct patient/family in relaxation techniques, as appropriate  5. Assess for spiritual pain/suffering and initiate Spiritual Care, Psychosocial Clinical Specialist consults as needed  1/9/2023 2100 by Cristela Acosta LPN  Outcome: Progressing  Patient is flat and isolative to room resting throughout shift. Patient encouraged to explore coping skills to reduce negative thoughts. None verbalized at this time.

## 2023-01-10 NOTE — PROGRESS NOTES
Daily Progress Note  Abimael Nguyễn MD  1/10/2023  CHIEF COMPLAINT: Depression with suicidal ideation    Reviewed patient's current plan of care and vital signs with nursing staff. Sleep:  several hours last night  Attending groups: Only intermittent attendance    SUBJECTIVE:    Patient reports that she still having intense suicidal thoughts, in particular when she thinks of the loss of her child. She states they spike to an 8 out of 10 intensity. She is very clear that she would not be able to be safe outside of the hospital.  She does feel safe from acting on these thoughts here in the hospital but states its only because she does not have a means of acting on this. She is denying side effects to medication. She is reporting a tension headache but does not believe it is from the Cymbalta, stating it is a chronic problem. We did discuss risk benefits and alternatives and agreed to further titrate Cymbalta today to try and help with her mood as well as increasing at bedtime Remeron. Overall she is tolerating it well and does feel that there has been a slight improvement in her symptoms. We did spend a significant amount of time in supportive psychotherapy talking about her perception of others in life, her experience of racism in life, how she ties in her experience with her wig on the unit with racism. Discussed that we were not able to offer her her wig due to requirements and policies in the hospital due to strangulation risk. We have been over this before however patient beginning to state that \"there is another patient on the unit with awake\". I am unaware of the situation and reassured the patient that we try to be very consistent and force meant of our policies. Asked her if her mother could bring in her wig, a shorter wake. Patient indicating that all of her legs are exactly the same length.   This is contradictory to his things that the patient has stated earlier where she states she did have shorter weeks at home but did not want to tell anybody that she was here. She now acknowledges her mother is aware that she is here but it is clear she does not want her mother to bring in the leg. Patient is asking to cut the existing week that she has. I discussed with her that if she continues to request this through tomorrow would consider allowing her to do it, I did ask her to think about it overnight as she previously expressed disinterest and then subsequently changed her mind due to the expense of the wig. Mental Status Exam  Level of consciousness:  Within normal limits  Appearance: Hospital attire, seated in chair, with good grooming and hygiene   Behavior/Motor: No abnormalities noted  Attitude toward examiner:  Cooperative, attentive, good eye contact  Speech:  spontaneous, normal rate, normal volume and well articulated  Mood: Depressed  Affect: Flat and withdrawn  Thought processes:  linear, goal directed and coherent  Thought content:  denies homicidal ideation  Suicidal Ideation: Endorses active suicidal ideation with inability to contract for safety outside of the hospital  Delusions:  no evidence of delusions  Perceptual Disturbance:  denies any perceptual disturbance  Cognition:  Oriented to self, location, time, and situation  Memory: age appropriate  Insight & Judgement: She was very modest signs of improvement but remains at a poor base  Medication side effects:  denies       Data   height is 5' 4\" (1.626 m) and weight is 127 lb (57.6 kg). Her temporal temperature is 97 °F (36.1 °C). Her blood pressure is 116/92 (abnormal) and her pulse is 80. Her respiration is 14 and oxygen saturation is 100%. Labs:   Admission on 01/05/2023   Component Date Value Ref Range Status    Hepatitis C Ab 01/06/2023 NONREACTIVE  NONREACTIVE Final    Comment:       The hepatitis C procedure used in our laboratory is a Chemiluminescent test specific for   three recombinant HCV antigens.   A negative anti-HCV result indicates that the antibodies to   hepatitis C virus are not present at this time. Individuals with reactive anti-HCV should be considered infected and infectious until proven   otherwise. Confirmation of all equivocal or reactive results is recommended by ordering   HCV RNA by PCR. Medications  Current Facility-Administered Medications: nicotine (NICODERM CQ) 14 MG/24HR 1 patch, 1 patch, TransDERmal, Daily  DULoxetine (CYMBALTA) extended release capsule 30 mg, 30 mg, Oral, Daily  mirtazapine (REMERON) tablet 7.5 mg, 7.5 mg, Oral, Nightly  hydroCHLOROthiazide (HYDRODIURIL) tablet 25 mg, 25 mg, Oral, Daily  acetaminophen (TYLENOL) tablet 650 mg, 650 mg, Oral, Q4H PRN  ibuprofen (ADVIL;MOTRIN) tablet 400 mg, 400 mg, Oral, Q6H PRN  polyethylene glycol (GLYCOLAX) packet 17 g, 17 g, Oral, Daily PRN  aluminum & magnesium hydroxide-simethicone (MAALOX) 200-200-20 MG/5ML suspension 30 mL, 30 mL, Oral, Q6H PRN  hydrOXYzine HCl (ATARAX) tablet 50 mg, 50 mg, Oral, TID PRN  traZODone (DESYREL) tablet 50 mg, 50 mg, Oral, Nightly PRN  haloperidol lactate (HALDOL) injection 5 mg, 5 mg, IntraMUSCular, Q6H PRN **AND** LORazepam (ATIVAN) injection 2 mg, 2 mg, IntraMUSCular, Q6H PRN **AND** diphenhydrAMINE (BENADRYL) injection 50 mg, 50 mg, IntraMUSCular, Q6H PRN  haloperidol (HALDOL) tablet 5 mg, 5 mg, Oral, Q6H PRN **AND** LORazepam (ATIVAN) tablet 2 mg, 2 mg, Oral, Q6H PRN    ASSESSMENT  Depression with suicidal ideation     PLAN  Patient s symptoms   show very modest signs of improvement but unstable for discharge  Increase Cymbalta to 40 mg daily  Increase Remeron to 15 mg by mouth at bedtime  Attempt to develop insight  Psycho-education conducted. Supportive Therapy conducted. Probable discharge is undetermined at this time  Follow-up daily while in the inpatient unit    Of the 30 minutes I spent with the patient, approximately 19 minutes was spent doing Supportive psychotherapy.    Electronically signed by Landon Betancur MD on 1/10/23 at 2:07 PM EST    **This report has been created using voice recognition software. It may contain minor errors which are inherent in voice recognition technology. **

## 2023-01-10 NOTE — BH NOTE
At this time patient had an heated phone call where she then got agitated and came up to writer and stated \" I need the shots im angry and that's the only thing that helps me calm down. \" Dmitriy Rodriguez then educated patient on the proper use of emergency medication and offered orals to be given first. Patient accepted oral PRNS and stated \" So I basically have to cause a scene and act a fool to get shots , now I know what to do next time I need them. \"

## 2023-01-10 NOTE — GROUP NOTE
Group Therapy Note    Date: 1/10/2023    Group Start Time: 1100  Group End Time: 1453  Group Topic: Cognitive Skills    [facility] CINDI Tapia    Cognitive Skills Group Note        Date: January 10, 2023 Start Time: 11am  End Time:  1140am      Number of Participants in Group & Unit Census:  9/21    Topic: cognitive skills     Goal of Group: pt will demonstrate improved cognitive skills and improve social skills       Comments:     Patient did not participate in Cognitive Skills group, despite staff encouragement and explanation of benefits. Patient remain seclusive to self. Q15 minute safety checks maintained for patient safety and will continue to encourage patient to attend unit programming.               Signature:  Jourdan Leach

## 2023-01-10 NOTE — GROUP NOTE
Group Therapy Note    Date: 1/10/2023    Group Start Time: 1330  Group End Time: 8096  Group Topic: Recreational    [facility]    CINDI Brantley    Recreation Group Note        Date: January 10, 2023 Start Time: 1:30pm  End Time:  215pm      Number of Participants in Group & Unit Census:  10/18    Topic:  recreation therapy group     Goal of Group: to improve decision making skills , improve coping using leisure      Comments:     Patient did not participate in Recreation group, despite staff encouragement and explanation of benefits. Patient remain seclusive to self. Q15 minute safety checks maintained for patient safety and will continue to encourage patient to attend unit programming.             Signature:  Phylicia Coronado

## 2023-01-11 PROCEDURE — 6370000000 HC RX 637 (ALT 250 FOR IP)

## 2023-01-11 PROCEDURE — 6370000000 HC RX 637 (ALT 250 FOR IP): Performed by: NURSE PRACTITIONER

## 2023-01-11 PROCEDURE — 6370000000 HC RX 637 (ALT 250 FOR IP): Performed by: PSYCHIATRY & NEUROLOGY

## 2023-01-11 PROCEDURE — 6370000000 HC RX 637 (ALT 250 FOR IP): Performed by: INTERNAL MEDICINE

## 2023-01-11 PROCEDURE — 1240000000 HC EMOTIONAL WELLNESS R&B

## 2023-01-11 RX ADMIN — HYDROCHLOROTHIAZIDE 25 MG: 25 TABLET ORAL at 08:35

## 2023-01-11 RX ADMIN — HYDROXYZINE HYDROCHLORIDE 50 MG: 50 TABLET, FILM COATED ORAL at 21:26

## 2023-01-11 RX ADMIN — HYDROXYZINE HYDROCHLORIDE 50 MG: 50 TABLET, FILM COATED ORAL at 15:15

## 2023-01-11 RX ADMIN — IBUPROFEN 400 MG: 400 TABLET ORAL at 19:31

## 2023-01-11 RX ADMIN — DULOXETINE HYDROCHLORIDE 40 MG: 20 CAPSULE, DELAYED RELEASE ORAL at 08:35

## 2023-01-11 RX ADMIN — MIRTAZAPINE 15 MG: 15 TABLET, FILM COATED ORAL at 20:51

## 2023-01-11 RX ADMIN — ACETAMINOPHEN 650 MG: 325 TABLET ORAL at 15:16

## 2023-01-11 RX ADMIN — TRAZODONE HYDROCHLORIDE 50 MG: 50 TABLET ORAL at 20:51

## 2023-01-11 ASSESSMENT — PAIN DESCRIPTION - LOCATION
LOCATION: BACK
LOCATION: BACK

## 2023-01-11 ASSESSMENT — PAIN SCALES - GENERAL
PAINLEVEL_OUTOF10: 4
PAINLEVEL_OUTOF10: 8

## 2023-01-11 ASSESSMENT — PAIN DESCRIPTION - DESCRIPTORS: DESCRIPTORS: ACHING

## 2023-01-11 ASSESSMENT — PAIN - FUNCTIONAL ASSESSMENT: PAIN_FUNCTIONAL_ASSESSMENT: 0-10

## 2023-01-11 ASSESSMENT — PAIN DESCRIPTION - ORIENTATION: ORIENTATION: UPPER

## 2023-01-11 NOTE — PLAN OF CARE
Problem: Self Harm/Suicidality  Goal: Will have no self-injury during hospital stay  Description: INTERVENTIONS:  1. Ensure constant observer at bedside with Q15M safety checks  2. Maintain a safe environment  3. Secure patient belongings  4. Ensure family/visitors adhere to safety recommendations  5. Ensure safety tray has been added to patient's diet order  6. Every shift and PRN: Re-assess suicidal risk via Frequent Screener    1/10/2023 2235 by Piero Reddy  Outcome: Progressing     Problem: Coping  Goal: Pt/Family able to verbalize concerns and demonstrate effective coping strategies  Description: INTERVENTIONS:  1. Assist patient/family to identify coping skills, available support systems and cultural and spiritual values  2. Provide emotional support, including active listening and acknowledgement of concerns of patient and caregivers  3. Reduce environmental stimuli, as able  4. Instruct patient/family in relaxation techniques, as appropriate  5. Assess for spiritual pain/suffering and initiate Spiritual Care, Psychosocial Clinical Specialist consults as needed  1/10/2023 2235 by Piero Reddy  Outcome: Progressing     Patient present in room. Patient is cooperative with vitals and interview. Patient is isolative to room. Patient denies current suicidal ideation but reports she was feeling suicidal last night. Patient disucssed grieving her late daughter and \"sometimes I just wish I could join her. \" Support and active listening provided to patient. Patient agrees to seek out staff should thoughts of self-harm start to arise. Safety maintained with 15-minute purposeful rounds and additional checks and monitoring as needed.

## 2023-01-11 NOTE — BH NOTE
Writer present talking to PT/OT in regards to another patients care. Patient became irate, verbally aggressive towards staff. When writer inquired about patients needs she states \" I don't want fucking shit from any of you all off you are some lazy mother fuckers, you black bitch your just like all theses other white people\". Patient then preceded to yell at other staff. Patient is loud, labile, and intrusive. Attempted to verbally redirect patient, patient was not accepting and continued to curse and yell. Writer then asked patient to walk away from nurses station and spend some time in room.

## 2023-01-11 NOTE — PLAN OF CARE
Problem: Self Harm/Suicidality  Goal: Will have no self-injury during hospital stay  Description: INTERVENTIONS:  1. Ensure constant observer at bedside with Q15M safety checks  2. Maintain a safe environment  3. Secure patient belongings  4. Ensure family/visitors adhere to safety recommendations  5. Ensure safety tray has been added to patient's diet order  6. Every shift and PRN: Re-assess suicidal risk via Frequent Screener    1/11/2023 0919 by Chen Raymundo LPN  Outcome: Progressing  Flowsheets (Taken 1/11/2023 0919)  Will have no self-injury during hospital stay: Maintain a safe environment  Note: Pt denies thoughts of self harm and is agreeable to seeking out should thoughts of self harm arise. Patient is brightened, social on the unit with peers. Admit to having some anxiety, and moments of hopelessness, and no motivation. Encouraged to attend programming, patient was receptive. Safe environment maintained. Every 15 minute checks for safety cont per unit policy. Will cont to monitor for safety and provides support and reassurance as needed. Problem: Coping  Goal: Pt/Family able to verbalize concerns and demonstrate effective coping strategies  Description: INTERVENTIONS:  1. Assist patient/family to identify coping skills, available support systems and cultural and spiritual values  2. Provide emotional support, including active listening and acknowledgement of concerns of patient and caregivers  3. Reduce environmental stimuli, as able  4. Instruct patient/family in relaxation techniques, as appropriate  5.  Assess for spiritual pain/suffering and initiate Spiritual Care, Psychosocial Clinical Specialist consults as needed  1/11/2023 0919 by Chen Raymundo LPN  Outcome: Progressing

## 2023-01-11 NOTE — PROGRESS NOTES
Daily Progress Note  Tiffany Strauss MD  1/11/2023  CHIEF COMPLAINT: Depression with suicidal ideation    Reviewed patient's current plan of care and vital signs with nursing staff. Sleep:  7 hours last night  Attending groups: Yes    SUBJECTIVE:    Patient denying side effects to increase in medication. She is reporting some improvement in her symptoms but again reiterates very clearly that she does not feel safe to go at present time. She does feel like things are moving in the right direction. She identified going to [facility] as a potential.  Subsequent to me meeting with the patient he did receive confirmation that the patient is excepted to [place].  We are tentatively going to [facility] Friday for disposition. Patient is stating that she feels things are moving in the right direction. We discussed observing on recently adjusted medication with the possibility of further increase which will be discussed tomorrow. Spent time in supportive psychotherapy discussing the death of her daughter. Daughter was killed when she got onto the highway going the wrong way in an automobile accident. Patient states that she does not know that she will ever stop wanting to be with her daughter. We discussed that the most important aspect related to safety is that desire of wanting to be with the daughter not translating to suicidal ideation with intent or plan. Patient states that she still feeling that way at present time, meaning she is struggling with suicidal ideation intent or plan. She did go to group today and reports that she is starting to \"come back alive\" a little bit. Patient has changed her mind about her leg again. Stating she does not want to cut or destroy her weight. This is consistent with her flip-flopping on many issues. 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     Mental Status Exam  Level of consciousness:  Within normal limits  Appearance: Hospital attire, seated in chair, with good grooming and hygiene   Behavior/Motor: No abnormalities noted  Attitude toward examiner:  Cooperative, attentive, good eye contact  Speech:  spontaneous, normal rate, normal volume and well articulated  Mood: Depressed  Affect: Some improvement in affect, more engaged and more relatable affect  Thought processes:  linear, goal directed and coherent  Thought content:  denies homicidal ideation  Suicidal Ideation: Endorses active suicidal ideation with inability to contract for safety outside of the hospital  Delusions:  no evidence of delusions  Perceptual Disturbance:  denies any perceptual disturbance  Cognition:  Oriented to self, location, time, and situation  Memory: age appropriate  Insight & Judgement: Improving   medication side effects:  denies        Data   height is 5' 4\" (1.626 m) and weight is 127 lb (57.6 kg). Her oral temperature is 98.1 °F (36.7 °C). Her blood pressure is 126/86 and her pulse is 92. Her respiration is 14 and oxygen saturation is 100%. Labs:   Admission on 01/05/2023   Component Date Value Ref Range Status    Hepatitis C Ab 01/06/2023 NONREACTIVE  NONREACTIVE Final    Comment:       The hepatitis C procedure used in our laboratory is a Chemiluminescent test specific for   three recombinant HCV antigens. A negative anti-HCV result indicates that the antibodies to   hepatitis C virus are not present at this time. Individuals with reactive anti-HCV should be considered infected and infectious until proven   otherwise. Confirmation of all equivocal or reactive results is recommended by ordering   HCV RNA by PCR.               Medications  Current Facility-Administered Medications: DULoxetine (CYMBALTA) extended release capsule 40 mg, 40 mg, Oral, Daily  mirtazapine (REMERON) tablet 15 mg, 15 mg, Oral, Nightly  nicotine (NICODERM CQ) 14 MG/24HR 1 patch, 1 patch, TransDERmal, Daily  hydroCHLOROthiazide (HYDRODIURIL) tablet 25 mg, 25 mg, Oral, Daily  acetaminophen (TYLENOL) tablet 650 mg, 650 mg, Oral, Q4H PRN  ibuprofen (ADVIL;MOTRIN) tablet 400 mg, 400 mg, Oral, Q6H PRN  polyethylene glycol (GLYCOLAX) packet 17 g, 17 g, Oral, Daily PRN  aluminum & magnesium hydroxide-simethicone (MAALOX) 200-200-20 MG/5ML suspension 30 mL, 30 mL, Oral, Q6H PRN  hydrOXYzine HCl (ATARAX) tablet 50 mg, 50 mg, Oral, TID PRN  traZODone (DESYREL) tablet 50 mg, 50 mg, Oral, Nightly PRN  haloperidol lactate (HALDOL) injection 5 mg, 5 mg, IntraMUSCular, Q6H PRN **AND** LORazepam (ATIVAN) injection 2 mg, 2 mg, IntraMUSCular, Q6H PRN **AND** diphenhydrAMINE (BENADRYL) injection 50 mg, 50 mg, IntraMUSCular, Q6H PRN  haloperidol (HALDOL) tablet 5 mg, 5 mg, Oral, Q6H PRN **AND** LORazepam (ATIVAN) tablet 2 mg, 2 mg, Oral, Q6H PRN    ASSESSMENT  Depression with suicidal ideation     PLAN  Patient s symptoms   show modest signs of improvement  Monitor on recently adjusted medication  Attempt to develop insight  Psycho-education conducted. Supportive Therapy conducted. Probable discharge is possibly by Friday to [facility] recovery  Follow-up daily while in the inpatient unit     spent over 30 minutes with the patient, of that approximately 20 minutes was spent in supportive psychotherapy      Electronically signed by Sam Velasco MD on 1/11/23 at 12:40 PM EST    **This report has been created using voice recognition software. It may contain minor errors which are inherent in voice recognition technology. **

## 2023-01-11 NOTE — PROGRESS NOTES
01/11/23 1419   Encounter Summary   Encounter Overview/Reason  Spiritual/Emotional Needs   Service Provided For: Patient   Referral/Consult From: Cedric   Last Encounter  01/11/23   Complexity of Encounter Moderate   Begin Time 0130   End Time  0200   Total Time Calculated 30 min   Spiritual/Emotional needs   Type Spiritual Support   Behavioral Health    Type  [facility]   Assessment/Intervention/Outcome   Assessment Calm   Intervention Active listening;Nurtured Hope;Prayer (assurance of)/[place];Read/Provided Scripture;Sustaining Presence/Ministry of presence   Outcome Receptive; Expressed Gratitude;Engaged in conversation

## 2023-01-11 NOTE — PROGRESS NOTES
Pharmacy Med Education Group Note    Date: 1/11/23  Start Time: 1430  End Time: 1500    Number Participants in Group:  11    Goal:  Patient will demonstrate an understanding of the medications intended purpose and possible adverse effects  Topic: [place] for Pharmacy Med Ed Group    Discipline Responsible:     OT  AT  [facility].  RT     X Other       Participation Level:     None  Minimal      X Active Listener    X Interactive    Monopolizing         Participation Quality:    X Appropriate  Inappropriate     X       Attentive        Intrusive          Sharing        Resistant          Supportive        Lethargic       Affective:     X Congruent  Incongruent  Blunted  Flat    Constricted  Anxious  Elated  Angry    Labile  Depressed  Other         Cognitive:    X Alert  Oriented PPTP     Concentration   X G  F  P   Attention Span   X G  F  P   Short-Term Memory   X G  F  P   Long-Term Memory  G  F  P   ProblemSolving/  Decision Making  G  F  P   Ability to Process  Information   X G  F  P      Contributing Factors             Delusional             Hallucinating             Flight of Ideas             Other:       Modes of Intervention:    X Education   X Support  Exploration    Clarifying  Problem Solving  Confrontation    Socialization  Limit Setting  Reality Testing    Activity  Movement  Media    Other:            Response to Learning:    X Able to verbalize current knowledge/experience    Able to verbalize/acknowledge new learning    Able to retain information    Capable of insight    Able to change behavior    Progressing to goal    Other:        Comments: patient required redirecting     Alexa Elizabeth, PharmD Student

## 2023-01-11 NOTE — GROUP NOTE
Group Therapy Note    Date: 1/11/2023    Group Start Time: 1100  Group End Time: 1130  Group Topic: Cognitive Skills    [facility] Alexander, CTRS        Group Therapy Note    Attendees:10/20       Patient's Goal:  to improve coping skills and improve concentration     Notes:  pt was intrusive and loud, disruptive at times    Status After Intervention:  unchanged    Participation Level:  Active Listener and Interactive    Participation Quality: monopolizing       Speech:  hyperverbal loud      Thought Process/Content: Logical      Affective Functioning: Congruent      Mood: euthymic      Level of consciousness:  Alert      Response to Learning: Able to verbalize current knowledge/experience and Capable of insight      Endings: None Reported    Modes of Intervention: Support, Socialization, and Activity      Discipline Responsible: Psychoeducational Specialist      Signature:  Olivia Whelan

## 2023-01-11 NOTE — GROUP NOTE
Group Therapy Note    Date: 1/11/2023    Group Start Time: 0900  Group End Time: 0930  Group Topic: Community Meeting    [facility]    Haris Francisco        Group Therapy Note    Attendees: 7/21     Community Meeting Group Note        Date: January 11, 2023 Start Time: 9am  End Time:  8456      Number of Participants in Group & Unit Census:  7/21      Topic: goal setting    Goal of Group: set short term goal for the day      Comments:     Patient did not participate in [employer] group, despite staff encouragement and explanation of benefits. Patient remain seclusive to self. Q15 minute safety checks maintained for patient safety and will continue to encourage patient to attend unit programming.

## 2023-01-12 VITALS
OXYGEN SATURATION: 100 % | HEIGHT: 64 IN | DIASTOLIC BLOOD PRESSURE: 102 MMHG | BODY MASS INDEX: 21.68 KG/M2 | HEART RATE: 83 BPM | RESPIRATION RATE: 14 BRPM | WEIGHT: 127 LBS | TEMPERATURE: 97.3 F | SYSTOLIC BLOOD PRESSURE: 138 MMHG

## 2023-01-12 PROCEDURE — 6370000000 HC RX 637 (ALT 250 FOR IP): Performed by: NURSE PRACTITIONER

## 2023-01-12 PROCEDURE — 6370000000 HC RX 637 (ALT 250 FOR IP): Performed by: PSYCHIATRY & NEUROLOGY

## 2023-01-12 PROCEDURE — 1240000000 HC EMOTIONAL WELLNESS R&B

## 2023-01-12 PROCEDURE — 6370000000 HC RX 637 (ALT 250 FOR IP)

## 2023-01-12 PROCEDURE — 6370000000 HC RX 637 (ALT 250 FOR IP): Performed by: INTERNAL MEDICINE

## 2023-01-12 RX ORDER — TRAZODONE HYDROCHLORIDE 50 MG/1
50 TABLET ORAL NIGHTLY PRN
Qty: 30 TABLET | Refills: 0 | Status: SHIPPED | OUTPATIENT
Start: 2023-01-12

## 2023-01-12 RX ORDER — DULOXETINE 40 MG/1
40 CAPSULE, DELAYED RELEASE ORAL DAILY
Qty: 30 CAPSULE | Refills: 0 | Status: SHIPPED | OUTPATIENT
Start: 2023-01-13

## 2023-01-12 RX ORDER — MIRTAZAPINE 15 MG/1
15 TABLET, FILM COATED ORAL NIGHTLY
Qty: 30 TABLET | Refills: 0 | Status: SHIPPED | OUTPATIENT
Start: 2023-01-12

## 2023-01-12 RX ADMIN — ACETAMINOPHEN 650 MG: 325 TABLET ORAL at 08:55

## 2023-01-12 RX ADMIN — ACETAMINOPHEN 650 MG: 325 TABLET ORAL at 18:39

## 2023-01-12 RX ADMIN — HYDROCHLOROTHIAZIDE 25 MG: 25 TABLET ORAL at 08:55

## 2023-01-12 RX ADMIN — MIRTAZAPINE 15 MG: 15 TABLET, FILM COATED ORAL at 20:43

## 2023-01-12 RX ADMIN — DULOXETINE HYDROCHLORIDE 40 MG: 20 CAPSULE, DELAYED RELEASE ORAL at 08:55

## 2023-01-12 RX ADMIN — HYDROXYZINE HYDROCHLORIDE 50 MG: 50 TABLET, FILM COATED ORAL at 15:56

## 2023-01-12 RX ADMIN — HYDROXYZINE HYDROCHLORIDE 50 MG: 50 TABLET, FILM COATED ORAL at 20:43

## 2023-01-12 RX ADMIN — TRAZODONE HYDROCHLORIDE 50 MG: 50 TABLET ORAL at 20:42

## 2023-01-12 RX ADMIN — HYDROXYZINE HYDROCHLORIDE 50 MG: 50 TABLET, FILM COATED ORAL at 03:27

## 2023-01-12 ASSESSMENT — PAIN SCALES - GENERAL
PAINLEVEL_OUTOF10: 0
PAINLEVEL_OUTOF10: 1
PAINLEVEL_OUTOF10: 4

## 2023-01-12 NOTE — GROUP NOTE
Group Therapy Note    Date: 1/12/2023    Group Start Time: 1330  Group End Time: 6281  Group Topic: Relaxtion Group     [facility]    Annnazario Parents, CTRS        Group Therapy Note    Attendees: 8/17       Patient's Goal:  pt will demonstrate improved relaxation techniques using art     Notes:   pt was pleasant and participated well     Status After Intervention:  Improved    Participation Level:  Active Listener and Interactive    Participation Quality: Appropriate, Sharing, and Supportive      Speech:  normal      Thought Process/Content: Logical      Affective Functioning: Congruent      Mood: euthymic      Level of consciousness:  Alert      Response to Learning: Able to verbalize current knowledge/experience, Capable of insight, and Progressing to goal      Endings: None Reported    Modes of Intervention: Support, Socialization, and Activity      Discipline Responsible: Psychoeducational Specialist      Signature:  Alisia Richey

## 2023-01-12 NOTE — BH NOTE
Patient  (Mela Kate) arguing with writer and cussing at Lydia Ruy requesting a grievance for another patient,  \"You're not doing your job. You're not helping him. \" Unbenounced to patient Lydia Ruy had assisted  the other patient out of bed to wheelchair and wheeled patient to the telephone and then went  in the med room to get patient  patient medication. \" Writer instructed, Mela Kate that the patient needs to advocated for themself. Sruthi flips writer off. Writer told patient, 'Please do not flip me off. \" Patient  tells nurse, \"Why don't you come out behind the desk to talk to me. \" Aleks Coronado responds to patient, \"do not make threats or security will be called. \" Patient's assigned RN is aware and observed situation and is reaching out to Dr Giuseppe Cooper.

## 2023-01-12 NOTE — CARE COORDINATION
Sw spoke with Joyce Dunn at [facility] who shared pt has been accepted and can arrive on 1/13/2023 before 2:00 pm. Pt needs to be cabbed [address] [place] [address]

## 2023-01-12 NOTE — GROUP NOTE
Group Therapy Note    Date: 1/12/2023    Group Start Time: 1100  Group End Time: 1130  Group Topic: Cognitive Skills    [facility]    CINDI Tubbs        Group Therapy Note    Attendees:14/20       Patient's Goal:  pt will demonstrate improved concentration and improved social skills     Notes:   pt was pleasant and participated well     Status After Intervention:  Improved    Participation Level:  Active Listener and Interactive    Participation Quality: intrusive       Speech:  loud      Thought Process/Content: flight of ideas      Affective Functioning: animated      Mood: monopolizing      Level of consciousness:  Alert      Response to Learning: Able to verbalize current knowledge/experience, Capable of insight, and Progressing to goal      Endings: None Reported    Modes of Intervention: Support, Socialization, and Activity      Discipline Responsible: Psychoeducational Specialist      Signature:  Lucie Saucedo

## 2023-01-12 NOTE — PROGRESS NOTES
Behavioral Services                                              Medicare Re-Certification    I certify that the inpatient psychiatric hospital services furnished since the previous certification/re-certification were, and continue to be, medically necessary for;    [x] (1) Treatment which could reasonably be expected to improve the patient's condition,    [x] (2) Or for diagnostic study. Estimated length of stay/service 1-3 days    Plan for post-hospital care aod rehab vs home with outpatient [facility] f.u    This patient continues to need, on a daily basis, active treatment furnished directly by or requiring the supervision of inpatient psychiatric personnel.     Electronically signed by Mary Kay Nguyễn MD on 1/12/2023 at 8:35 AM

## 2023-01-12 NOTE — PROGRESS NOTES
Daily Progress Note  1/12/2023    Patient Name: Marilee Gambino: Depression with suicidal ideation         SUBJECTIVE:      Patient is seen today for a follow up assessment. Patient has been compliant with scheduled medications at this time and has not required emergency medications in the past 24 hours. When approached interview patient is argumentative and dismissive. Patient states she wants to leave and denies depression and suicidal ideation. As interview progressed patient became pleasant and appears to be in control of her behaviors. Patient is redirectable however with effort. When this provider was leaving the unit patient was overheard yelling and cursing at staff, acutely changing behavior from calm and engaged. Patient appears to be intentionally yelling and being disruptive on unit. Patient was reapproached and it was made clear that these behaviors are not appropriate. Patient presents with complete insight into her behavior and verbalized awareness that behaviors are not appropriate. Patient again repeated that she does not need to be in the hospital, she is safe for discharge and reports ability to follow-up with continued treatment and denies plan or intention to harm herself. Discussion with social work regarding potential discharge tomorrow and it was found that patient is accepted into [place] [id number] for discharge tomorrow morning pending continued stability of depression and suicidal ideation. Patient denies medication side effects and reports they are helpful. Appetite:  [x] Adequate/Unchanged  [] Increased  [] Decreased      Sleep:       [x] Adequate/Unchanged  [] Fair  [] Poor      Group Attendance on Unit:   [x] Yes   [] Selectively    [] No    Medication Side Effects: denies         Mental Status Exam  Level of consciousness: Alert and awake. Appearance: Appropriate attire for setting, seated in chair, with good  grooming and hygiene. Behavior/Motor: Approachable, compliant with interview  Attitude toward examiner: Cooperative, attentive, good eye contact. Speech: normal rate and normal volume   Mood:  Patient reports \"perfect\". Affect: congruent  Thought processes: Linear, goal directed, and coherent. Thought content: Denies homicidal ideation. Suicidal Ideation: Denies suicidal ideations  Delusions: No evidence of delusions. Denies paranoia. Perceptual Disturbance: Patient does not appear to be responding to internal stimuli. Denies auditory hallucinations. Denies visual hallucinations. Cognition: Oriented to person, place, time and situation. Memory: Intact. Insight: Good . Judgement: Fair. Data   height is 5' 4\" (1.626 m) and weight is 127 lb (57.6 kg). Her temporal temperature is 97.3 °F (36.3 °C). Her blood pressure is 111/81 and her pulse is 80. Her respiration is 14 and oxygen saturation is 100%. Labs:   Admission on 01/05/2023   Component Date Value Ref Range Status    Hepatitis C Ab 01/06/2023 NONREACTIVE  NONREACTIVE Final    Comment:       The hepatitis C procedure used in our laboratory is a Chemiluminescent test specific for   three recombinant HCV antigens. A negative anti-HCV result indicates that the antibodies to   hepatitis C virus are not present at this time. Individuals with reactive anti-HCV should be considered infected and infectious until proven   otherwise. Confirmation of all equivocal or reactive results is recommended by ordering   HCV RNA by PCR. Reviewed patient's current plan of care and vital signs with nursing staff.     Labs reviewed: [x] Yes    Medications  Current Facility-Administered Medications: DULoxetine (CYMBALTA) extended release capsule 40 mg, 40 mg, Oral, Daily  mirtazapine (REMERON) tablet 15 mg, 15 mg, Oral, Nightly  nicotine (NICODERM CQ) 14 MG/24HR 1 patch, 1 patch, TransDERmal, Daily  hydroCHLOROthiazide (HYDRODIURIL) tablet 25 mg, 25 mg, Oral, Daily  acetaminophen (TYLENOL) tablet 650 mg, 650 mg, Oral, Q4H PRN  ibuprofen (ADVIL;MOTRIN) tablet 400 mg, 400 mg, Oral, Q6H PRN  polyethylene glycol (GLYCOLAX) packet 17 g, 17 g, Oral, Daily PRN  aluminum & magnesium hydroxide-simethicone (MAALOX) 200-200-20 MG/5ML suspension 30 mL, 30 mL, Oral, Q6H PRN  hydrOXYzine HCl (ATARAX) tablet 50 mg, 50 mg, Oral, TID PRN  traZODone (DESYREL) tablet 50 mg, 50 mg, Oral, Nightly PRN  haloperidol lactate (HALDOL) injection 5 mg, 5 mg, IntraMUSCular, Q6H PRN **AND** LORazepam (ATIVAN) injection 2 mg, 2 mg, IntraMUSCular, Q6H PRN **AND** diphenhydrAMINE (BENADRYL) injection 50 mg, 50 mg, IntraMUSCular, Q6H PRN  haloperidol (HALDOL) tablet 5 mg, 5 mg, Oral, Q6H PRN **AND** LORazepam (ATIVAN) tablet 2 mg, 2 mg, Oral, Q6H PRN    ASSESSMENT  Depression with suicidal ideation         PLAN  Patient symptoms: Showing stability  Continue current medication regimen. Monitor need and frequency of PRN medications. Encourage participation in groups and milieu. Attempt to develop insight. Psycho-education conducted. Supportive Therapy conducted. Probable discharge tomorrow. Follow-up daily while inpatient. Patient continues to be monitored in the inpatient psychiatric facility at [facility] for safety and stabilization. Patient continues to need, on a daily basis, active treatment furnished directly by or requiring the supervision of inpatient psychiatric personnel. Electronically signed by EFRAIN Mack CNP on 1/12/2023 at 5:56 PM    **This report has been created using voice recognition software. It may contain minor errors which are inherent in voice recognition technology. **

## 2023-01-12 NOTE — GROUP NOTE
Group Therapy Note    Attendees: 10/17       Patient's Goal:  To identify safe care plan    Notes:  Patient was loud , intrusive and hard to redirect.      Status After Intervention: not oimproved    Participation Level: Interactive    Participation Quality: inappropriate       Speech: loud      Thought Process/Content: Logical      Affective Functioning: Congruent      Mood:agitated, irritable, and sexually inappropriate       Level of consciousness:  Alert      Response to Learning: refused redirection    Endings: None Reported    Modes of Intervention: Education      Discipline Responsible: Licensed Practical Nurse      Signature:  Kai Wise LPN

## 2023-01-12 NOTE — PLAN OF CARE
Problem: Self Harm/Suicidality  Goal: Will have no self-injury during hospital stay  Description: INTERVENTIONS:  1. Ensure constant observer at bedside with Q15M safety checks  2. Maintain a safe environment  3. Secure patient belongings  4. Ensure family/visitors adhere to safety recommendations  5. Ensure safety tray has been added to patient's diet order  6. Every shift and PRN: Re-assess suicidal risk via Frequent Screener    1/11/2023 2028 by Glenn Escobar  Outcome: Progressing     Problem: Coping  Goal: Pt/Family able to verbalize concerns and demonstrate effective coping strategies  Description: INTERVENTIONS:  1. Assist patient/family to identify coping skills, available support systems and cultural and spiritual values  2. Provide emotional support, including active listening and acknowledgement of concerns of patient and caregivers  3. Reduce environmental stimuli, as able  4. Instruct patient/family in relaxation techniques, as appropriate  5. Assess for spiritual pain/suffering and initiate Spiritual Care, Psychosocial Clinical Specialist consults as needed  1/11/2023 2028 by Glenn Escobar  Outcome: Progressing     Patient present in dayroom. Patient is cooperative with vitals and interview. Patient is out in day room and social and pleasant with peers and staff. Patient denies all thoughts of self-harm, suicidal ideation, homicidal ideation, or hallucinations. Patient reports depression and anxiety are improving. Patient is much brighter today compared to yesterday. Support and active listening provided to patient. Patient agrees to seek out staff should thoughts of self-harm start to arise. Safety maintained with 15-minute purposeful rounds and additional checks and monitoring as needed.

## 2023-01-12 NOTE — GROUP NOTE
Group Therapy Note    Date: 2023    Group Start Time: 915  Group End Time: 930  Group Topic: Community Meeting    LAILA Whiteside RN        Group Therapy Note    Attendees: 10/19  Refusals:9         Patient's Goal:  ***    Notes:  ***    Status After Intervention:  {Status After Intervention:163360483}    Participation Level: {Participation Level:930951754}    Participation Quality: {[facility] PARTICIPATION QUALITY:054928874}      Speech:  {[facility] CD_SPEECH:32131}      Thought Process/Content: {Thought Process/Content:912183949}      Affective Functioning: {Affective Functionin}      Mood: {Mood:556022294}      Level of consciousness:  {Level of consciousness:792619088}      Response to Learnin Nicole Jamie BHI Responses to Learnin}      Endings: {[facility] Endings:04157}    Modes of Intervention: {[facility] BHI Modes of Intervention:324195304}      Discipline Responsible: {[facility] Multidisciplinary:581708276}      Signature:   Candi Whiteside RN

## 2023-01-12 NOTE — GROUP NOTE
Group Therapy Note    Date: 1/12/2023    Group Start Time: 0930  Group End Time: 1000  Group Topic: Daily Inventory Group    LAILA Vee LPN        Group Therapy Note    Attendees: 7/18       Patient's Goal:  to help peers as much as possible.     Notes:  Intrusive    Status After Intervention:  Unchanged    Participation Level: Interactive    Participation Quality: Intrusive      Speech:  loud/instrusive      Thought Process/Content: Delusional      Affective Functioning: Blunted      Mood: elevated      Level of consciousness:  Preoccupied      Response to Learning: Resistant      Endings: None Reported    Modes of Intervention: Support      Discipline Responsible: Licensed Practical Nurse      Signature:  Marcus Vee LPN

## 2023-01-12 NOTE — PLAN OF CARE
[address] [place] [address] Interdisciplinary Treatment Plan Note     Review Date & Time: 1/12/2023   1310    Admission Type:   Admission Type: Voluntary    Reason for admission:  Reason for Admission: suicidal thoughts to run into traffic      Estimated Discharge Date Update: to be determined by physician    PATIENT STRENGTHS:  Patient Strengths:   Patient Strengths and Limitations:Limitations: Difficult relationships / poor social skills, Difficulty problem solving/relies on others to help solve problems  Addictive Behavior:Addictive Behavior  In the Past 3 Months, Have You Felt or Has Someone Told You That You Have a Problem With  : None  Medical Problems:   Past Medical History:   Diagnosis Date    Fibromyalgia     Headache     migraines    Hyperlipidemia     Hypertension     Hypothyroidism     Lupus ([facility] [address]     Thyroid disease        Risk:  Fall Risk   Sidney Scale Sidney Scale Score: 22  BVC    Change in scores no.  Changes to plan of Care  no    Status EXAM:   Mental Status and Behavioral Exam  Normal: No  Level of Assistance: Independent/Self  Facial Expression: Brightened  Affect: Congruent  Level of Consciousness: Alert  Frequency of Checks: 4 times per hour, close  Mood:Normal: No  Mood: Elated  Motor Activity:Normal: No  Motor Activity: Increased  Eye Contact: Good  Observed Behavior: Friendly, Cooperative  Sexual Misconduct History: Current - no  Preception: [place] to person, [place] to time, [place] to place, [place] to situation  Attention:Normal: No  Attention: Distractible  Thought Processes: Flight of ideas  Thought Content:Normal: No  Thought Content: Preoccupations  Depression Symptoms: Change in energy level, Impaired concentration  Anxiety Symptoms: Generalized  Maria Teresa Symptoms: Flight of ideas, Labile, Increased energy  Hallucinations: None  Delusions: No  Delusions: Paranoid  Memory:Normal: Yes  Memory: Poor recent  Insight and Judgment: No  Insight and Judgment: Poor judgment, Poor insight    Daily Assessment Last Entry:   Daily Sleep (WDL): Exceptions to WDL (pt reports trouble sleeping due to racing thoughts)            Daily Nutrition (WDL): Within Defined Limits  Level of Assistance: Independent/Self    Patient Monitoring:  Frequency of Checks: 4 times per hour, close    Psychiatric Symptoms:   Depression Symptoms  Depression Symptoms: Change in energy level, Impaired concentration  Anxiety Symptoms  Anxiety Symptoms: Generalized  Maria Teresa Symptoms  Maria Teresa Symptoms: Flight of ideas, Labile, Increased energy          Suicide Risk CSSR-S:  1) Within the past month, have you wished you were dead or wished you could go to sleep and not wake up? : Yes  2) Have you actually had any thoughts of killing yourself? : Yes  3) Have you been thinking about how you might kill yourself? : Yes  5) Have you started to work out or worked out the details of how to kill yourself?  Do you intend to carry out this plan? : No  6) Have you ever done anything, started to do anything, or prepared to do anything to end your life?: No  Change in Result no Change in Plan of care no    EDUCATION:   Learner Progress Toward Treatment Goals: Reviewed group plan and strategies, Reviewed signs, symptoms and risk of self harm and violent behavior, and Reviewed goals and plan of care    Method: Small group    Outcome: Verbalized understanding    PATIENT GOALS: short term-pt refused to attend meeting            Long term-    PLAN/TREATMENT RECOMMENDATIONS UPDATE:   [address] Sh[address] Goodman, GOAL SETTING    SHORT-TERM GOALS UPDATE:  Time frame for Short-Term Goals: 1-2 WEEKS     LONG-TERM GOALS UPDATE:  Time frame for Long-Term Goals: 6 MONTHS  Members Present in Team Meeting: See Signature Sheet    Joseph Villatoro RN

## 2023-01-13 PROCEDURE — 6370000000 HC RX 637 (ALT 250 FOR IP): Performed by: INTERNAL MEDICINE

## 2023-01-13 PROCEDURE — 6370000000 HC RX 637 (ALT 250 FOR IP): Performed by: NURSE PRACTITIONER

## 2023-01-13 PROCEDURE — 6370000000 HC RX 637 (ALT 250 FOR IP): Performed by: PSYCHIATRY & NEUROLOGY

## 2023-01-13 RX ADMIN — DULOXETINE HYDROCHLORIDE 40 MG: 20 CAPSULE, DELAYED RELEASE ORAL at 08:51

## 2023-01-13 RX ADMIN — HYDROXYZINE HYDROCHLORIDE 50 MG: 50 TABLET, FILM COATED ORAL at 08:55

## 2023-01-13 RX ADMIN — HYDROCHLOROTHIAZIDE 25 MG: 25 TABLET ORAL at 08:51

## 2023-01-13 NOTE — PROGRESS NOTES
CLINICAL PHARMACY NOTE: MEDS TO BEDS    Total # of Prescriptions Filled: 3   The following medications were delivered to the patient:  Duloxetine HCL 40mg  Mirtazapine 15mg  Trazodone HCL 50mg    Additional Documentation:  Medication Picked Up in Pharmacy by Nursing Staff

## 2023-01-13 NOTE — PLAN OF CARE
Problem: Self Harm/Suicidality  Goal: Will have no self-injury during hospital stay  Description: INTERVENTIONS:      1/13/2023 0059 by Ned Jean, CARSON  Outcome: Progressing     Problem: Coping  Goal: Pt/Family able to verbalize concerns and demonstrate effective coping strategies  Description: INTERVENTIONS:    1/13/2023 0059 by Ned Jean, CRISTIANN  Outcome: Progressing   Patient denies suicidal ideas at this time. Patient denies homicidal ideas at this time. Patient denies depressive symptoms at this time. Patient has been out in day room watching tv and socializing with peers. Patient is free of self harm at this time. Patient agrees to seek out staff if thoughts to harm self arise. Staff will provide support and reassurance as needed. Safety checks maintained every 15 minutes.

## 2023-01-13 NOTE — BH NOTE
Patient given tobacco quitline number [phone] at this time, refusing to call at this time, states \" I just dont want to quit now\"- patient given information as to the dangers of long term tobacco use. Continue to reinforce the importance of tobacco cessation.

## 2023-01-13 NOTE — BH NOTE
[facility]  Discharge Note    Pt discharged with followings belongings:   Dental Appliances: None  Vision - Corrective Lenses: None  Hearing Aid: None  Jewelry: None  Body Piercings Removed: N/A  Clothing: Pants, Shirt, Undergarments  Other Valuables: Purse   Valuables sent home with patient or returned to patient. Patient educated on aftercare instructions: yes  Information faxed to [facility] by staff  at 12:01 PM .Patient verbalize understanding of AVS:  yes. Status EXAM upon discharge: alert and oriented, denied suicidal ideations  Mental Status and Behavioral Exam  Normal: No  Level of Assistance: Independent/Self  Facial Expression: Brightened  Affect: Appropriate  Level of Consciousness: Alert  Frequency of Checks: 4 times per hour, close  Mood:Normal: Yes  Mood: Labile  Motor Activity:Normal: Yes  Motor Activity: Increased  Eye Contact: Good  Observed Behavior: Cooperative  Sexual Misconduct History: Current - no  History of Sexually Inappropriate Behavior When Previously Hospitalized?: Yes(Comments)  Preception: [place] to person, [place] to time, [place] to place, [place] to situation  Attention:Normal: Yes  Attention: Distractible  Thought Processes: Flight of ideas  Thought Content:Normal: Yes  Thought Content: Preoccupations  Depression Symptoms: No problems reported or observed. Anxiety Symptoms: No problems reported or observed. Maria Teresa Symptoms: No problems reported or observed.   Hallucinations: None  Delusions: No  Delusions: Paranoid  Memory:Normal: Yes  Memory: Poor recent  Insight and Judgment: Yes  Insight and Judgment: Poor insight    Tobacco Screening:  Practical Counseling, on admission, jayme X, if applicable and completed (first 3 are required if patient doesn't refuse):            ( ) Recognizing danger situations (included triggers and roadblocks)                    ( ) Coping skills (new ways to manage stress,relaxation techniques, changing routine, distraction) ( ) Basic information about quitting (benefits of quitting, techniques in how to quit, available resources  ( ) Referral for counseling faxed to [facility]                                                                                                                   ( x) Patient refused counseling  (x ) Patient refused referral  ( x) Patient refused prescription upon discharge  ( ) Patient has not smoked in the last 30 days    Metabolic Screening:    No results found for: LABA1C    No results found for: CHOL  No results found for: TRIG  No results found for: HDL  No components found for: LDLCAL  No results found for: LABVLDL    Patient discharged to home. Patient picked up by friend, discharged with AVS medications and all valuables.      Brent Mendes, RN

## 2023-01-13 NOTE — DISCHARGE SUMMARY
Provider Discharge Summary     Patient ID:  Joe Hinojosa  [id number]  18 y.o.  1975    Admit date: 1/5/2023    Discharge date and time: 1/13/2023  11:33 AM     Admitting Physician: Sparkle Gaxiola MD     Discharge Physician: Alan Mendenhall MD    Admission Diagnoses: Depression with suicidal ideation [F32. Pinky Stallingsapp    Discharge Diagnoses:      Depression with suicidal ideation     Patient Active Problem List   Diagnosis Code    Essential hypertension I10    Smoking F17.200    Other chronic pain G89.29    Mass on back R22.2    Depression with suicidal ideation F32. A, R45.851    MDD (major depressive disorder), recurrent severe, without psychosis ([facility] [address] F33.2        Admission Condition: poor    Discharged Condition: stable    Indication for Admission: threat to self    History of Present Illnes (present tense wording is of findings from admission exam and are not necessarily indicative of current findings):   Joe Hinojosa is a 49 y.o. female who has a past medical history of Fibromyalgia, Headache, Hyperlipidemia, Hypertension, Hypothyroidism, Lupus,Thyroid disease, polysubstance abuse : Crack/cocaine and cannabis use disorder, and mental illness patient presented to the [facility] ED with suicidal ideation to run into traffic. Per ED documenation, \"SW met with pt for BHI assessment. Pt reports she is currently experiencing suicidal ideations, denies homicidal ideation. Pt reports \"I feel like jumping in front of some cars\", reporting \"it's always to jump in traffic, I would die instantly\". Pt reports she is struggling to cope with the loss of her daughter from November 2022. Pt reports after her daughter passed away, she was admitted to [facility] psychiatry from December 19-26 or 27, does not recall specific discharge date. Pt reports \"I feel worse now than I did when I was at [facility]". Pt reports past mental health diagnosis of bipolar disorder.  Pt reports she is prescribed mental health medications of buspirone, trazodone, \"and one that starts with a V, it's new\". Pt reports she is compliant with her medications. Pt reports she \"kind of\" has a history of suicidal ideation. Pt denies hallucinations/delusions. Pt reports substance use history of crack/cocaine, marijuana, and alcohol use. Pt reports most recently drank 1/2 of a beer during the day, as well as used crack and marijuana around 2-3pm. Pt reports she has been attending [facility] for substance use treatment, but has not been for a few weeks. Pt reports she currently lives with her mother on [facility] in [place], [place]. Pt reports it is a safe environment, but her relationship with her mother can be \"shakir\". Pt denies legal issues. \"     Per prior documentation patient has been admitted to [facility] for suicidal ideation about the death of her daughter in November 2022. Patient reports living with mother and states to have drug with grief of multiple family members last few years. Per prior documentation patient has 2 grandchildren who similar since death of her daughter. She has 2 other children, ages 23 and 21. Patient struggles with crack cocaine and marijuana abuse which she has received treatment and self recovery and left treatment a few weeks ago. It is noted the patient has had an encounter at [address] [place] [address] on 12/19/2022. Writer attempted to engage with patient multiple times to complete intake assessment however unsuccessful. Patient is seen in the milieu, loud, agitated and highly focused on her wig. She refused to talk to writer in regards to mental illness and subsequently resulted in admission. Nursing staff reports the patient is refusing to take off wig which is past shoulderlength. Writer attempted to engage with patient and discussed the policy and safety rist however, patient not willing to engage and continue to proceed making a phone call.   I was unsuccessful at placing intake assessment due to patient receiving emergency medication at 1357 due to agitation and disruptive behaviors. Per EMR documentation, \"Patient is agitated and disruptive as evidenced by patient slamming doors loudly multiple times and yelling inappropriately inside her room after her hair wig was successfully removed by staff. Staff and security attempted to find and relieve the distress by Talking to patient, Refocusing on new activity, Offering suggestions, and Administer PRN medications. Patient's behaviors continued to escalate out of control. Patient remained verbally aggressive cursing and swearing. Patient assisted to seclusion room by security and staff. Patient easily accepted PRN medications. Medication Administered as prescribed: (Ativan 2 mg, IM route. Haldol 5 mg IM Route, Benadryl 50 mg IM route at 1357 PM. Patient Tolerated medication administration. Will continue to monitor, offer support, and reassess\"     Will attempt to engage tomorrow. Unable to assess if substance abuse. Per prior documentation urine toxin positive for THC, and cocaine on 12/19/2022 per care everywhere. Blood alcohol level negative, urine tox screen positive for cannabis and cocaine on admission. PDMP reviewed, no activity noted. There is risk for decompensation thus patient warrants inpatient hospitalization due to instability of symptoms and safety concern. Hospital Course:   Upon admission, Bo Rome was provided a safe secure environment, introduced to unit milieu. Patient participated in groups and individual therapies. Meds were adjusted as noted below. After few days of hospital care, patient began to feel improvement. Depression lifted, thoughts to harm self ceased. Sleep improved, appetite was good. On morning rounds 1/13/2023, Bo Rome  endorses feeling ready for discharge. Patient denies suicidal or homicidal ideations, denies hallucinations or delusions. Denies SE's from meds.   It was decided that maximum benefit from hospital care had been achieved and patient can be discharged. Consults:   Internal medicine for medical management    Significant Diagnostic Studies: Routine labs and diagnostics    Treatments: Psychotropic medications, therapy with group, milieu, and 1:1 with nurses, social workers, PALARRY/CNP, and Attending physician. Discharge Medications:  Current Discharge Medication List        START taking these medications    Details   DULoxetine 40 MG CPEP Take 40 mg by mouth daily  Qty: 30 capsule, Refills: 0      mirtazapine (REMERON) 15 MG tablet Take 1 tablet by mouth nightly  Qty: 30 tablet, Refills: 0      traZODone (DESYREL) 50 MG tablet Take 1 tablet by mouth nightly as needed for Sleep  Qty: 30 tablet, Refills: 0           STOP taking these medications       vilazodone hcl 10 MG TABS Comments:   Reason for Stopping:         ibuprofen (ADVIL;MOTRIN) 600 MG tablet Comments:   Reason for Stopping:         acetaminophen (TYLENOL) 500 MG tablet Comments:   Reason for Stopping:         ibuprofen (ADVIL;MOTRIN) 800 MG tablet Comments:   Reason for Stopping:                Core Measures statement:   Not applicable    Discharge Exam:  Level of consciousness:  Within normal limits  Appearance: Street clothes, seated, with good grooming  Behavior/Motor: No abnormalities noted  Attitude toward examiner:  Cooperative, attentive, good eye contact  Speech:  spontaneous, normal rate, normal volume and well articulated  Mood:  euthymic  Affect:  Full range  Thought processes:  linear, goal directed and coherent  Thought content:  denies homicidal ideation  Suicidal Ideation:  denies suicidal ideation  Delusions:  no evidence of delusions  Perceptual Disturbance:  denies any perceptual disturbance  Cognition:  Intact  Memory: age appropriate  Insight & Judgement: fair  Medication side effects: denies     Disposition: home    Patient Instructions: Activity: activity as tolerated  1.  Patient instructed to take medications regularly and follow up with outpatient appointments. Follow-up as scheduled with outpatient [facility] mental health      Signed:    Electronically signed by Mary Kay Nguyễn MD on 1/13/23 at 11:33 AM EST    Time Spent on discharge is more than 30 minutes in the examination, evaluation, counseling and review of medications and discharge plan.

## 2023-01-13 NOTE — CARE COORDINATION
Name: Percy Veras    : 1975    Discharge Date: 2023    Primary Auth/Cert #: [id number]    Destination: home with friend    Discharge Medications:      Medication List        START taking these medications      DULoxetine HCl 40 MG Cpep  Take 40 mg by mouth daily  Notes to patient: Antidepressant     mirtazapine 15 MG tablet  Commonly known as: REMERON  Take 1 tablet by mouth nightly  Notes to patient: Antidepressant     traZODone 50 MG tablet  Commonly known as: DESYREL  Take 1 tablet by mouth nightly as needed for Sleep  Notes to patient: For sleep            STOP taking these medications      hydroCHLOROthiazide 25 MG tablet  Commonly known as: HYDRODIURIL     ibuprofen 600 MG tablet  Commonly known as: ADVIL;MOTRIN     ondansetron 4 MG disintegrating tablet  Commonly known as: Zofran ODT     vilazodone hcl 10 MG Tabs  Generic drug: vilazodone HCl               Where to Get Your Medications        These medications were sent to [address] [place] [address]      Phone: [phone]   DULoxetine HCl 40 MG Cpep  mirtazapine 15 MG tablet  traZODone 50 MG tablet         Follow Up Appointment: [address]\""  Follow up on 2023  Pt needs to arrive before 2:00 pm. All services are provided at [facility]

## 2023-01-13 NOTE — DISCHARGE INSTRUCTIONS
Information:  Medications:   Medication summary provided   I understand that I should take only the medications on my list.     -why and when I need to take each medicine.     -which side effects to watch for.     -that I should carry my medication information at all times in case of     Emergency situations. I will take all of my medicines to follow up appointments.     -check with my physician or pharmacist before taking any new    Medication, over the counter product or drink alcohol.    -Ask about food, drug or dietary supplement interactions.    -discard old lists and update records with medication providers. Notify Physician:  Notify physician if you notice:   Always call 911 if you feel your life is in danger  In case of an emergency call 911 immediately! If 911 is not available call your local emergency medical system for help    Behavioral Health Follow Up:  Original Referral Source:[facility]  Discharge Diagnosis: Depression with suicidal ideation [F32. A, R45.851]  Recommendations for Level of Care: Adherence to medications and follow up appointment, group and supportive therapy. Patient status at discharge: Alert and oriented x4. Patient denies suicidal ideations and thoughts to harm self or others. My hospital  was: [facility]  Aftercare plan faxed: [place]   -faxed by: staff   -date: 01/13/2023   -time: 0900  Prescriptions: Patient will leave with prescriptions upon discharge. Smoking: Quit Smoking. Call the [employer]'s smoking quitline at 4-300-53F-QUIT  Know the signs of a heart attack   If you have any of the following symptoms call 911 immediately, do not wait more    Than five minutes. 1. Pressure, fullness and/ or squeezing in the center of the chest spreading to    The jaw, neck or shoulder. 2. Chest discomfort with light headedness, fainting, sweating, nausea or    Shortness of breath. 3. Upper abdominal pressure or discomfort.    4. Lower chest pain, back pain, unusual fatigue, shortness of breath, nausea   Or dizziness.      General Information:   Questions regarding your bill: Call HELP program [phone]     Suicide Hotline ([place] [address])  (724) 998-8895      Recovery Help line- 921.977.9967      To obtain results of pending studies call Medical Records at: [phone]     For emergencies and 24 hour/7 days a week contact information:  [phone]

## 2023-01-31 ENCOUNTER — HOSPITAL ENCOUNTER (EMERGENCY)
Age: 48
Discharge: LEFT BEFORE DISCHARGE | End: 2023-01-31
Attending: EMERGENCY MEDICINE

## 2023-01-31 DIAGNOSIS — Z53.21 ELOPED FROM EMERGENCY DEPARTMENT: Primary | ICD-10-CM

## 2023-01-31 NOTE — ED PROVIDER NOTES
[facility] ED  Emergency Department Encounter  Emergency Medicine Resident     Pt Miriam Christina  MRN: [id number]  [place] 1975  Date of evaluation: 1/31/23  PCP:  No primary care provider on file.   Note Started: 1:29 PM EST    JHON Conrad DO  Resident  01/31/23 9159

## 2023-01-31 NOTE — ED PROVIDER NOTES
Patient left without being seen, I did not see examine or talk to this patient.      David Carbone MD  01/31/23 0852

## 2023-02-08 ENCOUNTER — HOSPITAL ENCOUNTER (EMERGENCY)
Age: 48
Discharge: ANOTHER HOSPITAL | DRG: 751 | End: 2023-02-09
Attending: EMERGENCY MEDICINE
Payer: MEDICAID

## 2023-02-08 DIAGNOSIS — R45.851 SUICIDAL IDEATION: Primary | ICD-10-CM

## 2023-02-08 PROCEDURE — 99285 EMERGENCY DEPT VISIT HI MDM: CPT

## 2023-02-09 ENCOUNTER — HOSPITAL ENCOUNTER (INPATIENT)
Age: 48
LOS: 5 days | Discharge: OTHER INSTITUTION | DRG: 751 | End: 2023-02-14
Attending: PSYCHIATRY & NEUROLOGY | Admitting: PSYCHIATRY & NEUROLOGY
Payer: MEDICAID

## 2023-02-09 VITALS
TEMPERATURE: 98.2 F | RESPIRATION RATE: 14 BRPM | SYSTOLIC BLOOD PRESSURE: 88 MMHG | HEART RATE: 83 BPM | DIASTOLIC BLOOD PRESSURE: 52 MMHG | OXYGEN SATURATION: 97 %

## 2023-02-09 PROBLEM — F12.10 CANNABIS ABUSE: Status: ACTIVE | Noted: 2023-02-09

## 2023-02-09 PROBLEM — F14.10 COCAINE USE DISORDER (HCC): Status: ACTIVE | Noted: 2023-02-09

## 2023-02-09 LAB
ABSOLUTE EOS #: 0.06 K/UL (ref 0–0.44)
ABSOLUTE IMMATURE GRANULOCYTE: <0.03 K/UL (ref 0–0.3)
ABSOLUTE LYMPH #: 1.61 K/UL (ref 1.1–3.7)
ABSOLUTE MONO #: 0.3 K/UL (ref 0.1–1.2)
ALBUMIN SERPL-MCNC: 4.1 G/DL (ref 3.5–5.2)
ALBUMIN/GLOBULIN RATIO: 1.4 (ref 1–2.5)
ALP SERPL-CCNC: 65 U/L (ref 35–104)
ALT SERPL-CCNC: 12 U/L (ref 5–33)
AMPHETAMINE SCREEN URINE: NEGATIVE
ANION GAP SERPL CALCULATED.3IONS-SCNC: 15 MMOL/L (ref 9–17)
AST SERPL-CCNC: 25 U/L
BARBITURATE SCREEN URINE: NEGATIVE
BASOPHILS # BLD: 1 % (ref 0–2)
BASOPHILS ABSOLUTE: <0.03 K/UL (ref 0–0.2)
BENZODIAZEPINE SCREEN, URINE: NEGATIVE
BILIRUB SERPL-MCNC: <0.1 MG/DL (ref 0.3–1.2)
BUN SERPL-MCNC: 16 MG/DL (ref 6–20)
CALCIUM SERPL-MCNC: 9.2 MG/DL (ref 8.6–10.4)
CANNABINOID SCREEN URINE: POSITIVE
CHLORIDE SERPL-SCNC: 101 MMOL/L (ref 98–107)
CO2 SERPL-SCNC: 21 MMOL/L (ref 20–31)
COCAINE METABOLITE, URINE: POSITIVE
CREAT SERPL-MCNC: 0.57 MG/DL (ref 0.5–0.9)
EOSINOPHILS RELATIVE PERCENT: 2 % (ref 1–4)
ETHANOL PERCENT: 0.09 %
ETHANOL: 85 MG/DL
FENTANYL URINE: POSITIVE
GFR SERPL CREATININE-BSD FRML MDRD: >60 ML/MIN/1.73M2
GLUCOSE SERPL-MCNC: 121 MG/DL (ref 70–99)
HCT VFR BLD AUTO: 36.7 % (ref 36.3–47.1)
HGB BLD-MCNC: 11.8 G/DL (ref 11.9–15.1)
IMMATURE GRANULOCYTES: 0 %
LYMPHOCYTES # BLD: 40 % (ref 24–43)
MCH RBC QN AUTO: 29.6 PG (ref 25.2–33.5)
MCHC RBC AUTO-ENTMCNC: 32.2 G/DL (ref 28.4–34.8)
MCV RBC AUTO: 92.2 FL (ref 82.6–102.9)
METHADONE SCREEN, URINE: NEGATIVE
MONOCYTES # BLD: 8 % (ref 3–12)
NRBC AUTOMATED: 0 PER 100 WBC
OPIATES, URINE: NEGATIVE
OXYCODONE SCREEN URINE: NEGATIVE
PDW BLD-RTO: 14.1 % (ref 11.8–14.4)
PHENCYCLIDINE, URINE: NEGATIVE
PLATELET # BLD AUTO: 192 K/UL (ref 138–453)
PMV BLD AUTO: 12.1 FL (ref 8.1–13.5)
POTASSIUM SERPL-SCNC: 4.5 MMOL/L (ref 3.7–5.3)
PROT SERPL-MCNC: 7.1 G/DL (ref 6.4–8.3)
RBC # BLD: 3.98 M/UL (ref 3.95–5.11)
SEG NEUTROPHILS: 49 % (ref 36–65)
SEGMENTED NEUTROPHILS ABSOLUTE COUNT: 2 K/UL (ref 1.5–8.1)
SODIUM SERPL-SCNC: 137 MMOL/L (ref 135–144)
TEST INFORMATION: ABNORMAL
WBC # BLD AUTO: 4 K/UL (ref 3.5–11.3)

## 2023-02-09 PROCEDURE — 80307 DRUG TEST PRSMV CHEM ANLYZR: CPT

## 2023-02-09 PROCEDURE — 80053 COMPREHEN METABOLIC PANEL: CPT

## 2023-02-09 PROCEDURE — 6370000000 HC RX 637 (ALT 250 FOR IP): Performed by: NURSE PRACTITIONER

## 2023-02-09 PROCEDURE — 99223 1ST HOSP IP/OBS HIGH 75: CPT | Performed by: INTERNAL MEDICINE

## 2023-02-09 PROCEDURE — 1240000000 HC EMOTIONAL WELLNESS R&B

## 2023-02-09 PROCEDURE — 85025 COMPLETE CBC W/AUTO DIFF WBC: CPT

## 2023-02-09 PROCEDURE — G0480 DRUG TEST DEF 1-7 CLASSES: HCPCS

## 2023-02-09 PROCEDURE — 6370000000 HC RX 637 (ALT 250 FOR IP): Performed by: PSYCHIATRY & NEUROLOGY

## 2023-02-09 RX ORDER — MAGNESIUM HYDROXIDE/ALUMINUM HYDROXICE/SIMETHICONE 120; 1200; 1200 MG/30ML; MG/30ML; MG/30ML
30 SUSPENSION ORAL EVERY 6 HOURS PRN
Status: DISCONTINUED | OUTPATIENT
Start: 2023-02-09 | End: 2023-02-14 | Stop reason: HOSPADM

## 2023-02-09 RX ORDER — GAUZE BANDAGE 2" X 2"
100 BANDAGE TOPICAL DAILY
Status: DISCONTINUED | OUTPATIENT
Start: 2023-02-09 | End: 2023-02-14 | Stop reason: HOSPADM

## 2023-02-09 RX ORDER — POLYETHYLENE GLYCOL 3350 17 G/17G
17 POWDER, FOR SOLUTION ORAL DAILY PRN
Status: DISCONTINUED | OUTPATIENT
Start: 2023-02-09 | End: 2023-02-14 | Stop reason: HOSPADM

## 2023-02-09 RX ORDER — DIPHENHYDRAMINE HYDROCHLORIDE 50 MG/ML
50 INJECTION INTRAMUSCULAR; INTRAVENOUS EVERY 4 HOURS PRN
Status: DISCONTINUED | OUTPATIENT
Start: 2023-02-09 | End: 2023-02-14 | Stop reason: HOSPADM

## 2023-02-09 RX ORDER — ACETAMINOPHEN 325 MG/1
650 TABLET ORAL EVERY 4 HOURS PRN
Status: DISCONTINUED | OUTPATIENT
Start: 2023-02-09 | End: 2023-02-14 | Stop reason: HOSPADM

## 2023-02-09 RX ORDER — TRAZODONE HYDROCHLORIDE 50 MG/1
50 TABLET ORAL NIGHTLY PRN
Status: DISCONTINUED | OUTPATIENT
Start: 2023-02-09 | End: 2023-02-14 | Stop reason: HOSPADM

## 2023-02-09 RX ORDER — NICOTINE 21 MG/24HR
1 PATCH, TRANSDERMAL 24 HOURS TRANSDERMAL EVERY 24 HOURS
Status: ON HOLD | COMMUNITY
End: 2023-02-14 | Stop reason: HOSPADM

## 2023-02-09 RX ORDER — HALOPERIDOL 5 MG/ML
5 INJECTION INTRAMUSCULAR EVERY 4 HOURS PRN
Status: DISCONTINUED | OUTPATIENT
Start: 2023-02-09 | End: 2023-02-14 | Stop reason: HOSPADM

## 2023-02-09 RX ORDER — LORAZEPAM 1 MG/1
1 TABLET ORAL ONCE
Status: COMPLETED | OUTPATIENT
Start: 2023-02-09 | End: 2023-02-09

## 2023-02-09 RX ORDER — HYDROXYZINE HYDROCHLORIDE 25 MG/1
25 TABLET, FILM COATED ORAL 2 TIMES DAILY PRN
Status: ON HOLD | COMMUNITY
End: 2023-02-14 | Stop reason: HOSPADM

## 2023-02-09 RX ORDER — MIRTAZAPINE 15 MG/1
7.5 TABLET, FILM COATED ORAL NIGHTLY
Status: DISCONTINUED | OUTPATIENT
Start: 2023-02-09 | End: 2023-02-13

## 2023-02-09 RX ORDER — FOLIC ACID 1 MG/1
1 TABLET ORAL DAILY
Status: DISCONTINUED | OUTPATIENT
Start: 2023-02-09 | End: 2023-02-14 | Stop reason: HOSPADM

## 2023-02-09 RX ORDER — LORAZEPAM 2 MG/ML
2 INJECTION INTRAMUSCULAR EVERY 4 HOURS PRN
Status: DISCONTINUED | OUTPATIENT
Start: 2023-02-09 | End: 2023-02-14 | Stop reason: HOSPADM

## 2023-02-09 RX ORDER — VILAZODONE HYDROCHLORIDE 10 MG/1
10 TABLET ORAL DAILY
Status: ON HOLD | COMMUNITY
End: 2023-02-14 | Stop reason: HOSPADM

## 2023-02-09 RX ORDER — HALOPERIDOL 5 MG/1
5 TABLET ORAL EVERY 4 HOURS PRN
Status: DISCONTINUED | OUTPATIENT
Start: 2023-02-09 | End: 2023-02-14 | Stop reason: HOSPADM

## 2023-02-09 RX ORDER — HYDROXYZINE 50 MG/1
50 TABLET, FILM COATED ORAL 3 TIMES DAILY PRN
Status: DISCONTINUED | OUTPATIENT
Start: 2023-02-09 | End: 2023-02-14 | Stop reason: HOSPADM

## 2023-02-09 RX ORDER — IBUPROFEN 400 MG/1
400 TABLET ORAL EVERY 6 HOURS PRN
Status: DISCONTINUED | OUTPATIENT
Start: 2023-02-09 | End: 2023-02-12

## 2023-02-09 RX ORDER — LORAZEPAM 1 MG/1
2 TABLET ORAL EVERY 4 HOURS PRN
Status: DISCONTINUED | OUTPATIENT
Start: 2023-02-09 | End: 2023-02-14 | Stop reason: HOSPADM

## 2023-02-09 RX ADMIN — FOLIC ACID 1 MG: 1 TABLET ORAL at 14:49

## 2023-02-09 RX ADMIN — HYDROXYZINE HYDROCHLORIDE 50 MG: 50 TABLET, FILM COATED ORAL at 21:14

## 2023-02-09 RX ADMIN — MIRTAZAPINE 7.5 MG: 15 TABLET, FILM COATED ORAL at 21:14

## 2023-02-09 RX ADMIN — THIAMINE HCL TAB 100 MG 100 MG: 100 TAB at 14:49

## 2023-02-09 RX ADMIN — LORAZEPAM 1 MG: 1 TABLET ORAL at 14:49

## 2023-02-09 ASSESSMENT — ENCOUNTER SYMPTOMS
VOMITING: 0
NAUSEA: 0
SHORTNESS OF BREATH: 0
ABDOMINAL PAIN: 0

## 2023-02-09 ASSESSMENT — SLEEP AND FATIGUE QUESTIONNAIRES
DO YOU USE A SLEEP AID: COMMENT
SLEEP PATTERN: DIFFICULTY FALLING ASLEEP;DISTURBED/INTERRUPTED SLEEP
DO YOU HAVE DIFFICULTY SLEEPING: YES
AVERAGE NUMBER OF SLEEP HOURS: 3

## 2023-02-09 ASSESSMENT — PATIENT HEALTH QUESTIONNAIRE - PHQ9: SUM OF ALL RESPONSES TO PHQ QUESTIONS 1-9: 13

## 2023-02-09 ASSESSMENT — PAIN SCALES - GENERAL: PAINLEVEL_OUTOF10: 0

## 2023-02-09 ASSESSMENT — LIFESTYLE VARIABLES
HOW MANY STANDARD DRINKS CONTAINING ALCOHOL DO YOU HAVE ON A TYPICAL DAY: 3 OR 4
HOW OFTEN DO YOU HAVE A DRINK CONTAINING ALCOHOL: 2-4 TIMES A MONTH

## 2023-02-09 NOTE — ED NOTES
Patient accepted to the [facility] under the care of Dr T. Vernell Mohs, [facility]  02/09/23 9187

## 2023-02-09 NOTE — ED NOTES
The following labs were labeled with appropriate pt sticker and tubed to lab:     [] Blue     [] Lavender   [] on ice  [] Green/yellow  [] Green/black [] on ice  [] Dannielle Playas  [] on ice  [] Yellow  [] Red  [] Type/ Screen  [] ABG  [] VBG    [] COVID-19 swab    [] Rapid  [] PCR  [] Flu swab  [] Peds Viral Panel     [x] Urine Sample  [] Fecal Sample  [] Pelvic Cultures  [] Blood Cultures  [] X 2  [] STREP Cultures         Denny Hood RN  02/09/23 0045

## 2023-02-09 NOTE — ED NOTES
The patient is a 49year old female that has a history of Major Depression. The patient came to the ED today due to suicidal thoughts. Patient reports that in November 2022 her adult daughter passed away. Patient reports that ever since then she has had persistent suicidal thoughts. Patient states that after visiting her grand children today, her suicidal thoughts intensified. Patient reports that she is very depressed and feels \"lost.\" Patient presents being very helpless. Patient obviously grieving over the loss of her daughter at this time. Patient admits using cocaine, cannabis and alcohol to help to mitigate her depressive feelings. Patient does report that she has thought about jumping into traffic as a possible suicide plan. Patient denied any attempts. Patient denied any psychosis or joe. Patient report that she does not feel safe being discharged into the community at this time. Patient was last admitted to the [facility] on 01.05.23 and states that she was at [facility] recently. Patient was last prescribed Remeron and Trazodone but is not compliant with her medications. Patient mentation is tearful but cooperative during the assessment.       Hermilo Barker [facility]  02/09/23 0106

## 2023-02-09 NOTE — GROUP NOTE
Group Therapy Note    Date: 2/9/2023    Group Start Time: 0900  Group End Time: 0920  Group Topic: Community Meeting    LAILA WILLIAM MELVIN    Michele Hannah        Group Therapy Note    Attendees: 4/21     Community Meeting Group Note        Date: February 9, 2023 Start Time: 9am  End Time:  2371      Number of Participants in Group & Unit Census:    4/21    Topic: goal setting    Goal of Group: set short term goal for the day      Comments:     Patient did not participate in [employer] group, despite staff encouragement and explanation of benefits. Patient remain seclusive to self. Q15 minute safety checks maintained for patient safety and will continue to encourage patient to attend unit programming.

## 2023-02-09 NOTE — ED NOTES
The following labs were labeled with appropriate pt sticker and tubed to lab:     [] Blue     [x] Lavender   [] on ice  [x] Green/yellow  [] Green/black [] on ice  [] Ashleigh Swann  [] on ice  [] Yellow  [] Red  [] Type/ Screen  [] ABG  [] VBG    [] COVID-19 swab    [] Rapid  [] PCR  [] Flu swab  [] Peds Viral Panel     [] Urine Sample  [] Fecal Sample  [] Pelvic Cultures  [] Blood Cultures  [] X 2  [] STREP Cultures         Tra Umana RN  02/09/23 0005

## 2023-02-09 NOTE — PLAN OF CARE
[facility]  Initial Interdisciplinary Treatment Plan NO      Original treatment plan Date & Time: 2/9/2023   1302    Admission Type:  Admission Type: Voluntary    Reason for admission:   Reason for Admission: Suicidal to run into traffic    Estimated Length of Stay:  5-7days  Estimated Discharge Date: to be determined by physician    PATIENT STRENGTHS:  Patient Strengths:   Patient Strengths and Limitations:   Addictive Behavior: Addictive Behavior  In the Past 3 Months, Have You Felt or Has Someone Told You That You Have a Problem With  : None  Medical Problems:  Past Medical History:   Diagnosis Date    Fibromyalgia     Headache     migraines    Hyperlipidemia     Hypertension     Hypothyroidism     Lupus ([facility] [address]     Thyroid disease      Status EXAM:Mental Status and Behavioral Exam  Normal: No  Level of Assistance: Independent/Self  Facial Expression: Flat  Affect: Blunt  Level of Consciousness: Alert  Frequency of Checks: 4 times per hour, close  Mood:Normal: No  Mood: Empty, Sad  Motor Activity:Normal: Yes  Eye Contact: Fair  Observed Behavior: Withdrawn  Sexual Misconduct History: Current - no  History of Sexually Inappropriate Behavior When Previously Hospitalized?: No  Preception: [place] to person, [place] to time, [place] to place  Attention:Normal: No  Attention: Distractible  Thought Processes: Blocking  Thought Content:Normal: No  Thought Content: Preoccupations  Depression Symptoms: Decreased libido  Anxiety Symptoms: Generalized  Maria Teresa Symptoms: No problems reported or observed.   Hallucinations: None  Delusions: No  Memory:Normal: No  Memory: Poor recent  Insight and Judgment: No  Insight and Judgment: Poor judgment    EDUCATION:   Learner Progress Toward Treatment Goals: reviewed group plans and strategies for care    Method:group therapy, medication compliance, individualized assessments and care planning    Outcome: needs reinforcement    PATIENT GOALS: short term-pt refused to attend meeting.             Long term-    PLAN/TREATMENT RECOMMENDATIONS:     continue group therapy , medications compliance, goal setting, individualized assessments and care, continue to monitor pt on unit      SHORT-TERM GOALS:   Time frame for Short-Term Goals: 5-7 days    LONG-TERM GOALS:  Time frame for Long-Term Goals: 6 months  Members Present in Team Meeting: See Signature Sheet    Clari Garcia RN

## 2023-02-09 NOTE — ED PROVIDER NOTES
[facility] ED  eMERGENCY dEPARTMENT eNCOUnter   Attending Attestation     Pt Name: Umm Dillard  MRN: [id number]  Heathergftoni 1975  Date of evaluation: 2/8/23       Umm Dillard is a 49 y.o. female who presents with Suicidal (Plan to jump into traffic, denies HI)      History: Patient presents with suicidal thoughts. Patient lost her daughter recently and went to visit the children of her daughter and they did not seem to understand that she was dead. This was something that was very upsetting to the patient and she drink alcohol after this. Patient said that she feels very alone and is concerned she would harm herself if she was alone. Patient says she feels safer here with people. Patient's been thinking about jumping into traffic. Exam: Heart rate and rhythm irregular. Lungs are clear to auscultation bilaterally. Abdomen is soft nontender. Patient is awake alert and oriented. Patient expresses suicidal ideation. Plan for evaluation by social work, I believe the patient represents to risk to herself however she is intoxicated. Plan to review and reassess when she is sober. I performed a history and physical examination of the patient and discussed management with the resident. I reviewed the residents note and agree with the documented findings and plan of care. Any areas of disagreement are noted on the chart. I was personally present for the key portions of any procedures. I have documented in the chart those procedures where I was not present during the key portions. I have personally reviewed all images and agree with the resident's interpretation. I have reviewed the emergency nurses triage note. I agree with the chief complaint, past medical history, past surgical history, allergies, medications, social and family history as documented unless otherwise noted below.  Documentation of the HPI, Physical Exam and Medical Decision Making performed by medical students or scribes is based on my personal performance of the HPI, PE and MDM. For Phys Assistant/ Nurse Practitioner cases/documentation I have had a face to face evaluation of this patient and have completed at least one if not all key elements of the E/M (history, physical exam, and MDM). Additional findings are as noted. For APC cases I have personally evaluated and examined the patient in conjunction with the APC and agree with the treatment plan and disposition of the patient as recorded by the APC.     Sofia Petersen MD  Attending Emergency  Physician       Kelly Springer MD  02/08/23 3273

## 2023-02-09 NOTE — ED NOTES
[] [place]    [] [address]    [x]  One [facility] ASSESSMENT      Y  N     [x] [] In the past two weeks have you had thoughts of hurting yourself in any way? [x] [] In the past two weeks have you had thoughts that you would be better off dead? [] [x] Have you made a suicide attempt in the past two months? [x] [] Do you have a plan for hurting yourself or suicide? [] [x] Presence of hallucinations/voices related to hurting himself or herself or someone else. SUICIDE/SECURITY WATCH PRECAUTION CHECKLIST     Orders    [x]  Suicide/Security Watch Precautions initiated as checked below:   2/9/23 12:50 AM EST 03/03    [x] Notified physician:  Easton Barroso DO  2/9/23 12:50 AM EST    [x] Orders obtained as appropriate:     [x] 1:1 Observer     [] Psych Consult     [] Psych Consult    Name:  Date:  Time:    [x] 1:1 Observer, Notified by:  Angela Monroy RN    Contact Nurse Supervisor    [x] Remove all personal clothes from room and place in snap/paper gown/pants. Slipper only    [x] Remove all personal belongings from room and secured away from patient. Documentation    [x] Initiate Suicide/Security Watch Precaution Flow Sheet    [x] Initiate individualized Care Plan/Problem    [x] Document why precautions initiated on flow sheet (Initiate Nursing Care Plan/Problem)    [x] 1:1 Observer in place; instructions provided. Suicide precautions require observer be within arms length. [x] Nurse-Observer Communication Hand-off initiated by RN, reviewed with Observer. Subsequently used as Hand Off between Observers. [x] Initiate every 15 minute observations per observer as delegated by the RN.     [x] Initiate RN assessment and documentation    Environmental Scan  Search Criteria and Process: OPTIONAL, see Search Policy    [] Reason for search:    [] Nursing in presence of second person to search patient    [] Patient notified of reason for body assessment and belongings search:     Persons present during search:   Results of search and disposition:       Searchers Name: Josh Rivas RN    These items or items similar should be removed from the room:   [x] Chairs   [x] Telephone   [x] Trash cans and liners   [x] Plastic utensils (order Patient Safety tray)   [x] Empty or remove Sharps containers   [x] All personal clothing/belongings removed   [x] All unnecessary lead wires, electrical cords, draw cords, etc.   [x] Flowers and plants   [x] Double check for lighters, matches, razors, any glass items etc that can be used as weapons. Person completing Checklist: Josh Rivas RN       GENERAL INFORMATION     Y  N     [x] [] Has the patient been informed that they are on a watch and what that means? [x] [] Can the patient get out of Bed without nursing assistance? [x] [] Can the patient use the restroom without nursing assistance? [] [x] Can the patient walk the halls to [place] their legs? \"   [] [x] Does the patient have metal utensils? [x] [] Have the patient's belongings been placed out of control of the patient? [] [x] Have the patient and his/her belongings been checked for contraband? [] [x] Is the patient under any visitor restrictions? If Yes, explain:   [] [x] Is the patient under an alias? [facility] Name:   Authorized visitors (no more than two are to be on the list)   Name/Relationship:   Name/Relationship:    Name of Staff member that you  Received this information from?: Josh Rivas RN    General Description:    Bailey Low [birth year] female 49 y.o. Admission weight:      Race: []  [x] Black  []   []   [] [place] [] Other  Facial Hair:  [] Yes  [x] No  If yes, please describe: Identifying Marks (i.e. Visible tattoos, scars, etc... ):     NURSING CARE PLAN    Nursing Diagnosis: Risk of Self Directed Harm  [] Actual  [x] Potential  Date Started: 2/9/23      Etiological Factors: (related to)  [x] Expressed or implied suicidal ideation/behavior  [x] Depression  [] Suicide attempt      [] Low self-esteem  [] Hallucinations      [x] Feeling of Hopelessness  [] Substance abuse or withdrawal    [] Dysfunctional family  [] Major traumatic event, eg., divorce, etc   [x] Excessive stress/anxiety    2/9/23    Expected Outcomes    Patient will:   [x] Patient will remain safe for the duration of their stay   [x] Patient's environment will be safe, eg. Free of potential suicide weapons   [] Verbalize Recovery from suicidal episode and improvement in self-worth   [x] Discuss feeling that precipitated suicide attempt/thoughts/behavior   [] Will describe available resources for crisis prevention and management   [] Will verbalize positive coping skills     Nursing Intervention   [x] Assessment and Observations hourly   [x] Suicide Precautions implemented with patient, should be 1:1 observation   [x] Document observation [id number] and RN assessment hourly   [] Consult physician for:    [] Psychiatric consult    [] Pharmacological therapy    [] Other:    [x] Patient search completed by security   [x] Initiated appropriate safety protocols by removing from the patient's environment anything that could be used to inflict self injury, eg. Order safe tray, snap gown, etc   [x] Maintain open, warm, caring, non-judgmental attitude/manner towards patient   [] Discuss advantages and disadvantages of existing coping methods/skills   [x] Assist and educate patient with identifying present strengths and coping skills   [x] Keep patient informed regarding plan of care and provide clear concise explanations. Provide the patient/family education information as well as telephone numbers and other information about crisis centers, hot lines, and counselors.     Discharge Planning:   [] Referral  [] Groups [] Health agencies  [] Other:     Dennis Moreno RN  02/09/23 1623

## 2023-02-09 NOTE — CARE COORDINATION
[facility] Biopsychosocial Assessment    Current Level of Psychosocial Functioning     Independent xx  Dependent    Minimal Assist     Comments:    Psychosocial High Risk Factors (check all that apply)    Unable to obtain meds   Chronic illness/pain    Substance abuse   Lack of Family Support   Financial stress   Isolation   Inadequate Community Resources  Suicide attempt(s)  Not taking medications   Victim of crime   Developmental Delay  Unable to manage personal needs    Age 72 or older   Homeless  No transportation   Readmission within 30 days xx  Unemployment  Traumatic Event    Comments:   Psychiatric Advanced Directives: pt denies     Family to Involve in Treatment: pt reports she has supportive family however that \"I don't want them involved with me\"     Sexual Orientation:  n/a    Patient Strengths: pt demonstrates motivation to link to Recovery programming, states supportive family     Patient Barriers: pt is not linked with outpatient provider, ongoing substance abuse issues       Opiate Education Provided:  pt reports history of cocaine, alcohol and marijuana abuse       CMHC/mental health history: pt has history of link to [facility], was discharged to [facility] recovery 1/13/2023    Plan of Care   medication management, group/individual therapies, family meetings, psycho -education, treatment team meetings to assist with stabilization    Initial Discharge Plan:  Pt given AOD resource folder, states she plans to link to recovery program at discharge. Clinical Summary:  Joi Felder is a 49year old single female who has been admitted to [address] [place] [address] with report of suicidal ideation. Pt was recently hospitalized at [facility] and discharged to [address] [place] [address] for AOD treatment, pt reports she was in care for two weeks, states \"someone pissed me off\" and left program without access to community mental health or recovery supports. Pt reports she wishes to pursue recovery resources at discharge.  Pt reports her family is supportive, states \"they are all Christians and they just want me to pray it away. \" Pt denies legal concerns. SW offered ongoing support.

## 2023-02-09 NOTE — ED NOTES
Bed in lowest position, locked, rails up x2. Constant observer maintained and at bedside. Patient resting in bed with call light in reach with no verbalized needs at this time.         Josemanuel Dao RN  02/09/23 2536 Physical Therapy Daily Treatment Note      Patient: Collin Sandoval   : 1962  Referring practitioner: Zane Moe MD  Date of Initial Visit: Type: THERAPY  Noted: 2023  Today's Date: 2023  Patient seen for 5 sessions           Visit Diagnoses:    ICD-10-CM ICD-9-CM   1. H/O total shoulder replacement, right  Z96.611 V43.61   2. Decreased right shoulder range of motion  M25.611 719.51   3. Weakness of right arm  R29.898 729.89       Subjective Evaluation    History of Present Illness    Subjective comment: 0/10 pain.  He is to keep the sling on until 23.           Objective   See Exercise, Manual, and Modality Logs for complete treatment.       Assessment & Plan     Assessment  Impairments: abnormal muscle firing, abnormal or restricted ROM, activity intolerance, impaired physical strength, lacks appropriate home exercise program, pain with function and safety issue  Functional Limitations: carrying objects, lifting, sleeping, pulling, pushing, uncomfortable because of pain, reaching behind back, reaching overhead and unable to perform repetitive tasks  Assessment details: Tim is progressing well within protocol limits.  Able to progress with PROM flexion today - he was at 155 degrees, no pain, but tightness started.  External rotation having tightness, but was able to achieve 34 degrees PROM.           Goals  Plan Goals: SHOULDER  PROBLEMS:     1. The patient has limited ROM of the right shoulder.    LTG 1: 12 weeks:  The patient will demonstrate 160 degrees of right shoulder flexion, 160 degrees of shoulder abduction, 60 degrees of shoulder external rotation, and 60 degrees of shoulder internal rotation to allow the patient to reach into upper kitchen cabinets and manipulate clothing behind the back with greater ease.    STATUS:  Progressing   STG 1a: 4 weeks:  The patient will demonstrate 120 degrees of right shoulder flexion, 120 degrees of shoulder abduction, 45 degrees of shoulder  external rotation, and 45 degrees of shoulder internal rotation.    STATUS:  Progressing   TREATMENT: Manual therapy, therapeutic exercise, home exercise instruction, and modalities as needed to include: electrical stimulation, ultrasound, moist heat, and ice.    2. The patient has limited strength of the right shoulder.   LTG 2: 12 weeks:  The patient will demonstrate 4 /5 strength for right shoulder flexion, abduction, external rotation, and internal rotation in order to demonstrate improved shoulder stability.    STATUS:  Progressing   STG 2a: 4 weeks:  The patient will demonstrate 4- /5 strength for right shoulder flexion, abduction, external rotation, and internal rotation.    STATUS:  Progressing   STG2b:  4 weeks:  The patient will be independent with home exercises.     STATUS:  Progressing   TREATMENT: Manual therapy, therapeutic exercise, home exercise instruction, and modalities as needed to include: electrical stimulation, ultrasound, moist heat, and ice.     3. The patient complains of pain to the right shoulder.   LTG 3: 12 weeks:  The patient will report a pain rating of 1 /10 or better in order to improve sleep quality and tolerance to performance of activities of daily living.    STATUS:  Progressing   STG 3a: 4 weeks:  The patient will report a pain rating of 3 /10 or better.     STATUS:  Progressing   TREATMENT: Manual therapy, therapeutic exercise, home exercise instruction, and modalities as needed to include: electrical stimulation, ultrasound, moist heat, and ice.    4. Carrying, Moving, and Handling Objects Functional Limitation     LTG 4: 12 weeks:  The patient will demonstrate 20 % limitation by achieving a score of  on the QuickDASH.    STATUS:  Progressing   STG 4a: 4 weeks:  The patient will demonstrate 40 % limitation by achieving a score of on the QuickDASH.      STATUS:  Progressing   TREATMENT:  Manual therapy, therapeutic exercise, home exercise instruction, and modalities as needed  to include: moist heat, electrical stimulation, and ultrasound.      Plan  Therapy options: will be seen for skilled therapy services  Planned modality interventions: cryotherapy, dry needling, electrical stimulation/Russian stimulation, TENS, thermotherapy (hydrocollator packs) and ultrasound  Planned therapy interventions: manual therapy, flexibility, functional ROM exercises, home exercise program, therapeutic activities, stretching, strengthening, soft tissue mobilization, postural training, neuromuscular re-education, body mechanics training, joint mobilization, ADL retraining and fine motor coordination training  Frequency: 2x week  Duration in weeks: 12  Treatment plan discussed with: patient  Plan details: We are going to do 1x week for the next few weeks, high copay.    Continue with protocol restrictions. Week 4 next week, add in AAROM.             Progress per Plan of Care and Progress strengthening /stabilization /functional activity           Timed:  Manual Therapy:    18     mins  65224;  Therapeutic Exercise:    8     mins  31605;     Neuromuscular Chris:        mins  70739;    Therapeutic Activity:          mins  35821;     Gait Training:           mins  30002;     Ultrasound:          mins  08130;    Canalith Repos           ___  mins  98647      Untimed:  Electrical Stimulation:         mins  08078 ( );  Mechanical Traction:         mins  85102;   Dry Needling:                     mins self pay       Timed Treatment:   26   mins   Total Treatment:     26   mins      Janeth Irizarry PT, DPT  [place] License #: [id number]

## 2023-02-09 NOTE — GROUP NOTE
Group Therapy Note    Date: 2/9/2023    Group Start Time: 1430  Group End Time: 7529  Group Topic: Healthy Living/Wellness    LAILA Jones Folks        Group Therapy Note    Attendees: 4/19     Health/Wellness Group Note        Date: February 9, 2023 Start Time: 2:30pm  End Time:  5762      Number of Participants in Group & Unit Census:  4/19    Topic: coping skills    Goal of Group: identify positive coping skills      Comments:     Patient did not participate in Health/Wellness group, despite staff encouragement and explanation of benefits. Patient remain seclusive to self. Q15 minute safety checks maintained for patient safety and will continue to encourage patient to attend unit programming.

## 2023-02-09 NOTE — GROUP NOTE
Group Therapy Note    Date: 2/9/2023    Group Start Time: 1100  Group End Time: 1150  Group Topic: Music Therapy    LAILA Soto    Music Therapy Group Note        Date: 2/9/2023 Start Time: 1100  End Time: 1150      Number of Participants in Silvana Cowart 5747 Unit Census:  4/20    Topic: Patients shared music and dedicated songs to important people in their lives. Answered questions about these people and relationships as asked by this writer. Goal of Group: Patient goals to increase self-expression; Increase sense of community; Increase socialization; Normalization of the environment;      Comments:     Patient did not participate in Music Therapy group, despite staff encouragement and explanation of benefits. Patient remain seclusive to self. Q15 minute safety checks maintained for patient safety and will continue to encourage patient to attend unit programming.

## 2023-02-09 NOTE — PLAN OF CARE
Problem: Behavior  Goal: Pt/Family maintain appropriate behavior and adhere to behavioral management agreement, if implemented  Description: INTERVENTIONS:  1. Assess patient/family's coping skills and  non-compliant behavior (including use of illegal substances)  2. Notify security of behavior or suspected illegal substances which indicate the need for search of the family and/or belongings  3. Encourage verbalization of thoughts and concerns in a socially appropriate manner  4. Utilize positive, consistent limit setting strategies supporting safety of patient, staff and others  5. Encourage participation in the decision making process about the behavioral management agreement  6. If a visitor's behavior poses a threat to safety call refer to organization policy. 7. Initiate consult with , Psychosocial CNS, Spiritual Care as appropriate  Outcome: Progressing  Flowsheets (Taken 2/9/2023 1011)  Patient/family maintains appropriate behavior and adheres to behavioral management agreement, if implemented: Encourage verbalization of thoughts and concerns in a socially appropriate manner  Note: Patient is isolative to room, guarded, and evasive. Instructed and encouraged to participate in programming and communicate thoughts and concerns in regards to treatment. No violent or negative behaviors noted at this time. Will cont to provide safe, calm, environment and use verbal de-escalation techniques when needed. Support and reassurance given as needed.

## 2023-02-09 NOTE — ED NOTES
Bed in lowest position, locked, rails up x2. Constant observer maintained and at bedside. Patient resting in bed with call light in reach with no verbalized needs at this time.         Wm Hinton RN  02/09/23 3420

## 2023-02-09 NOTE — GROUP NOTE
Group Therapy Note    Date: 2/9/2023    Group Start Time: 1000  Group End Time: 1030  Group Topic: Cognitive Skills    [facility]    CINDI Quispe    Cognitive Skills Group Note        Date: February 9, 2023 Start Time: 10am  End Time: 1030am      Number of Participants in Group & Unit Census:  3/20    Topic: cognitive skills     Goal of Group:pt will demonstrate improved concentration and improved social skills       Comments:     Patient did not participate in Cognitive Skills group, despite staff encouragement and explanation of benefits. Patient remain seclusive to self. Q15 minute safety checks maintained for patient safety and will continue to encourage patient to attend unit programming.               Signature:  John Velarde

## 2023-02-09 NOTE — GROUP NOTE
Group Therapy Note    Date: 2/9/2023    Group Start Time: 1330  Group End Time: 6107  Group Topic:     CINDI Hodge        Group Therapy Note    Attendees:5/19    Patient's Goal:  pt will demonstrate improved decision making skills and improved social skills     Notes:   pt was pleasant and participated well     Status After Intervention:  Improved    Participation Level:  Active Listener and Interactive    Participation Quality: Appropriate, Sharing, and Supportive      Speech:  normal      Thought Process/Content: Logical      Affective Functioning: restricted      Mood depressed       Level of consciousness:  Alert      Response to Learning: Able to verbalize current knowledge/experience and Progressing to goal      Endings: None Reported    Modes of Intervention: Support, Socialization, and Activity      Discipline Responsible: Psychoeducational Specialist      Signature:  Casper Barroso

## 2023-02-09 NOTE — ED NOTES
Bed in lowest position, locked, rails up x2. Constant observer maintained and at bedside. Patient resting in bed with call light in reach with no verbalized needs at this time.         Fiordaliza Mitchell RN  02/09/23 3319

## 2023-02-09 NOTE — ED PROVIDER NOTES
[address]  ED  Emergency Department Encounter  Emergency Medicine Resident     Pt Geovanna Lewis  MRN: [id number]  [place] 1975  Date of evaluation: 23  PCP:  No primary care provider on file. CHIEF COMPLAINT       Chief Complaint   Patient presents with    Suicidal     Plan to jump into traffic, denies HI       HISTORY OF PRESENT ILLNESS  (Location/Symptom, Timing/Onset, Context/Setting, Quality, Duration, Modifying Factors, Severity.)      Mickey Gibbs is a 49 y.o. female who presents with suicidal ideation with plan to jump into traffic. Her daughter  in November and she has been struggling with suicidal ideation and depression ever since. Recent [facility] admission, but has been noncompliant with medications. Today she saw her daughter's children for the first time since her daughter's death, which triggered her depression and suicidality. She says that she tried to numb the pain with alcohol, crack, and marijuana. Patient says that she has been feeling extremely loss and helpless and wants to die. Plan would be to walk in front of traffic. She feels that if she were discharged she would try to harm herself. PAST MEDICAL / SURGICAL / SOCIAL / FAMILY HISTORY      has a past medical history of Fibromyalgia, Headache, Hyperlipidemia, Hypertension, Hypothyroidism, Lupus ([address] and Thyroid disease. has a past surgical history that includes  section; Foot surgery; and Hysterectomy.       Social History     Socioeconomic History    Marital status: Single     Spouse name: Not on file    Number of children: Not on file    Years of education: Not on file    Highest education level: Not on file   Occupational History    Not on file   Tobacco Use    Smoking status: Every Day     Packs/day: 0.50     Years: 23.00     Pack years: 11.50     Types: Cigarettes    Smokeless tobacco: Never   Substance and Sexual Activity    Alcohol use: No    Drug use: Yes     Types: Marijuana (Weed)     Comment: Deysi-puffs    Sexual activity: Not on file   Other Topics Concern    Not on file   Social History Narrative    Not on file     Social Determinants of Health     Financial Resource Strain: Not on file   Food Insecurity: Not on file   Transportation Needs: Not on file   Physical Activity: Not on file   Stress: Not on file   Social Connections: Not on file   Intimate Partner Violence: Not on file   Housing Stability: Not on file       Family History   Problem Relation Age of Onset    High Blood Pressure Mother     Heart Disease Father     Stroke Maternal Grandmother     High Blood Pressure Maternal Grandfather     Heart Disease Paternal Grandmother     Heart Disease Paternal Grandfather        Allergies:  Bactrim [sulfamethoxazole-trimethoprim]    Home Medications:  Prior to Admission medications    Medication Sig Start Date End Date Taking? Authorizing Provider   DULoxetine 40 MG CPEP Take 40 mg by mouth daily 1/13/23   Jarad Cancilla, APRN - CNP   mirtazapine (REMERON) 15 MG tablet Take 1 tablet by mouth nightly 1/12/23   Jarad Cancilla, APRN - CNP   traZODone (DESYREL) 50 MG tablet Take 1 tablet by mouth nightly as needed for Sleep 1/12/23   Jarad Cancilla, APRN - CNP       REVIEW OF SYSTEMS       Review of Systems   Respiratory:  Negative for shortness of breath. Cardiovascular:  Negative for chest pain. Gastrointestinal:  Negative for abdominal pain, nausea and vomiting. Neurological:  Negative for dizziness and headaches. Psychiatric/Behavioral:  Positive for decreased concentration, dysphoric mood, sleep disturbance and suicidal ideas. Negative for hallucinations and self-injury. PHYSICAL EXAM      INITIAL VITALS:   BP 98/61   Pulse 80   Temp 97.2 °F (36.2 °C)   Resp 20   SpO2 98%     Physical Exam  Vitals and nursing note reviewed. Constitutional:       Appearance: Normal appearance.       Comments: Tearful   HENT:      Nose: Nose normal.      Mouth/Throat: Mouth: Mucous membranes are moist.   Eyes:      Extraocular Movements: Extraocular movements intact. Pupils: Pupils are equal, round, and reactive to light. Cardiovascular:      Rate and Rhythm: Normal rate. Pulses: Normal pulses. Pulmonary:      Effort: Pulmonary effort is normal.   Musculoskeletal:         General: Normal range of motion. Cervical back: Normal range of motion. Skin:     General: Skin is warm. Capillary Refill: Capillary refill takes less than 2 seconds. Neurological:      General: No focal deficit present. Mental Status: She is alert and oriented to person, place, and time. Psychiatric:         Attention and Perception: Attention normal.         Mood and Affect: Mood is depressed. Affect is tearful. Speech: Speech normal.         Behavior: Behavior normal.         Thought Content: Thought content includes suicidal ideation. Thought content does not include homicidal ideation. Thought content includes suicidal plan. Thought content does not include homicidal plan. DDX/DIAGNOSTIC RESULTS / EMERGENCY DEPARTMENT COURSE / MDM     Medical Decision Making  71-year-old female with suicidal ideation and plan after the death of her daughter a few months ago. Recent admission to [facility] but was noncompliant with medications upon discharge. Admits to some substance use today to numb the pain. No medical complaints. Vital signs reviewed and within normal limits. Physical examination as above. Plan to obtain medical screening labs and coordinate with social work for inpatient psychiatric admission. Amount and/or Complexity of Data Reviewed  External Data Reviewed: notes. Details: [facility] admission notes  Labs: ordered. Risk  Decision regarding hospitalization.                 EMERGENCY DEPARTMENT COURSE:      ED Course as of 02/09/23 0240   u Feb 09, 2023   0055 Patient is medically cleared for admission to inpatient psychiatric unit [JT]      ED Course User Index  [JT] Wendie Rendon MD       PROCEDURES:      CONSULTS:  None    CRITICAL CARE:  There was significant risk of life threatening deterioration of patient's condition requiring my direct management. Critical care time 0 minutes, excluding any documented procedures. FINAL IMPRESSION      1. Suicidal ideation          DISPOSITION / PLAN     DISPOSITION Decision To Transfer 02/09/2023 01:20:46 AM      PATIENT REFERRED TO:  No follow-up provider specified.     DISCHARGE MEDICATIONS:  New Prescriptions    No medications on file       Wendie Rendon MD  Emergency Medicine Resident    (Please note that portions of thisnote were completed with a voice recognition program.  Efforts were made to edit the dictations but occasionally words are mis-transcribed.)       Wendie Rendon MD  Resident  02/09/23 2501

## 2023-02-09 NOTE — BH NOTE
Patient given tobacco quitline number 62946693445 at this time, refusing to call at this time, states \" I just dont want to quit now\"- patient given information as to the dangers of long term tobacco use. Continue to reinforce the importance of tobacco cessation.

## 2023-02-09 NOTE — ED PROVIDER NOTES
Faculty Sign-Out Attestation  Handoff taken on the following patient from prior Attending Physician: Carlos     I was available and discussed any additional care issues that arose and coordinated the management plans with the resident(s) caring for the patient during my duty period. Any areas of disagreement with residents documentation of care or procedures are noted on the chart. I was personally present for the key portions of any/all procedures during my duty period. I have documented in the chart those procedures where I was not present during the key portions.     Intermittent suicidal thought, intoxication, will allow pt to sober up,   Re eval by social work,     Marily Andersen,   Attending Physician       Marily Andersen, DO  02/09/23 0003    Accepted to [facility], DO  02/09/23 1103

## 2023-02-09 NOTE — H&P
[facility] Internal Medicine    HISTORY AND PHYSICAL EXAMINATION/ CONSULT NOTE            Date:   2023  Patient name:  Abby Haddad  Date of admission:  2023  3:59 AM  MRN:   [id number]  Account:  [de-identified]  YOB: 1975  PCP:    No primary care provider on file. Room:   [address] [place] [address]  Code Status:    Full Code    Physician Requesting Consult: Juancarlos Kong, *    Reason for Consult: History and physical, medical management    Chief Complaint:     No chief complaint on file. Medical management    History Obtained From:     Patient, EMR, nursing staff    History of Present Illness:     50-year-old female admitted with depression with suicidal ideation    Medical history significant for hypothyroid, lupus, hypertension, hyperlipidemia, fibromyalgia    Patient is not currently on medication for thyroid or hypertension      Past Medical History:     Past Medical History:   Diagnosis Date    Fibromyalgia     Headache     migraines    Hyperlipidemia     Hypertension     Hypothyroidism     Lupus ([address]     Thyroid disease         Past Surgical History:     Past Surgical History:   Procedure Laterality Date     SECTION      FOOT SURGERY      right    HYSTERECTOMY (CERVIX STATUS UNKNOWN)          Medications Prior to Admission:     Prior to Admission medications    Medication Sig Start Date End Date Taking?  Authorizing Provider   hydrOXYzine HCl (ATARAX) 25 MG tablet Take 25 mg by mouth 2 times daily as needed for Anxiety   Yes Historical Provider, MD   vilazodone hcl 10 MG TABS Take 10 mg by mouth daily   Yes Historical Provider, MD   nicotine (NICODERM CQ) 21 MG/24HR Place 1 patch onto the skin every 24 hours   Yes Historical Provider, MD   DULoxetine 40 MG CPEP Take 40 mg by mouth daily 23   Jarad CanEFRAIN olvera - CNP   mirtazapine (REMERON) 15 MG tablet Take 1 tablet by mouth nightly 23   Jarad CanEFRAIN olvera - JESSA   traZODone (DESYREL) 50 MG tablet Take 1 tablet by mouth nightly as needed for Sleep 23   Jarad Lon, APRN - CNP        Allergies:     Bactrim [sulfamethoxazole-trimethoprim]    Social History:     Tobacco:    reports that she has been smoking cigarettes. She has a 11.50 pack-year smoking history. She has never used smokeless tobacco.  Alcohol:      reports no history of alcohol use. Drug Use:  reports current drug use. Drug: Marijuana Berneta Nicola). Family History:     Family History   Problem Relation Age of Onset    High Blood Pressure Mother     Heart Disease Father     Stroke Maternal Grandmother     High Blood Pressure Maternal Grandfather     Heart Disease Paternal Grandmother     Heart Disease Paternal Grandfather        Review of Systems:     Positive and Negative as described in HPI. Denies any shortness of breath or cough  Denies chest pain or palpitations  Denies abdominal pain, diarrhea vomiting  Denies any new numbness tremors or weakness. 10 point review of systems was negative other than mentioned above    Physical Exam:     BP (!) 108/58   Pulse 69   Temp 97.7 °F (36.5 °C) (Temporal)   Resp 14   Ht 5' 4\" (1.626 m)   Wt 125 lb (56.7 kg)   BMI 21.46 kg/m²   Temp (24hrs), Av °F (36.7 °C), Min:97.2 °F (36.2 °C), Max:98.6 °F (37 °C)    No results for input(s): POCGLU in the last 72 hours. No intake or output data in the 24 hours ending 23 1241    General Appearance:  alert, well appearing, and in no acute distress  Head:  normocephalic, atraumatic.   Eye: no icterus, redness, pupils equal and reactive, extraocular eye movements intact, conjunctiva clear  Ear: normal external ear, no discharge, hearing intact  Nose:  no drainage noted  Mouth: mucous membranes moist  Neck: supple, no carotid bruits, thyroid not palpable  Lungs: Bilateral equal air entry, clear to ausculation, no wheezing, rales or rhonchi, normal effort  Cardiovascular: normal rate, regular rhythm, no murmur, gallop, rub.  Abdomen: Soft, nontender, nondistended, normal bowel sounds, no hepatomegaly or splenomegaly  Neurologic: There are no new focal motor or sensory deficits, normal muscle tone and bulk, no abnormal sensation, normal speech, cranial nerves II through XII grossly intact  Skin: No gross lesions, rashes, bruising or bleeding on exposed skin area  Extremities:  No joint swelling, no pedal edema or calf pain with palpation      Investigations:      Laboratory Testing:  Recent Results (from the past 24 hour(s))   CBC with Auto Differential    Collection Time: 02/09/23 12:03 AM   Result Value Ref Range    WBC 4.0 3.5 - 11.3 k/uL    RBC 3.98 3.95 - 5.11 m/uL    Hemoglobin 11.8 (L) 11.9 - 15.1 g/dL    Hematocrit 36.7 36.3 - 47.1 %    MCV 92.2 82.6 - 102.9 fL    MCH 29.6 25.2 - 33.5 pg    MCHC 32.2 28.4 - 34.8 g/dL    RDW 14.1 11.8 - 14.4 %    Platelets 307 434 - 376 k/uL    MPV 12.1 8.1 - 13.5 fL    NRBC Automated 0.0 0.0 per 100 WBC    Seg Neutrophils 49 36 - 65 %    Lymphocytes 40 24 - 43 %    Monocytes 8 3 - 12 %    Eosinophils % 2 1 - 4 %    Basophils 1 0 - 2 %    Immature Granulocytes 0 0 %    Segs Absolute 2.00 1.50 - 8.10 k/uL    Absolute Lymph # 1.61 1.10 - 3.70 k/uL    Absolute Mono # 0.30 0.10 - 1.20 k/uL    Absolute Eos # 0.06 0.00 - 0.44 k/uL    Basophils Absolute <0.03 0.00 - 0.20 k/uL    Absolute Immature Granulocyte <0.03 0.00 - 0.30 k/uL   CMP    Collection Time: 02/09/23 12:03 AM   Result Value Ref Range    Glucose 121 (H) 70 - 99 mg/dL    BUN 16 6 - 20 mg/dL    Creatinine 0.57 0.50 - 0.90 mg/dL    Est, Glom Filt Rate >60 >60 mL/min/1.73m2    Calcium 9.2 8.6 - 10.4 mg/dL    Sodium 137 135 - 144 mmol/L    Potassium 4.5 3.7 - 5.3 mmol/L    Chloride 101 98 - 107 mmol/L    CO2 21 20 - 31 mmol/L    Anion Gap 15 9 - 17 mmol/L    Alkaline Phosphatase 65 35 - 104 U/L    ALT 12 5 - 33 U/L    AST 25 <32 U/L    Total Bilirubin <0.1 (L) 0.3 - 1.2 mg/dL    Total Protein 7.1 6.4 - 8.3 g/dL    Albumin 4.1 3.5 - 5.2 g/dL    Albumin/Globulin Ratio 1.4 1.0 - 2.5   Ethanol    Collection Time: 02/09/23 12:03 AM   Result Value Ref Range    Ethanol 85 (H) <10 mg/dL    Ethanol percent 0.085 (H) <0.010 %   Urine Drug Screen    Collection Time: 02/09/23 12:43 AM   Result Value Ref Range    Amphetamine Screen, Ur NEGATIVE NEGATIVE    Barbiturate Screen, Ur NEGATIVE NEGATIVE    Benzodiazepine Screen, Urine NEGATIVE NEGATIVE    Cocaine Metabolite, Urine POSITIVE (A) NEGATIVE    Methadone Screen, Urine NEGATIVE NEGATIVE    Opiates, Urine NEGATIVE NEGATIVE    Phencyclidine, Urine NEGATIVE NEGATIVE    Cannabinoid Scrn, Ur POSITIVE (A) NEGATIVE    Oxycodone Screen, Ur NEGATIVE NEGATIVE    Fentanyl, Ur POSITIVE (A) NEGATIVE    Test Information       Assay provides medical screening only. The absence of expected drug(s) and/or metabolite(s) may indicate diluted or adulterated urine, limitations of testing or timing of collection. Imaging/Diagonstics:  Recent data reviewed    Assessment :      Primary Problem  Depression with suicidal ideation    Active Hospital Problems    Diagnosis Date Noted    Depression with suicidal ideation [F32. A, R45.851] 01/05/2023     Priority: Medium       Plan:     Reason for consult: General medical management     Depression with suicidal ideation-management per psychiatry  Hypertension-currently controlled patient not on medication  Hyperlipidemia-check lipid profile  History of hypothyroid-we will check TSH  Labs and vitals including liver enzymes reviewed and satisfactory  DVT prophylaxis-not required, patient is mobile    Consultations:   Jez Zamudio MD  2/9/2023  12:41 PM    Copy sent to No primary care provider on file. Please note that this chart was generated using voice recognition Dragon dictation software. Although every effort was made to ensure the accuracy of this automated transcription, some errors in transcription may have occurred.

## 2023-02-09 NOTE — BH NOTE
[facility]  Admission Note     Admission Type:   Admission Type: Voluntary    Reason for admission:  Reason for Admission: Suicidal to run into traffic      Addictive Behavior:   Addictive Behavior  In the Past 3 Months, Have You Felt or Has Someone Told You That You Have a Problem With  : None    Medical Problems:   Past Medical History:   Diagnosis Date    Fibromyalgia     Headache     migraines    Hyperlipidemia     Hypertension     Hypothyroidism     Lupus ([facility] [address]     Thyroid disease        Status EXAM:  Mental Status and Behavioral Exam  Normal: No  Level of Assistance: Independent/Self  Facial Expression: Flat, Worried, Sad  Affect: Blunt  Level of Consciousness: Alert  Frequency of Checks: 4 times per hour, close  Mood:Normal: No  Mood: Empty, Sad  Motor Activity:Normal: Yes  Eye Contact: Fair  Observed Behavior: Withdrawn, Preoccupied, Guarded  Sexual Misconduct History: Current - no  History of Sexually Inappropriate Behavior When Previously Hospitalized?: No  Preception: Mobile to person, Mobile to time, Mobile to place, Mobile to situation  Attention:Normal: No  Attention: Distractible  Thought Processes: Blocking  Thought Content:Normal: No  Thought Content: Preoccupations  Depression Symptoms: Impaired concentration, Feelings of hopelessess, Feelings of helplessness, Sleep disturbance  Anxiety Symptoms: Generalized  Maria Teresa Symptoms: No problems reported or observed.   Hallucinations: None  Delusions: No  Memory:Normal: No  Memory: Poor recent  Insight and Judgment: No  Insight and Judgment: Poor judgment, Poor insight    Tobacco Screening:  Practical Counseling, on admission, jayme X, if applicable and completed (first 3 are required if patient doesn't refuse):            ( ) Recognizing danger situations (included triggers and roadblocks)                    ( ) Coping skills (new ways to manage stress,relaxation techniques, changing routine, distraction) ( ) Basic information about quitting (benefits of quitting, techniques in how to quit, available resources  ( ) Referral for counseling faxed to [facility]                                                                                                                     (X) Patient refused counseling  ( ) Patient has not smoked in the last 30 days    Metabolic Screening:    No results found for: LABA1C    No results found for: CHOL  No results found for: TRIG  No results found for: HDL  No components found for: LDLCAL  No results found for: LABVLDL      Body mass index is 21.46 kg/m². BP Readings from Last 2 Encounters:   02/09/23 106/80   02/09/23 (!) 88/52           Pt admitted with followings belongings:    See belongings sheet or flow sheets for belongings assessment. Patient is admitted to the [facility] due to depression with suicidal ideation. Patient reports increased hopelessness and helplessness related to her daughter recently passing away. She states that she is still in the grieving process. Upon admission, patient allowed assessment, however, she did not want to sign the admission paperwork. Patient was offered food and drink. Patient was wanded and searched for contraband. Safety maintained and every 15 minute checks were initiated.      John Garner RN

## 2023-02-09 NOTE — ED NOTES
Report given to [facility] nurse w all questions answered     Jorene Cheadle, RN  02/09/23 5548

## 2023-02-09 NOTE — H&P
Department of Psychiatry  Attending Physician Psychiatric Assessment     Reason for Admission to Psychiatric Unit:  A mental disorder causing major disability in social, interpersonal, occupational, and/or educational functioning that is leading to dangerous or life-threatening functioning, and that can only be addressed in an acute inpatient setting   Concerns about patient's safety in the community    CHIEF COMPLAINT: Depression with suicidal ideation with plan and intent to walk into traffic    History obtained from: Patient, electronic medical record          HISTORY OF PRESENT ILLNESS:    Mynor Santillan is a 49 y.o. female who has a past medical history of hypertension, fibromyalgia, lupus, hyperlipidemia, hypothyroidism, depression, and polysubstance use. Patient presented to the ED reporting ongoing suicidal thoughts and symptoms of depression. Her daughter  in a car accident in November and she has been struggling with intense grief and suicidal thoughts. She was recently discharged from [facility] in 2023. Patient was seen for initial evaluation today. She was resting in bed but awake. She presented as sad and withdrawn. She became tearful during discussion of the past few months. She reports that seeing her daughter's children for the first time since her daughter's death was a trigger to increasing suicidal thoughts and depressive symptoms. Patient reports that she continues to feel extremely helpless and hopeless. She has difficulty falling asleep and staying asleep and experiences early morning waking. She has little appetite. She has very little motivation to get up or care for herself. She describes ongoing anhedonia, decreased energy, and poor concentration. Suicidal ideation has been intermittent but very intense over the last couple of days. She continues to report ongoing suicidal thoughts today and does not feel that she is safe to return to her home at this time. Patient is also reporting intense symptoms of anxiety and currently rates it a 10/10, 10 being the worst.  She describes excessive worry, restlessness, feeling on edge, irritability, muscle tension, and nervousness. She does endorse a history of panic attacks. Patient is not currently linked with [facility] mental health but is interested in going to [facility] for medication management, counseling, and grief support group. Patient reports that she continues to drink daily since her daughter's death. Blood alcohol level on 2/9/2023 was 0.085. Patient reports that she does not experience symptoms of withdrawal when abstaining from alcohol. She does endorse daily marijuana and frequent crack cocaine use. Urine drug screen dated 2/9/2023 was positive for marijuana, cocaine, and fentanyl. She was surprised that her drug screen was positive for fentanyl and is adamant that she did not take fentanyl. Cocaine may have been laced. Patient reports that she would like to be admitted to an inpatient AOD treatment facility following discharge. She is encouraged to discuss this with the . Patient smokes a pack of cigarettes per day. Patient denies any recent or past symptoms of joe, OCD, PTSD, psychosis, or phobias. At this time patient is unable to contract for safety in the community and warrants hospitalization for safety and stabilization.          History of head trauma: [] Yes [x] No    History of seizures: [] Yes [x] No    History of violence or aggression: [x] Yes [] No         PSYCHIATRIC HISTORY:  [x] Yes [] No    Not currently linked  0 lifetime suicide attempts  Multiple psychiatric hospital admissions: Recent admissions to [facility] Medication Compliance: [] Yes [x] No    Past psychiatric medications includes:   Viibryd, trazodone, Cymbalta, Remeron    Adverse reactions from psychotropic medications: [] Yes [x] No         Lifetime Psychiatric Review of Systems Depression: Endorses     Anxiety: Endorses     Panic Attacks: Endorses     Maria Teresa or Hypomania: Denies     Phobias: Denies     Obsessions and Compulsions: Denies     Body or Vocal Tics: Denies     Visual Hallucinations: Denies     Auditory Hallucinations: Denies     Delusions/Paranoia: Denies     PTSD: Denies    Past Medical History:        Diagnosis Date    Fibromyalgia     Headache     migraines    Hyperlipidemia     Hypertension     Hypothyroidism     Lupus ([address]     Thyroid disease        Past Surgical History:        Procedure Laterality Date     SECTION      FOOT SURGERY      right    HYSTERECTOMY (CERVIX STATUS UNKNOWN)         Allergies:  Bactrim [sulfamethoxazole-trimethoprim]         Social History:     Born in: [facility]  Family: Raised primarily by mother; parents are both alive; she has siblings but declines to comment  Highest Level of Education: High school graduate  Occupation: Unemployed  Marital Status: Single   Children: 3; 2 living and 1 ; survived children ages 24 and 21  Residence: Lives with her mother; reports having a dysfunctional relationship  Stressors: Grief; mental illness; limited social support; substance use, financial  Patient Assets/Supportive Factors: Willingness to seek treatment, stable housing         DRUG USE HISTORY  Social History     Tobacco Use   Smoking Status Every Day    Packs/day: 0.50    Years: 23.00    Pack years: 11.50    Types: Cigarettes   Smokeless Tobacco Never     Social History     Substance and Sexual Activity   Alcohol Use No     Social History     Substance and Sexual Activity   Drug Use Yes    Types: Marijuana (Weed)    Comment: Deysi-puffs     2023: UDS positive cocaine, fentanyl, cannabis;  EtOH 0.085  Patient uses crack cocaine daily  Patient uses cannabis daily  Patient denies fentanyl use  Patient endorses daily alcohol use; primarily beer  Patient smokes a pack of cigarettes per day         LEGAL HISTORY:   HISTORY OF INCARCERATION: [] Yes [x] No    Family History:       Problem Relation Age of Onset    High Blood Pressure Mother     Heart Disease Father     Stroke Maternal Grandmother     High Blood Pressure Maternal Grandfather     Heart Disease Paternal Grandmother     Heart Disease Paternal Grandfather        Psychiatric Family History  Patient endorses psychiatric family history. Maternal aunt: Schizophrenia    Suicides in family: [] Yes [x] No    Substance use in family: [] Yes [x] No         PHYSICAL EXAM:  Vitals:  BP (!) 108/58   Pulse 69   Temp 97.7 °F (36.5 °C) (Temporal)   Resp 14   Ht 5' 4\" (1.626 m)   Wt 125 lb (56.7 kg)   BMI 21.46 kg/m²   Pain Level: 0    LABS:  Labs reviewed: [x] Yes  Metabolic Screening:  [x] Yes [] No (order HgBA1C/Lipid panel if not screened in past year) ordered for tomorrow a.m. draw  Last EKG in EMR reviewed: [x] Yes          Review of Systems   Constitutional: Negative for chills and weight loss. HENT: Negative for ear pain and nosebleeds. Eyes: Negative for blurred vision and photophobia. Respiratory: Negative for cough, shortness of breath and wheezing. Cardiovascular: Negative for chest pain and palpitations. Gastrointestinal: Negative for abdominal pain, diarrhea and vomiting. Genitourinary: Negative for dysuria and urgency. Musculoskeletal: Negative for falls and joint pain. Skin: Negative for itching and rash. Neurological: Negative for tremors, seizures and weakness. Endo/Heme/Allergies: Does not bruise/bleed easily. Mental Status Examination:    Level of consciousness: Awake and alert  Appearance:  Appropriate attire, resting in bed, fair grooming and hygiene  Behavior/Motor: Approachable, engages with interviewer, no psychomotor abnormalities, tearful  Attitude toward examiner:  Cooperative, attentive, fair eye contact,   Speech: Normal rate, decreased volume, and depressed tone.   Mood: Sad  Affect: Blunted  Thought processes: Linear and coherent  Thought content: Active suicidal ideations, with a  current plan or intent, contracts for safety on the unit; unable to contract for safety in community              Denies homicidal ideations               Denies hallucinations              Denies delusions              Denies paranoia  Cognition:  Oriented to self, location, time, situation  Concentration: Clinically adequate  Memory: Intact  Insight &Judgment: Poor         DSM-5 Diagnosis    Principal Problem: Major depressive disorder, recurrent severe without psychotic features ([facility]  Cannabis use disorder  Cocaine use disorder    Psychosocial and Contextual factors:  Financial   Occupational   Relationship X  Legal   Living situation X  Educational     Past Medical History:   Diagnosis Date    Fibromyalgia     Headache     migraines    Hyperlipidemia     Hypertension     Hypothyroidism     Lupus ([facility]     Thyroid disease         PLAN:  Continue inpatient psychiatric treatment. Home medications reviewed/denies med compliance  Start Remeron 7.5 mg by mouth nightly  Order one-time dose of Ativan 1 mg by mouth  Monitor need and frequency of PRN medications. Attempt to develop insight. Follow-up daily while inpatient. Reviewed medications risks and benefits as well as potential side effects with patient. CONSULT:  [x] Yes [] No  Internal medicine for medical management/medical H&P    Risk Management: close watch per standard protocol    Psychotherapy: participation in milieu and group and individual sessions with Attending Physician,  and Physician Assistant/CNP    Estimated length of stay:  2-14 days    GENERAL PATIENT/FAMILY EDUCATION  Patient will understand basic signs and symptoms, patient will understand benefits/risks and potential side effects from proposed medications, and patient will understand their role in recovery. Family is not active in patient's care.    Patient assets that may be helpful during treatment include: Intent to participate and engage in treatment, sufficient fund of knowledge and intellect to understand and utilize treatments. Goals:    1) Remission of suicidal ideation. 2) Stabilization of symptoms prior to discharge. 3) Establish efficacy and tolerability of medications. Behavioral Services  Medicare Certification     Admission Day 1  I certify that this patient's inpatient psychiatric hospital admission is medically necessary for:    x (1) treatment which could reasonably be expected to improve this patient's condition, or    x (2) diagnostic study or its equivalent. Time Spent: 60 minutes    Jovan Linton is a 49 y.o. female being evaluated face to face    --EFRAIN Petersen CNP on 2/9/2023 at 2:11 PM    An electronic signature was used to authenticate this note. Please note that this chart was generated using voice recognition Dragon dictation software. Although every effort was made to ensure the accuracy of this automated transcription, some errors in transcription may have occurred.

## 2023-02-09 NOTE — ED NOTES
Pt reports to the ED with complaints of SI. Pt denies HI at this time and states she has a plan to jump into traffic. Pt states she lost her daughter back in November and her grand kids have been asking where their mother is. Pt was placed on trazodone in January to help with sleep but pt states she lost her meds. Pt does not have any other complaints at this time. Pt is A&O x4 and speaking in complete sentences. Pt tearful but resting in bed comfortably, NAD noted.  Suicide precautions initiated, pt changed out and sitter initiated to bedside immediately upon arrival to room [address], RN  02/08/23 0195

## 2023-02-09 NOTE — PROGRESS NOTES
Behavioral Services  Medicare Certification Upon Admission    I certify that this patient's inpatient psychiatric hospital admission is medically necessary for:    [x] (1) Treatment which could reasonably be expected to improve this patient's condition,       [x] (2) Or for diagnostic study;     AND     [x](2) The inpatient psychiatric services are provided while the individual is under the care of a physician and are included in the individualized plan of care.     Estimated length of stay/service 3-5 days    Plan for post-hospital care hc    Electronically signed by Nickolas Sepulveda MD on 2/9/2023 at 7:38 AM

## 2023-02-09 NOTE — PROGRESS NOTES
Pharmacy Medication History Note      List of current medications patient is taking is complete. Source of information: AVS, [facility] [place] [facility] discharge medication list, [facility]    Changes made to medication list:  Medications removed (include reason, ex. therapy complete or physician discontinued, noncompliance):  Flagged Duloxetine and Mirtazapine for provider review as these were prescribed upon discharge from the [facility] on 1/13/23 but not continued when the patient was admitted to [facility] [place] [facility] later in the month of January    Medications added/doses adjusted: Added Hydroxyzine 25 mg twice daily as needed  Added Nicotine 21 mg patch daily  Added Vilazodone 10 mg daily - prescribed upon discharge from [facility] [place] [facility]    Other notes (ex. Recent course of antibiotics, Coumadin dosing): The patient was discharged from [facility] [place] [facility] on 1/24/23. Current Home Medication List at Time of Admission:  Prior to Admission medications    Medication Sig   hydrOXYzine HCl (ATARAX) 25 MG tablet Take 25 mg by mouth 2 times daily as needed for Anxiety   vilazodone hcl 10 MG TABS Take 10 mg by mouth daily   nicotine (NICODERM CQ) 21 MG/24HR Place 1 patch onto the skin every 24 hours   DULoxetine 40 MG CPEP Take 40 mg by mouth daily   mirtazapine (REMERON) 15 MG tablet Take 1 tablet by mouth nightly   traZODone (DESYREL) 50 MG tablet Take 1 tablet by mouth nightly as needed for Sleep         Please let me know if you have any questions about this encounter. Thank you!     Electronically signed by Debbe Buerger, [facility] on 2/9/2023 at 8:22 AM

## 2023-02-10 LAB
CHOLEST SERPL-MCNC: 192 MG/DL
CHOLESTEROL/HDL RATIO: 2.9
EST. AVERAGE GLUCOSE BLD GHB EST-MCNC: 114 MG/DL
HBA1C MFR BLD: 5.6 % (ref 4–6)
HDLC SERPL-MCNC: 67 MG/DL
LDLC SERPL CALC-MCNC: 113 MG/DL (ref 0–130)
TRIGL SERPL-MCNC: 61 MG/DL
TSH SERPL-ACNC: 2.25 UIU/ML (ref 0.3–5)

## 2023-02-10 PROCEDURE — 83036 HEMOGLOBIN GLYCOSYLATED A1C: CPT

## 2023-02-10 PROCEDURE — 84443 ASSAY THYROID STIM HORMONE: CPT

## 2023-02-10 PROCEDURE — 6370000000 HC RX 637 (ALT 250 FOR IP): Performed by: NURSE PRACTITIONER

## 2023-02-10 PROCEDURE — 80061 LIPID PANEL: CPT

## 2023-02-10 PROCEDURE — 1240000000 HC EMOTIONAL WELLNESS R&B

## 2023-02-10 PROCEDURE — 6370000000 HC RX 637 (ALT 250 FOR IP): Performed by: PSYCHIATRY & NEUROLOGY

## 2023-02-10 PROCEDURE — 99232 SBSQ HOSP IP/OBS MODERATE 35: CPT | Performed by: INTERNAL MEDICINE

## 2023-02-10 PROCEDURE — 36415 COLL VENOUS BLD VENIPUNCTURE: CPT

## 2023-02-10 RX ADMIN — MIRTAZAPINE 7.5 MG: 15 TABLET, FILM COATED ORAL at 21:07

## 2023-02-10 RX ADMIN — TRAZODONE HYDROCHLORIDE 50 MG: 50 TABLET ORAL at 21:18

## 2023-02-10 RX ADMIN — HYDROXYZINE HYDROCHLORIDE 50 MG: 50 TABLET, FILM COATED ORAL at 08:27

## 2023-02-10 RX ADMIN — THIAMINE HCL TAB 100 MG 100 MG: 100 TAB at 08:26

## 2023-02-10 RX ADMIN — HYDROXYZINE HYDROCHLORIDE 50 MG: 50 TABLET, FILM COATED ORAL at 21:07

## 2023-02-10 RX ADMIN — FOLIC ACID 1 MG: 1 TABLET ORAL at 08:26

## 2023-02-10 NOTE — PLAN OF CARE
Problem: Behavior  Goal: Pt/Family maintain appropriate behavior and adhere to behavioral management agreement, if implemented  Description: INTERVENTIONS:  1. Assess patient/family's coping skills and  non-compliant behavior (including use of illegal substances)  2. Notify security of behavior or suspected illegal substances which indicate the need for search of the family and/or belongings  3. Encourage verbalization of thoughts and concerns in a socially appropriate manner  4. Utilize positive, consistent limit setting strategies supporting safety of patient, staff and others  5. Encourage participation in the decision making process about the behavioral management agreement  6. If a visitor's behavior poses a threat to safety call refer to organization policy. 7. Initiate consult with , Psychosocial CNS, Spiritual Care as appropriate  2/10/2023 0895 by Sugey Sapp LPN  Outcome: Progressing  Flowsheets (Taken 2/10/2023 5005)  Patient/family maintains appropriate behavior and adheres to behavioral management agreement, if implemented: Assess patient/familys coping skills and  non-compliant behavior (including use of illegal substances)  Note:   Patient is isolative to room, guarded, and evasive. Instructed and encouraged to participate in programming and communicate thoughts and concerns in regards to treatment. No violent or negative behaviors noted at this time. Will cont to provide safe, calm, environment and use verbal de-escalation techniques when needed. Support and reassurance given as needed.

## 2023-02-10 NOTE — GROUP NOTE
Group Therapy Note    Date: 2/10/2023    Group Start Time: 0900  Group End Time: 5966  Group Topic: Community Meeting    [facility] WILLIAM Barillas RN        Group Therapy Note    Attendees: 4/22       Patient did not participate in [employer] group, despite staff encouragement and explanation of benefits. Patient remain seclusive to self. Q15 minute safety checks maintained for patient safety and will continue to encourage patient to attend unit programming.        Signature:  Albina Barillas RN

## 2023-02-10 NOTE — PROGRESS NOTES
[facility] Internal [facility]    HISTORY AND PHYSICAL EXAMINATION/ CONSULT NOTE            Date:   2/10/2023  Patient name:  Aleksandra Alexander  Date of admission:  2023  3:59 AM  MRN:   [id number]  Account:  [de-identified]  YOB: 1975  PCP:    No primary care provider on file. Room:   [address] [place] [address]  Code Status:    Full Code    Physician Requesting Consult: Mark Caicedo, *    Reason for Consult: History and physical, medical management    Chief Complaint:     No chief complaint on file. Medical management    History Obtained From:     Patient, EMR, nursing staff    History of Present Illness:     48-year-old female admitted with depression with suicidal ideation    Medical history significant for hypothyroid, lupus, hypertension, hyperlipidemia, fibromyalgia    Patient is not currently on medication for thyroid or hypertension      Past Medical History:     Past Medical History:   Diagnosis Date    Fibromyalgia     Headache     migraines    Hyperlipidemia     Hypertension     Hypothyroidism     Lupus ([address]     Thyroid disease         Past Surgical History:     Past Surgical History:   Procedure Laterality Date     SECTION      FOOT SURGERY      right    HYSTERECTOMY (CERVIX STATUS UNKNOWN)          Medications Prior to Admission:     Prior to Admission medications    Medication Sig Start Date End Date Taking?  Authorizing Provider   hydrOXYzine HCl (ATARAX) 25 MG tablet Take 25 mg by mouth 2 times daily as needed for Anxiety   Yes Historical Provider, MD   vilazodone hcl 10 MG TABS Take 10 mg by mouth daily   Yes Historical Provider, MD   nicotine (NICODERM CQ) 21 MG/24HR Place 1 patch onto the skin every 24 hours   Yes Historical Provider, MD   DULoxetine 40 MG CPEP Take 40 mg by mouth daily 23   Jarad CanEFRAIN olvera - CNP   mirtazapine (REMERON) 15 MG tablet Take 1 tablet by mouth nightly 23   Jarad CanEFRAIN olvera - JESSA   traZODone (DESYREL) 50 MG tablet Take 1 tablet by mouth nightly as needed for Sleep 23   Jarad Lon, APRN - CNP        Allergies:     Bactrim [sulfamethoxazole-trimethoprim]    Social History:     Tobacco:    reports that she has been smoking cigarettes. She has a 11.50 pack-year smoking history. She has never used smokeless tobacco.  Alcohol:      reports no history of alcohol use. Drug Use:  reports current drug use. Drug: Marijuana Irena Postin). Family History:     Family History   Problem Relation Age of Onset    High Blood Pressure Mother     Heart Disease Father     Stroke Maternal Grandmother     High Blood Pressure Maternal Grandfather     Heart Disease Paternal Grandmother     Heart Disease Paternal Grandfather        Review of Systems:     Positive and Negative as described in HPI. Denies any shortness of breath or cough  Denies chest pain or palpitations  Denies abdominal pain, diarrhea vomiting  Denies any new numbness tremors or weakness. 10 point review of systems was negative other than mentioned above    Physical Exam:     /74   Pulse 52   Temp 97.9 °F (36.6 °C)   Resp 14   Ht 5' 4\" (1.626 m)   Wt 125 lb (56.7 kg)   BMI 21.46 kg/m²   Temp (24hrs), Av.8 °F (36.6 °C), Min:97.7 °F (36.5 °C), Max:97.9 °F (36.6 °C)    No results for input(s): POCGLU in the last 72 hours. No intake or output data in the 24 hours ending 02/10/23 1652    General Appearance:  alert, well appearing, and in no acute distress  Head:  normocephalic, atraumatic. Eye: no icterus, redness, pupils equal and reactive, extraocular eye movements intact, conjunctiva clear  Ear: normal external ear, no discharge, hearing intact  Nose:  no drainage noted  Mouth: mucous membranes moist  Neck: supple, no carotid bruits, thyroid not palpable  Lungs: Bilateral equal air entry, clear to ausculation, no wheezing, rales or rhonchi, normal effort  Cardiovascular: normal rate, regular rhythm, no murmur, gallop, rub.   Abdomen: Soft, nontender, nondistended, normal bowel sounds, no hepatomegaly or splenomegaly  Neurologic: There are no new focal motor or sensory deficits, normal muscle tone and bulk, no abnormal sensation, normal speech, cranial nerves II through XII grossly intact  Skin: No gross lesions, rashes, bruising or bleeding on exposed skin area  Extremities:  No joint swelling, no pedal edema or calf pain with palpation      Investigations:      Laboratory Testing:  Recent Results (from the past 24 hour(s))   TSH with Reflex    Collection Time: 02/10/23  7:08 AM   Result Value Ref Range    TSH 2.25 0.30 - 5.00 uIU/mL   Lipid, Fasting    Collection Time: 02/10/23  7:08 AM   Result Value Ref Range    Cholesterol, Fasting 192 <200 mg/dL    HDL 67 >40 mg/dL    LDL Cholesterol 113 0 - 130 mg/dL    Chol/HDL Ratio 2.9 <5    Triglyceride, Fasting 61 <150 mg/dL   Hemoglobin A1C    Collection Time: 02/10/23  7:08 AM   Result Value Ref Range    Hemoglobin A1C 5.6 4.0 - 6.0 %    Estimated Avg Glucose 114 mg/dL       Imaging/Diagonstics:  Recent data reviewed    Assessment :      Primary Problem  Major depressive disorder, recurrent severe without psychotic features [facility])    Active Hospital Problems    Diagnosis Date Noted    Cannabis abuse [F12.10] 02/09/2023     Priority: Medium    Cocaine use disorder ([facility] [F14.10] 02/09/2023     Priority: Medium    Major depressive disorder, recurrent severe without psychotic features ([facility] [F33.2] 01/06/2023     Priority: Medium       Plan:     Reason for consult: General medical management     Depression with suicidal ideation-management per psychiatry  Hypertension-currently controlled patient not on medication  Hyperlipidemia-check lipid profile  History of hypothyroid-we will check TSH  Labs and vitals including liver enzymes reviewed and satisfactory  DVT prophylaxis-not required, patient is mobile      2/10  Labs, radiology, medications, vitals reviewed,  Blood pressure running well,  Polysubstance abuse, watch for any withdrawal,  Alcohol intoxication, watch for any withdrawal,      Consultations:   Adriana Bob MD  2/10/2023  4:52 PM    Copy sent to No primary care provider on file. Please note that this chart was generated using voice recognition Dragon dictation software. Although every effort was made to ensure the accuracy of this automated transcription, some errors in transcription may have occurred.

## 2023-02-10 NOTE — GROUP NOTE
Group Therapy Note    Date: 2/10/2023    Group Start Time: 1050  Group End Time: 1130  Group Topic: Cognitive Skills    [facility] D Fabio Gottron, CTRS    Cognitive Skills Group Note        Date: February 10, 2023 Start Time: 1050am  End Time:  1130am      Number of Participants in Group & Unit Census:  5/22    Topic: cognitive skills     Goal of Group: pt will demonstrate improved decision making skills and improved social skills       Comments:     Patient did not participate in Cognitive Skills group, despite staff encouragement and explanation of benefits. Patient remain seclusive to self. Q15 minute safety checks maintained for patient safety and will continue to encourage patient to attend unit programming.             Signature:  Claudell Bran

## 2023-02-10 NOTE — GROUP NOTE
Group Therapy Note    Date: 2/9/2023    Group Start Time: 2030  Group End Time: 2115  Group Topic: Wrap-Up    [facility] WILLIAM Purdy        Group Therapy Note    Attendees: 7/20 patients for sharing/goals group       Patient's Goal:  travel worl and explore cultures/foods    Notes:  good participation    Status After Intervention:  Improved    Participation Level: Interactive    Participation Quality: Appropriate      Speech:  normal      Thought Process/Content: Logical      Affective Functioning: Blunted      Mood: anxious and depressed      Level of consciousness:  Preoccupied      Response to Learning: Able to verbalize current knowledge/experience      Endings: None Reported    Modes of Intervention: Support and Exploration      Discipline Responsible: Behavorial Health Tech      Signature:  Sis Harris

## 2023-02-10 NOTE — PLAN OF CARE
Problem: Behavior  Goal: Pt/Family maintain appropriate behavior and adhere to behavioral management agreement, if implemented  Description: INTERVENTIONS:  1. Assess patient/family's coping skills and  non-compliant behavior (including use of illegal substances)  2. Notify security of behavior or suspected illegal substances which indicate the need for search of the family and/or belongings  3. Encourage verbalization of thoughts and concerns in a socially appropriate manner  4. Utilize positive, consistent limit setting strategies supporting safety of patient, staff and others  5. Encourage participation in the decision making process about the behavioral management agreement  6. If a visitor's behavior poses a threat to safety call refer to organization policy.  7. Initiate consult with , Psychosocial CNS, Spiritual Care as appropriate  2/9/2023 2030 by Tricia Alonzo LPN  Outcome: Progressing     Problem: Self Care Deficit  Goal: Return ADL status to a safe level of function  Description: INTERVENTIONS:  1. Administer medication as ordered  2. Assess ADL deficits and provide assistive devices as needed  3. Obtain PT/OT consults as needed  4. Assist and instruct patient to increase activity and self care as tolerated  2/9/2023 2030 by Tricia Alonzo LPN  Outcome: Progressing     Problem: Pain  Goal: Verbalizes/displays adequate comfort level or baseline comfort level  2/9/2023 2030 by Tricia Alonzo LPN  Outcome: Progressing       Patient has a sad affect, depressed mood and she is isolative to her room for most of shift. Patient denies suicidal ideations or thoughts of self-harm at this time. Patient states \"I'm here because I want to get better.\" Support and encouragement provided. Patient attends [employer] group and is interactive. Patient's behavior is controlled and she is medication compliant. No pain verbalized at this time. Patient safety checks maintained every 15 minutes and  intermittently.

## 2023-02-10 NOTE — GROUP NOTE
Group Therapy Note    Date: 2/10/2023    Group Start Time: 1330  Group End Time: 2731  Group Topic: Psychoeducation    [facility] Montelongo, JAVIERS    Psych-Ed/Relapse Prevention Group Note        Date: February 10, 2023 Start Time: 1:30pm  End Time:  2:00pm      Number of Participants in Group & Unit Census:  5/15    Topic: psycheducation group     Goal of Group: pt will improve communication and improve critical thinking skills       Comments:     Patient did not participate in Psych-Ed/Relapse Prevention group, despite staff encouragement and explanation of benefits. Patient remain seclusive to self. Q15 minute safety checks maintained for patient safety and will continue to encourage patient to attend unit programming.             Signature:  Ruthie Bob

## 2023-02-11 PROCEDURE — 6370000000 HC RX 637 (ALT 250 FOR IP): Performed by: NURSE PRACTITIONER

## 2023-02-11 PROCEDURE — 1240000000 HC EMOTIONAL WELLNESS R&B

## 2023-02-11 PROCEDURE — 6370000000 HC RX 637 (ALT 250 FOR IP): Performed by: PSYCHIATRY & NEUROLOGY

## 2023-02-11 RX ORDER — DULOXETIN HYDROCHLORIDE 20 MG/1
20 CAPSULE, DELAYED RELEASE ORAL DAILY
Status: DISCONTINUED | OUTPATIENT
Start: 2023-02-12 | End: 2023-02-14 | Stop reason: HOSPADM

## 2023-02-11 RX ADMIN — TRAZODONE HYDROCHLORIDE 50 MG: 50 TABLET ORAL at 21:13

## 2023-02-11 RX ADMIN — HYDROXYZINE HYDROCHLORIDE 50 MG: 50 TABLET, FILM COATED ORAL at 07:55

## 2023-02-11 RX ADMIN — FOLIC ACID 1 MG: 1 TABLET ORAL at 07:55

## 2023-02-11 RX ADMIN — MIRTAZAPINE 7.5 MG: 15 TABLET, FILM COATED ORAL at 21:13

## 2023-02-11 RX ADMIN — HYDROXYZINE HYDROCHLORIDE 50 MG: 50 TABLET, FILM COATED ORAL at 18:26

## 2023-02-11 RX ADMIN — THIAMINE HCL TAB 100 MG 100 MG: 100 TAB at 07:55

## 2023-02-11 RX ADMIN — NICOTINE POLACRILEX 2 MG: 2 GUM, CHEWING BUCCAL at 18:34

## 2023-02-11 NOTE — GROUP NOTE
Group Therapy Note    Date: 2/11/2023    Group Start Time: 1100  Group End Time: 4830  Group Topic: Psychoeducation    [facility]    Group Therapy Note    Attendees: 6/23       Patient's Goal:  Patient will demonstrate increased knowledge of communication skills. Notes:  Patient was on-time to group and actively participated in group activity and discussion. Status After Intervention:  Improved    Participation Level: Active Listener and Interactive    Participation Quality: Appropriate, Attentive, and Sharing      Speech:  Quiet      Thought Process/Content: Logical  Linear      Affective Functioning: Flat      Mood: depressed      Level of consciousness:  Alert, Oriented x4, and Attentive      Response to Learning: Able to verbalize current knowledge/experience, Able to verbalize/acknowledge new learning, and Progressing to goal      Endings: None Reported    Modes of Intervention: Education, Support, Exploration, Clarifying, and Problem-solving      Discipline Responsible: Psychoeducational Specialist      Signature:   Axel hCance [place]

## 2023-02-11 NOTE — PLAN OF CARE
Problem: Behavior  Goal: Pt/Family maintain appropriate behavior and adhere to behavioral management agreement, if implemented  Description: INTERVENTIONS:  1. Assess patient/family's coping skills and  non-compliant behavior (including use of illegal substances)  2. Notify security of behavior or suspected illegal substances which indicate the need for search of the family and/or belongings  3. Encourage verbalization of thoughts and concerns in a socially appropriate manner  4. Utilize positive, consistent limit setting strategies supporting safety of patient, staff and others  5. Encourage participation in the decision making process about the behavioral management agreement  6. If a visitor's behavior poses a threat to safety call refer to organization policy. 7. Initiate consult with , Psychosocial CNS, Spiritual Care as appropriate  2/10/2023 2045 by Elma Ulloa RN  Outcome: Progressing     Problem: Self Care Deficit  Goal: Return ADL status to a safe level of function  Description: INTERVENTIONS:  1. Administer medication as ordered  2. Assess ADL deficits and provide assistive devices as needed  3. Obtain PT/OT consults as needed  4. Assist and instruct patient to increase activity and self care as tolerated  Outcome: Progressing     Problem: Pain  Goal: Verbalizes/displays adequate comfort level or baseline comfort level  Outcome: Progressing     Patient remains isolative to room with minimal participation with assessment and interview. Patient encouraged to seek out staff if thoughts of self harm arise. Patient encouraged to attend group and spend time in the milieu. Patient encouraged to shower and come out for snack. Patient remains free from self harm. Patient remains in behavorial control. Patient denies pain. A safe environment and Q 15 min checks maintained.

## 2023-02-11 NOTE — GROUP NOTE
Group Therapy Note    Date: 2/11/2023    Group Start Time: 1430  Group End Time: 8487  Group Topic: Recreational    Jack Santo 82, CTRS    Group Therapy Note    Attendees: 5/23       Patient's Goal:  Patient will demonstrate increased interpersonal skills and focus on task at hand. Notes:  Patient was on-time to group and actively participated in group activity. Status After Intervention:  Improved    Participation Level: Active Listener and Interactive    Participation Quality: Appropriate and Attentive      Speech:  Quiet      Thought Process/Content: Logical  Linear      Affective Functioning: Congruent      Mood: euthymic      Level of consciousness:  Alert, Oriented x4, and Attentive      Response to Learning: Able to verbalize current knowledge/experience, Able to verbalize/acknowledge new learning, Able to retain information, and Progressing to goal      Endings: None Reported    Modes of Intervention: Socialization, Exploration, and Activity      Discipline Responsible: Psychoeducational Specialist      Signature:   Gasper Ellis [place]

## 2023-02-11 NOTE — PROGRESS NOTES
Daily Progress Note  2/10/2023    Patient Name: Navin Tadeo:  Depression with suicidal ideation with plan and intent to walk into traffic          SUBJECTIVE:    Patient was seen for follow-up assessment today. She has been medication adherent and behaviorally in control. She has not required any emergency medications in the last 24 hours. Nursing staff report patient has been mostly isolative to her room coming out for needs and meals only. She was resting in bed on approach awake. She was agreeable to conversation. Patient continues to present as withdrawn and very sad. She does state that her sleep was a little better last night following commencement of Remeron. She reports that her mood is still \"really down in the dumps\". She endorses ongoing suicidal ideation due to the intense grief following the death of her daughter. She has not been attending groups and is encouraged to do so she may find them to be beneficial.  She states she states she will attempt to attend 1 group today. She endorses ongoing lethargy, fatigue, and low motivation to care for herself. She is denying homicidal ideation. She denies auditory and visual hallucinations and makes no delusional or paranoid statements. Thoughts and speech are organized and linear. At this time patient is unable to contract for safety in the community warrants further hospitalization for safety and stabilization.     Appetite:  [] Adequate/Unchanged  [] Increased  [x] Decreased      Sleep:       [] Adequate/Unchanged  [x] Fair  [] Poor      Group Attendance on Unit:   [] Yes   [x] Selectively    [] No    Compliant with scheduled medications: [x] Yes  [] No    Received emergency medications in past 24 hrs: [] Yes   [x] No    Medication Side Effects: Denies         Mental Status Exam  Level of consciousness: Alert and awake   Appearance: Appropriate attire for setting, resting in bed, with fair  grooming and hygiene Behavior/Motor: Approachable, engages with interviewer, no psychomotor abnormalities; tearful  Attitude toward examiner: Cooperative, attentive, fair eye contact  Speech: Decreased volume, normal rate, soft, depressed tone  Mood: Sad  Affect: Blunted  Thought processes: linear, goal directed, and coherent   Thought content:  Denies homicidal ideation  Suicidal Ideation: Active suicidal ideations, contracts for safety on the unit; unable to contract for safety in community  Delusions: No evidence of delusions. Perceptual Disturbance: Denies hallucinations; patient is not observed to be responding to internal stimuli. Cognition: Oriented to self, location, time, situation  Memory: intact  Insight: fair   Judgement: fair     Data   height is 5' 4\" (1.626 m) and weight is 125 lb (56.7 kg). Her temporal temperature is 97.9 °F (36.6 °C). Her blood pressure is 110/77 and her pulse is 76. Her respiration is 14. Labs:   Admission on 02/09/2023   Component Date Value Ref Range Status    TSH 02/10/2023 2.25  0.30 - 5.00 uIU/mL Final    Cholesterol, Fasting 02/10/2023 192  <200 mg/dL Final    Comment:    Cholesterol Guidelines:      <200  Desirable   200-240  Borderline      >240  Undesirable         HDL 02/10/2023 67  >40 mg/dL Final    Comment:    HDL Guidelines:    <40     Undesirable   40-59    Borderline    >59     Desirable         LDL Cholesterol 02/10/2023 113  0 - 130 mg/dL Final    Comment:    LDL Guidelines:     <100    Desirable   100-129   Near to/above Desirable   130-159   Borderline      >159   Undesirable     Direct (measured) LDL and calculated LDL are not interchangeable tests. Chol/HDL Ratio 02/10/2023 2.9  <5 Final            Triglyceride, Fasting 02/10/2023 61  <150 mg/dL Final    Comment:    Triglyceride Guidelines:     <150   Desirable   150-199  Borderline   200-499  High     >499   Very high   Based on AHA Guidelines for fasting triglyceride, October 2012.          Hemoglobin A1C 02/10/2023 5.6  4.0 - 6.0 % Final    Estimated Avg Glucose 02/10/2023 114  mg/dL Final    Comment: The ADA and AACC recommend providing the estimated average glucose result to permit better   patient understanding of their HBA1c result. Admission on 02/08/2023, Discharged on 02/09/2023   Component Date Value Ref Range Status    WBC 02/09/2023 4.0  3.5 - 11.3 k/uL Final    RBC 02/09/2023 3.98  3.95 - 5.11 m/uL Final    Hemoglobin 02/09/2023 11.8 (A)  11.9 - 15.1 g/dL Final    Hematocrit 02/09/2023 36.7  36.3 - 47.1 % Final    MCV 02/09/2023 92.2  82.6 - 102.9 fL Final    MCH 02/09/2023 29.6  25.2 - 33.5 pg Final    MCHC 02/09/2023 32.2  28.4 - 34.8 g/dL Final    RDW 02/09/2023 14.1  11.8 - 14.4 % Final    Platelets 16/89/7988 192  138 - 453 k/uL Final    MPV 02/09/2023 12.1  8.1 - 13.5 fL Final    NRBC Automated 02/09/2023 0.0  0.0 per 100 WBC Final    Seg Neutrophils 02/09/2023 49  36 - 65 % Final    Lymphocytes 02/09/2023 40  24 - 43 % Final    Monocytes 02/09/2023 8  3 - 12 % Final    Eosinophils % 02/09/2023 2  1 - 4 % Final    Basophils 02/09/2023 1  0 - 2 % Final    Immature Granulocytes 02/09/2023 0  0 % Final    Segs Absolute 02/09/2023 2.00  1.50 - 8.10 k/uL Final    Absolute Lymph # 02/09/2023 1.61  1.10 - 3.70 k/uL Final    Absolute Mono # 02/09/2023 0.30  0.10 - 1.20 k/uL Final    Absolute Eos # 02/09/2023 0.06  0.00 - 0.44 k/uL Final    Basophils Absolute 02/09/2023 <0.03  0.00 - 0.20 k/uL Final    Absolute Immature Granulocyte 02/09/2023 <0.03  0.00 - 0.30 k/uL Final    Glucose 02/09/2023 121 (A)  70 - 99 mg/dL Final    BUN 02/09/2023 16  6 - 20 mg/dL Final    Creatinine 02/09/2023 0.57  0.50 - 0.90 mg/dL Final    Est, Glom Filt Rate 02/09/2023 >60  >60 mL/min/1.73m2 Final    Comment:       These results are not intended for use in patients <25years of age. eGFR results are calculated without a race factor using the 2021 CKD-EPI equation.   Careful clinical correlation is recommended, particularly when comparing to results   calculated using previous equations. The CKD-EPI equation is less accurate in patients with extremes of muscle mass, extra-renal   metabolism of creatine, excessive creatine ingestion, or following therapy that affects   renal tubular secretion.       Calcium 02/09/2023 9.2  8.6 - 10.4 mg/dL Final    Sodium 02/09/2023 137  135 - 144 mmol/L Final    Potassium 02/09/2023 4.5  3.7 - 5.3 mmol/L Final    SPECIMEN MODERATELY HEMOLYZED, RESULTS MAY BE ADVERSELY AFFECTED    Chloride 02/09/2023 101  98 - 107 mmol/L Final    CO2 02/09/2023 21  20 - 31 mmol/L Final    Anion Gap 02/09/2023 15  9 - 17 mmol/L Final    Alkaline Phosphatase 02/09/2023 65  35 - 104 U/L Final    ALT 02/09/2023 12  5 - 33 U/L Final    AST 02/09/2023 25  <32 U/L Final    Total Bilirubin 02/09/2023 <0.1 (A)  0.3 - 1.2 mg/dL Final    Total Protein 02/09/2023 7.1  6.4 - 8.3 g/dL Final    Albumin 02/09/2023 4.1  3.5 - 5.2 g/dL Final    Albumin/Globulin Ratio 02/09/2023 1.4  1.0 - 2.5 Final    Ethanol 02/09/2023 85 (A)  <10 mg/dL Final    Ethanol percent 02/09/2023 0.085 (A)  <0.010 % Final    Amphetamine Screen, Ur 02/09/2023 NEGATIVE  NEGATIVE Final    Comment:       (Positive cutoff 1000 ng/mL)                  Barbiturate Screen, Ur 02/09/2023 NEGATIVE  NEGATIVE Final    Comment:       (Positive cutoff 200 ng/mL)                  Benzodiazepine Screen, Urine 02/09/2023 NEGATIVE  NEGATIVE Final    Comment:       (Positive cutoff 200 ng/mL)                  Cocaine Metabolite, Urine 02/09/2023 POSITIVE (A)  NEGATIVE Final    Comment:       (Positive cutoff 300 ng/mL)                  Methadone Screen, Urine 02/09/2023 NEGATIVE  NEGATIVE Final    Comment:       (Positive cutoff 300 ng/mL)                  Opiates, Urine 02/09/2023 NEGATIVE  NEGATIVE Final    Comment:       (Positive cutoff 300 ng/mL)                  Phencyclidine, Urine 02/09/2023 NEGATIVE  NEGATIVE Final    Comment:       (Positive cutoff 25 ng/mL) Cannabinoid Scrn, Ur 02/09/2023 POSITIVE (A)  NEGATIVE Final    Comment:       (Positive cutoff 50 ng/mL)                  Oxycodone Screen, Ur 02/09/2023 NEGATIVE  NEGATIVE Final    Comment:       (Positive cutoff 100 ng/mL)                  Fentanyl, Ur 02/09/2023 POSITIVE (A)  NEGATIVE Final    Comment:       (Positive cutoff  5 ng/ml)            Test Information 02/09/2023 Assay provides medical screening only. The absence of expected drug(s) and/or metabolite(s) may indicate diluted or adulterated urine, limitations of testing or timing of collection. Final    Comment: Testing for legal purposes should be confirmed by another method. To request confirmation   of test result, please call the lab within 7 days of sample submission. Reviewed patient's current plan of care and vital signs with nursing staff.     Labs reviewed: [x] Yes    Medications  Current Facility-Administered Medications: acetaminophen (TYLENOL) tablet 650 mg, 650 mg, Oral, Q4H PRN  aluminum & magnesium hydroxide-simethicone (MAALOX) 200-200-20 MG/5ML suspension 30 mL, 30 mL, Oral, Q6H PRN  hydrOXYzine HCl (ATARAX) tablet 50 mg, 50 mg, Oral, TID PRN  ibuprofen (ADVIL;MOTRIN) tablet 400 mg, 400 mg, Oral, Q6H PRN  nicotine polacrilex (NICORETTE) gum 2 mg, 2 mg, Oral, PRN  polyethylene glycol (GLYCOLAX) packet 17 g, 17 g, Oral, Daily PRN  traZODone (DESYREL) tablet 50 mg, 50 mg, Oral, Nightly PRN  haloperidol (HALDOL) tablet 5 mg, 5 mg, Oral, Q4H PRN **AND** LORazepam (ATIVAN) tablet 2 mg, 2 mg, Oral, Q4H PRN  haloperidol lactate (HALDOL) injection 5 mg, 5 mg, IntraMUSCular, Q4H PRN **AND** LORazepam (ATIVAN) injection 2 mg, 2 mg, IntraMUSCular, Q4H PRN **AND** diphenhydrAMINE (BENADRYL) injection 50 mg, 50 mg, IntraMUSCular, Q4H PRN  mirtazapine (REMERON) tablet 7.5 mg, 7.5 mg, Oral, Nightly  thiamine mononitrate tablet 100 mg, 100 mg, Oral, Daily  folic acid (FOLVITE) tablet 1 mg, 1 mg, Oral, Daily    ASSESSMENT  Major depressive disorder, recurrent severe without psychotic features ([facility] [address]         PLAN  Patient symptoms are: Showing minimal improvement  No Medication Changes Today  Monitor need and frequency of PRN medications. Encourage participation in groups and milieu. Attempt to develop insight. Psycho-education conducted. Probable discharge is undetermined  Follow-up daily while inpatient. Patient continues to be monitored in the inpatient psychiatric facility at [facility] for safety and stabilization. Patient continues to need, on a daily basis, active treatment furnished directly by or requiring the supervision of inpatient psychiatric personnel. Electronically signed by EFRAIN Weaver CNP on 2/10/2023 at 8:47 PM    **This report has been created using voice recognition software. It may contain minor errors which are inherent in voice recognition technology. **

## 2023-02-11 NOTE — PLAN OF CARE
[address] [place] [address]  Day 3 Interdisciplinary Treatment Plan NOTE    Review Date & Time: 2/11/2023   1332    Admission Type:   Admission Type: Voluntary    Reason for admission:  Reason for Admission: Suicidal to run into traffic  Estimated Length of Stay: 5-7 days  Estimated Discharge Date Update: to be determined by physician    PATIENT STRENGTHS:  Patient Strengths    Patient Strengths and Limitations:Limitations: Difficult relationships / poor social skills, Inappropriate/potentially harmful leisure interests, Difficulty problem solving/relies on others to help solve problems  Addictive Behavior:Addictive Behavior  In the Past 3 Months, Have You Felt or Has Someone Told You That You Have a Problem With  : None  Medical Problems:  Past Medical History:   Diagnosis Date    Fibromyalgia     Headache     migraines    Hyperlipidemia     Hypertension     Hypothyroidism     Lupus ([facility] [address]     Thyroid disease        Risk:  Fall Risk   Sidney Scale Sidney Scale Score: 23  BVC    Change in scores no Changes to plan of Care no    Status EXAM:   Mental Status and Behavioral Exam  Normal: No  Level of Assistance: Independent/Self  Facial Expression: Flat  Affect: Congruent  Level of Consciousness: Alert  Frequency of Checks: 4 times per hour, close  Mood:Normal: No  Mood: Depressed, Anxious  Motor Activity:Normal: Yes  Motor Activity: Decreased  Eye Contact: Good  Observed Behavior: Cooperative, Friendly, Preoccupied  Sexual Misconduct History: Current - no  History of Sexually Inappropriate Behavior When Previously Hospitalized?: No  Preception: [place] to person, [place] to time, [place] to place, [place] to situation  Attention:Normal: No  Attention: Distractible  Thought Processes: Other (comment) (linear and coherent)  Thought Content:Normal: No  Thought Content: Preoccupations  Depression Symptoms: Feelings of helplessness, Feelings of hopelessess, Isolative, Loss of interest  Anxiety Symptoms: Generalized  Maria Teresa Symptoms: No problems reported or observed. Hallucinations: None  Delusions: No  Memory:Normal: Yes  Memory: Poor recent  Insight and Judgment: No  Insight and Judgment: Poor judgment, Poor insight    Daily Assessment Last Entry:   Daily Sleep (WDL): Within Defined Limits            Daily Nutrition (WDL): Within Defined Limits  Level of Assistance: Independent/Self    Patient Monitoring:  Frequency of Checks: 4 times per hour, close    Psychiatric Symptoms:   Depression Symptoms  Depression Symptoms: Feelings of helplessness, Feelings of hopelessess, Isolative, Loss of interest  Anxiety Symptoms  Anxiety Symptoms: Generalized  Maria Teresa Symptoms  Maria Teresa Symptoms: No problems reported or observed. Suicide Risk CSSR-S:  1) Within the past month, have you wished you were dead or wished you could go to sleep and not wake up? : Yes  2) Have you actually had any thoughts of killing yourself? : No  3) Have you been thinking about how you might kill yourself? : No  5) Have you started to work out or worked out the details of how to kill yourself?  Do you intend to carry out this plan? : No  6) Have you ever done anything, started to do anything, or prepared to do anything to end your life?: No  Change in Result no Change in Plan of care no      EDUCATION:   EDUCATION:   Learner Progress Toward Treatment Goals: Reviewed results and recommendations of this team, Reviewed group plan and strategies, Reviewed signs, symptoms and risk of self harm and violent behavior, Reviewed goals and plan of care    Method:small group, individual verbal education    Outcome:verbalized by patient, but needs reinforcement to obtain goals    PATIENT GOALS:  Short term:\"Cant think of any right now, I want to get back to normal, High spirits\"  Long term:\" Getting a job, starting life over again\"    PLAN/TREATMENT RECOMMENDATIONS UPDATE: continue with group therapies, increased socialization, continue planning for after discharge goals, continue with medication compliance    SHORT-TERM GOALS UPDATE:   Time frame for Short-Term Goals: 5-7 days    LONG-TERM GOALS UPDATE:   Time frame for Long-Term Goals: 6 months  Members Present in Team Meeting: See Signature [facility] Daily

## 2023-02-11 NOTE — GROUP NOTE
Group Therapy Note    Date: 2/11/2023    Group Start Time: 0900  Group End Time: 0930  Group Topic: Group Documentation    [facility]    Brooke Villasenor LPN        Group Therapy Note    Attendees: 6/23       Patient's Goal:  To keep warm    Notes:      Status After Intervention:  Improved    Participation Level:  Active Listener    Participation Quality: Appropriate      Speech:  normal      Thought Process/Content: Logical      Affective Functioning: Congruent      Mood: anxious      Level of consciousness:  Alert, Oriented x4, and Attentive      Response to Learning: Capable of insight      Endings: None Reported    Modes of Intervention: Support      Discipline Responsible: Licensed Practical Nurse      Signature:  Brooke Villasenor LPN

## 2023-02-11 NOTE — PLAN OF CARE
Problem: Behavior  Goal: Pt/Family maintain appropriate behavior and adhere to behavioral management agreement, if implemented  Description: INTERVENTIONS:  1. Assess patient/family's coping skills and  non-compliant behavior (including use of illegal substances)  2. Notify security of behavior or suspected illegal substances which indicate the need for search of the family and/or belongings  3. Encourage verbalization of thoughts and concerns in a socially appropriate manner  4. Utilize positive, consistent limit setting strategies supporting safety of patient, staff and others  5. Encourage participation in the decision making process about the behavioral management agreement  6. If a visitor's behavior poses a threat to safety call refer to organization policy. 7. Initiate consult with , Psychosocial CNS, Spiritual Care as appropriate  2/11/2023 1851 by Rema Danielle LPN  Outcome: Progressing  Flowsheets (Taken 2/10/2023 0847 by Maday Kumar LPN)  Patient/family maintains appropriate behavior and adheres to behavioral management agreement, if implemented: Assess patient/familys coping skills and  non-compliant behavior (including use of illegal substances)  Note: Patient admit to depression due to the dealing with her daughters death. She has been isolative to her room at times. She states she has some anxiety. Problem: Self Care Deficit  Goal: Return ADL status to a safe level of function  Description: INTERVENTIONS:  1. Administer medication as ordered  2. Assess ADL deficits and provide assistive devices as needed  3. Obtain PT/OT consults as needed  4.  Assist and instruct patient to increase activity and self care as tolerated  2/11/2023 1851 by Rema Danielle LPN  Outcome: Progressing     Problem: Pain  Goal: Verbalizes/displays adequate comfort level or baseline comfort level  2/11/2023 1851 by Rema Danielle LPN  Outcome: Progressing

## 2023-02-11 NOTE — PROGRESS NOTES
Daily Progress Note  2023    Patient Name: Willian Ek:  Depression with suicidal ideation with plan and intent to walk into traffic          SUBJECTIVE:    Patient was seen for follow-up assessment today. She has been medication adherent and behaviorally in control. She has not required any emergency medications in the last 24 hours. Nursing staff report patient continues to be mostly isolative to her room out for meals and needs only. Patient was observed to be exiting the shower prior to conversation. She was agreeable to sit in the day area and window alcove, there were no other patients present at this time. Patient was pleasant but mood remains subdued and sad. She became tearful again when discussing current intensity of grief. She stated repeatedly \"my mind is still at the  I cannot get over it\". She continues to express suicidal ideation and the desire to not wake up. Patient is encouraged to express her feelings of grief and to talk about her daughter if she chooses. She reports that seeing her grandchildren recently actually made her feel better and she was surprised by this. She reports a desire to be in more contact with them. Patient is also encouraged to consider attending a grief support group after discharge and she is open to this idea. She is not currently linked with community mental health or a counselor and states that she would like to do this as well. Remerosantos has been helping with sleep at night but she is open to taking something during the day for mood. She reports Cymbalta being helpful during past admission. Patient denies auditory and visual hallucinations and makes no delusional or paranoid statements. At this time she is not yet able to contract for safety in the community and warrants further hospitalization for safety and stabilization.     Appetite:  [] Adequate/Unchanged  [x] Increased  [] Decreased      Sleep:       [x] Adequate/Unchanged  [] Fair  [] Poor      Group Attendance on Unit:   [] Yes   [x] Selectively    [] No    Compliant with scheduled medications: [x] Yes  [] No    Received emergency medications in past 24 hrs: [] Yes   [x] No    Medication Side Effects: Denies         Mental Status Exam  Level of consciousness: Alert and awake   Appearance: Appropriate attire for setting, seated in chair, with good  grooming and hygiene   Behavior/Motor: Approachable, engages with interviewer, no psychomotor abnormalities; tearful  Attitude toward examiner: Cooperative, attentive, fair eye contact  Speech: Decreased volume, normal rate, soft, depressed tone  Mood: Sad  Affect: Blunted  Thought processes: linear, goal directed, and coherent   Thought content:  Denies homicidal ideation  Suicidal Ideation: Active suicidal ideations, contracts for safety on the unit; unable to contract for safety in community  Delusions: No evidence of delusions. Perceptual Disturbance: Denies hallucinations; patient is not observed to be responding to internal stimuli. Cognition: Oriented to self, location, time, situation  Memory: intact  Insight: fair   Judgement: fair     Data   height is 5' 4\" (1.626 m) and weight is 125 lb (56.7 kg). Her temporal temperature is 97.1 °F (36.2 °C). Her blood pressure is 128/84 and her pulse is 70. Her respiration is 14.    Labs:   Admission on 02/09/2023   Component Date Value Ref Range Status    TSH 02/10/2023 2.25  0.30 - 5.00 uIU/mL Final    Cholesterol, Fasting 02/10/2023 192  <200 mg/dL Final    Comment:    Cholesterol Guidelines:      <200  Desirable   200-240  Borderline      >240  Undesirable         HDL 02/10/2023 67  >40 mg/dL Final    Comment:    HDL Guidelines:    <40     Undesirable   40-59    Borderline    >59     Desirable         LDL Cholesterol 02/10/2023 113  0 - 130 mg/dL Final    Comment:    LDL Guidelines:     <100    Desirable   100-129   Near to/above Desirable   130-159   Borderline >159   Undesirable     Direct (measured) LDL and calculated LDL are not interchangeable tests. Chol/HDL Ratio 02/10/2023 2.9  <5 Final            Triglyceride, Fasting 02/10/2023 61  <150 mg/dL Final    Comment:    Triglyceride Guidelines:     <150   Desirable   150-199  Borderline   200-499  High     >499   Very high   Based on AHA Guidelines for fasting triglyceride, October 2012. Hemoglobin A1C 02/10/2023 5.6  4.0 - 6.0 % Final    Estimated Avg Glucose 02/10/2023 114  mg/dL Final    Comment: The ADA and AACC recommend providing the estimated average glucose result to permit better   patient understanding of their HBA1c result. Reviewed patient's current plan of care and vital signs with nursing staff.     Labs reviewed: [x] Yes    Medications  Current Facility-Administered Medications: acetaminophen (TYLENOL) tablet 650 mg, 650 mg, Oral, Q4H PRN  aluminum & magnesium hydroxide-simethicone (MAALOX) 200-200-20 MG/5ML suspension 30 mL, 30 mL, Oral, Q6H PRN  hydrOXYzine HCl (ATARAX) tablet 50 mg, 50 mg, Oral, TID PRN  ibuprofen (ADVIL;MOTRIN) tablet 400 mg, 400 mg, Oral, Q6H PRN  nicotine polacrilex (NICORETTE) gum 2 mg, 2 mg, Oral, PRN  polyethylene glycol (GLYCOLAX) packet 17 g, 17 g, Oral, Daily PRN  traZODone (DESYREL) tablet 50 mg, 50 mg, Oral, Nightly PRN  haloperidol (HALDOL) tablet 5 mg, 5 mg, Oral, Q4H PRN **AND** LORazepam (ATIVAN) tablet 2 mg, 2 mg, Oral, Q4H PRN  haloperidol lactate (HALDOL) injection 5 mg, 5 mg, IntraMUSCular, Q4H PRN **AND** LORazepam (ATIVAN) injection 2 mg, 2 mg, IntraMUSCular, Q4H PRN **AND** diphenhydrAMINE (BENADRYL) injection 50 mg, 50 mg, IntraMUSCular, Q4H PRN  mirtazapine (REMERON) tablet 7.5 mg, 7.5 mg, Oral, Nightly  thiamine mononitrate tablet 100 mg, 100 mg, Oral, Daily  folic acid (FOLVITE) tablet 1 mg, 1 mg, Oral, Daily    ASSESSMENT  Major depressive disorder, recurrent severe without psychotic features ([facility] [address]         PLAN  Patient symptoms are: Showing minimal improvement  Medications Changed Today. A discussion of risks, benefits, and alternatives was held with the patient and this provider with regards to medication changes. After this discussion we mutually agreed to proceed with the medication changes. Start Cymbalta 20 mg by mouth daily  Monitor need and frequency of PRN medications. Encourage participation in groups and milieu. Attempt to develop insight. Psycho-education conducted. Probable discharge is undetermined  Follow-up daily while inpatient. During in person evaluation today more than 16  minutes was spent in supportive psychotherapy. Patient continues to be monitored in the inpatient psychiatric facility at [facility] for safety and stabilization. Patient continues to need, on a daily basis, active treatment furnished directly by or requiring the supervision of inpatient psychiatric personnel. Electronically signed by EFRAIN Diaz CNP on 2/11/2023 at 5:27 PM    **This report has been created using voice recognition software. It may contain minor errors which are inherent in voice recognition technology. **

## 2023-02-12 PROCEDURE — 1240000000 HC EMOTIONAL WELLNESS R&B

## 2023-02-12 PROCEDURE — 6370000000 HC RX 637 (ALT 250 FOR IP): Performed by: PSYCHIATRY & NEUROLOGY

## 2023-02-12 PROCEDURE — 6370000000 HC RX 637 (ALT 250 FOR IP): Performed by: NURSE PRACTITIONER

## 2023-02-12 RX ORDER — MELOXICAM 7.5 MG/1
7.5 TABLET ORAL DAILY PRN
Status: DISCONTINUED | OUTPATIENT
Start: 2023-02-12 | End: 2023-02-14 | Stop reason: HOSPADM

## 2023-02-12 RX ADMIN — DULOXETINE HYDROCHLORIDE 20 MG: 20 CAPSULE, DELAYED RELEASE ORAL at 08:04

## 2023-02-12 RX ADMIN — TRAZODONE HYDROCHLORIDE 50 MG: 50 TABLET ORAL at 20:50

## 2023-02-12 RX ADMIN — MIRTAZAPINE 7.5 MG: 15 TABLET, FILM COATED ORAL at 20:49

## 2023-02-12 RX ADMIN — ACETAMINOPHEN 650 MG: 325 TABLET ORAL at 20:50

## 2023-02-12 RX ADMIN — FOLIC ACID 1 MG: 1 TABLET ORAL at 08:04

## 2023-02-12 RX ADMIN — ALUMINUM HYDROXIDE, MAGNESIUM HYDROXIDE, AND SIMETHICONE 30 ML: 200; 200; 20 SUSPENSION ORAL at 08:03

## 2023-02-12 RX ADMIN — NICOTINE POLACRILEX 2 MG: 2 GUM, CHEWING BUCCAL at 15:40

## 2023-02-12 RX ADMIN — HYDROXYZINE HYDROCHLORIDE 50 MG: 50 TABLET, FILM COATED ORAL at 08:04

## 2023-02-12 RX ADMIN — THIAMINE HCL TAB 100 MG 100 MG: 100 TAB at 08:04

## 2023-02-12 RX ADMIN — HYDROXYZINE HYDROCHLORIDE 50 MG: 50 TABLET, FILM COATED ORAL at 15:40

## 2023-02-12 RX ADMIN — HYDROXYZINE HYDROCHLORIDE 50 MG: 50 TABLET, FILM COATED ORAL at 20:49

## 2023-02-12 ASSESSMENT — PAIN DESCRIPTION - DESCRIPTORS: DESCRIPTORS: ACHING

## 2023-02-12 ASSESSMENT — PAIN SCALES - GENERAL
PAINLEVEL_OUTOF10: 0
PAINLEVEL_OUTOF10: 10

## 2023-02-12 ASSESSMENT — PAIN DESCRIPTION - LOCATION: LOCATION: BACK;KNEE

## 2023-02-12 NOTE — GROUP NOTE
Group Therapy Note    Date: 2/12/2023    Group Start Time: 1400  Group End Time: 6127  Group Topic: Cognitive Skills    [facility]    CINDI Garcia        Group Therapy Note    Attendees: 5/22       Patient's Goal:  To improve cognitive functioning through decision making, problem solving and increasing concentration/focus. To increase interpersonal communication with peers. Notes:  Patient attended group and actively participated. Patient was pleasant and appropriate throughout group. Patient engaged in conversation with peers. Status After Intervention:  Improved    Participation Level:  Active Listener and Interactive    Participation Quality: Appropriate and Attentive      Speech:  normal      Thought Process/Content: Logical      Affective Functioning: Congruent      Mood: euthymic      Level of consciousness:  Alert and Attentive      Response to Learning: Able to verbalize current knowledge/experience and Progressing to goal      Endings: None Reported    Modes of Intervention: Socialization, Exploration, Problem-solving, Activity, and Reality-testing      Discipline Responsible: Psychoeducational Specialist      Signature:  Loni Morgan

## 2023-02-12 NOTE — GROUP NOTE
Group Therapy Note    Date: 2/12/2023    Group Start Time: 0900  Group End Time: 1000  Group Topic: Community Meeting    [facility] WILLIAM Paige        Group Therapy Note    Attendees: 3/23       Patient's Goal:  \"Participate more\" and \"keep positive more. \"      Status After Intervention:  Improved    Participation Level:  Active Listener and Interactive    Participation Quality: Appropriate, Attentive, Sharing, and Supportive      Speech:  normal and hesitant      Thought Process/Content: Logical  Linear      Affective Functioning: Congruent      Mood: depressed      Level of consciousness:  Alert, Oriented x4, and Attentive      Response to Learning: Able to verbalize current knowledge/experience, Able to verbalize/acknowledge new learning, and Able to retain information      Endings: None Reported    Modes of Intervention: Education, Support, Socialization, Exploration, Clarifying, and Problem-solving      Discipline Responsible: [address], [employer]      Signature:  Phylicia Paige

## 2023-02-12 NOTE — PLAN OF CARE
Problem: Behavior  Goal: Pt/Family maintain appropriate behavior and adhere to behavioral management agreement, if implemented  Description: INTERVENTIONS:  1. Assess patient/family's coping skills and  non-compliant behavior (including use of illegal substances)  2. Notify security of behavior or suspected illegal substances which indicate the need for search of the family and/or belongings  3. Encourage verbalization of thoughts and concerns in a socially appropriate manner  4. Utilize positive, consistent limit setting strategies supporting safety of patient, staff and others  5. Encourage participation in the decision making process about the behavioral management agreement  6. If a visitor's behavior poses a threat to safety call refer to organization policy. 7. Initiate consult with , Psychosocial CNS, Spiritual Care as appropriate  2/12/2023 1014 by Ana Partida LPN  Outcome: Progressing  Flowsheets (Taken 2/12/2023 1014)  Patient/family maintains appropriate behavior and adheres to behavioral management agreement, if implemented:   Encourage verbalization of thoughts and concerns in a socially appropriate manner   Assess patient/familys coping skills and  non-compliant behavior (including use of illegal substances)  Note: Patient is isolative in her room. She has been attending groups. Patient states she is doing a little better but still not where she needs to be. She expressed her grief about her daughter. She has been utilizing her atarax to help with her anxiety. She has been cooperative with her care here. She has been eating her meals and sleeping well. Problem: Self Care Deficit  Goal: Return ADL status to a safe level of function  Description: INTERVENTIONS:  1. Administer medication as ordered  2. Assess ADL deficits and provide assistive devices as needed  3. Obtain PT/OT consults as needed  4.  Assist and instruct patient to increase activity and self care as tolerated  2/12/2023 1014 by Pedro Melendez LPN  Outcome: Progressing     Problem: Pain  Goal: Verbalizes/displays adequate comfort level or baseline comfort level  2/12/2023 1014 by Pedro Melendez LPN  Outcome: Progressing     Problem: Coping  Goal: Pt/Family able to verbalize concerns and demonstrate effective coping strategies  Description: INTERVENTIONS:  1. Assist patient/family to identify coping skills, available support systems and cultural and spiritual values  2. Provide emotional support, including active listening and acknowledgement of concerns of patient and caregivers  3. Reduce environmental stimuli, as able  4. Instruct patient/family in relaxation techniques, as appropriate  5.  Assess for spiritual pain/suffering and initiate Spiritual Care, Psychosocial Clinical Specialist consults as needed  Outcome: Progressing

## 2023-02-12 NOTE — PLAN OF CARE
Problem: Behavior  Goal: Pt/Family maintain appropriate behavior and adhere to behavioral management agreement, if implemented  Description: INTERVENTIONS:  1. Assess patient/family's coping skills and  non-compliant behavior (including use of illegal substances)  2. Notify security of behavior or suspected illegal substances which indicate the need for search of the family and/or belongings  3. Encourage verbalization of thoughts and concerns in a socially appropriate manner  4. Utilize positive, consistent limit setting strategies supporting safety of patient, staff and others  5. Encourage participation in the decision making process about the behavioral management agreement  6. If a visitor's behavior poses a threat to safety call refer to organization policy. 7. Initiate consult with , Psychosocial CNS, Spiritual Care as appropriate  2/12/2023 0102 by Zoraida Rios RN  Outcome: Progressing  Flowsheets (Taken 2/12/2023 0100)  Patient/family maintains appropriate behavior and adheres to behavioral management agreement, if implemented:   Assess patient/familys coping skills and  non-compliant behavior (including use of illegal substances)   Encourage verbalization of thoughts and concerns in a socially appropriate manner   Encourage participation in the decision making process about the behavioral management agreement  Note: Pt maintains appropriate behavior on unit, adherent to treatment plan, has been attending unit group therapy, compliant with prescribed medication. Problem: Self Care Deficit  Goal: Return ADL status to a safe level of function  Description: INTERVENTIONS:  1. Administer medication as ordered  2. Assess ADL deficits and provide assistive devices as needed  3. Obtain PT/OT consults as needed  4.  Assist and instruct patient to increase activity and self care as tolerated  2/12/2023 0102 by Zoraida Rios RN  Outcome: Progressing  Flowsheets (Taken 2/12/2023 0102)  Return ADL Status to a Safe Level of Function:   Administer medication as ordered   Assess activities of daily living deficits and provide assistive devices as needed  Note: Pt flat, sad, grief continues over daughter's death four months ago. Pt out in the milieu, watching TV with peers. Pt independent with activities of daily living, tending to personal hygiene and able to maintain optimum functioning. Pt reports depression and anxiety rates both at 8/1-10. Pt reports fleeting suicidal thoughts but contracts for safety while on unit. Pt agreed to seek staff at anytime she feels unable to control the urge to hurt self or others. Will continue to provide emotional support and reassurance as needed. Pt remains free from any self-harm, safe environment maintained. Regular rounding 4 times an hour and spontaneous patient checks done per unit policy.

## 2023-02-13 PROCEDURE — 99231 SBSQ HOSP IP/OBS SF/LOW 25: CPT | Performed by: INTERNAL MEDICINE

## 2023-02-13 PROCEDURE — 1240000000 HC EMOTIONAL WELLNESS R&B

## 2023-02-13 PROCEDURE — 6370000000 HC RX 637 (ALT 250 FOR IP): Performed by: NURSE PRACTITIONER

## 2023-02-13 PROCEDURE — 6370000000 HC RX 637 (ALT 250 FOR IP)

## 2023-02-13 PROCEDURE — 6370000000 HC RX 637 (ALT 250 FOR IP): Performed by: PSYCHIATRY & NEUROLOGY

## 2023-02-13 RX ORDER — MIRTAZAPINE 15 MG/1
15 TABLET, FILM COATED ORAL NIGHTLY
Status: DISCONTINUED | OUTPATIENT
Start: 2023-02-13 | End: 2023-02-14 | Stop reason: HOSPADM

## 2023-02-13 RX ADMIN — MELOXICAM 7.5 MG: 7.5 TABLET ORAL at 21:14

## 2023-02-13 RX ADMIN — HYDROXYZINE HYDROCHLORIDE 50 MG: 50 TABLET, FILM COATED ORAL at 21:13

## 2023-02-13 RX ADMIN — THIAMINE HCL TAB 100 MG 100 MG: 100 TAB at 08:12

## 2023-02-13 RX ADMIN — TRAZODONE HYDROCHLORIDE 50 MG: 50 TABLET ORAL at 21:13

## 2023-02-13 RX ADMIN — FOLIC ACID 1 MG: 1 TABLET ORAL at 08:12

## 2023-02-13 RX ADMIN — NICOTINE POLACRILEX 2 MG: 2 GUM, CHEWING BUCCAL at 19:27

## 2023-02-13 RX ADMIN — DULOXETINE HYDROCHLORIDE 20 MG: 20 CAPSULE, DELAYED RELEASE ORAL at 08:12

## 2023-02-13 RX ADMIN — MIRTAZAPINE 15 MG: 15 TABLET, FILM COATED ORAL at 21:15

## 2023-02-13 ASSESSMENT — PAIN - FUNCTIONAL ASSESSMENT: PAIN_FUNCTIONAL_ASSESSMENT: ACTIVITIES ARE NOT PREVENTED

## 2023-02-13 ASSESSMENT — PAIN DESCRIPTION - LOCATION: LOCATION: BACK;KNEE

## 2023-02-13 ASSESSMENT — PAIN SCALES - GENERAL: PAINLEVEL_OUTOF10: 10

## 2023-02-13 ASSESSMENT — PAIN DESCRIPTION - DESCRIPTORS: DESCRIPTORS: ACHING;DISCOMFORT;PENETRATING

## 2023-02-13 NOTE — PLAN OF CARE
Problem: Behavior  Goal: Pt/Family maintain appropriate behavior and adhere to behavioral management agreement, if implemented  Description: INTERVENTIONS:  1. Assess patient/family's coping skills and  non-compliant behavior (including use of illegal substances)  2. Notify security of behavior or suspected illegal substances which indicate the need for search of the family and/or belongings  3. Encourage verbalization of thoughts and concerns in a socially appropriate manner  4. Utilize positive, consistent limit setting strategies supporting safety of patient, staff and others  5. Encourage participation in the decision making process about the behavioral management agreement  6. If a visitor's behavior poses a threat to safety call refer to organization policy. 7. Initiate consult with , Psychosocial CNS, Spiritual Care as appropriate  2/13/2023 1313 by Parish Nunez  Outcome: Progressing  Patient is medication compliant and behaviorally controlled. She is occasionally irritable but does take redirection well. Will continue to provide therapeutic environment, encourage patient to take prescribed medications and adhere to unit rules. Q15 min checks to ensure patient remains behaviorally controlled. Problem: Self Care Deficit  Goal: Return ADL status to a safe level of function  Description: INTERVENTIONS:  1. Administer medication as ordered  2. Assess ADL deficits and provide assistive devices as needed  3. Obtain PT/OT consults as needed  4. Assist and instruct patient to increase activity and self care as tolerated  2/13/2023 1313 by Parish Nuenz  Outcome: Progressing  Patient did perform ADLs today which included shower, teeth brush and change of clothing. Will continue to encourage ADL compliance. Problem: Coping  Goal: Pt/Family able to verbalize concerns and demonstrate effective coping strategies  Description: INTERVENTIONS:  1.  Assist patient/family to identify coping skills, available support systems and cultural and spiritual values  2. Provide emotional support, including active listening and acknowledgement of concerns of patient and caregivers  3. Reduce environmental stimuli, as able  4. Instruct patient/family in relaxation techniques, as appropriate  5. Assess for spiritual pain/suffering and initiate Spiritual Care, Psychosocial Clinical Specialist consults as needed  2/13/2023 1313 by Parish Nunez  Outcome: Progressing  Patient denies adequate coping skills. Educated on coping skills such as deep breathing, coloring and listening to music. Encouraged to attend unit programming and interact with peers and staff to increase coping skills.

## 2023-02-13 NOTE — GROUP NOTE
Group Therapy Note    Date: 2/13/2023    Group Start Time: 1430  Group End Time: 1520  Group Topic: Cognitive Skills    [facility]    Suyapa Welsh, JAVIERS        Group Therapy Note    Attendees: 5/15     Patient's Goal:  To increase social interaction and to practice expressing feelings, and explore/practice relaxation and positive coping skills. Notes: Pt participated fully in group. Pt was able to express feelings,  explore/practice relaxation and positive coping skills using creative depression and discussion. Pt shared individually and engaged in group discussion. Pt was able to relate to some of peers' ideas and experiences. Status After Intervention:  Improved         Participation Level: Active Listener ,sharing , supportive     Participation Quality:  Attentive , sharing, supportive     Speech:  Normal     Thought Process/Content: Logical ,linear. Pt was very supportive and positive when talking r/t the artwork/ideas of a young female peer in group. Affective Functioning: Congruent     Mood: Euthymic     Level of consciousness:  Alert, and Attentive     Response to Learning: Able to express current knowledge/experience , able to verbalize/acknowledge new learning, able to demonstrate insight, and Progressing to goal        Endings: None Reported     Modes of Intervention: Education, Support, Socialization, Exploration, Clarifying and Problem-solving.         Discipline Responsible: Psychoeducational Specialist    Jorje Merritt

## 2023-02-13 NOTE — PROGRESS NOTES
[facility]    HISTORY AND PHYSICAL EXAMINATION/ CONSULT NOTE            Date:   2023  Patient name:  Stephanie Turk  Date of admission:  2023  3:59 AM  MRN:   [id number]  Account:  [de-identified]  YOB: 1975  PCP:    No primary care provider on file. Room:   [address] [place] [address]  Code Status:    Full Code    Physician Requesting Consult: Francis Fuentes, *    Reason for Consult: History and physical, medical management    Chief Complaint:     No chief complaint on file. Medical management    History Obtained From:     Patient, EMR, nursing staff    History of Present Illness:     50-year-old female admitted with depression with suicidal ideation    Medical history significant for hypothyroid, lupus, hypertension, hyperlipidemia, fibromyalgia    Patient is not currently on medication for thyroid or hypertension      Past Medical History:     Past Medical History:   Diagnosis Date    Fibromyalgia     Headache     migraines    Hyperlipidemia     Hypertension     Hypothyroidism     Lupus ([facility] [address]     Thyroid disease         Past Surgical History:     Past Surgical History:   Procedure Laterality Date     SECTION      FOOT SURGERY      right    HYSTERECTOMY (CERVIX STATUS UNKNOWN)          Medications Prior to Admission:     Prior to Admission medications    Medication Sig Start Date End Date Taking?  Authorizing Provider   hydrOXYzine HCl (ATARAX) 25 MG tablet Take 25 mg by mouth 2 times daily as needed for Anxiety   Yes Historical Provider, MD   vilazodone hcl 10 MG TABS Take 10 mg by mouth daily   Yes Historical Provider, MD   nicotine (NICODERM CQ) 21 MG/24HR Place 1 patch onto the skin every 24 hours   Yes Historical Provider, MD   DULoxetine 40 MG CPEP Take 40 mg by mouth daily 23   Jarad CanEFRAIN olvera - CNP   mirtazapine (REMERON) 15 MG tablet Take 1 tablet by mouth nightly 23   Jarad CanEFRAIN olvera - JESSA   traZODone (DESYREL) 50 MG tablet Take 1 tablet by mouth nightly as needed for Sleep 23   Jarad Canwade, APRN - CNP        Allergies:     Bactrim [sulfamethoxazole-trimethoprim]    Social History:     Tobacco:    reports that she has been smoking cigarettes. She has a 11.50 pack-year smoking history. She has never used smokeless tobacco.  Alcohol:      reports no history of alcohol use. Drug Use:  reports current drug use. Drug: Marijuana Roxy Dax). Family History:     Family History   Problem Relation Age of Onset    High Blood Pressure Mother     Heart Disease Father     Stroke Maternal Grandmother     High Blood Pressure Maternal Grandfather     Heart Disease Paternal Grandmother     Heart Disease Paternal Grandfather        Review of Systems:     Positive and Negative as described in HPI. Denies any shortness of breath or cough  Denies chest pain or palpitations  Denies abdominal pain, diarrhea vomiting  Denies any new numbness tremors or weakness. 10 point review of systems was negative other than mentioned above    Physical Exam:     /66   Pulse 54   Temp 98 °F (36.7 °C) (Oral)   Resp 14   Ht 5' 4\" (1.626 m)   Wt 125 lb (56.7 kg)   BMI 21.46 kg/m²   Temp (24hrs), Av.1 °F (36.7 °C), Min:98 °F (36.7 °C), Max:98.1 °F (36.7 °C)    No results for input(s): POCGLU in the last 72 hours. No intake or output data in the 24 hours ending 23 1804    General Appearance:  alert, well appearing, and in no acute distress  Head:  normocephalic, atraumatic.   Eye: no icterus, redness, pupils equal and reactive, extraocular eye movements intact, conjunctiva clear  Ear: normal external ear, no discharge, hearing intact  Nose:  no drainage noted  Mouth: mucous membranes moist  Neck: supple, no carotid bruits, thyroid not palpable  Lungs: Bilateral equal air entry, clear to ausculation, no wheezing, rales or rhonchi, normal effort  Cardiovascular: normal rate, regular rhythm, no murmur, gallop, rub.  Abdomen: Soft, nontender, nondistended, normal bowel sounds, no hepatomegaly or splenomegaly  Neurologic: There are no new focal motor or sensory deficits, normal muscle tone and bulk, no abnormal sensation, normal speech, cranial nerves II through XII grossly intact  Skin: No gross lesions, rashes, bruising or bleeding on exposed skin area  Extremities:  No joint swelling, no pedal edema or calf pain with palpation      Investigations:      Laboratory Testing:  No results found for this or any previous visit (from the past 24 hour(s)).      Imaging/Diagonstics:  Recent data reviewed    Assessment :      Primary Problem  Major depressive disorder, recurrent severe without psychotic features (HCC)    Active Hospital Problems    Diagnosis Date Noted    Cannabis abuse [F12.10] 02/09/2023     Priority: Medium    Cocaine use disorder (HCC) [F14.10] 02/09/2023     Priority: Medium    Major depressive disorder, recurrent severe without psychotic features (HCC) [F33.2] 01/06/2023     Priority: Medium       Plan:     Reason for consult: General medical management     Depression with suicidal ideation-management per psychiatry  Hypertension-currently controlled patient not on medication  Hyperlipidemia-check lipid profile  History of hypothyroid-we will check TSH  Labs and vitals including liver enzymes reviewed and satisfactory  DVT prophylaxis-not required, patient is mobile      2/13  Labs, radiology, medications, vitals reviewed,  Blood pressure running well,  Polysubstance abuse, watch for any withdrawal,  Alcohol intoxication, watch for any withdrawal,  Will sign off, plz call if needed       Consultations:   IP CONSULT TO INTERNAL MEDICINE      Magdaleno Velásquez MD  2/13/2023  6:04 PM    Copy sent to No primary care provider on file.    Please note that this chart was generated using voice recognition Dragon dictation software.  Although every effort was made to ensure the accuracy of this automated transcription,  some errors in transcription may have occurred.

## 2023-02-13 NOTE — PROGRESS NOTES
Pt up in alcove area , playing Monopoly with Nursing Students and peers. Pt bright, social, engaging in conversation.

## 2023-02-13 NOTE — GROUP NOTE
Group Therapy Note    Date: 2/13/2023    Group Start Time: 0100  Group End Time: 4557  Group Topic: Cognitive Skills    LAILA BHRACHELLE D    Renetta Rodriguez, CTRS        Group Therapy Note    Attendees: 7/21     Patient's Goal:  To increase social interaction and practice/improve decision making, concentration, and communication skills         Notes: Pt participated fully in task. Pt was able to practice/improve decision making skills, concentration, and communication skills . Status After Intervention: Improved         Participation Level: Active Listener ,sharing ,supportive     Participation Quality:  Attentive , sharing ,supportive     Speech:  Normal     Thought Process/Content: Logical ,linear to task     Affective Functioning: Congruent. , brightens    Mood: Euthymic, enjoys humor with group     Level of consciousness:  Alert, attentive     Response to Learning: Able to express current knowledge/experience , able to verbalize/acknowledge new learning , and Progressing to goal        Endings: None Reported     Modes of Intervention: Education, Support, Socialization, Exploration, Clarifying and Problem-solving.         Discipline Responsible: Psychoeducational Specialist      Signature:  Jerry Gr

## 2023-02-13 NOTE — PROGRESS NOTES
02/13/23 1420   Encounter Summary   Encounter Overview/Reason  Spiritual/Emotional Needs;Grief, Loss, and Adjustments   Service Provided For: Patient   Referral/Consult From: Rounding   Last Encounter  02/13/23   Complexity of Encounter Moderate   Begin Time 0130   End Time  0200   Total Time Calculated 30 min   Spiritual/Emotional needs   Type Spiritual Support   Behavioral Health    Type  Spirituality Group   Assessment/Intervention/Outcome   Assessment Calm   Intervention Active listening;Prayer (assurance of)/Enterprise;Nurtured Hope   Outcome Receptive

## 2023-02-13 NOTE — BH NOTE
Patient not receptive to 1:1, patient is easily irritable, and easily agitated when instructed on the rules of the milieu. Patient instructed on boundaries and limits while on the unit and become intrusive and disruptive verbally abusive towards staff and intentionally encouraging others to be disruptive as well. Instructed and encouraged effective communication during 1:1, patient declines. Instructed to attend programming. Writer will continue to encouraged therapeutic communication and participation in treatment, while maintaining boundaries and limit setting.

## 2023-02-13 NOTE — PLAN OF CARE
Problem: Coping  Goal: Pt/Family able to verbalize concerns and demonstrate effective coping strategies  Description: INTERVENTIONS:  1. Assist patient/family to identify coping skills, available support systems and cultural and spiritual values  2. Provide emotional support, including active listening and acknowledgement of concerns of patient and caregivers  3. Reduce environmental stimuli, as able  4. Instruct patient/family in relaxation techniques, as appropriate  5. Assess for spiritual pain/suffering and initiate Spiritual Care, Psychosocial Clinical Specialist consults as needed  Outcome: Progressing     Problem: Pain  Goal: Verbalizes/displays adequate comfort level or baseline comfort level  Outcome: Progressing     Problem: Self Care Deficit  Goal: Return ADL status to a safe level of function  Description: INTERVENTIONS:  1. Administer medication as ordered  2. Assess ADL deficits and provide assistive devices as needed  3. Obtain PT/OT consults as needed  4. Assist and instruct patient to increase activity and self care as tolerated  Outcome: Progressing     Problem: Behavior  Goal: Pt/Family maintain appropriate behavior and adhere to behavioral management agreement, if implemented  Description: INTERVENTIONS:  1. Assess patient/family's coping skills and  non-compliant behavior (including use of illegal substances)  2. Notify security of behavior or suspected illegal substances which indicate the need for search of the family and/or belongings  3. Encourage verbalization of thoughts and concerns in a socially appropriate manner  4. Utilize positive, consistent limit setting strategies supporting safety of patient, staff and others  5. Encourage participation in the decision making process about the behavioral management agreement  6. If a visitor's behavior poses a threat to safety call refer to organization policy.   7. Initiate consult with , Psychosocial CNS, Spiritual Care as appropriate  Outcome: Progressing    Patient is out and social with room mate, asks about wrap up/ goals group, group done with patient and her room mate by Ruth Ann Boyle. Patient expresses improvement in sleep and mood related to getting sleep, reports she was up early but has been able to sleep through the night. Patient reports that she wanted to have a good day which she believes she did. Patient describes certain family members within her family discusses areas for improvement in life. Patient later approaches writer asking for heat to be turned up and lights to be shut off in room, staff called maintenance for heat however explained that the lights are timed. Patient then responds with \"I just asked the wrong person. \" Patient tends to staff split related to requests. Patient sleeps well throughout the night however wakes at approximately 530 shouting in her room upset that staff is doing Q 15 min safety checks and she can hear the door. Patient displays minimal insight as to rules related to this type of facility and can be verbally disrespectful to staff. She denies thoughts to harm herself others and hallucinations.

## 2023-02-13 NOTE — PROGRESS NOTES
Daily Progress Note  2/12/2023    Patient Name: Jyoti Tomlin:  Depression with suicidal ideation with plan and intent to walk into traffic          SUBJECTIVE:    Patient was seen for follow-up assessment today. She has been medication adherent and behaviorally in control. She has not required any emergency medications in the last 24 hours. Nursing staff report patient continues to be cooperative. Spoke very time in the milieu periodically. She does isolate for extended periods of time in her room. She was observed to be participating in recreational therapy again prior to assessment. She is agreeable to conversation and privacy unit sensory room. Patient reports that her mood was slightly improved due to trying to stay busy with groups and games. She expressed enjoying herself, but the thoughts of her daughter are always in the back of her mind. Suicidal ideation is intermittent and she continues to have the desire to no longer wake up so she can be with her daughter. Thoughts and speech are organized and linear and she makes no delusional or paranoid statements. She has been attending to hygiene needs and ADLs. Appetite is somewhat improved she reports. Sleep has also been adequate since commencement of low-dose Remeron. Although modestly improving patient is not yet able to contract for safety in the community and warrants further hospitalization for safety and stabilization.     Appetite:  [] Adequate/Unchanged  [x] Increased  [] Decreased      Sleep:       [x] Adequate/Unchanged  [] Fair  [] Poor      Group Attendance on Unit:   [] Yes   [x] Selectively    [] No    Compliant with scheduled medications: [x] Yes  [] No    Received emergency medications in past 24 hrs: [] Yes   [x] No    Medication Side Effects: Denies         Mental Status Exam  Level of consciousness: Alert and awake   Appearance: Appropriate attire for setting, seated in chair, with good  grooming and hygiene   Behavior/Motor: Approachable, engages with interviewer, no psychomotor abnormalities  Attitude toward examiner: Cooperative, attentive, fair eye contact  Speech: Decreased volume, normal rate, soft, depressed tone  Mood: Constricted  Affect: Blunted  Thought processes: linear, goal directed, and coherent   Thought content:  Denies homicidal ideation  Suicidal Ideation: Active suicidal ideations, contracts for safety on the unit; unable to contract for safety in community  Delusions: No evidence of delusions. Perceptual Disturbance: Denies hallucinations; patient is not observed to be responding to internal stimuli. Cognition: Oriented to self, location, time, situation  Memory: intact  Insight: fair   Judgement: fair     Data   height is 5' 4\" (1.626 m) and weight is 125 lb (56.7 kg). Her oral temperature is 98.1 °F (36.7 °C). Her blood pressure is 126/72 and her pulse is 67. Her respiration is 15. Labs:   Admission on 02/09/2023   Component Date Value Ref Range Status    TSH 02/10/2023 2.25  0.30 - 5.00 uIU/mL Final    Cholesterol, Fasting 02/10/2023 192  <200 mg/dL Final    Comment:    Cholesterol Guidelines:      <200  Desirable   200-240  Borderline      >240  Undesirable         HDL 02/10/2023 67  >40 mg/dL Final    Comment:    HDL Guidelines:    <40     Undesirable   40-59    Borderline    >59     Desirable         LDL Cholesterol 02/10/2023 113  0 - 130 mg/dL Final    Comment:    LDL Guidelines:     <100    Desirable   100-129   Near to/above Desirable   130-159   Borderline      >159   Undesirable     Direct (measured) LDL and calculated LDL are not interchangeable tests. Chol/HDL Ratio 02/10/2023 2.9  <5 Final            Triglyceride, Fasting 02/10/2023 61  <150 mg/dL Final    Comment:    Triglyceride Guidelines:     <150   Desirable   150-199  Borderline   200-499  High     >499   Very high   Based on AHA Guidelines for fasting triglyceride, October 2012.          Hemoglobin A1C 02/10/2023 5.6  4.0 - 6.0 % Final    Estimated Avg Glucose 02/10/2023 114  mg/dL Final    Comment: The ADA and AACC recommend providing the estimated average glucose result to permit better   patient understanding of their HBA1c result. Reviewed patient's current plan of care and vital signs with nursing staff. Labs reviewed: [x] Yes    Medications  Current Facility-Administered Medications: DULoxetine (CYMBALTA) extended release capsule 20 mg, 20 mg, Oral, Daily  acetaminophen (TYLENOL) tablet 650 mg, 650 mg, Oral, Q4H PRN  aluminum & magnesium hydroxide-simethicone (MAALOX) 200-200-20 MG/5ML suspension 30 mL, 30 mL, Oral, Q6H PRN  hydrOXYzine HCl (ATARAX) tablet 50 mg, 50 mg, Oral, TID PRN  ibuprofen (ADVIL;MOTRIN) tablet 400 mg, 400 mg, Oral, Q6H PRN  nicotine polacrilex (NICORETTE) gum 2 mg, 2 mg, Oral, PRN  polyethylene glycol (GLYCOLAX) packet 17 g, 17 g, Oral, Daily PRN  traZODone (DESYREL) tablet 50 mg, 50 mg, Oral, Nightly PRN  haloperidol (HALDOL) tablet 5 mg, 5 mg, Oral, Q4H PRN **AND** LORazepam (ATIVAN) tablet 2 mg, 2 mg, Oral, Q4H PRN  haloperidol lactate (HALDOL) injection 5 mg, 5 mg, IntraMUSCular, Q4H PRN **AND** LORazepam (ATIVAN) injection 2 mg, 2 mg, IntraMUSCular, Q4H PRN **AND** diphenhydrAMINE (BENADRYL) injection 50 mg, 50 mg, IntraMUSCular, Q4H PRN  mirtazapine (REMERON) tablet 7.5 mg, 7.5 mg, Oral, Nightly  thiamine mononitrate tablet 100 mg, 100 mg, Oral, Daily  folic acid (FOLVITE) tablet 1 mg, 1 mg, Oral, Daily    ASSESSMENT  Major depressive disorder, recurrent severe without psychotic features ([facility] [address]         PLAN  Patient symptoms are: Showing modest improvement  No Medication Changes Today  Monitor need and frequency of PRN medications. Encourage participation in groups and milieu. Attempt to develop insight. Psycho-education conducted. Probable discharge is undetermined  Follow-up daily while inpatient.      Patient continues to be monitored in the inpatient psychiatric facility at [facility] for safety and stabilization. Patient continues to need, on a daily basis, active treatment furnished directly by or requiring the supervision of inpatient psychiatric personnel. Electronically signed by EFRAIN Boyd CNP on 2/12/2023 at 9:44 PM    **This report has been created using voice recognition software. It may contain minor errors which are inherent in voice recognition technology. **

## 2023-02-13 NOTE — GROUP NOTE
Group Therapy Note    Date: 2/13/2023    Group Start Time: 0900  Group End Time: 0915  Group Topic: Community Meeting    [facility]    Blayne Monsivais      Group Therapy Note    Attendees: 8/20       Patient's Goal: Patient will verbalize today's goals. Patient will also offer supportive listening and interact with peers to clarify and                   understand clearly set goals. Notes: Patient is making progress AEB participating in group discussion, actively listening, and supporting other group members. Status After Intervention: Improved      Participation Level:  Active Listener and Interactive      Participation Quality: Appropriate, Attentive, Sharing, and Supportive      Speech: normal      Thought Process/Content: Logical, Linear      Affective Functioning: Congruent      Mood: anxious      Level of consciousness: Oriented x4      Response to Learning: Able to verbalize current knowledge/experience, Able to verbalize/acknowledge new learning,      Able to retain information, and Capable of insight      Endings: None Reported      Modes of Intervention: Education, Support, Socialization, and Problem-solving        Discipline Responsible: Behavorial Health Tech      Signature: Blayne Monsivais

## 2023-02-14 VITALS
SYSTOLIC BLOOD PRESSURE: 122 MMHG | HEIGHT: 64 IN | WEIGHT: 125 LBS | RESPIRATION RATE: 14 BRPM | DIASTOLIC BLOOD PRESSURE: 79 MMHG | BODY MASS INDEX: 21.34 KG/M2 | HEART RATE: 66 BPM | TEMPERATURE: 97.2 F

## 2023-02-14 PROCEDURE — 6370000000 HC RX 637 (ALT 250 FOR IP): Performed by: NURSE PRACTITIONER

## 2023-02-14 RX ORDER — HYDROXYZINE 50 MG/1
50 TABLET, FILM COATED ORAL 3 TIMES DAILY PRN
Qty: 30 TABLET | Refills: 0 | Status: SHIPPED | OUTPATIENT
Start: 2023-02-14 | End: 2023-02-24

## 2023-02-14 RX ORDER — FOLIC ACID 1 MG/1
1 TABLET ORAL DAILY
Qty: 30 TABLET | Refills: 0 | Status: SHIPPED | OUTPATIENT
Start: 2023-02-15

## 2023-02-14 RX ORDER — THIAMINE MONONITRATE (VIT B1) 100 MG
100 TABLET ORAL DAILY
Qty: 30 TABLET | Refills: 0 | Status: SHIPPED | OUTPATIENT
Start: 2023-02-15

## 2023-02-14 RX ORDER — TRAZODONE HYDROCHLORIDE 50 MG/1
50 TABLET ORAL NIGHTLY PRN
Qty: 30 TABLET | Refills: 0 | Status: SHIPPED | OUTPATIENT
Start: 2023-02-14

## 2023-02-14 RX ORDER — MELOXICAM 7.5 MG/1
7.5 TABLET ORAL DAILY PRN
Qty: 30 TABLET | Refills: 0 | Status: SHIPPED | OUTPATIENT
Start: 2023-02-14

## 2023-02-14 RX ORDER — MIRTAZAPINE 15 MG/1
15 TABLET, FILM COATED ORAL NIGHTLY
Qty: 30 TABLET | Refills: 0 | Status: SHIPPED | OUTPATIENT
Start: 2023-02-14

## 2023-02-14 RX ORDER — DULOXETIN HYDROCHLORIDE 20 MG/1
20 CAPSULE, DELAYED RELEASE ORAL DAILY
Qty: 30 CAPSULE | Refills: 0 | Status: SHIPPED | OUTPATIENT
Start: 2023-02-15

## 2023-02-14 RX ADMIN — THIAMINE HCL TAB 100 MG 100 MG: 100 TAB at 08:07

## 2023-02-14 RX ADMIN — DULOXETINE HYDROCHLORIDE 20 MG: 20 CAPSULE, DELAYED RELEASE ORAL at 08:07

## 2023-02-14 RX ADMIN — FOLIC ACID 1 MG: 1 TABLET ORAL at 08:07

## 2023-02-14 NOTE — CARE COORDINATION
Patient approached social work stating she had an address she would like to be cabbed to for discharge.  She stated she would like to return home for just a few hours and then planned to go to [facility] for treatment.   Social work faxed completed [facility] application for patient and will schedule her for follow up at [facility].

## 2023-02-14 NOTE — PLAN OF CARE
Problem: Behavior  Goal: Pt/Family maintain appropriate behavior and adhere to behavioral management agreement, if implemented  Description: INTERVENTIONS:  1. Assess patient/family's coping skills and  non-compliant behavior (including use of illegal substances)  2. Notify security of behavior or suspected illegal substances which indicate the need for search of the family and/or belongings  3. Encourage verbalization of thoughts and concerns in a socially appropriate manner  4. Utilize positive, consistent limit setting strategies supporting safety of patient, staff and others  5. Encourage participation in the decision making process about the behavioral management agreement  6. If a visitor's behavior poses a threat to safety call refer to organization policy. 7. Initiate consult with , Psychosocial CNS, Spiritual Care as appropriate  Outcome: Progressing  Flowsheets (Taken 2/12/2023 1014 by Livia James LPN)  Patient/family maintains appropriate behavior and adheres to behavioral management agreement, if implemented:   Encourage verbalization of thoughts and concerns in a socially appropriate manner   Assess patient/familys coping skills and  non-compliant behavior (including use of illegal substances)     Problem: Self Care Deficit  Goal: Return ADL status to a safe level of function  Description: INTERVENTIONS:  1. Administer medication as ordered  2. Assess ADL deficits and provide assistive devices as needed  3. Obtain PT/OT consults as needed  4. Assist and instruct patient to increase activity and self care as tolerated  Outcome: Progressing  Flowsheets (Taken 2/14/2023 0517)  Return ADL Status to a Safe Level of Function:   Administer medication as ordered   Assess activities of daily living deficits and provide assistive devices as needed  Note: Pt brightened this shift, out in the milieu social with peers.  Pt independent with activities of daily living, tending to personal hygiene and able to maintain optimum functioning. Problem: Pain  Goal: Verbalizes/displays adequate comfort level or baseline comfort level  Outcome: Progressing  Flowsheets (Taken 2/14/2023 0526)  Verbalizes/displays adequate comfort level or baseline comfort level:   Encourage patient to monitor pain and request assistance   Assess pain using appropriate pain scale   Administer analgesics based on type and severity of pain and evaluate response   Implement non-pharmacological measures as appropriate and evaluate response   Consider cultural and social influences on pain and pain management  Note: Pt complains of generalized pain rates at 8/10. Staff educated pt of non pharmacological pain intervention such as distraction, relaxation. Pt requested PRN analgesic, states pain medication helps decrease pain level. Problem: Coping  Goal: Pt/Family able to verbalize concerns and demonstrate effective coping strategies  Description: INTERVENTIONS:  1. Assist patient/family to identify coping skills, available support systems and cultural and spiritual values  2. Provide emotional support, including active listening and acknowledgement of concerns of patient and caregivers  3. Reduce environmental stimuli, as able  4. Instruct patient/family in relaxation techniques, as appropriate  5.  Assess for spiritual pain/suffering and initiate Spiritual Care, Psychosocial Clinical Specialist consults as needed  Outcome: Progressing  Flowsheets (Taken 2/14/2023 0526)  Patient/family able to verbalize anxieties, fears, and concerns, and demonstrate effective coping:   Assist patient/family to identify coping skills, available support systems and cultural and spiritual values   Provide emotional support, including active listening and acknowledgement of concerns of patient and caregivers   Reduce environmental stimuli, as able   Instruct patient/family in relaxation techniques, as appropriate

## 2023-02-14 NOTE — BH NOTE
Patient given tobacco quitline number [phone] at this time, refusing to call at this time, states \" I just dont want to quit now\"- patient given information as to the dangers of long term tobacco use. Continue to reinforce the importance of tobacco cessation.

## 2023-02-14 NOTE — DISCHARGE INSTRUCTIONS
Information:  Medications:   Medication summary provided   I understand that I should take only the medications on my list.     -why and when I need to take each medicine.     -which side effects to watch for.     -that I should carry my medication information at all times in case of     Emergency situations. I will take all of my medicines to follow up appointments.     -check with my physician or pharmacist before taking any new    Medication, over the counter product or drink alcohol.    -Ask about food, drug or dietary supplement interactions.    -discard old lists and update records with medication providers. Notify Physician:  Notify physician if you notice:   Always call 911 if you feel your life is in danger  In case of an emergency call 911 immediately! If 911 is not available call your local emergency medical system for help    Behavioral Health Follow Up:  Original Referral Source:[facility]  Discharge Diagnosis: Depression with suicidal ideation [F32. A, R45.851]  Recommendations for Level of Care: Follow up  Patient status at discharge: Stable  [facility] hospital  was: [facility]  Aftercare plan faxed: Dalton Borges    -faxed by: Nba Brown   -date: 2/14   -time: 1522  Prescriptions: [facility]    Smoking: Quit Smoking. Call the [employer]'s smoking quitline at 4-755-46U-QUIT  Know the signs of a heart attack   If you have any of the following symptoms call 911 immediately, do not wait more    Than five minutes. 1. Pressure, fullness and/ or squeezing in the center of the chest spreading to    The jaw, neck or shoulder. 2. Chest discomfort with light headedness, fainting, sweating, nausea or    Shortness of breath. 3. Upper abdominal pressure or discomfort. 4. Lower chest pain, back pain, unusual fatigue, shortness of breath, nausea   Or dizziness.      General Information:   Questions regarding your bill: Call HELP program [phone]     Suicide Hotline ([address])  (725) 174-2926      Recovery Help line- 161.723.3493      To obtain results of pending studies call Medical Records at: [phone]     For emergencies and 24 hour/7 days a week contact information:  [phone]

## 2023-02-14 NOTE — BH NOTE
[facility]  Discharge Note    Pt discharged with followings belongings:   Dental Appliances: None  Vision - Corrective Lenses: None  Hearing Aid: None  Jewelry: None  Body Piercings Removed: N/A  Clothing: Pants, Sweater, Undergarments, Socks, Footwear  Other Valuables: Other (Comment) (none)   Valuables sent home with patient or returned to patient. Patient educated on aftercare instructions: yes  Information faxed to [facility] by   at 12:32 PM .Patient verbalize understanding of AVS:  yes. Status EXAM upon discharge:  Mental Status and Behavioral Exam  Normal: No  Level of Assistance: Independent/Self  Facial Expression: Exaggerated  Affect: Appropriate  Level of Consciousness: Alert  Frequency of Checks: 4 times per hour, close  Mood:Normal: No  Mood: Depressed, Anxious, Angry  Motor Activity:Normal: Yes  Motor Activity: Increased  Eye Contact: Good  Observed Behavior: Cooperative, Agitated, Aggressive  Sexual Misconduct History: Current - no  History of Sexually Inappropriate Behavior When Previously Hospitalized?: No  Preception: [place] to person, [place] to time, [place] to place, [place] to situation  Attention:Normal: No  Attention: Distractible  Thought Processes: Circumstantial  Thought Content:Normal: No  Thought Content: Preoccupations  Depression Symptoms: Impaired concentration  Anxiety Symptoms: Generalized  Maria Teresa Symptoms: No problems reported or observed.   Hallucinations: None  Delusions: No  Memory:Normal: Yes  Memory: Poor recent  Insight and Judgment: No  Insight and Judgment: Poor judgment, Poor insight    Tobacco Screening:  Practical Counseling, on admission, jayme X, if applicable and completed (first 3 are required if patient doesn't refuse):            ( ) Recognizing danger situations (included triggers and roadblocks)                    ( ) Coping skills (new ways to manage stress,relaxation techniques, changing routine, distraction) ( ) Basic information about quitting (benefits of quitting, techniques in how to quit, available resources  ( ) Referral for counseling faxed to [facility]                                                                                                                   ( x ) Patient refused counseling  ( ) Patient refused referral  ( ) Patient refused prescription upon discharge  ( ) Patient has not smoked in the last 30 days    Metabolic Screening:    Lab Results   Component Value Date    LABA1C 5.6 02/10/2023       No results found for: CHOL  No results found for: TRIG  Lab Results   Component Value Date    HDL 67 02/10/2023     No components found for: LDLCAL  No results found for: Chelly Madsen LPN  Patient discharged to [facility] by [facility] in which was approved by charge nurse. Walked out by staff.

## 2023-02-14 NOTE — CARE COORDINATION
Name: Nadira Garcia    : 1975    Discharge Date: 23    Primary Auth/Cert #: [id number]    Destination: Patient discharged to [facility]     Discharge Medications:      Medication List        START taking these medications      folic acid 1 MG tablet  Commonly known as: Ledy Aguilarner  Take 1 tablet by mouth daily  Start taking on: February 15, 2023  Notes to patient: supplemt     meloxicam 7.5 MG tablet  Commonly known as: MOBIC  Take 1 tablet by mouth daily as needed (once daily as needed for leg pain)  Notes to patient: Leg pain     vitamin B-1 100 MG tablet  Commonly known as: THIAMINE  Take 1 tablet by mouth daily  Start taking on: February 15, 2023  Notes to patient: supplement            CHANGE how you take these medications      DULoxetine 20 MG extended release capsule  Commonly known as: CYMBALTA  Take 1 capsule by mouth daily  Start taking on: February 15, 2023  What changed:   medication strength  how much to take  Notes to patient: depression     hydrOXYzine HCl 50 MG tablet  Commonly known as: ATARAX  Take 1 tablet by mouth 3 times daily as needed for Anxiety  What changed:   medication strength  how much to take  when to take this  Notes to patient: anxiety            CONTINUE taking these medications      mirtazapine 15 MG tablet  Commonly known as: REMERON  Take 1 tablet by mouth nightly  Notes to patient: depression     traZODone 50 MG tablet  Commonly known as: DESYREL  Take 1 tablet by mouth nightly as needed for Sleep  Notes to patient: Sleep             STOP taking these medications      nicotine 21 MG/24HR  Commonly known as: NICODERM CQ     vilazodone hcl 10 MG Tabs  Generic drug: vilazodone HCl               Where to Get Your Medications        These medications were sent to [facility] [address] [facility]  [address], [address] [facility] [address]      Phone: [phone]   DULoxetine 20 MG extended release capsule  folic acid 1 MG tablet  hydrOXYzine HCl 50 MG tablet  meloxicam 7.5 MG tablet  mirtazapine 15 MG tablet  traZODone 50 MG tablet  vitamin B-1 100 MG tablet         Follow Up Appointment: [address] [place] [address] [address] [phone]  Follow up on 2/20/2023  You are scheduled at 11:15 AM with the [facility] clinic

## 2023-02-14 NOTE — DISCHARGE SUMMARY
Provider Discharge Summary     Patient ID:  Abby Haddad  [id number]  23 y.o.  1975    Admit date: 2023    Discharge date and time: 2023  3:59 PM     Admitting Physician: Juancarlos Kong MD     Discharge Physician: Stacey Kellogg MD    Admission Diagnoses: Depression with suicidal ideation [F32. A, R45.851]    Discharge Diagnoses:      Major depressive disorder, recurrent severe without psychotic features [facility])     Patient Active Problem List   Diagnosis Code    Essential hypertension I10    Smoking F17.200    Other chronic pain G89.29    Mass on back R22.2    Depression with suicidal ideation F32. A, R45.851    Major depressive disorder, recurrent severe without psychotic features ([facility] [address] F33.2    Cannabis abuse F12.10    Cocaine use disorder ([facility] [address] F14.10        Admission Condition: poor    Discharged Condition: stable    Indication for Admission: threat to self    History of Present Illnes (present tense wording is of findings from admission exam and are not necessarily indicative of current findings):   Abby Haddad is a 49 y.o. female who has a past medical history of hypertension, fibromyalgia, lupus, hyperlipidemia, hypothyroidism, depression, and polysubstance use. Patient presented to the ED reporting ongoing suicidal thoughts and symptoms of depression. Her daughter  in a car accident in November and she has been struggling with intense grief and suicidal thoughts. She was recently discharged from [facility] in 2023. Patient was seen for initial evaluation today. She was resting in bed but awake. She presented as sad and withdrawn. She became tearful during discussion of the past few months. She reports that seeing her daughter's children for the first time since her daughter's death was a trigger to increasing suicidal thoughts and depressive symptoms. Patient reports that she continues to feel extremely helpless and hopeless.   She has difficulty falling asleep and staying asleep and experiences early morning waking. She has little appetite. She has very little motivation to get up or care for herself. She describes ongoing anhedonia, decreased energy, and poor concentration. Suicidal ideation has been intermittent but very intense over the last couple of days. She continues to report ongoing suicidal thoughts today and does not feel that she is safe to return to her home at this time. Patient is also reporting intense symptoms of anxiety and currently rates it a 10/10, 10 being the worst.  She describes excessive worry, restlessness, feeling on edge, irritability, muscle tension, and nervousness. She does endorse a history of panic attacks. Patient is not currently linked with [facility] but is interested in going to [place] for medication management, counseling, and grief support group. Patient reports that she continues to drink daily since her daughter's death. Blood alcohol level on 2/9/2023 was 0.085. Patient reports that she does not experience symptoms of withdrawal when abstaining from alcohol. She does endorse daily marijuana and frequent crack cocaine use. Urine drug screen dated 2/9/2023 was positive for marijuana, cocaine, and fentanyl. She was surprised that her drug screen was positive for fentanyl and is adamant that she did not take fentanyl. Cocaine may have been laced. Patient reports that she would like to be admitted to an inpatient AOD treatment facility following discharge. She is encouraged to discuss this with the . Patient smokes a pack of cigarettes per day. Patient denies any recent or past symptoms of joe, OCD, PTSD, psychosis, or phobias. At this time patient is unable to contract for safety in the community and warrants hospitalization for safety and stabilization. Hospital Course:   Upon admission, Vandana Castorena was provided a safe secure environment, introduced to unit milieu.  Patient participated in groups and individual therapies. Meds were adjusted as noted below. After few days of hospital care, patient began to feel improvement. Depression lifted, thoughts to harm self ceased. Sleep improved, appetite was good. On morning rounds 2/14/2023, Skip Shires  endorses feeling ready for discharge. Patient denies suicidal or homicidal ideations, denies hallucinations or delusions. Denies SE's from meds. It was decided that maximum benefit from hospital care had been achieved and patient can be discharged. Consults:   Internal medicine    Significant Diagnostic Studies: Routine labs and diagnostics    Treatments: Psychotropic medications, therapy with group, milieu, and 1:1 with nurses, social workers, PALARRY/CNP, and Attending physician.       Discharge Medications:  Discharge Medication List as of 2/14/2023 11:12 AM        START taking these medications    Details   meloxicam (MOBIC) 7.5 MG tablet Take 1 tablet by mouth daily as needed (once daily as needed for leg pain), Disp-30 tablet, [id number]      folic acid (FOLVITE) 1 MG tablet Take 1 tablet by mouth daily, Disp-30 tablet, R-0Normal      thiamine mononitrate (THIAMINE) 100 MG tablet Take 1 tablet by mouth daily, Disp-30 tablet, R-0Normal           CONTINUE these medications which have CHANGED    Details   hydrOXYzine HCl (ATARAX) 50 MG tablet Take 1 tablet by mouth 3 times daily as needed for Anxiety, Disp-30 tablet, R-0Normal      DULoxetine (CYMBALTA) 20 MG extended release capsule Take 1 capsule by mouth daily, Disp-30 capsule, R-0Normal      mirtazapine (REMERON) 15 MG tablet Take 1 tablet by mouth nightly, Disp-30 tablet, R-0Normal      traZODone (DESYREL) 50 MG tablet Take 1 tablet by mouth nightly as needed for Sleep, Disp-30 tablet, R-0Normal           STOP taking these medications       vilazodone hcl 10 MG TABS Comments:   Reason for Stopping:         nicotine (NICODERM CQ) 21 MG/24HR Comments:   Reason for Stopping: Core Measures statement:   Not applicable    Discharge Exam:  Level of consciousness:  Within normal limits  Appearance: Street clothes, seated, with good grooming  Behavior/Motor: No abnormalities noted  Attitude toward examiner:  Cooperative, attentive, good eye contact  Speech:  spontaneous, normal rate, normal volume and well articulated  Mood:  euthymic  Affect:  Full range  Thought processes:  linear, goal directed and coherent  Thought content:  denies homicidal ideation  Suicidal Ideation:  denies suicidal ideation  Delusions:  no evidence of delusions  Perceptual Disturbance:  denies any perceptual disturbance  Cognition:  Intact  Memory: age appropriate  Insight & Judgement: fair  Medication side effects: denies     Disposition: home    Patient Instructions: Activity: activity as tolerated  1. Patient instructed to take medications regularly and follow up with outpatient appointments. Follow-up as scheduled with [facility]      Signed:    Electronically signed by EFRAIN Davila CNP on 2/14/23 at 3:59 PM EST    Time Spent on discharge is more than 30 minutes in the examination, evaluation, counseling and review of medications and discharge plan.

## 2023-02-14 NOTE — PROGRESS NOTES
Daily Progress Note  2/13/2023    Patient Name: Johnathan Boston:  Depression with suicidal ideation with plan and intent to walk into traffic          SUBJECTIVE:    Patient was seen for follow-up assessment today. When approached for interview patient continues to endorse depression as significant related to her daughter however states suicidal ideations have significantly improved. Patient does endorse desire for continued support while she gains an improved sense of stability related to depression. Plan discussed with patient about further titration of Remeron and pending stability, plan of discharge in 1 to 2 days. Patient reports desire to follow up with outpatient therapy in the community. Patient does not contract for safety outside the hospital at this time however is agreeable that she is moving in this direction, we will continue to observe for readiness of discharge pending stability.         Appetite:  [] Adequate/Unchanged  [x] Increased  [] Decreased      Sleep:       [x] Adequate/Unchanged  [] Fair  [] Poor      Group Attendance on Unit:   [x] Yes   [] Selectively    [] No    Compliant with scheduled medications: [x] Yes  [] No    Received emergency medications in past 24 hrs: [] Yes   [x] No    Medication Side Effects: Denies         Mental Status Exam  Level of consciousness: Alert and awake   Appearance: Appropriate attire for setting, seated in chair, with good  grooming and hygiene   Behavior/Motor: Approachable, engages with interviewer, no psychomotor abnormalities  Attitude toward examiner: Cooperative, attentive, fair eye contact  Speech: Normal volume, normal rate, depressed tone  Mood: \"Okay\"  Affect: Congruent  Thought processes: linear, goal directed, and coherent   Thought content:  Denies homicidal ideation  Suicidal Ideation: Reports improvement in suicidal ideations, contracts for safety on the unit; unable to contract for safety in community  Delusions: No evidence of delusions. Perceptual Disturbance: Denies hallucinations; patient is not observed to be responding to internal stimuli. Cognition: Oriented to self, location, time, situation  Memory: intact  Insight: fair   Judgement: fair     Data   height is 5' 4\" (1.626 m) and weight is 125 lb (56.7 kg). Her temperature is 98 °F (36.7 °C). Her blood pressure is 125/75 and her pulse is 61. Her respiration is 14. Labs:   Admission on 02/09/2023   Component Date Value Ref Range Status    TSH 02/10/2023 2.25  0.30 - 5.00 uIU/mL Final    Cholesterol, Fasting 02/10/2023 192  <200 mg/dL Final    Comment:    Cholesterol Guidelines:      <200  Desirable   200-240  Borderline      >240  Undesirable         HDL 02/10/2023 67  >40 mg/dL Final    Comment:    HDL Guidelines:    <40     Undesirable   40-59    Borderline    >59     Desirable         LDL Cholesterol 02/10/2023 113  0 - 130 mg/dL Final    Comment:    LDL Guidelines:     <100    Desirable   100-129   Near to/above Desirable   130-159   Borderline      >159   Undesirable     Direct (measured) LDL and calculated LDL are not interchangeable tests. Chol/HDL Ratio 02/10/2023 2.9  <5 Final            Triglyceride, Fasting 02/10/2023 61  <150 mg/dL Final    Comment:    Triglyceride Guidelines:     <150   Desirable   150-199  Borderline   200-499  High     >499   Very high   Based on AHA Guidelines for fasting triglyceride, October 2012. Hemoglobin A1C 02/10/2023 5.6  4.0 - 6.0 % Final    Estimated Avg Glucose 02/10/2023 114  mg/dL Final    Comment: The ADA and AACC recommend providing the estimated average glucose result to permit better   patient understanding of their HBA1c result. Reviewed patient's current plan of care and vital signs with nursing staff.     Labs reviewed: [x] Yes    Medications  Current Facility-Administered Medications: mirtazapine (REMERON) tablet 15 mg, 15 mg, Oral, Nightly  meloxicam (MOBIC) tablet 7.5 mg, 7.5 mg, Oral, Daily PRN  DULoxetine (CYMBALTA) extended release capsule 20 mg, 20 mg, Oral, Daily  acetaminophen (TYLENOL) tablet 650 mg, 650 mg, Oral, Q4H PRN  aluminum & magnesium hydroxide-simethicone (MAALOX) 200-200-20 MG/5ML suspension 30 mL, 30 mL, Oral, Q6H PRN  hydrOXYzine HCl (ATARAX) tablet 50 mg, 50 mg, Oral, TID PRN  nicotine polacrilex (NICORETTE) gum 2 mg, 2 mg, Oral, PRN  polyethylene glycol (GLYCOLAX) packet 17 g, 17 g, Oral, Daily PRN  traZODone (DESYREL) tablet 50 mg, 50 mg, Oral, Nightly PRN  haloperidol (HALDOL) tablet 5 mg, 5 mg, Oral, Q4H PRN **AND** LORazepam (ATIVAN) tablet 2 mg, 2 mg, Oral, Q4H PRN  haloperidol lactate (HALDOL) injection 5 mg, 5 mg, IntraMUSCular, Q4H PRN **AND** LORazepam (ATIVAN) injection 2 mg, 2 mg, IntraMUSCular, Q4H PRN **AND** diphenhydrAMINE (BENADRYL) injection 50 mg, 50 mg, IntraMUSCular, Q4H PRN  thiamine mononitrate tablet 100 mg, 100 mg, Oral, Daily  folic acid (FOLVITE) tablet 1 mg, 1 mg, Oral, Daily    ASSESSMENT  Major depressive disorder, recurrent severe without psychotic features ([facility] [address]         PLAN  Patient symptoms: Showing improvement  Medications Changed Today. A discussion of risks, benefits, and alternatives was held with the patient and this provider with regards to medication changes. After this discussion we mutually agreed to proceed with the medication changes. Modify order: Titrate Remeron to 15 mg nightly  Monitor need and frequency of PRN medications. Encourage participation in groups and milieu. Attempt to develop insight. Psycho-education conducted. Probable discharge is 1 to 2 days. Follow-up daily while inpatient. Patient continues to be monitored in the inpatient psychiatric facility at [facility] for safety and stabilization. Patient continues to need, on a daily basis, active treatment furnished directly by or requiring the supervision of inpatient psychiatric personnel.     Electronically signed by EFRAIN Dudley CNP on 2/13/2023 at 10:06 PM    **This report has been created using voice recognition software. It may contain minor errors which are inherent in voice recognition technology. **

## 2023-02-14 NOTE — BH NOTE
Patient given tobacco quitline number [phone] at this time, refusing to call at this time, states \" I just dont want to quit now\"- patient given information as to the dangers of long term tobacco use. Continue to reinforce the importance of tobacco cessation.

## 2023-02-14 NOTE — CARE COORDINATION
At patient's request social work has faxed clinical referral to Mustapha Mancini at [facility].     Phone: [phone]  Fax: [phone]

## 2023-02-14 NOTE — GROUP NOTE
Group Therapy Note    Date: 2/13/2023    Group Start Time: 2030  Group End Time: 2120  Group Topic: Wrap-Up    LAILA Lewis        Group Therapy Note    Attendees: 18/7       Patient's Goal:  refused & left early    Notes:  refused    Status After Intervention:  Unchanged    Participation Level: None    Participation Quality: Resistant      Speech:  hesitant      Thought Process/Content: Perseverating      Affective Functioning: Blunted      Mood: depressed      Level of consciousness:  Alert, Oriented x4, and Preoccupied      Response to Learning: Resistant      Endings: None Reported    Modes of Intervention: Support      Discipline Responsible: Behavorial Health Tech      Signature:  Yani Brownlee

## 2023-02-14 NOTE — PROGRESS NOTES
CLINICAL PHARMACY NOTE: MEDS TO BEDS    Total # of Prescriptions Filled: 7   The following medications were delivered to the patient:  Hydroxyzine HCL 50mg  Meloxicam 7.5mg  Thiamine Mononitrate 938VA  Folic Acid 1mg  Trazodone HCL 50mg  Mirtazapine 15mg  Duloxetine HCL 20mg    Additional Documentation:  Delivered medications to nurses station

## 2023-02-24 ENCOUNTER — HOSPITAL ENCOUNTER (EMERGENCY)
Age: 48
Discharge: HOME | End: 2023-02-24
Attending: EMERGENCY MEDICINE
Payer: MEDICAID

## 2023-02-24 ENCOUNTER — ANCILLARY PROCEDURE (OUTPATIENT)
Dept: EMERGENCY DEPT | Age: 48
End: 2023-02-24
Payer: MEDICAID

## 2023-02-24 VITALS
OXYGEN SATURATION: 99 % | HEART RATE: 88 BPM | BODY MASS INDEX: 24.89 KG/M2 | SYSTOLIC BLOOD PRESSURE: 134 MMHG | DIASTOLIC BLOOD PRESSURE: 84 MMHG | RESPIRATION RATE: 14 BRPM | WEIGHT: 145 LBS | TEMPERATURE: 97.5 F

## 2023-02-24 DIAGNOSIS — A59.9 TRICHIMONIASIS: ICD-10-CM

## 2023-02-24 DIAGNOSIS — N76.0 BV (BACTERIAL VAGINOSIS): Primary | ICD-10-CM

## 2023-02-24 DIAGNOSIS — B96.89 BV (BACTERIAL VAGINOSIS): Primary | ICD-10-CM

## 2023-02-24 LAB
ABSOLUTE EOS #: 0.11 K/UL (ref 0–0.44)
ABSOLUTE IMMATURE GRANULOCYTE: <0.03 K/UL (ref 0–0.3)
ABSOLUTE LYMPH #: 1.75 K/UL (ref 1.1–3.7)
ABSOLUTE MONO #: 0.44 K/UL (ref 0.1–1.2)
ANION GAP SERPL CALCULATED.3IONS-SCNC: 10 MMOL/L (ref 9–17)
BASOPHILS # BLD: 1 % (ref 0–2)
BASOPHILS ABSOLUTE: 0.04 K/UL (ref 0–0.2)
BILIRUBIN URINE: NEGATIVE
BUN SERPL-MCNC: 16 MG/DL (ref 6–20)
CALCIUM SERPL-MCNC: 9.7 MG/DL (ref 8.6–10.4)
CANDIDA SPECIES, DNA PROBE: NEGATIVE
CHLORIDE SERPL-SCNC: 104 MMOL/L (ref 98–107)
CO2 SERPL-SCNC: 25 MMOL/L (ref 20–31)
COLOR: YELLOW
COMMENT UA: NORMAL
CREAT SERPL-MCNC: 0.62 MG/DL (ref 0.5–0.9)
EOSINOPHILS RELATIVE PERCENT: 3 % (ref 1–4)
GARDNERELLA VAGINALIS, DNA PROBE: POSITIVE
GFR SERPL CREATININE-BSD FRML MDRD: >60 ML/MIN/1.73M2
GLUCOSE SERPL-MCNC: 99 MG/DL (ref 70–99)
GLUCOSE UR STRIP.AUTO-MCNC: NEGATIVE MG/DL
HCG(URINE) PREGNANCY TEST: NEGATIVE
HCT VFR BLD AUTO: 38.8 % (ref 36.3–47.1)
HGB BLD-MCNC: 12.4 G/DL (ref 11.9–15.1)
IMMATURE GRANULOCYTES: 1 %
KETONES UR STRIP.AUTO-MCNC: NEGATIVE MG/DL
LEUKOCYTE ESTERASE UR QL STRIP.AUTO: NEGATIVE
LIPASE SERPL-CCNC: 61 U/L (ref 13–60)
LYMPHOCYTES # BLD: 42 % (ref 24–43)
MCH RBC QN AUTO: 29.7 PG (ref 25.2–33.5)
MCHC RBC AUTO-ENTMCNC: 32 G/DL (ref 28.4–34.8)
MCV RBC AUTO: 92.8 FL (ref 82.6–102.9)
MONOCYTES # BLD: 11 % (ref 3–12)
NITRITE UR QL STRIP.AUTO: NEGATIVE
NRBC AUTOMATED: 0 PER 100 WBC
PDW BLD-RTO: 14.1 % (ref 11.8–14.4)
PLATELET # BLD AUTO: 169 K/UL (ref 138–453)
PMV BLD AUTO: 12.5 FL (ref 8.1–13.5)
POTASSIUM SERPL-SCNC: 4.3 MMOL/L (ref 3.7–5.3)
PROT UR STRIP.AUTO-MCNC: 6 MG/DL (ref 5–8)
PROT UR STRIP.AUTO-MCNC: NEGATIVE MG/DL
RBC # BLD: 4.18 M/UL (ref 3.95–5.11)
SEG NEUTROPHILS: 42 % (ref 36–65)
SEGMENTED NEUTROPHILS ABSOLUTE COUNT: 1.83 K/UL (ref 1.5–8.1)
SODIUM SERPL-SCNC: 139 MMOL/L (ref 135–144)
SOURCE: ABNORMAL
SPECIFIC GRAVITY UA: 1.02 (ref 1–1.03)
TRICHOMONAS VAGINALIS DNA: POSITIVE
TURBIDITY: CLEAR
URINE HGB: NEGATIVE
UROBILINOGEN, URINE: NORMAL
WBC # BLD AUTO: 4.2 K/UL (ref 3.5–11.3)

## 2023-02-24 PROCEDURE — 80048 BASIC METABOLIC PNL TOTAL CA: CPT

## 2023-02-24 PROCEDURE — 96374 THER/PROPH/DIAG INJ IV PUSH: CPT

## 2023-02-24 PROCEDURE — 6360000002 HC RX W HCPCS: Performed by: HEALTH CARE PROVIDER

## 2023-02-24 PROCEDURE — 87480 CANDIDA DNA DIR PROBE: CPT

## 2023-02-24 PROCEDURE — 85025 COMPLETE CBC W/AUTO DIFF WBC: CPT

## 2023-02-24 PROCEDURE — 99284 EMERGENCY DEPT VISIT MOD MDM: CPT

## 2023-02-24 PROCEDURE — 87491 CHLMYD TRACH DNA AMP PROBE: CPT

## 2023-02-24 PROCEDURE — 83690 ASSAY OF LIPASE: CPT

## 2023-02-24 PROCEDURE — 81003 URINALYSIS AUTO W/O SCOPE: CPT

## 2023-02-24 PROCEDURE — 87591 N.GONORRHOEAE DNA AMP PROB: CPT

## 2023-02-24 PROCEDURE — 6370000000 HC RX 637 (ALT 250 FOR IP): Performed by: HEALTH CARE PROVIDER

## 2023-02-24 PROCEDURE — 3209999900 POC US FAST ABDOMEN LIMITED

## 2023-02-24 PROCEDURE — 87660 TRICHOMONAS VAGIN DIR PROBE: CPT

## 2023-02-24 PROCEDURE — 87510 GARDNER VAG DNA DIR PROBE: CPT

## 2023-02-24 PROCEDURE — 81025 URINE PREGNANCY TEST: CPT

## 2023-02-24 RX ORDER — KETOROLAC TROMETHAMINE 30 MG/ML
30 INJECTION, SOLUTION INTRAMUSCULAR; INTRAVENOUS ONCE
Status: DISCONTINUED | OUTPATIENT
Start: 2023-02-24 | End: 2023-02-24

## 2023-02-24 RX ORDER — METRONIDAZOLE 500 MG/1
500 TABLET ORAL ONCE
Status: COMPLETED | OUTPATIENT
Start: 2023-02-24 | End: 2023-02-24

## 2023-02-24 RX ORDER — METRONIDAZOLE 500 MG/1
500 TABLET ORAL 2 TIMES DAILY
Qty: 14 TABLET | Refills: 0 | Status: SHIPPED | OUTPATIENT
Start: 2023-02-24 | End: 2023-03-03

## 2023-02-24 RX ORDER — KETOROLAC TROMETHAMINE 30 MG/ML
30 INJECTION, SOLUTION INTRAMUSCULAR; INTRAVENOUS ONCE
Status: COMPLETED | OUTPATIENT
Start: 2023-02-24 | End: 2023-02-24

## 2023-02-24 RX ADMIN — KETOROLAC TROMETHAMINE 30 MG: 30 INJECTION, SOLUTION INTRAMUSCULAR; INTRAVENOUS at 15:51

## 2023-02-24 RX ADMIN — METRONIDAZOLE 500 MG: 500 TABLET ORAL at 17:17

## 2023-02-24 ASSESSMENT — PAIN SCALES - GENERAL: PAINLEVEL_OUTOF10: 10

## 2023-02-24 ASSESSMENT — PAIN - FUNCTIONAL ASSESSMENT: PAIN_FUNCTIONAL_ASSESSMENT: 0-10

## 2023-02-24 NOTE — ED NOTES
Pt took IV out to go outside to smoke. PT states that she wants to leave because she will be missing dinner. PT provided with boxed lunch. Dr. Ranjith Lopez notified.         Nikunj Blackwell RN  02/24/23 1949

## 2023-02-24 NOTE — ED NOTES
Per Dr. Abigail Mcneill, pt okay to self swab for vaginal swabs.       Catracho Parra RN  02/24/23 [id number]

## 2023-02-24 NOTE — ED PROVIDER NOTES
[facility]     Emergency Department     Faculty Note/ Attestation      Pt Name: Eze Schaffer                                       MRN: [id number]  [place] 1975  Date of evaluation: 2/24/2023    Patients PCP:    No primary care provider on file. Attestation  I performed a history and physical examination of the patient and discussed management with the resident. I reviewed the residents note and agree with the documented findings and plan of care. Any areas of disagreement are noted on the chart. I was personally present for the key portions of any procedures. I have documented in the chart those procedures where I was not present during the key portions. I have reviewed the emergency nurses triage note. I agree with the chief complaint, past medical history, past surgical history, allergies, medications, social and family history as documented unless otherwise noted below. For Physician Assistant/ Nurse Practitioner cases/documentation I have personally evaluated this patient and have completed at least one if not all key elements of the E/M (history, physical exam, and MDM). Additional findings are as noted. Initial Screens:        [place] Coma Scale  Eye Opening: Spontaneous  Best Verbal Response: Oriented  Best Motor Response: Obeys commands  [place] Coma Scale Score: 15    Vitals:    Vitals:    02/24/23 1439   BP: 134/84   Pulse: 88   Resp: 14   Temp: 97.5 °F (36.4 °C)   TempSrc: Oral   SpO2: 99%   Weight: 145 lb (65.8 kg)       CHIEF COMPLAINT       Chief Complaint   Patient presents with    Hernia     Concern for     Abdominal Pain       Patient 24-year-old female complaint concerns that she has her bellybutton disappearing. Patient is concerned that this is a hernia patient also concerned about abdominal pain is elicited at multiple different places to multiple different providers throughout the abdomen patient currently noting is just above the umbilicus. Tender to palpation when ultrasound of the pancreas        EMERGENCY DEPARTMENT COURSE:     -------------------------  BP: 134/84, Temp: 97.5 °F (36.4 °C), Heart Rate: 88, Resp: 14  Mid abdominal tenderness worse in area of the pancreas patient has no peritonitis no peritoneal signs    Comments  Medical Decision Making  Amount and/or Complexity of Data Reviewed  Labs: ordered. Decision-making details documented in ED Course. Radiology: ordered. Decision-making details documented in ED Course. Details: POCUS    Risk  Prescription drug management. The patient arrives complaining of diffuse abdominal pain. Concerns for this patient that needed to be investigated immediately include: Aortic aneurysm  Aortic Dissection  Appendicitis  Bowel Obstruction  Metabolic disorders (DKA, PETRONA, [place]'s and such)  Pancreatitis  Perforated bowel  Peritonitis  Mesenteric Ischemia    Based on exam the patient concerns are unlikely for pain is not out of proportion to exam some mesenteric ischemia unlikely patient also has no abdominal peritoneal signs still passing gas perforated bowel very unlikely in this patient given the patient's pain no pancreatitis is possible lipase will be obtained as well as metabolic labs including CMP for assessment of biliary obstruction though patient's gallbladder is normal on ultrasound appendicitis very unlikely without any pain in the left or right lower quadrant on my exam no signs of aortic aneurysm or dissection good pulses in bilateral lower extremities. ED Course as of 02/24/23 1834 Fri Feb 24, 2023   1702 Trichomonas Vaginalis DNA(!): POSITIVE [JS]   1702 Gardnerella Vaginalis, DNA Probe(!): POSITIVE [JS]   1703 Lipase(!): 61  Down from 94. [JS]   9075 Follow up plans and ER return precautions discussed. Patient verbalized understanding and had no further questions at time of discharge.  [JS]      ED Course User Index  [JS] DO Claudio Duffy DO,, DO, RDMS.  Attending Emergency Physician         Carly Collins DO  02/24/23 8672

## 2023-02-24 NOTE — ED NOTES
Pt to ER for c/o abdominal pain and concern for a hernia. Pt states that she was cleaning her stomach in the shower when she felt like her belly button had disappeared. Pt states that she felt like it may be a hernia but is unsure because there is nothing sticking out. PT also complaining of bloating that has been on going with this. PT states that she feels like she ate a whole bunch and now is feeling full and has been passing a lot of gas. Pt states that her abdomen is sore to the touch. Pt states that she is having normal bowel movements and denies urinary symptoms. Pt noted to be tender on palpation around the middle abdomen. No acute distress noted, RR even and NL.       Cailin Kelley RN  02/24/23 2486

## 2023-02-24 NOTE — DISCHARGE INSTRUCTIONS
Call today or tomorrow to follow up with your PCP  in 2-3 days. Do not have any sexual intercourse until the test results (if obtained) are completed in 2 - 3 days. STI Testing results are available on [employer], here in the hospital with ID, and you should follow up with these with the health department or primary doctor. Make sure you use condoms at all times. Take your medication as indicated, if you are given an antibiotic then make sure you get the prescription filled and take the antibiotics until finished. Drink plenty of water while taking the antibiotics. Avoid drinking alcohol or drinks that have caffeine it in while taking antibiotics. Rossi Ferrer has some antibiotics for free; Yg King has a 4 dollar prescription plan for some antibiotics. Return to the Emergency Department for worsening of symptoms, pain with urination, any vaginal discharge, any other care or concern.

## 2023-02-27 LAB
C TRACH DNA SPEC QL PROBE+SIG AMP: NEGATIVE
N GONORRHOEA DNA SPEC QL PROBE+SIG AMP: NEGATIVE
SPECIMEN DESCRIPTION: NORMAL

## 2023-02-27 ASSESSMENT — ENCOUNTER SYMPTOMS
NAUSEA: 0
DIARRHEA: 0
ABDOMINAL PAIN: 1
SORE THROAT: 0
CONSTIPATION: 0
SHORTNESS OF BREATH: 0
VOMITING: 0

## 2023-02-27 NOTE — ED PROVIDER NOTES
[address] ED  Emergency Department Encounter  Emergency Medicine Resident     Pt Jean-Paul Ya  MRN: [id number]  [place] 1975  Date of evaluation: 23  PCP:  No primary care provider on file. Note Started: 10:16 AM EST      CHIEF COMPLAINT       Chief Complaint   Patient presents with    Hernia     Concern for     Abdominal Pain       HISTORY OF PRESENT ILLNESS  (Location/Symptom, Timing/Onset, Context/Setting, Quality, Duration, Modifying Factors, Severity.)      Bon Grant is a 49 y.o. female who presents with concerns for abdominal pain. Patient states she was in the shower earlier today and she felt like she \"lost her bellybutton\" and presents with concern for potential hernia. Patient also reports some stomach bloating and diarrhea, as well as feeling more gassy. Does report she's lactose intolerant but has been eating a lot of dairy products. Reports some generalized abdominal discomfort as well as vaginal discharge. Denies any other fevers, chills, nausea/vomiting, chest pain, SOB, upper or lower extremity numbness/weakness/paresthesias. PAST MEDICAL / SURGICAL / SOCIAL / FAMILY HISTORY      has a past medical history of Fibromyalgia, Headache, Hyperlipidemia, Hypertension, Hypothyroidism, Lupus ([address] and Thyroid disease. has a past surgical history that includes  section; Foot surgery; and Hysterectomy.     Social History     Socioeconomic History    Marital status: Single     Spouse name: Not on file    Number of children: Not on file    Years of education: Not on file    Highest education level: Not on file   Occupational History    Not on file   Tobacco Use    Smoking status: Every Day     Packs/day: 0.50     Years: 23.00     Pack years: 11.50     Types: Cigarettes    Smokeless tobacco: Never   Substance and Sexual Activity    Alcohol use: No    Drug use: Yes     Types: Marijuana Fatmata Ochoa     Comment: Deysi-puffs    Sexual activity: Not on file   Other Topics Concern    Not on file   Social History Narrative    Not on file     Social Determinants of Health     Financial Resource Strain: Not on file   Food Insecurity: Not on file   Transportation Needs: Not on file   Physical Activity: Not on file   Stress: Not on file   Social Connections: Not on file   Intimate Partner Violence: Not on file   Housing Stability: Not on file       Family History   Problem Relation Age of Onset    High Blood Pressure Mother     Heart Disease Father     Stroke Maternal Grandmother     High Blood Pressure Maternal Grandfather     Heart Disease Paternal Grandmother     Heart Disease Paternal Grandfather        Allergies:  Bactrim [sulfamethoxazole-trimethoprim]    Home Medications:  Prior to Admission medications    Medication Sig Start Date End Date Taking? Authorizing Provider   metroNIDAZOLE (FLAGYL) 500 MG tablet Take 1 tablet by mouth 2 times daily for 7 days 2/24/23 3/3/23 Yes Tiffani Gao DO   meloxicam (MOBIC) 7.5 MG tablet Take 1 tablet by mouth daily as needed (once daily as needed for leg pain) 2/14/23   Jarad Cancilla, APRN - CNP   DULoxetine (CYMBALTA) 20 MG extended release capsule Take 1 capsule by mouth daily 2/15/23   Jarad Cancilla, APRN - CNP   mirtazapine (REMERON) 15 MG tablet Take 1 tablet by mouth nightly 2/14/23   Jarad Cancilla, APRN - CNP   traZODone (DESYREL) 50 MG tablet Take 1 tablet by mouth nightly as needed for Sleep 2/14/23   Ajrad Cancilla, APRN - CNP   folic acid (FOLVITE) 1 MG tablet Take 1 tablet by mouth daily 2/15/23   Jarad Cancilla, APRN - CNP   thiamine mononitrate (THIAMINE) 100 MG tablet Take 1 tablet by mouth daily 2/15/23   Jarad Cancilla, APRN - CNP     REVIEW OF SYSTEMS       Review of Systems   Constitutional:  Negative for chills and fever. HENT:  Negative for ear pain, hearing loss and sore throat. Eyes:  Negative for visual disturbance. Respiratory:  Negative for shortness of breath. Cardiovascular:  Negative for chest pain. Gastrointestinal:  Positive for abdominal pain. Negative for constipation, diarrhea, nausea and vomiting. Genitourinary:  Positive for vaginal discharge. Negative for difficulty urinating, dysuria, pelvic pain, vaginal bleeding and vaginal pain. Musculoskeletal:  Negative for arthralgias and myalgias. Neurological:  Negative for numbness. Psychiatric/Behavioral:  Negative for agitation and confusion. PHYSICAL EXAM      INITIAL VITALS:   /84   Pulse 88   Temp 97.5 °F (36.4 °C) (Oral)   Resp 14   Wt 145 lb (65.8 kg)   SpO2 99%   BMI 24.89 kg/m²     Physical Exam  Vitals and nursing note reviewed. Constitutional:       General: She is not in acute distress. Appearance: She is well-developed. She is not diaphoretic. HENT:      Head: Normocephalic and atraumatic. Right Ear: External ear normal.      Left Ear: External ear normal.      Nose: Nose normal.   Eyes:      Conjunctiva/sclera: Conjunctivae normal.   Neck:      Trachea: No tracheal deviation. Cardiovascular:      Rate and Rhythm: Normal rate and regular rhythm. Heart sounds: Normal heart sounds. No murmur heard. No friction rub. No gallop. Pulmonary:      Effort: Pulmonary effort is normal. No respiratory distress. Breath sounds: Normal breath sounds. Abdominal:      General: Bowel sounds are normal.      Palpations: Abdomen is soft. Tenderness: There is abdominal tenderness in the suprapubic area. Musculoskeletal:         General: No tenderness. Normal range of motion. Cervical back: Neck supple. Skin:     General: Skin is warm and dry. Capillary Refill: Capillary refill takes less than 2 seconds. Neurological:      Mental Status: She is alert and oriented to person, place, and time. Motor: No abnormal muscle tone.          DDX/DIAGNOSTIC RESULTS / EMERGENCY DEPARTMENT COURSE / MDM     Medical Decision Making  49year old female who presents at this time for concerns of generalized abdominal discomfort. At time of initial presentation, patient was in no acute distress, vital signs stable. We will plan for bedside abdominal ultrasound, abdominal labs, urinalysis, patient describes an attempt some analgesia at this time. Amount and/or Complexity of Data Reviewed  Labs: ordered. Decision-making details documented in ED Course. Radiology: ordered. Risk  Prescription drug management. EKG    All EKG's are interpreted by the Emergency Department Physician who either signs or Co-signs this chart in the absence of a cardiologist.    EMERGENCY DEPARTMENT COURSE:    ED Course as of 02/27/23 1033   Fri Feb 24, 2023   1702 Trichomonas Vaginalis DNA(!): POSITIVE [JS]   1702 Gardnerella Vaginalis, DNA Probe(!): POSITIVE [JS]   1703 Lipase(!): 61  Down from 94. [JS]   2481 Patient was update on all results. First dose of Flagyl was given in ER. Follow up plans and ER return precautions discussed. Patient verbalized understanding and had no further questions at time of discharge. [JS]      ED Course User Index  [JS] Saul Mcqueen DO       PROCEDURES:    CONSULTS:  None    CRITICAL CARE:    FINAL IMPRESSION      1. BV (bacterial vaginosis)    2.  Trichimoniasis          DISPOSITION / PLAN     DISPOSITION Decision To Discharge 02/24/2023 05:13:06 PM      PATIENT REFERRED TO:  [facility]  [facility]  [phone]  Go to   As needed, If symptoms worsen    [address] [address] [phone]  Schedule an appointment as soon as possible for a visit   As needed      DISCHARGE MEDICATIONS:  Discharge Medication List as of 2/24/2023  5:13 PM        START taking these medications    Details   metroNIDAZOLE (FLAGYL) 500 MG tablet Take 1 tablet by mouth 2 times daily for 7 days, Disp-14 tablet, R-0Normal             Kalen Mackenzie DO  Emergency Medicine Resident    (Please note that portions of thisnote were completed with a voice recognition program.  Efforts were made to edit the dictations but occasionally words are mis-transcribed.)     Kathy Arthur DO  Resident  02/27/23 1032

## 2023-03-02 ENCOUNTER — HOSPITAL ENCOUNTER (OUTPATIENT)
Age: 48
Setting detail: SPECIMEN
Discharge: HOME | End: 2023-03-02

## 2023-03-03 LAB
25(OH)D3 SERPL-MCNC: 16.8 NG/ML
ABSOLUTE EOS #: 0.13 K/UL (ref 0–0.44)
ABSOLUTE IMMATURE GRANULOCYTE: <0.03 K/UL (ref 0–0.3)
ABSOLUTE LYMPH #: 1.17 K/UL (ref 1.1–3.7)
ABSOLUTE MONO #: 0.39 K/UL (ref 0.1–1.2)
ALBUMIN SERPL-MCNC: 4.5 G/DL (ref 3.5–5.2)
ALBUMIN/GLOBULIN RATIO: 1.6 (ref 1–2.5)
ALP SERPL-CCNC: 65 U/L (ref 35–104)
ALT SERPL-CCNC: 24 U/L (ref 5–33)
ANION GAP SERPL CALCULATED.3IONS-SCNC: 11 MMOL/L (ref 9–17)
AST SERPL-CCNC: 24 U/L
BACTERIA: ABNORMAL
BASOPHILS # BLD: 2 % (ref 0–2)
BASOPHILS ABSOLUTE: 0.05 K/UL (ref 0–0.2)
BILIRUB SERPL-MCNC: 0.2 MG/DL (ref 0.3–1.2)
BILIRUBIN URINE: NEGATIVE
BUN SERPL-MCNC: 14 MG/DL (ref 6–20)
CALCIUM SERPL-MCNC: 9.6 MG/DL (ref 8.6–10.4)
CHLORIDE SERPL-SCNC: 108 MMOL/L (ref 98–107)
CHOLEST SERPL-MCNC: 206 MG/DL
CHOLESTEROL/HDL RATIO: 2.9
CO2 SERPL-SCNC: 24 MMOL/L (ref 20–31)
COLOR: YELLOW
CREAT SERPL-MCNC: 0.76 MG/DL (ref 0.5–0.9)
EOSINOPHILS RELATIVE PERCENT: 4 % (ref 1–4)
EPITHELIAL CELLS UA: ABNORMAL /HPF (ref 0–5)
GFR SERPL CREATININE-BSD FRML MDRD: >60 ML/MIN/1.73M2
GLUCOSE SERPL-MCNC: 88 MG/DL (ref 70–99)
GLUCOSE UR STRIP.AUTO-MCNC: NEGATIVE MG/DL
HCT VFR BLD AUTO: 38.3 % (ref 36.3–47.1)
HDLC SERPL-MCNC: 72 MG/DL
HGB BLD-MCNC: 12 G/DL (ref 11.9–15.1)
IMMATURE GRANULOCYTES: 0 %
KETONES UR STRIP.AUTO-MCNC: NEGATIVE MG/DL
LDLC SERPL CALC-MCNC: 127 MG/DL (ref 0–130)
LEUKOCYTE ESTERASE UR QL STRIP.AUTO: ABNORMAL
LYMPHOCYTES # BLD: 37 % (ref 24–43)
MCH RBC QN AUTO: 29.6 PG (ref 25.2–33.5)
MCHC RBC AUTO-ENTMCNC: 31.3 G/DL (ref 28.4–34.8)
MCV RBC AUTO: 94.3 FL (ref 82.6–102.9)
MONOCYTES # BLD: 12 % (ref 3–12)
MUCUS: ABNORMAL
NITRITE UR QL STRIP.AUTO: NEGATIVE
NRBC AUTOMATED: 0 PER 100 WBC
PDW BLD-RTO: 14.4 % (ref 11.8–14.4)
PLATELET # BLD AUTO: ABNORMAL K/UL (ref 138–453)
PLATELET, FLUORESCENCE: 159 K/UL (ref 138–453)
PLATELET, IMMATURE FRACTION: 8.2 % (ref 1.1–10.3)
POTASSIUM SERPL-SCNC: 5.3 MMOL/L (ref 3.7–5.3)
PROT SERPL-MCNC: 7.4 G/DL (ref 6.4–8.3)
PROT UR STRIP.AUTO-MCNC: 6.5 MG/DL (ref 5–8)
PROT UR STRIP.AUTO-MCNC: NEGATIVE MG/DL
RBC # BLD: 4.06 M/UL (ref 3.95–5.11)
RBC CLUMPS #/AREA URNS AUTO: ABNORMAL /HPF (ref 0–4)
SEG NEUTROPHILS: 45 % (ref 36–65)
SEGMENTED NEUTROPHILS ABSOLUTE COUNT: 1.44 K/UL (ref 1.5–8.1)
SODIUM SERPL-SCNC: 143 MMOL/L (ref 135–144)
SPECIFIC GRAVITY UA: 1.02 (ref 1–1.03)
TRIGL SERPL-MCNC: 35 MG/DL
TSH SERPL-ACNC: 2.58 UIU/ML (ref 0.3–5)
TURBIDITY: ABNORMAL
URINE HGB: NEGATIVE
UROBILINOGEN, URINE: NORMAL
WBC # BLD AUTO: 3.2 K/UL (ref 3.5–11.3)
WBC UA: ABNORMAL /HPF (ref 0–5)

## 2023-03-14 ENCOUNTER — HOSPITAL ENCOUNTER (EMERGENCY)
Age: 48
Discharge: HOME | End: 2023-03-14
Attending: EMERGENCY MEDICINE
Payer: MEDICAID

## 2023-03-14 VITALS
SYSTOLIC BLOOD PRESSURE: 106 MMHG | TEMPERATURE: 98.5 F | HEART RATE: 79 BPM | RESPIRATION RATE: 19 BRPM | DIASTOLIC BLOOD PRESSURE: 73 MMHG | OXYGEN SATURATION: 96 %

## 2023-03-14 DIAGNOSIS — F32.A DEPRESSION, UNSPECIFIED DEPRESSION TYPE: Primary | ICD-10-CM

## 2023-03-14 LAB
ABSOLUTE EOS #: 0.07 K/UL (ref 0–0.44)
ABSOLUTE IMMATURE GRANULOCYTE: <0.03 K/UL (ref 0–0.3)
ABSOLUTE LYMPH #: 1.75 K/UL (ref 1.1–3.7)
ABSOLUTE MONO #: 0.29 K/UL (ref 0.1–1.2)
ALBUMIN SERPL-MCNC: 4.2 G/DL (ref 3.5–5.2)
ALBUMIN/GLOBULIN RATIO: 1.5 (ref 1–2.5)
ALP SERPL-CCNC: 61 U/L (ref 35–104)
ALT SERPL-CCNC: 17 U/L (ref 5–33)
AMPHETAMINE SCREEN URINE: NEGATIVE
ANION GAP SERPL CALCULATED.3IONS-SCNC: 13 MMOL/L (ref 9–17)
AST SERPL-CCNC: 17 U/L
BARBITURATE SCREEN URINE: NEGATIVE
BASOPHILS # BLD: 1 % (ref 0–2)
BASOPHILS ABSOLUTE: <0.03 K/UL (ref 0–0.2)
BENZODIAZEPINE SCREEN, URINE: NEGATIVE
BILIRUB SERPL-MCNC: <0.1 MG/DL (ref 0.3–1.2)
BUN SERPL-MCNC: 10 MG/DL (ref 6–20)
CALCIUM SERPL-MCNC: 9.5 MG/DL (ref 8.6–10.4)
CANNABINOID SCREEN URINE: POSITIVE
CHLORIDE SERPL-SCNC: 103 MMOL/L (ref 98–107)
CO2 SERPL-SCNC: 23 MMOL/L (ref 20–31)
COCAINE METABOLITE, URINE: POSITIVE
CREAT SERPL-MCNC: 0.76 MG/DL (ref 0.5–0.9)
EOSINOPHILS RELATIVE PERCENT: 2 % (ref 1–4)
ETHANOL PERCENT: <0.01 %
ETHANOL: <10 MG/DL
FENTANYL URINE: NEGATIVE
GFR SERPL CREATININE-BSD FRML MDRD: >60 ML/MIN/1.73M2
GLUCOSE SERPL-MCNC: 110 MG/DL (ref 70–99)
HCG QUALITATIVE: NEGATIVE
HCT VFR BLD AUTO: 35.8 % (ref 36.3–47.1)
HGB BLD-MCNC: 11.6 G/DL (ref 11.9–15.1)
IMMATURE GRANULOCYTES: 0 %
LYMPHOCYTES # BLD: 46 % (ref 24–43)
MCH RBC QN AUTO: 29.1 PG (ref 25.2–33.5)
MCHC RBC AUTO-ENTMCNC: 32.4 G/DL (ref 28.4–34.8)
MCV RBC AUTO: 89.7 FL (ref 82.6–102.9)
METHADONE SCREEN, URINE: NEGATIVE
MONOCYTES # BLD: 8 % (ref 3–12)
NRBC AUTOMATED: 0 PER 100 WBC
OPIATES, URINE: NEGATIVE
OXYCODONE SCREEN URINE: NEGATIVE
PDW BLD-RTO: 13.9 % (ref 11.8–14.4)
PHENCYCLIDINE, URINE: NEGATIVE
PLATELET # BLD AUTO: 163 K/UL (ref 138–453)
PMV BLD AUTO: 12.4 FL (ref 8.1–13.5)
POTASSIUM SERPL-SCNC: 4 MMOL/L (ref 3.7–5.3)
PROT SERPL-MCNC: 7 G/DL (ref 6.4–8.3)
RBC # BLD: 3.99 M/UL (ref 3.95–5.11)
SEG NEUTROPHILS: 43 % (ref 36–65)
SEGMENTED NEUTROPHILS ABSOLUTE COUNT: 1.6 K/UL (ref 1.5–8.1)
SODIUM SERPL-SCNC: 139 MMOL/L (ref 135–144)
TEST INFORMATION: ABNORMAL
WBC # BLD AUTO: 3.7 K/UL (ref 3.5–11.3)

## 2023-03-14 PROCEDURE — G0480 DRUG TEST DEF 1-7 CLASSES: HCPCS

## 2023-03-14 PROCEDURE — 85025 COMPLETE CBC W/AUTO DIFF WBC: CPT

## 2023-03-14 PROCEDURE — 80053 COMPREHEN METABOLIC PANEL: CPT

## 2023-03-14 PROCEDURE — 84703 CHORIONIC GONADOTROPIN ASSAY: CPT

## 2023-03-14 PROCEDURE — 99285 EMERGENCY DEPT VISIT HI MDM: CPT

## 2023-03-14 PROCEDURE — 6360000002 HC RX W HCPCS: Performed by: PEDIATRICS

## 2023-03-14 PROCEDURE — 80307 DRUG TEST PRSMV CHEM ANLYZR: CPT

## 2023-03-14 PROCEDURE — 96374 THER/PROPH/DIAG INJ IV PUSH: CPT

## 2023-03-14 RX ORDER — KETOROLAC TROMETHAMINE 30 MG/ML
30 INJECTION, SOLUTION INTRAMUSCULAR; INTRAVENOUS ONCE
Status: COMPLETED | OUTPATIENT
Start: 2023-03-14 | End: 2023-03-14

## 2023-03-14 RX ADMIN — KETOROLAC TROMETHAMINE 30 MG: 30 INJECTION, SOLUTION INTRAMUSCULAR at 22:27

## 2023-03-14 ASSESSMENT — PAIN SCALES - GENERAL
PAINLEVEL_OUTOF10: 10
PAINLEVEL_OUTOF10: 10

## 2023-03-14 ASSESSMENT — PAIN - FUNCTIONAL ASSESSMENT: PAIN_FUNCTIONAL_ASSESSMENT: 0-10

## 2023-03-14 ASSESSMENT — ENCOUNTER SYMPTOMS
COUGH: 0
BACK PAIN: 0
ABDOMINAL PAIN: 0
SHORTNESS OF BREATH: 0
TROUBLE SWALLOWING: 0

## 2023-03-14 ASSESSMENT — PAIN DESCRIPTION - FREQUENCY
FREQUENCY: CONTINUOUS
FREQUENCY: CONTINUOUS

## 2023-03-14 ASSESSMENT — PAIN DESCRIPTION - PAIN TYPE
TYPE: CHRONIC PAIN
TYPE: CHRONIC PAIN

## 2023-03-14 ASSESSMENT — PAIN DESCRIPTION - DESCRIPTORS
DESCRIPTORS: ACHING
DESCRIPTORS: ACHING

## 2023-03-14 ASSESSMENT — PAIN DESCRIPTION - LOCATION: LOCATION: BACK

## 2023-03-14 ASSESSMENT — PAIN DESCRIPTION - ORIENTATION: ORIENTATION: LOWER;UPPER

## 2023-03-14 NOTE — ED NOTES
[] [place]    [] [address]    [x]  [facility]      Y  N     [] [x] In the past two weeks have you had thoughts of hurting yourself in any way? [x] [] In the past two weeks have you had thoughts that you would be better off dead? [] [x] Have you made a suicide attempt in the past two months? [] [x] Do you have a plan for hurting yourself or suicide? [] [x] Presence of hallucinations/voices related to hurting himself or herself or someone else. SUICIDE/SECURITY WATCH PRECAUTION CHECKLIST     Orders    [x]  Suicide/Security Watch Precautions initiated as checked below:   3/14/23 7:28 PM EDT BH31/BH31B    [x] Notified physician:  Willean Favre, MD  3/14/23 7:28 PM EDT    [x] Orders obtained as appropriate:     [x] 1:1 Observer     [] Psych Consult     [] Psych Consult    Name:  Date:  Time:    [x] 1:1 Observer, Notified by: Aiden Franco RN    Contact Nurse Supervisor    [x] Remove all personal clothes from room and place in snap/paper gown/pants. Slipper only    [x] Remove all personal belongings from room and secured away from patient. Documentation    [x] Initiate Suicide/Security Watch Precaution Flow Sheet    [x] Initiate individualized Care Plan/Problem    [x] Document why precautions initiated on flow sheet (Initiate Nursing Care Plan/Problem)    [x] 1:1 Observer in place; instructions provided. Suicide precautions require observer be within arms length. [x] Nurse-Observer Communication Hand-off initiated by RN, reviewed with Observer. Subsequently used as Hand Off between Observers. [x] Initiate every 15 minute observations per observer as delegated by the RN.     [x] Initiate RN assessment and documentation    Environmental Scan  Search Criteria and Process: OPTIONAL, see Search Policy    [] Reason for search:    [] Nursing in presence of second person to search patient    [] Patient notified of reason for body assessment and belongings search:     Persons present during search:   Results of search and disposition:       Searchers Name: Skyn [place]     These items or items similar should be removed from the room:   [x] Chairs   [x] Telephone   [x] Trash cans and liners   [x] Plastic utensils (order Patient Safety tray)   [x] Empty or remove Sharps containers   [x] All personal clothing/belongings removed   [x] All unnecessary lead wires, electrical cords, draw cords, etc.   [x] Flowers and plants   [x] Double check for lighters, matches, razors, any glass items etc that can be used as weapons. Person completing Checklist: Chris Sepulveda RN       GENERAL INFORMATION     Y  N     [x] [] Has the patient been informed that they are on a watch and what that means? [x] [] Can the patient get out of Bed without nursing assistance? [x] [] Can the patient use the restroom without nursing assistance? [x] [] Can the patient walk the halls to [place] their legs? \"   [] [x] Does the patient have metal utensils? [x] [] Have the patient's belongings been placed out of control of the patient? [x] [] Have the patient and his/her belongings been checked for contraband? [] [x] Is the patient under any visitor restrictions? If Yes, explain:   [] [] Is the patient under an alias? [facility] Name:   Authorized visitors (no more than two are to be on the list)   Name/Relationship:   Name/Relationship:    Name of Staff member that you  Received this information from?: Ivone VILLARREAL  General Description:    Anuj Gutierrez BH30/BH26B female 49 y.o. Admission weight:      Race: []  [x] Black  []   []   [] [place] [] Other  Facial Hair:  [] Yes  [x] No  If yes, please describe: Identifying Marks (i.e. Visible tattoos, scars, etc... ):     NURSING CARE PLAN    Nursing Diagnosis: Risk of Self Directed Harm  [x] Actual  [] Potential  Date Started: 3/14/23      Etiological Factors: (related to)  [x] Expressed or implied suicidal ideation/behavior  [x] Depression  [] Suicide attempt      [x] Low self-esteem  [] Hallucinations      [x] Feeling of Hopelessness  [] Substance abuse or withdrawal    [x] Dysfunctional family  [] Major traumatic event, eg., divorce, etc   [x] Excessive stress/anxiety    3/14/23    Expected Outcomes    Patient will:   [x] Patient will remain safe for the duration of their stay   [x] Patient's environment will be safe, eg. Free of potential suicide weapons   [] Verbalize Recovery from suicidal episode and improvement in self-worth   [x] Discuss feeling that precipitated suicide attempt/thoughts/behavior   [] Will describe available resources for crisis prevention and management   [] Will verbalize positive coping skills     Nursing Intervention   [x] Assessment and Observations hourly   [x] Suicide Precautions implemented with patient, should be 1:1 observation   [x] Document observation [id number] and RN assessment hourly   [] Consult physician for:    [] Psychiatric consult    [] Pharmacological therapy    [] Other:    [x] Patient search completed by security   [x] Initiated appropriate safety protocols by removing from the patient's environment anything that could be used to inflict self injury, eg. Order safe tray, snap gown, etc   [x] Maintain open, warm, caring, non-judgmental attitude/manner towards patient   [] Discuss advantages and disadvantages of existing coping methods/skills   [x] Assist and educate patient with identifying present strengths and coping skills   [x] Keep patient informed regarding plan of care and provide clear concise explanations. Provide the patient/family education information as well as telephone numbers and other information about crisis centers, hot lines, and counselors.     Discharge Planning:   [] Referral  [] Groups [] Health agencies  [] Other:          Mouna Robertson RN  03/14/23 1930
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
## 2023-03-14 NOTE — ED TRIAGE NOTES
Pt presents to ED with complaints of suicidal ideation and states \"I just don't feel right\". Pt reports that she lost her daughter back in November of 2022 and has been feeling depressed for the past 4 months. Pt states that she did go to [facility] but was not satisfied with the treatment. Pt states that she wants help with her thoughts. Pt denies any acts of self harm or current plans but does state that she has thought about jumping in front of a bus. Pt is alert and oriented. Pt changed into paper scrubs. Pt personal belongings collected by security. Sitter at bedside.

## 2023-03-14 NOTE — ED PROVIDER NOTES
[facility] ED  Emergency Department Encounter  EmergencyMedicine Resident     Pt Tye Campos  MRN: [id number]  [place] 1975  Date of evaluation: 3/14/23  PCP:  No primary care provider on file. CHIEF COMPLAINT       Chief Complaint   Patient presents with    Suicidal    Other     \"I just dont feel well\"       HISTORY OF PRESENT ILLNESS  (Location/Symptom, Timing/Onset, Context/Setting, Quality, Duration, Modifying Factors, Severity.)      Theresa Lea is a 49 y.o. female who presents with suicidal ideation, states she has a plan to run into traffic. Her daughter passed away in 2022. She is grieving from this. She states she is pressured to read the Bible at home from her own mother but she does not want to. She has been admitted twice per  since her daughter has passed away and this has not provided her relief per patient. She endorses cocaine and marijuana over the past 4 days. Had 1 beer this morning did eat chicken and drink a soda prior to admission to the emergency department. Is on no daily medications at this time. States she is seeing things that are not there out of her periphery. Denies auditory hallucinations    PAST MEDICAL / SURGICAL / SOCIAL / FAMILY HISTORY      has a past medical history of Fibromyalgia, Headache, Hyperlipidemia, Hypertension, Hypothyroidism, Lupus ([address] and Thyroid disease. reviewed     has a past surgical history that includes  section; Foot surgery; and Hysterectomy.   reviewed    Social History     Socioeconomic History    Marital status: Single     Spouse name: Not on file    Number of children: Not on file    Years of education: Not on file    Highest education level: Not on file   Occupational History    Not on file   Tobacco Use    Smoking status: Every Day     Packs/day: 0.50     Years: 23.00     Pack years: 11.50     Types: Cigarettes    Smokeless tobacco: Never   Substance and Sexual Activity    Alcohol use: No    Drug use: Yes     Types: Marijuana Rico Michele     Comment: Deysi-puffs    Sexual activity: Not on file   Other Topics Concern    Not on file   Social History Narrative    Not on file     Social Determinants of Health     Financial Resource Strain: Not on file   Food Insecurity: Not on file   Transportation Needs: Not on file   Physical Activity: Not on file   Stress: Not on file   Social Connections: Not on file   Intimate Partner Violence: Not on file   Housing Stability: Not on file       Family History   Problem Relation Age of Onset    High Blood Pressure Mother     Heart Disease Father     Stroke Maternal Grandmother     High Blood Pressure Maternal Grandfather     Heart Disease Paternal Grandmother     Heart Disease Paternal Grandfather        Allergies:  Bactrim [sulfamethoxazole-trimethoprim]    Home Medications:  Prior to Admission medications    Medication Sig Start Date End Date Taking? Authorizing Provider   meloxicam (MOBIC) 7.5 MG tablet Take 1 tablet by mouth daily as needed (once daily as needed for leg pain) 2/14/23   Jarad Cancilla, APRN - CNP   DULoxetine (CYMBALTA) 20 MG extended release capsule Take 1 capsule by mouth daily 2/15/23   Jarad Cancilla, APRN - CNP   mirtazapine (REMERON) 15 MG tablet Take 1 tablet by mouth nightly 2/14/23   Jarad Cancilla, APRN - CNP   traZODone (DESYREL) 50 MG tablet Take 1 tablet by mouth nightly as needed for Sleep 2/14/23   Jarad Cancilla, APRN - CNP   folic acid (FOLVITE) 1 MG tablet Take 1 tablet by mouth daily 2/15/23   Jarad Cancilla, APRN - CNP   thiamine mononitrate (THIAMINE) 100 MG tablet Take 1 tablet by mouth daily 2/15/23   Jarad Cancilla, APRN - CNP       REVIEW OF SYSTEMS    (2-9 systems for level 4, 10 or more for level 5)      Review of Systems   Constitutional:  Negative for fatigue and fever. HENT:  Negative for congestion and trouble swallowing. Eyes:  Negative for visual disturbance.    Respiratory: Negative for cough and shortness of breath. Cardiovascular:  Negative for chest pain and leg swelling. Gastrointestinal:  Negative for abdominal pain. Genitourinary:  Negative for dysuria. Musculoskeletal:  Negative for back pain and gait problem. Neurological:  Negative for dizziness and seizures. Psychiatric/Behavioral:  Positive for hallucinations and suicidal ideas. Negative for behavioral problems and confusion. PHYSICAL EXAM   (up to 7 for level 4, 8 or more for level 5)      INITIAL VITALS:   /73   Pulse 79   Temp 98.5 °F (36.9 °C) (Oral)   Resp 19   SpO2 96%     Physical Exam  Vitals and nursing note reviewed. Constitutional:       General: She is not in acute distress. Appearance: Normal appearance. She is not ill-appearing, toxic-appearing or diaphoretic. HENT:      Head: Normocephalic and atraumatic. Right Ear: External ear normal.      Left Ear: External ear normal.      Nose: Nose normal.      Mouth/Throat:      Mouth: Mucous membranes are moist.   Eyes:      General: No scleral icterus. Right eye: No discharge. Left eye: No discharge. Conjunctiva/sclera: Conjunctivae normal.      Pupils: Pupils are equal, round, and reactive to light. Cardiovascular:      Rate and Rhythm: Normal rate and regular rhythm. Pulses: Normal pulses. Heart sounds: Normal heart sounds. Pulmonary:      Effort: Pulmonary effort is normal.      Breath sounds: Normal breath sounds. Abdominal:      General: Abdomen is flat. Bowel sounds are normal. There is no distension. Tenderness: There is no abdominal tenderness. There is no guarding or rebound. Musculoskeletal:         General: No signs of injury. Cervical back: Neck supple. No tenderness. Right lower leg: No edema. Left lower leg: No edema. Skin:     General: Skin is warm and dry. Capillary Refill: Capillary refill takes less than 2 seconds.    Neurological:      General: No focal deficit present. Mental Status: She is alert and oriented to person, place, and time. Sensory: No sensory deficit. Motor: No weakness. Coordination: Coordination normal.   Psychiatric:         Mood and Affect: Mood normal.         Behavior: Behavior is cooperative. DIFFERENTIAL  DIAGNOSIS     PLAN (LABS / IMAGING / EKG):  Orders Placed This Encounter   Procedures    CBC with Auto Differential    Ethanol    Urine Drug Screen    Comprehensive Metabolic Panel    HCG Qualitative, Serum    CONSTANT OBSERVATION       MEDICATIONS ORDERED:  Orders Placed This Encounter   Medications    ketorolac (TORADOL) injection 30 mg       DDX: Depression schizophrenia grieving hypotension hypertension knee fracture malingering    Medical Decision Making  Amount and/or Complexity of Data Reviewed  Labs: ordered. Risk  Prescription drug management.         DIAGNOSTIC RESULTS / EMERGENCY DEPARTMENT COURSE / MDM   LAB RESULTS:  Results for orders placed or performed during the hospital encounter of 03/14/23   CBC with Auto Differential   Result Value Ref Range    WBC 3.7 3.5 - 11.3 k/uL    RBC 3.99 3.95 - 5.11 m/uL    Hemoglobin 11.6 (L) 11.9 - 15.1 g/dL    Hematocrit 35.8 (L) 36.3 - 47.1 %    MCV 89.7 82.6 - 102.9 fL    MCH 29.1 25.2 - 33.5 pg    MCHC 32.4 28.4 - 34.8 g/dL    RDW 13.9 11.8 - 14.4 %    Platelets 466 020 - 757 k/uL    MPV 12.4 8.1 - 13.5 fL    NRBC Automated 0.0 0.0 per 100 WBC    Seg Neutrophils 43 36 - 65 %    Lymphocytes 46 (H) 24 - 43 %    Monocytes 8 3 - 12 %    Eosinophils % 2 1 - 4 %    Basophils 1 0 - 2 %    Immature Granulocytes 0 0 %    Segs Absolute 1.60 1.50 - 8.10 k/uL    Absolute Lymph # 1.75 1.10 - 3.70 k/uL    Absolute Mono # 0.29 0.10 - 1.20 k/uL    Absolute Eos # 0.07 0.00 - 0.44 k/uL    Basophils Absolute <0.03 0.00 - 0.20 k/uL    Absolute Immature Granulocyte <0.03 0.00 - 0.30 k/uL   Ethanol   Result Value Ref Range    Ethanol <10 <10 mg/dL    Ethanol percent <0.010 <0.010 %   Urine Drug Screen   Result Value Ref Range    Amphetamine Screen, Ur NEGATIVE NEGATIVE    Barbiturate Screen, Ur NEGATIVE NEGATIVE    Benzodiazepine Screen, Urine NEGATIVE NEGATIVE    Cocaine Metabolite, Urine POSITIVE (A) NEGATIVE    Methadone Screen, Urine NEGATIVE NEGATIVE    Opiates, Urine NEGATIVE NEGATIVE    Phencyclidine, Urine NEGATIVE NEGATIVE    Cannabinoid Scrn, Ur POSITIVE (A) NEGATIVE    Oxycodone Screen, Ur NEGATIVE NEGATIVE    Fentanyl, Ur NEGATIVE NEGATIVE    Test Information       Assay provides medical screening only. The absence of expected drug(s) and/or metabolite(s) may indicate diluted or adulterated urine, limitations of testing or timing of collection.    Comprehensive Metabolic Panel   Result Value Ref Range    Glucose 110 (H) 70 - 99 mg/dL    BUN 10 6 - 20 mg/dL    Creatinine 0.76 0.50 - 0.90 mg/dL    Est, Glom Filt Rate >60 >60 mL/min/1.73m2    Calcium 9.5 8.6 - 10.4 mg/dL    Sodium 139 135 - 144 mmol/L    Potassium 4.0 3.7 - 5.3 mmol/L    Chloride 103 98 - 107 mmol/L    CO2 23 20 - 31 mmol/L    Anion Gap 13 9 - 17 mmol/L    Alkaline Phosphatase 61 35 - 104 U/L    ALT 17 5 - 33 U/L    AST 17 <32 U/L    Total Bilirubin <0.1 (L) 0.3 - 1.2 mg/dL    Total Protein 7.0 6.4 - 8.3 g/dL    Albumin 4.2 3.5 - 5.2 g/dL    Albumin/Globulin Ratio 1.5 1.0 - 2.5   HCG Qualitative, Serum   Result Value Ref Range    hCG Qual NEGATIVE NEGATIVE       IMPRESSION: Depression    RADIOLOGY:  None    EKG  None    All EKG's are interpreted by the Emergency Department Physician who either signs or Co-signs this chart in the absence of a cardiologist.    EMERGENCY DEPARTMENT COURSE:  Admitted to the [facility] AN AFFILIATE OF [facility] vital signs initially hypotensive they were taking whenever patient was lying flat she was completely coherent normal vital signs otherwise was placed in a emergency department room 20 9 repeat vital signs with normal blood pressure completely conscious cooperative relayed that she is depressed\" down in the dumps\" she is depressed from her daughter passing away November 2022 and has suicidal ideation of killing herself by walking into traffic however at this current time she is not suicidal that sensation does come and go. PROCEDURES:  none    CONSULTS:  None    CRITICAL CARE:  Please see attending note    FINAL IMPRESSION      1. Depression, unspecified depression type          DISPOSITION / PLAN     DISPOSITION Decision To Discharge 03/14/2023 10:17:46 PM      PATIENT REFERRED TO:  No follow-up provider specified.     DISCHARGE MEDICATIONS:  Discharge Medication List as of 3/14/2023 10:18 PM          Tamika Swann MD  Emergency Medicine Resident    (Please note that portions of thisnote were completed with a voice recognition program.  Efforts were made to edit the dictations but occasionally words are mis-transcribed.)       Tamika Swann MD  Resident  03/15/23 6084

## 2023-03-14 NOTE — ED NOTES
Dr. Tasha Lester aware of pt blood pressures. Pt transferred to room 29 for further cardiac monitoring.       Mark Lewis RN  03/14/23 3371

## 2023-03-15 NOTE — DISCHARGE INSTRUCTIONS
Please feel free return to the hospital if your symptoms worsen or any new concerning symptoms develop. Follow-up with your primary care physician as needed for all other the concerns.

## 2023-03-15 NOTE — ED NOTES
Pt. Resting on stretcher, eyes open, RR even and non-labored  Pt.  Updated on POC  Will continue to monitor        Asuncion Gallo RN  03/14/23 2008

## 2023-03-15 NOTE — ED NOTES
The following labs were labeled with appropriate pt sticker and tubed to lab:     [] Blue     [] Lavender   [] on ice  [] Green/yellow  [] Green/black [] on ice  [] Rhae Enola  [] on ice  [] Yellow  [] Red  [] Type/ Screen  [] ABG  [] VBG    [] COVID-19 swab    [] Rapid  [] PCR  [] Flu swab  [] Peds Viral Panel     [x] Urine Sample  [] Fecal Sample  [] Pelvic Cultures  [] Blood Cultures  [] X 2  [] STREP Cultures         Malissa Arreguin RN  03/14/23 2048

## 2023-03-15 NOTE — ED PROVIDER NOTES
[facility] ED     Emergency Department     Faculty Attestation    I performed a history and physical examination of the patient and discussed management with the resident. I reviewed the residents note and agree with the documented findings and plan of care. Any areas of disagreement are noted on the chart. I was personally present for the key portions of any procedures. I have documented in the chart those procedures where I was not present during the key portions. I have reviewed the emergency nurses triage note. I agree with the chief complaint, past medical history, past surgical history, allergies, medications, social and family history as documented unless otherwise noted below. For Physician Assistant/ Nurse Practitioner cases/documentation I have personally evaluated this patient and have completed at least one if not all key elements of the E/M (history, physical exam, and MDM). Additional findings are as noted. Patient here with intermittent suicidal thoughts no plan. Patient states she is been dealing with depression and intermittent suicidal thoughts since the untimely death of her daughter in November. Has been admitted for this was doing some group therapy but is not currently. Misplaced her psychiatric meds several days ago. Denies any other complaints other than just not feeling well but specifically denies any headache, chest pain, back pain, abdominal pain, nausea vomiting diarrhea no shortness of breath no fevers. On exam well-appearing nontoxic watching TV. Lungs clear heart normal normal pupils normal speech normal mental status. Equal for extremity pulses. We will check requisite labs, social work to see.       Critical Care     none    Jaimee Galvan MD, Randolph Quiñonez  Attending Emergency  Physician           Jaimee Galvan MD  03/14/23 2006

## 2023-04-13 PROCEDURE — 99285 EMERGENCY DEPT VISIT HI MDM: CPT

## 2023-04-13 PROCEDURE — 96374 THER/PROPH/DIAG INJ IV PUSH: CPT

## 2023-04-14 ENCOUNTER — HOSPITAL ENCOUNTER (EMERGENCY)
Age: 48
Discharge: HOME | End: 2023-04-14
Attending: EMERGENCY MEDICINE
Payer: MEDICAID

## 2023-04-14 VITALS
RESPIRATION RATE: 16 BRPM | OXYGEN SATURATION: 100 % | TEMPERATURE: 97.5 F | SYSTOLIC BLOOD PRESSURE: 103 MMHG | HEART RATE: 70 BPM | DIASTOLIC BLOOD PRESSURE: 67 MMHG

## 2023-04-14 DIAGNOSIS — R45.851 SUICIDAL IDEATION: Primary | ICD-10-CM

## 2023-04-14 LAB
ABSOLUTE EOS #: 0.07 K/UL (ref 0–0.44)
ABSOLUTE IMMATURE GRANULOCYTE: <0.03 K/UL (ref 0–0.3)
ABSOLUTE LYMPH #: 2.07 K/UL (ref 1.1–3.7)
ABSOLUTE MONO #: 0.36 K/UL (ref 0.1–1.2)
ALBUMIN SERPL-MCNC: 4 G/DL (ref 3.5–5.2)
ALBUMIN/GLOBULIN RATIO: 1.3 (ref 1–2.5)
ALP SERPL-CCNC: 61 U/L (ref 35–104)
ALT SERPL-CCNC: 12 U/L (ref 5–33)
AMPHETAMINE SCREEN URINE: NEGATIVE
ANION GAP SERPL CALCULATED.3IONS-SCNC: 12 MMOL/L (ref 9–17)
AST SERPL-CCNC: 19 U/L
BARBITURATE SCREEN URINE: NEGATIVE
BASOPHILS # BLD: 1 % (ref 0–2)
BASOPHILS ABSOLUTE: 0.03 K/UL (ref 0–0.2)
BENZODIAZEPINE SCREEN, URINE: NEGATIVE
BILIRUB SERPL-MCNC: 0.2 MG/DL (ref 0.3–1.2)
BUN SERPL-MCNC: 13 MG/DL (ref 6–20)
CALCIUM SERPL-MCNC: 9.3 MG/DL (ref 8.6–10.4)
CANNABINOID SCREEN URINE: POSITIVE
CHLORIDE SERPL-SCNC: 104 MMOL/L (ref 98–107)
CO2 SERPL-SCNC: 22 MMOL/L (ref 20–31)
COCAINE METABOLITE, URINE: POSITIVE
CREAT SERPL-MCNC: 0.61 MG/DL (ref 0.5–0.9)
EOSINOPHILS RELATIVE PERCENT: 2 % (ref 1–4)
ETHANOL PERCENT: 0.04 %
ETHANOL: 35 MG/DL
FENTANYL URINE: NEGATIVE
GFR SERPL CREATININE-BSD FRML MDRD: >60 ML/MIN/1.73M2
GLUCOSE SERPL-MCNC: 92 MG/DL (ref 70–99)
HCG QUALITATIVE: NEGATIVE
HCT VFR BLD AUTO: 37.3 % (ref 36.3–47.1)
HGB BLD-MCNC: 12.1 G/DL (ref 11.9–15.1)
IMMATURE GRANULOCYTES: 0 %
LYMPHOCYTES # BLD: 44 % (ref 24–43)
MCH RBC QN AUTO: 29.2 PG (ref 25.2–33.5)
MCHC RBC AUTO-ENTMCNC: 32.4 G/DL (ref 28.4–34.8)
MCV RBC AUTO: 89.9 FL (ref 82.6–102.9)
METHADONE SCREEN, URINE: NEGATIVE
MONOCYTES # BLD: 8 % (ref 3–12)
NRBC AUTOMATED: 0 PER 100 WBC
OPIATES, URINE: NEGATIVE
OXYCODONE SCREEN URINE: NEGATIVE
PDW BLD-RTO: 13.7 % (ref 11.8–14.4)
PHENCYCLIDINE, URINE: NEGATIVE
PLATELET # BLD AUTO: ABNORMAL K/UL (ref 138–453)
PLATELET, FLUORESCENCE: 172 K/UL (ref 138–453)
PLATELET, IMMATURE FRACTION: 10.1 % (ref 1.1–10.3)
POTASSIUM SERPL-SCNC: 3.4 MMOL/L (ref 3.7–5.3)
PROT SERPL-MCNC: 7.2 G/DL (ref 6.4–8.3)
RBC # BLD: 4.15 M/UL (ref 3.95–5.11)
SEG NEUTROPHILS: 46 % (ref 36–65)
SEGMENTED NEUTROPHILS ABSOLUTE COUNT: 2.19 K/UL (ref 1.5–8.1)
SODIUM SERPL-SCNC: 138 MMOL/L (ref 135–144)
TEST INFORMATION: ABNORMAL
WBC # BLD AUTO: 4.7 K/UL (ref 3.5–11.3)

## 2023-04-14 PROCEDURE — G0480 DRUG TEST DEF 1-7 CLASSES: HCPCS

## 2023-04-14 PROCEDURE — 6370000000 HC RX 637 (ALT 250 FOR IP): Performed by: STUDENT IN AN ORGANIZED HEALTH CARE EDUCATION/TRAINING PROGRAM

## 2023-04-14 PROCEDURE — 6360000002 HC RX W HCPCS: Performed by: STUDENT IN AN ORGANIZED HEALTH CARE EDUCATION/TRAINING PROGRAM

## 2023-04-14 PROCEDURE — 80307 DRUG TEST PRSMV CHEM ANLYZR: CPT

## 2023-04-14 PROCEDURE — 84703 CHORIONIC GONADOTROPIN ASSAY: CPT

## 2023-04-14 PROCEDURE — 80053 COMPREHEN METABOLIC PANEL: CPT

## 2023-04-14 PROCEDURE — 85025 COMPLETE CBC W/AUTO DIFF WBC: CPT

## 2023-04-14 PROCEDURE — 85055 RETICULATED PLATELET ASSAY: CPT

## 2023-04-14 RX ORDER — KETOROLAC TROMETHAMINE 30 MG/ML
30 INJECTION, SOLUTION INTRAMUSCULAR; INTRAVENOUS ONCE
Status: COMPLETED | OUTPATIENT
Start: 2023-04-14 | End: 2023-04-14

## 2023-04-14 RX ADMIN — POTASSIUM BICARBONATE 40 MEQ: 782 TABLET, EFFERVESCENT ORAL at 04:55

## 2023-04-14 RX ADMIN — KETOROLAC TROMETHAMINE 30 MG: 30 INJECTION, SOLUTION INTRAMUSCULAR; INTRAVENOUS at 05:16

## 2023-04-14 ASSESSMENT — ENCOUNTER SYMPTOMS
VOMITING: 0
SHORTNESS OF BREATH: 0
ABDOMINAL PAIN: 0
NAUSEA: 0

## 2023-04-14 ASSESSMENT — PAIN - FUNCTIONAL ASSESSMENT: PAIN_FUNCTIONAL_ASSESSMENT: NONE - DENIES PAIN

## 2023-04-14 NOTE — ED NOTES
Provisional Diagnosis:  Depression with SI      Psychosocial and Contextual Factors:   Pt reports she lives with her mother      C-SSRS Summary:    Patient: X   Family:  Agency:    Present Suicidal Behavior:    Verbal: Yes    Attempt: Denied    Past Suicidal Behavior:    Verbal: Yes     Attempt:  Unknown     Self-Injurious/Self-Mutilation: Not discussed       Protective Factors: Willing to go inpatient     Risk Factors:  Substance abuse, recent loss, non-compliancy with medication       Clinical Summary:    Pt is a 49year old female who presents to the ED with SI and no plan. Pt reports recent loss of her daughter in November of 2022 with an increase in depression. Pt has hx of major depressive disorder, severe without psychotic features. Pt appears calm at today's visit, but tired. Per medical charting pt's last admission into [facility] was on 2/9/2023. Medical charting also shows pt was accepted into [facility] on 3/23/3023 by Dr. Ramakrishna Diane. Since then pt reports non-compliancy with medications due to not receiving the script when she left. Pt reports use of alcohol and states it is not daily use, but when she does drink alcohol she goes on\"binges\". Pt reports crack cocaine use but does not specify how much and how often stating, \"Whenever it's around\". Pt's alcohol level at today's visit was 0.035. SW discussed with pt going to [facility]. Pt reports she does not want to got to [facility] as she feels it didn't help her. Pt denies HI to this writer. Pt does endorse auditory hallucinations, but denies voices are commanding in nature as she states she is unable to make out what they say. Level of Care Disposition:  DIOMEDES discussed with Dr. Renita bhagat of [facility] and will fax all necessary forms once labs have resulted for their review. Pt in agreement with going to [facility] [address] Ervin Cogan, [facility]  04/14/23 [facility], [facility]  04/14/23

## 2023-04-14 NOTE — ED NOTES
[] [place]    [] [facility]    [x]  [facility] ASSESSMENT      Y  N     [x] [] In the past two weeks have you had thoughts of hurting yourself in any way? [x] [] In the past two weeks have you had thoughts that you would be better off dead? [] [x] Have you made a suicide attempt in the past two months? [] [x] Do you have a plan for hurting yourself or suicide? [] [x] Presence of hallucinations/voices related to hurting himself or herself or someone else. SUICIDE/SECURITY WATCH PRECAUTION CHECKLIST     Orders    [x]  Suicide/Security Watch Precautions initiated as checked below:   4/14/23 1:15 AM EDT BH31/BH31A    [x] Notified physician:  Gertrudis Gusman MD  4/14/23 1:15 AM EDT    [x] Orders obtained as appropriate:     [x] 1:1 Observer     [] Psych Consult     [] Psych Consult    Name:  Date:  Time:    [x] 1:1 Observer, Notified by:  Suzanne Leos RN    Contact Nurse Supervisor    [x] Remove all personal clothes from room and place in snap/paper gown/pants. Slipper only    [x] Remove all personal belongings from room and secured away from patient. Documentation    [x] Initiate Suicide/Security Watch Precaution Flow Sheet    [x] Initiate individualized Care Plan/Problem    [x] Document why precautions initiated on flow sheet (Initiate Nursing Care Plan/Problem)    [x] 1:1 Observer in place; instructions provided. Suicide precautions require observer be within arms length. [x] Nurse-Observer Communication Hand-off initiated by RN, reviewed with Observer. Subsequently used as Hand Off between Observers. [x] Initiate every 15 minute observations per observer as delegated by the RN.     [x] Initiate RN assessment and documentation    Environmental Scan  Search Criteria and Process: OPTIONAL, see Search Policy    [x] Reason for search:SI    [x] Nursing in presence of second person to search patient    [x] Patient notified of reason for body

## 2023-04-14 NOTE — DISCHARGE INSTRUCTIONS
Thank you for visiting [facility] Emergency Department. You need to call No primary care provider on file. to make an appointment as directed for follow up. Should you have any questions regarding your care or further treatment, please call [facility] at [phone].

## 2023-04-14 NOTE — ED NOTES
Pt. Asked to urinate. Pt states she does not need to go right now. Sitter at the bedside. Will continue with plan of care.      Marzena Joseph RN  04/14/23 0125

## 2023-04-14 NOTE — ED NOTES
DIOMEDES faxed packet to [facility]. Diomedes called and spoke to Honey coelho [facility] and informed of fax and pt's information.       Lorri Bryan, JACINTO  04/14/23 6691

## 2023-04-14 NOTE — ED NOTES
Pt. Presents to the ED for suicidal ideations. Pt states that her daughter  recently and she has been feeling sad. Pt admits to alcohol, marijuana, and meth. Pt denies any plan at this time. Pt resp even and non labored. Pt placed on suicide precautions with a sitter at the bedside. Pt changed out of her clothes and into paper scrubs. Will continue with plan of care.         Shari Echeverria RN  23 [phone]

## 2023-04-14 NOTE — ED NOTES
Pt presents to ED with c/o suicidal thoughts. Pt states she is hearing voices that are telling her to hurt herself. Pt also states she drank a large amount of alcohol today. Pt denies any homicidal thoughts at this time. Pt changed out into paper scrubs. Belongings locked up by [facility] SIDDHARTHA HAMILTON. Writer at bedside.          Imer Sigala  04/14/23 0024       Dee Sigala  04/14/23 0028

## 2023-04-14 NOTE — ED NOTES
Called and spoke with [facility] Kurtis Jasso for possible assistance. [facility] will call back once shift change is over. Per physician, pt is fine with going to [facility]. Received phone call back from [facility] Kurtis Gardnerazo. They said to cab pt over and they will complete her assessment. Pt updated and agreeable to discharge plan.  [employer] cab scheduled to pick pt up at 8:10 am.      JACINTO Jacob  04/14/23 [address] [facility]  04/14/23 [phone]

## 2023-04-14 NOTE — ED NOTES
Pt resting in bed, eyes closed, NAD. Pt resp even and non labored. Pt denies any needs at this time. Pt on 1 to 1 observation. Will continue with plan of care.       Honey Parry RN  04/14/23 3252

## 2023-04-14 NOTE — ED NOTES
SW received phone call from [facility] who reports they have no beds open at this time and do not expect discharges. Dr. Jesusita Herrera informed and pt being handed off to day shift SW [place] to determine POC.       Vasquez Chao, W  04/14/23 [address], hospitals  04/14/23 9581

## 2023-04-14 NOTE — ED NOTES
Pt. Resting in bed, eyes closed, NAD. Pt resp even and non labored. Sitter at the bedside. Will continue with plan of care.      Rosa Maria Aparicio RN  04/14/23 7178

## 2023-04-17 ENCOUNTER — HOSPITAL ENCOUNTER (EMERGENCY)
Age: 48
Discharge: HOME | End: 2023-04-17
Attending: EMERGENCY MEDICINE
Payer: MEDICAID

## 2023-04-17 ENCOUNTER — APPOINTMENT (OUTPATIENT)
Dept: GENERAL RADIOLOGY | Age: 48
End: 2023-04-17
Payer: MEDICAID

## 2023-04-17 VITALS
HEIGHT: 67 IN | RESPIRATION RATE: 16 BRPM | TEMPERATURE: 98.2 F | DIASTOLIC BLOOD PRESSURE: 78 MMHG | SYSTOLIC BLOOD PRESSURE: 138 MMHG | WEIGHT: 125 LBS | BODY MASS INDEX: 19.62 KG/M2 | OXYGEN SATURATION: 99 % | HEART RATE: 88 BPM

## 2023-04-17 DIAGNOSIS — R45.851 SUICIDAL THOUGHTS: Primary | ICD-10-CM

## 2023-04-17 DIAGNOSIS — M25.561 ACUTE PAIN OF RIGHT KNEE: ICD-10-CM

## 2023-04-17 LAB
ABSOLUTE EOS #: 0.09 K/UL (ref 0–0.44)
ABSOLUTE IMMATURE GRANULOCYTE: <0.03 K/UL (ref 0–0.3)
ABSOLUTE LYMPH #: 1.84 K/UL (ref 1.1–3.7)
ABSOLUTE MONO #: 0.41 K/UL (ref 0.1–1.2)
ALBUMIN SERPL-MCNC: 3.9 G/DL (ref 3.5–5.2)
ALBUMIN/GLOBULIN RATIO: 1.3 (ref 1–2.5)
ALP SERPL-CCNC: 65 U/L (ref 35–104)
ALT SERPL-CCNC: 10 U/L (ref 5–33)
AMPHETAMINE SCREEN URINE: NEGATIVE
ANION GAP SERPL CALCULATED.3IONS-SCNC: 7 MMOL/L (ref 9–17)
AST SERPL-CCNC: 17 U/L
BARBITURATE SCREEN URINE: NEGATIVE
BASOPHILS # BLD: 1 % (ref 0–2)
BASOPHILS ABSOLUTE: 0.03 K/UL (ref 0–0.2)
BENZODIAZEPINE SCREEN, URINE: NEGATIVE
BILIRUB SERPL-MCNC: <0.1 MG/DL (ref 0.3–1.2)
BUN SERPL-MCNC: 13 MG/DL (ref 6–20)
CALCIUM SERPL-MCNC: 9.2 MG/DL (ref 8.6–10.4)
CANNABINOID SCREEN URINE: NEGATIVE
CHLORIDE SERPL-SCNC: 108 MMOL/L (ref 98–107)
CO2 SERPL-SCNC: 25 MMOL/L (ref 20–31)
COCAINE METABOLITE, URINE: NEGATIVE
CREAT SERPL-MCNC: 0.68 MG/DL (ref 0.5–0.9)
EOSINOPHILS RELATIVE PERCENT: 2 % (ref 1–4)
ETHANOL PERCENT: <0.01 %
ETHANOL: <10 MG/DL
FENTANYL URINE: NEGATIVE
GFR SERPL CREATININE-BSD FRML MDRD: >60 ML/MIN/1.73M2
GLUCOSE SERPL-MCNC: 89 MG/DL (ref 70–99)
HCG QUALITATIVE: NEGATIVE
HCT VFR BLD AUTO: 39 % (ref 36.3–47.1)
HGB BLD-MCNC: 12.2 G/DL (ref 11.9–15.1)
IMMATURE GRANULOCYTES: 0 %
LYMPHOCYTES # BLD: 47 % (ref 24–43)
MCH RBC QN AUTO: 29.2 PG (ref 25.2–33.5)
MCHC RBC AUTO-ENTMCNC: 31.3 G/DL (ref 28.4–34.8)
MCV RBC AUTO: 93.3 FL (ref 82.6–102.9)
METHADONE SCREEN, URINE: NEGATIVE
MONOCYTES # BLD: 11 % (ref 3–12)
NRBC AUTOMATED: 0 PER 100 WBC
OPIATES, URINE: NEGATIVE
OXYCODONE SCREEN URINE: NEGATIVE
PDW BLD-RTO: 13.8 % (ref 11.8–14.4)
PHENCYCLIDINE, URINE: NEGATIVE
PLATELET # BLD AUTO: ABNORMAL K/UL (ref 138–453)
PLATELET, FLUORESCENCE: 166 K/UL (ref 138–453)
PLATELET, IMMATURE FRACTION: 9.7 % (ref 1.1–10.3)
POTASSIUM SERPL-SCNC: 4.1 MMOL/L (ref 3.7–5.3)
PROT SERPL-MCNC: 6.8 G/DL (ref 6.4–8.3)
RBC # BLD: 4.18 M/UL (ref 3.95–5.11)
SEG NEUTROPHILS: 39 % (ref 36–65)
SEGMENTED NEUTROPHILS ABSOLUTE COUNT: 1.53 K/UL (ref 1.5–8.1)
SODIUM SERPL-SCNC: 140 MMOL/L (ref 135–144)
TEST INFORMATION: NORMAL
WBC # BLD AUTO: 3.9 K/UL (ref 3.5–11.3)

## 2023-04-17 PROCEDURE — 85025 COMPLETE CBC W/AUTO DIFF WBC: CPT

## 2023-04-17 PROCEDURE — 84703 CHORIONIC GONADOTROPIN ASSAY: CPT

## 2023-04-17 PROCEDURE — 99284 EMERGENCY DEPT VISIT MOD MDM: CPT

## 2023-04-17 PROCEDURE — 6360000002 HC RX W HCPCS: Performed by: STUDENT IN AN ORGANIZED HEALTH CARE EDUCATION/TRAINING PROGRAM

## 2023-04-17 PROCEDURE — 73562 X-RAY EXAM OF KNEE 3: CPT

## 2023-04-17 PROCEDURE — 85055 RETICULATED PLATELET ASSAY: CPT

## 2023-04-17 PROCEDURE — 80053 COMPREHEN METABOLIC PANEL: CPT

## 2023-04-17 PROCEDURE — 80307 DRUG TEST PRSMV CHEM ANLYZR: CPT

## 2023-04-17 PROCEDURE — 96374 THER/PROPH/DIAG INJ IV PUSH: CPT

## 2023-04-17 PROCEDURE — G0480 DRUG TEST DEF 1-7 CLASSES: HCPCS

## 2023-04-17 RX ORDER — KETOROLAC TROMETHAMINE 30 MG/ML
30 INJECTION, SOLUTION INTRAMUSCULAR; INTRAVENOUS ONCE
Status: COMPLETED | OUTPATIENT
Start: 2023-04-17 | End: 2023-04-17

## 2023-04-17 RX ADMIN — KETOROLAC TROMETHAMINE 30 MG: 30 INJECTION, SOLUTION INTRAMUSCULAR at 18:00

## 2023-04-17 ASSESSMENT — PAIN DESCRIPTION - ORIENTATION: ORIENTATION: RIGHT

## 2023-04-17 ASSESSMENT — ENCOUNTER SYMPTOMS
BACK PAIN: 0
ABDOMINAL PAIN: 0
COUGH: 0
VOMITING: 0
SHORTNESS OF BREATH: 0
NAUSEA: 0
DIARRHEA: 0
RHINORRHEA: 0

## 2023-04-17 ASSESSMENT — PAIN - FUNCTIONAL ASSESSMENT
PAIN_FUNCTIONAL_ASSESSMENT: NONE - DENIES PAIN
PAIN_FUNCTIONAL_ASSESSMENT: NONE - DENIES PAIN
PAIN_FUNCTIONAL_ASSESSMENT: ACTIVITIES ARE NOT PREVENTED

## 2023-04-17 ASSESSMENT — PAIN SCALES - GENERAL: PAINLEVEL_OUTOF10: 10

## 2023-04-17 ASSESSMENT — PAIN DESCRIPTION - LOCATION: LOCATION: GENERALIZED;KNEE

## 2023-04-17 NOTE — ED PROVIDER NOTES
[facility] ED     Emergency Department     Faculty Attestation        I performed a history and physical examination of the patient and discussed management with the resident. I reviewed the residents note and agree with the documented findings and plan of care. Any areas of disagreement are noted on the chart. I was personally present for the key portions of any procedures. I have documented in the chart those procedures where I was not present during the key portions. I have reviewed the emergency nurses triage note. I agree with the chief complaint, past medical history, past surgical history, allergies, medications, social and family history as documented unless otherwise noted below. For mid-level providers such as nurse practitioners as well as physicians assistants:    I have personally seen and evaluated the patient. I find the patient's history and physical exam are consistent with NP/PA documentation. I agree with the care provided, treatment rendered, disposition, & follow-up plan. Additional findings are as noted. Vital Signs: /78   Pulse 88   Temp 98.2 °F (36.8 °C)   Resp 16   Ht 5' 7\" (1.702 m)   Wt 125 lb (56.7 kg)   SpO2 99%   BMI 19.58 kg/m²   PCP:  No primary care provider on file. Pertinent Comments:     Patient depressed and anxious from recent death of her daughter. Was at [facility] but did not like her care there. She is suicidal but has no plan. She is awake alert and cooperative.       Critical Care  None          Jade Read MD    Attending Emergency Medicine Physician            Lima Gutierrez MD  04/17/23 5738
7:26 PM          Ishan Honeycutt DO  Emergency Medicine Resident    (Please note that portions of thisnote were completed with a voice recognition program.  Efforts were made to edit the dictations but occasionally words are mis-transcribed.)       Ishan Honeycutt DO  Resident  04/18/23 5059

## 2023-04-17 NOTE — DISCHARGE INSTRUCTIONS
You were seen in the emergency department today for hallucinations and right knee pain. Your x-ray does not show any new injury. Continue to take Tylenol and ibuprofen for this. Social work discussed options for continued mental health care. Please follow-up with the recommendations. If you have any new or worsening symptoms, please return to the ED for reevaluation. Thank you for visiting [facility] Emergency Department. You need to call No primary care provider on file. to make an appointment as directed for follow up. Should you have any questions regarding your care or further treatment, please call [facility] Emergency Department at [phone]. Take any medications as prescribed, if given any, otherwise for pain Use ibuprofen or Tylenol (unless prescribed medications that have Tylenol in it). You can take over the counter Ibuprofen (advil) tablets (4 tablets every 8 hours or 3 tablets every 6 hours or 2 tablets every 4 hours)    If given narcotics during this ED visit, please do not drive or operate heavy machinery for at least 4-6 hours. PLEASE RETURN TO THE ED IMMEDIATELY for worsening symptoms, or if you develop any concerning symptoms such as: high fever not relieved by tylenol and/or motrin, chills, shortness of breath, chest pain, persistent nausea and/or vomiting, numbness, weakness or tingling in the arms or legs or change in color of the extremities, changes in mental status, persistent headache, blurry vision, inability to urinate, unable to follow up with your physician, or other any other  Care or concern.

## 2023-04-18 ENCOUNTER — SOCIAL WORK (OUTPATIENT)
Dept: EMERGENCY DEPT | Age: 48
End: 2023-04-18

## 2023-04-18 NOTE — PROGRESS NOTES
ED SW had a follow-up call with the [address] [facility], who states patient did not come to their facility last night, despite us providing a cab ride there. Deneen Barlow states that patient has been very resistant to services and is victim-oriented, believing that everyone else is supposed to \"fix\" her and they all fall short. Gonzalo Cannon.  Ciara Conception

## 2023-04-18 NOTE — ED NOTES
1:1 initated     Sally Swanson RN  04/17/23 9315
DIOMEDES consulted about patient who arrives to the ER with a complaint of SI but has no plan in place. Patient was in the [facility] from 2/9/2023 to 2/14/2023 and then [address] [facility] for a week starting on 3/23/2023. Per notes in chart, patient was just discharged to THE [facility] from here on 4/14/2023. This SW called [facility] and spoke with Sonja Bethea, who states that patient just left there AMA within the hour. Shane Rosario states patient denied SI, HI, voices, or hallucinations at discharge. Shane Rosario states patient clearly expressed that she was leaving their facility to go and get cigarettes. Per Marlon, patient has been at their CSU 5 times in the last few months. Patient has been to their sober living 3 times, as well. Patient was prescribed meds and has them with her. Shane Rosario also states they scheduled patient with a follow-up appointment. Shane Rosario is sending this SW patient's discharge papers with all the information. SW will meet with patient to discuss what is going on today. Den Ruelas.  Chio Castle [place]  04/17/23 0368
Per day shift Peggi Bound pt is to be discharged to [facility] or outpatient services. Sw met with discharged pt. Pt requesting SW to call [facility] to confirm she can return for an assessment. Per [facility] worker pt can return for an assessment. Pt informed there are no guarantees on admission if pt does not meet their inpatient criteria. Pt in agreement with POC and will wait in the lobby. [employer] utilized to [facility]. Dr. Perez Spikes in agreement with transporting pt by cab.       JACINTO Norman  04/17/23 2027
Pt ambulated to restroom with a quick steady gait     Paticia Cheadle, RN  04/17/23 7253
Pt sts she was seen at ER a few days ago for similar s/s  Pt sts \"they sent me to [facility] ctr-it was a nightmare with all the fighting\"  Pt sts she left [facility] ctr, pt sts she needs to be admitted \"somewhere else\"  Pt reported hallucinations with SI, denied HI  Pt is alert Ox3 and cooperative        Paloma Garrett, PHIL  04/17/23 9627
SW met with patient and she tells SW that she is here for help as no where she has gone has helped her so far. When patient was asked what good help would look like to her, she stated that she wants to go to a \"private facility\" that has better accommodations. Patient has [employer] in place. We discussed what steps patient has been taking to help herself get better and patient stated, \"I don't know what that means. Isn't reaching out for help helping myself\". SW gave an example of taking her meds as prescribed and going to her follow-up appointments, neither of which patient did after her last 2 hospitalizations. SW also suggested grief support groups to deal with the loss of her daughter. Patient states she has thoughts of SI, but no plan in place. It was explained that she would not meet acuity for inpatient treatment at this time. Patient had just left a crisis stabilization unit and this SW unsure what else would be helpful to patient as she says nothing she had done has helped so far. Patient has numerous complaints about all the facilities she has been at and does not wish to return to any of them. SW asked patient if this could be an issue of homelessness and a shelter list might be helpful. Patient says she can stay with her mom if needed. SW discussed patient leaving treatment today to go get cigarettes and patient became angry stating this SW is racist because SW mentioned this. Then patient yelled, \"Get out\" to SW. Patient currently prescribed Cymbalta and Remeron and has a full script on her person. Patient has a follow-up appointment with Lisandra Gandhi [facility]) on Monday 4/24/23 at 10:30am which she has in writing. SW discussed case with Dr. Kenneth Mancini and SW suggested patient can return to [place] [address] can give her a list of walk-in times for follow-up. Hunter [place], [place]  04/17/23 9269
Security notified     Ranjith Nixon RN  04/17/23 9856
(related to)  [x] Expressed or implied suicidal ideation/behavior  [] Depression  [] Suicide attempt      [] Low self-esteem  [x] Hallucinations      [] Feeling of Hopelessness  [] Substance abuse or withdrawal    [] Dysfunctional family  [] Major traumatic event, eg., divorce, etc   [] Excessive stress/anxiety    4/17/23    Expected Outcomes    Patient will:   [x] Patient will remain safe for the duration of their stay   [x] Patient's environment will be safe, eg. Free of potential suicide weapons   [] Verbalize Recovery from suicidal episode and improvement in self-worth   [x] Discuss feeling that precipitated suicide attempt/thoughts/behavior   [] Will describe available resources for crisis prevention and management   [] Will verbalize positive coping skills     Nursing Intervention   [x] Assessment and Observations hourly   [x] Suicide Precautions implemented with patient, should be 1:1 observation   [x] Document observation [id number] and RN assessment hourly   [] Consult physician for:    [] Psychiatric consult    [] Pharmacological therapy    [] Other:    [x] Patient search completed by security   [x] Initiated appropriate safety protocols by removing from the patient's environment anything that could be used to inflict self injury, eg. Order safe tray, snap gown, etc   [x] Maintain open, warm, caring, non-judgmental attitude/manner towards patient   [] Discuss advantages and disadvantages of existing coping methods/skills   [x] Assist and educate patient with identifying present strengths and coping skills   [x] Keep patient informed regarding plan of care and provide clear concise explanations. Provide the patient/family education information as well as telephone numbers and other information about crisis centers, hot lines, and counselors.     Discharge Planning:   [] Referral  [] Groups [] Health agencies  [] Other:           Phillip Solis RN  04/17/23 1700

## 2023-04-25 ENCOUNTER — HOSPITAL ENCOUNTER (INPATIENT)
Age: 48
LOS: 5 days | Discharge: HOME | DRG: 751 | End: 2023-05-01
Attending: EMERGENCY MEDICINE | Admitting: PSYCHIATRY & NEUROLOGY
Payer: MEDICAID

## 2023-04-25 DIAGNOSIS — R45.851 SUICIDAL IDEATION: Primary | ICD-10-CM

## 2023-04-25 PROCEDURE — 99285 EMERGENCY DEPT VISIT HI MDM: CPT

## 2023-04-25 PROCEDURE — 80307 DRUG TEST PRSMV CHEM ANLYZR: CPT

## 2023-04-25 ASSESSMENT — LIFESTYLE VARIABLES: HOW OFTEN DO YOU HAVE A DRINK CONTAINING ALCOHOL: NEVER

## 2023-04-26 PROBLEM — F33.3 MAJOR DEPRESSIVE DISORDER, RECURRENT, SEVERE WITH PSYCHOTIC FEATURES (HCC): Status: ACTIVE | Noted: 2023-04-26

## 2023-04-26 PROBLEM — R45.851 SUICIDAL IDEATION: Status: ACTIVE | Noted: 2023-04-26

## 2023-04-26 LAB
ABSOLUTE EOS #: 0.16 K/UL (ref 0–0.4)
ABSOLUTE LYMPH #: 2.91 K/UL (ref 1–4.8)
ABSOLUTE MONO #: 0.16 K/UL (ref 0.1–1.3)
ACETAMINOPHEN LEVEL: <5 UG/ML (ref 10–30)
ALBUMIN SERPL-MCNC: 4.3 G/DL (ref 3.5–5.2)
ALP SERPL-CCNC: 77 U/L (ref 35–104)
ALT SERPL-CCNC: 14 U/L (ref 5–33)
AMPHETAMINE SCREEN URINE: NEGATIVE
ANION GAP SERPL CALCULATED.3IONS-SCNC: 10 MMOL/L (ref 9–17)
AST SERPL-CCNC: 19 U/L
BARBITURATE SCREEN URINE: NEGATIVE
BASOPHILS # BLD: 0 % (ref 0–2)
BASOPHILS ABSOLUTE: 0 K/UL (ref 0–0.2)
BENZODIAZEPINE SCREEN, URINE: NEGATIVE
BILIRUB SERPL-MCNC: 0.2 MG/DL (ref 0.3–1.2)
BUN SERPL-MCNC: 15 MG/DL (ref 6–20)
CALCIUM SERPL-MCNC: 9.6 MG/DL (ref 8.6–10.4)
CANNABINOID SCREEN URINE: POSITIVE
CHLORIDE SERPL-SCNC: 104 MMOL/L (ref 98–107)
CO2 SERPL-SCNC: 25 MMOL/L (ref 20–31)
COCAINE METABOLITE, URINE: POSITIVE
CREAT SERPL-MCNC: 0.83 MG/DL (ref 0.5–0.9)
EOSINOPHILS RELATIVE PERCENT: 3 % (ref 0–4)
ETHANOL PERCENT: <0.01 %
ETHANOL: <10 MG/DL
FENTANYL URINE: NEGATIVE
GFR SERPL CREATININE-BSD FRML MDRD: >60 ML/MIN/1.73M2
GLUCOSE SERPL-MCNC: 94 MG/DL (ref 70–99)
HCT VFR BLD AUTO: 39.5 % (ref 36–46)
HGB BLD-MCNC: 12.9 G/DL (ref 12–16)
LYMPHOCYTES # BLD: 55 % (ref 24–44)
MCH RBC QN AUTO: 29 PG (ref 26–34)
MCHC RBC AUTO-ENTMCNC: 32.7 G/DL (ref 31–37)
MCV RBC AUTO: 88.7 FL (ref 80–100)
METHADONE SCREEN, URINE: NEGATIVE
MONOCYTES # BLD: 3 % (ref 1–7)
MORPHOLOGY: NORMAL
OPIATES, URINE: NEGATIVE
OXYCODONE SCREEN URINE: NEGATIVE
PDW BLD-RTO: 14.3 % (ref 11.5–14.9)
PHENCYCLIDINE, URINE: NEGATIVE
PLATELET # BLD AUTO: 177 K/UL (ref 150–450)
PMV BLD AUTO: 10.8 FL (ref 6–12)
POTASSIUM SERPL-SCNC: 4 MMOL/L (ref 3.7–5.3)
PROT SERPL-MCNC: 7.8 G/DL (ref 6.4–8.3)
RBC # BLD: 4.45 M/UL (ref 4–5.2)
SALICYLATE LEVEL: <1 MG/DL (ref 3–10)
SEG NEUTROPHILS: 39 % (ref 36–66)
SEGMENTED NEUTROPHILS ABSOLUTE COUNT: 2.07 K/UL (ref 1.3–9.1)
SODIUM SERPL-SCNC: 139 MMOL/L (ref 135–144)
TEST INFORMATION: ABNORMAL
WBC # BLD AUTO: 5.3 K/UL (ref 3.5–11)

## 2023-04-26 PROCEDURE — 6370000000 HC RX 637 (ALT 250 FOR IP)

## 2023-04-26 PROCEDURE — 36415 COLL VENOUS BLD VENIPUNCTURE: CPT

## 2023-04-26 PROCEDURE — 85025 COMPLETE CBC W/AUTO DIFF WBC: CPT

## 2023-04-26 PROCEDURE — 6360000002 HC RX W HCPCS

## 2023-04-26 PROCEDURE — 80179 DRUG ASSAY SALICYLATE: CPT

## 2023-04-26 PROCEDURE — 1240000000 HC EMOTIONAL WELLNESS R&B

## 2023-04-26 PROCEDURE — 80143 DRUG ASSAY ACETAMINOPHEN: CPT

## 2023-04-26 PROCEDURE — G0480 DRUG TEST DEF 1-7 CLASSES: HCPCS

## 2023-04-26 PROCEDURE — 80053 COMPREHEN METABOLIC PANEL: CPT

## 2023-04-26 PROCEDURE — 99222 1ST HOSP IP/OBS MODERATE 55: CPT | Performed by: INTERNAL MEDICINE

## 2023-04-26 PROCEDURE — 6370000000 HC RX 637 (ALT 250 FOR IP): Performed by: PSYCHIATRY & NEUROLOGY

## 2023-04-26 RX ORDER — DIPHENOXYLATE HYDROCHLORIDE AND ATROPINE SULFATE 2.5; .025 MG/1; MG/1
1 TABLET ORAL AS NEEDED
Status: ON HOLD | COMMUNITY
End: 2023-04-30 | Stop reason: HOSPADM

## 2023-04-26 RX ORDER — VILAZODONE HYDROCHLORIDE 40 MG/1
40 TABLET ORAL DAILY
Status: ON HOLD | COMMUNITY
End: 2023-04-30 | Stop reason: HOSPADM

## 2023-04-26 RX ORDER — HYDROXYZINE 50 MG/1
50 TABLET, FILM COATED ORAL 3 TIMES DAILY PRN
Status: DISCONTINUED | OUTPATIENT
Start: 2023-04-26 | End: 2023-05-01 | Stop reason: HOSPADM

## 2023-04-26 RX ORDER — POLYETHYLENE GLYCOL 3350 17 G/17G
17 POWDER, FOR SOLUTION ORAL DAILY PRN
Status: DISCONTINUED | OUTPATIENT
Start: 2023-04-26 | End: 2023-05-01 | Stop reason: HOSPADM

## 2023-04-26 RX ORDER — LORAZEPAM 1 MG/1
2 TABLET ORAL EVERY 4 HOURS PRN
Status: DISCONTINUED | OUTPATIENT
Start: 2023-04-26 | End: 2023-04-29

## 2023-04-26 RX ORDER — HYDROXYZINE 50 MG/1
50 TABLET, FILM COATED ORAL 3 TIMES DAILY PRN
Status: ON HOLD | COMMUNITY
End: 2023-04-30 | Stop reason: SDUPTHER

## 2023-04-26 RX ORDER — LORAZEPAM 2 MG/ML
2 INJECTION INTRAMUSCULAR EVERY 4 HOURS PRN
Status: DISCONTINUED | OUTPATIENT
Start: 2023-04-26 | End: 2023-04-27

## 2023-04-26 RX ORDER — TRAZODONE HYDROCHLORIDE 50 MG/1
50 TABLET ORAL NIGHTLY PRN
Status: DISCONTINUED | OUTPATIENT
Start: 2023-04-26 | End: 2023-04-30

## 2023-04-26 RX ORDER — HYDROCHLOROTHIAZIDE 25 MG/1
25 TABLET ORAL DAILY
Status: DISCONTINUED | OUTPATIENT
Start: 2023-04-26 | End: 2023-05-01 | Stop reason: HOSPADM

## 2023-04-26 RX ORDER — HALOPERIDOL 5 MG/1
5 TABLET ORAL EVERY 4 HOURS PRN
Status: DISCONTINUED | OUTPATIENT
Start: 2023-04-26 | End: 2023-04-27

## 2023-04-26 RX ORDER — ACETAMINOPHEN 325 MG/1
650 TABLET ORAL EVERY 4 HOURS PRN
Status: DISCONTINUED | OUTPATIENT
Start: 2023-04-26 | End: 2023-05-01 | Stop reason: HOSPADM

## 2023-04-26 RX ORDER — TRAZODONE HYDROCHLORIDE 150 MG/1
150 TABLET ORAL NIGHTLY PRN
Status: ON HOLD | COMMUNITY
End: 2023-04-30 | Stop reason: HOSPADM

## 2023-04-26 RX ORDER — NICOTINE 21 MG/24HR
1 PATCH, TRANSDERMAL 24 HOURS TRANSDERMAL DAILY
Status: DISCONTINUED | OUTPATIENT
Start: 2023-04-26 | End: 2023-05-01 | Stop reason: HOSPADM

## 2023-04-26 RX ORDER — HYDROCHLOROTHIAZIDE 25 MG/1
25 TABLET ORAL DAILY
COMMUNITY

## 2023-04-26 RX ORDER — KETOROLAC TROMETHAMINE 30 MG/ML
30 INJECTION, SOLUTION INTRAMUSCULAR; INTRAVENOUS ONCE
Status: COMPLETED | OUTPATIENT
Start: 2023-04-26 | End: 2023-04-26

## 2023-04-26 RX ORDER — FOLIC ACID 1 MG/1
1 TABLET ORAL DAILY
Status: DISCONTINUED | OUTPATIENT
Start: 2023-04-26 | End: 2023-05-01 | Stop reason: HOSPADM

## 2023-04-26 RX ORDER — IBUPROFEN 400 MG/1
400 TABLET ORAL EVERY 6 HOURS PRN
Status: DISCONTINUED | OUTPATIENT
Start: 2023-04-26 | End: 2023-05-01 | Stop reason: HOSPADM

## 2023-04-26 RX ORDER — HALOPERIDOL 5 MG/1
5 TABLET ORAL EVERY 4 HOURS PRN
Status: DISCONTINUED | OUTPATIENT
Start: 2023-04-26 | End: 2023-05-01 | Stop reason: HOSPADM

## 2023-04-26 RX ORDER — GAUZE BANDAGE 2" X 2"
100 BANDAGE TOPICAL DAILY
Status: DISCONTINUED | OUTPATIENT
Start: 2023-04-26 | End: 2023-05-01 | Stop reason: HOSPADM

## 2023-04-26 RX ORDER — MAGNESIUM HYDROXIDE/ALUMINUM HYDROXICE/SIMETHICONE 120; 1200; 1200 MG/30ML; MG/30ML; MG/30ML
30 SUSPENSION ORAL EVERY 6 HOURS PRN
Status: DISCONTINUED | OUTPATIENT
Start: 2023-04-26 | End: 2023-05-01 | Stop reason: HOSPADM

## 2023-04-26 RX ORDER — DULOXETIN HYDROCHLORIDE 60 MG/1
60 CAPSULE, DELAYED RELEASE ORAL DAILY
Status: ON HOLD | COMMUNITY
End: 2023-04-30 | Stop reason: HOSPADM

## 2023-04-26 RX ORDER — ESOMEPRAZOLE MAGNESIUM 40 MG/1
40 FOR SUSPENSION ORAL DAILY
COMMUNITY

## 2023-04-26 RX ORDER — ESOMEPRAZOLE MAGNESIUM 40 MG/1
40 FOR SUSPENSION ORAL DAILY
Status: DISCONTINUED | OUTPATIENT
Start: 2023-04-26 | End: 2023-05-01 | Stop reason: HOSPADM

## 2023-04-26 RX ORDER — MIRTAZAPINE 15 MG/1
7.5 TABLET, FILM COATED ORAL NIGHTLY
Status: DISCONTINUED | OUTPATIENT
Start: 2023-04-26 | End: 2023-04-28

## 2023-04-26 RX ORDER — DIPHENHYDRAMINE HYDROCHLORIDE 50 MG/ML
50 INJECTION INTRAMUSCULAR; INTRAVENOUS EVERY 4 HOURS PRN
Status: DISCONTINUED | OUTPATIENT
Start: 2023-04-26 | End: 2023-04-27

## 2023-04-26 RX ADMIN — KETOROLAC TROMETHAMINE 30 MG: 30 INJECTION, SOLUTION INTRAMUSCULAR; INTRAVENOUS at 14:05

## 2023-04-26 RX ADMIN — HYDROXYZINE HYDROCHLORIDE 50 MG: 50 TABLET, FILM COATED ORAL at 21:05

## 2023-04-26 RX ADMIN — TRAZODONE HYDROCHLORIDE 50 MG: 50 TABLET ORAL at 21:05

## 2023-04-26 RX ADMIN — MIRTAZAPINE 7.5 MG: 15 TABLET, FILM COATED ORAL at 21:05

## 2023-04-26 ASSESSMENT — ENCOUNTER SYMPTOMS
ABDOMINAL PAIN: 0
COUGH: 0
DIARRHEA: 0

## 2023-04-26 ASSESSMENT — PAIN DESCRIPTION - LOCATION
LOCATION: GENERALIZED;KNEE
LOCATION: BACK
LOCATION_2: BACK

## 2023-04-26 ASSESSMENT — PAIN - FUNCTIONAL ASSESSMENT
PAIN_FUNCTIONAL_ASSESSMENT: ACTIVITIES ARE NOT PREVENTED
PAIN_FUNCTIONAL_ASSESSMENT_SITE2: ACTIVITIES ARE NOT PREVENTED

## 2023-04-26 ASSESSMENT — PAIN SCALES - GENERAL
PAINLEVEL_OUTOF10: 5
PAINLEVEL_OUTOF10: 0
PAINLEVEL_OUTOF10: 5

## 2023-04-26 ASSESSMENT — LIFESTYLE VARIABLES
HOW OFTEN DO YOU HAVE A DRINK CONTAINING ALCOHOL: 4 OR MORE TIMES A WEEK
HOW MANY STANDARD DRINKS CONTAINING ALCOHOL DO YOU HAVE ON A TYPICAL DAY: PATIENT DECLINED
HOW MANY STANDARD DRINKS CONTAINING ALCOHOL DO YOU HAVE ON A TYPICAL DAY: 5 OR 6
HOW OFTEN DO YOU HAVE A DRINK CONTAINING ALCOHOL: PATIENT DECLINED

## 2023-04-26 ASSESSMENT — SLEEP AND FATIGUE QUESTIONNAIRES
DO YOU HAVE DIFFICULTY SLEEPING: NO
DO YOU USE A SLEEP AID: YES
AVERAGE NUMBER OF SLEEP HOURS: 8

## 2023-04-26 ASSESSMENT — PAIN DESCRIPTION - INTENSITY: RATING_2: 5

## 2023-04-26 ASSESSMENT — PATIENT HEALTH QUESTIONNAIRE - PHQ9: SUM OF ALL RESPONSES TO PHQ QUESTIONS 1-9: 18

## 2023-04-26 ASSESSMENT — PAIN DESCRIPTION - DESCRIPTORS: DESCRIPTORS_2: ACHING

## 2023-04-26 ASSESSMENT — PAIN DESCRIPTION - ORIENTATION
ORIENTATION_2: LOWER
ORIENTATION: RIGHT

## 2023-04-27 PROCEDURE — 6360000002 HC RX W HCPCS: Performed by: PSYCHIATRY & NEUROLOGY

## 2023-04-27 PROCEDURE — 99231 SBSQ HOSP IP/OBS SF/LOW 25: CPT | Performed by: INTERNAL MEDICINE

## 2023-04-27 PROCEDURE — 6370000000 HC RX 637 (ALT 250 FOR IP): Performed by: INTERNAL MEDICINE

## 2023-04-27 PROCEDURE — 6370000000 HC RX 637 (ALT 250 FOR IP)

## 2023-04-27 PROCEDURE — 6370000000 HC RX 637 (ALT 250 FOR IP): Performed by: PSYCHIATRY & NEUROLOGY

## 2023-04-27 PROCEDURE — 1240000000 HC EMOTIONAL WELLNESS R&B

## 2023-04-27 RX ORDER — HALOPERIDOL 5 MG/ML
5 INJECTION INTRAMUSCULAR EVERY 4 HOURS PRN
Status: DISCONTINUED | OUTPATIENT
Start: 2023-04-27 | End: 2023-04-27

## 2023-04-27 RX ORDER — DIPHENHYDRAMINE HYDROCHLORIDE 50 MG/ML
50 INJECTION INTRAMUSCULAR; INTRAVENOUS EVERY 4 HOURS PRN
Status: DISCONTINUED | OUTPATIENT
Start: 2023-04-27 | End: 2023-05-01 | Stop reason: HOSPADM

## 2023-04-27 RX ORDER — LORAZEPAM 2 MG/ML
2 INJECTION INTRAMUSCULAR EVERY 4 HOURS PRN
Status: DISCONTINUED | OUTPATIENT
Start: 2023-04-27 | End: 2023-04-29

## 2023-04-27 RX ORDER — HALOPERIDOL 5 MG/ML
5 INJECTION INTRAMUSCULAR EVERY 4 HOURS PRN
Status: DISCONTINUED | OUTPATIENT
Start: 2023-04-27 | End: 2023-05-01 | Stop reason: HOSPADM

## 2023-04-27 RX ADMIN — HALOPERIDOL LACTATE 5 MG: 5 INJECTION, SOLUTION INTRAMUSCULAR at 22:28

## 2023-04-27 RX ADMIN — HYDROCHLOROTHIAZIDE 25 MG: 25 TABLET ORAL at 10:19

## 2023-04-27 RX ADMIN — THIAMINE HCL TAB 100 MG 100 MG: 100 TAB at 10:19

## 2023-04-27 RX ADMIN — ALUMINUM HYDROXIDE, MAGNESIUM HYDROXIDE, AND SIMETHICONE 30 ML: 200; 200; 20 SUSPENSION ORAL at 21:27

## 2023-04-27 RX ADMIN — LORAZEPAM 2 MG: 2 INJECTION INTRAMUSCULAR; INTRAVENOUS at 22:28

## 2023-04-27 RX ADMIN — HYDROXYZINE HYDROCHLORIDE 50 MG: 50 TABLET, FILM COATED ORAL at 21:25

## 2023-04-27 RX ADMIN — ESOMEPRAZOLE MAGNESIUM 40 MG: 40 GRANULE, DELAYED RELEASE ORAL at 10:19

## 2023-04-27 RX ADMIN — MIRTAZAPINE 7.5 MG: 15 TABLET, FILM COATED ORAL at 21:25

## 2023-04-27 RX ADMIN — DIPHENHYDRAMINE HYDROCHLORIDE 50 MG: 50 INJECTION, SOLUTION INTRAMUSCULAR; INTRAVENOUS at 22:25

## 2023-04-27 RX ADMIN — FOLIC ACID 1 MG: 1 TABLET ORAL at 10:19

## 2023-04-28 PROCEDURE — 1240000000 HC EMOTIONAL WELLNESS R&B

## 2023-04-28 PROCEDURE — 6370000000 HC RX 637 (ALT 250 FOR IP): Performed by: INTERNAL MEDICINE

## 2023-04-28 PROCEDURE — 6370000000 HC RX 637 (ALT 250 FOR IP): Performed by: PSYCHIATRY & NEUROLOGY

## 2023-04-28 PROCEDURE — 6370000000 HC RX 637 (ALT 250 FOR IP): Performed by: NURSE PRACTITIONER

## 2023-04-28 RX ORDER — MIRTAZAPINE 15 MG/1
15 TABLET, FILM COATED ORAL NIGHTLY
Status: DISCONTINUED | OUTPATIENT
Start: 2023-04-28 | End: 2023-05-01 | Stop reason: HOSPADM

## 2023-04-28 RX ADMIN — FOLIC ACID 1 MG: 1 TABLET ORAL at 17:14

## 2023-04-28 RX ADMIN — ESOMEPRAZOLE MAGNESIUM 40 MG: 40 GRANULE, DELAYED RELEASE ORAL at 17:12

## 2023-04-28 RX ADMIN — MIRTAZAPINE 15 MG: 15 TABLET, FILM COATED ORAL at 21:18

## 2023-04-28 RX ADMIN — HYDROXYZINE HYDROCHLORIDE 50 MG: 50 TABLET, FILM COATED ORAL at 21:18

## 2023-04-28 RX ADMIN — HYDROCHLOROTHIAZIDE 25 MG: 25 TABLET ORAL at 17:15

## 2023-04-28 RX ADMIN — THIAMINE HCL TAB 100 MG 100 MG: 100 TAB at 17:14

## 2023-04-29 PROCEDURE — 1240000000 HC EMOTIONAL WELLNESS R&B

## 2023-04-29 PROCEDURE — 6370000000 HC RX 637 (ALT 250 FOR IP): Performed by: NURSE PRACTITIONER

## 2023-04-29 PROCEDURE — 6370000000 HC RX 637 (ALT 250 FOR IP): Performed by: PSYCHIATRY & NEUROLOGY

## 2023-04-29 PROCEDURE — 6360000002 HC RX W HCPCS: Performed by: PSYCHIATRY & NEUROLOGY

## 2023-04-29 PROCEDURE — 6370000000 HC RX 637 (ALT 250 FOR IP): Performed by: INTERNAL MEDICINE

## 2023-04-29 RX ADMIN — LORAZEPAM 2 MG: 1 TABLET ORAL at 13:06

## 2023-04-29 RX ADMIN — IBUPROFEN 400 MG: 400 TABLET, FILM COATED ORAL at 13:05

## 2023-04-29 RX ADMIN — FOLIC ACID 1 MG: 1 TABLET ORAL at 08:47

## 2023-04-29 RX ADMIN — MIRTAZAPINE 15 MG: 15 TABLET, FILM COATED ORAL at 20:54

## 2023-04-29 RX ADMIN — HYDROCHLOROTHIAZIDE 25 MG: 25 TABLET ORAL at 08:38

## 2023-04-29 RX ADMIN — ACETAMINOPHEN 650 MG: 325 TABLET ORAL at 08:53

## 2023-04-29 RX ADMIN — THIAMINE HCL TAB 100 MG 100 MG: 100 TAB at 08:38

## 2023-04-29 RX ADMIN — IBUPROFEN 400 MG: 400 TABLET, FILM COATED ORAL at 20:54

## 2023-04-29 RX ADMIN — HALOPERIDOL 5 MG: 5 TABLET ORAL at 13:06

## 2023-04-29 RX ADMIN — HYDROXYZINE HYDROCHLORIDE 50 MG: 50 TABLET, FILM COATED ORAL at 20:54

## 2023-04-29 RX ADMIN — TRAZODONE HYDROCHLORIDE 50 MG: 50 TABLET ORAL at 20:54

## 2023-04-29 RX ADMIN — HYDROXYZINE HYDROCHLORIDE 50 MG: 50 TABLET, FILM COATED ORAL at 14:09

## 2023-04-29 RX ADMIN — ESOMEPRAZOLE MAGNESIUM 40 MG: 40 GRANULE, DELAYED RELEASE ORAL at 08:38

## 2023-04-29 RX ADMIN — DIPHENHYDRAMINE HYDROCHLORIDE 50 MG: 50 INJECTION, SOLUTION INTRAMUSCULAR; INTRAVENOUS at 22:16

## 2023-04-29 RX ADMIN — HALOPERIDOL LACTATE 5 MG: 5 INJECTION, SOLUTION INTRAMUSCULAR at 22:16

## 2023-04-29 ASSESSMENT — PAIN DESCRIPTION - LOCATION
LOCATION: HEAD
LOCATION: GENERALIZED
LOCATION: HEAD

## 2023-04-29 ASSESSMENT — PAIN SCALES - GENERAL
PAINLEVEL_OUTOF10: 7
PAINLEVEL_OUTOF10: 7
PAINLEVEL_OUTOF10: 3
PAINLEVEL_OUTOF10: 0
PAINLEVEL_OUTOF10: 6

## 2023-04-29 ASSESSMENT — PAIN SCALES - WONG BAKER: WONGBAKER_NUMERICALRESPONSE: 2

## 2023-04-30 PROCEDURE — 6370000000 HC RX 637 (ALT 250 FOR IP): Performed by: PSYCHIATRY & NEUROLOGY

## 2023-04-30 PROCEDURE — 6370000000 HC RX 637 (ALT 250 FOR IP): Performed by: INTERNAL MEDICINE

## 2023-04-30 PROCEDURE — 6370000000 HC RX 637 (ALT 250 FOR IP): Performed by: NURSE PRACTITIONER

## 2023-04-30 PROCEDURE — 1240000000 HC EMOTIONAL WELLNESS R&B

## 2023-04-30 PROCEDURE — 6360000002 HC RX W HCPCS: Performed by: PSYCHIATRY & NEUROLOGY

## 2023-04-30 RX ORDER — MIRTAZAPINE 15 MG/1
15 TABLET, FILM COATED ORAL NIGHTLY
Qty: 30 TABLET | Refills: 0 | Status: SHIPPED | OUTPATIENT
Start: 2023-04-30

## 2023-04-30 RX ORDER — TRAZODONE HYDROCHLORIDE 100 MG/1
100 TABLET ORAL NIGHTLY PRN
Status: DISCONTINUED | OUTPATIENT
Start: 2023-04-30 | End: 2023-05-01 | Stop reason: HOSPADM

## 2023-04-30 RX ORDER — TRAZODONE HYDROCHLORIDE 100 MG/1
100 TABLET ORAL NIGHTLY PRN
Qty: 30 TABLET | Refills: 0 | Status: SHIPPED | OUTPATIENT
Start: 2023-04-30

## 2023-04-30 RX ORDER — HYDROXYZINE 50 MG/1
50 TABLET, FILM COATED ORAL 3 TIMES DAILY PRN
Qty: 30 TABLET | Refills: 0 | Status: SHIPPED | OUTPATIENT
Start: 2023-04-30

## 2023-04-30 RX ADMIN — DIPHENHYDRAMINE HYDROCHLORIDE 50 MG: 50 INJECTION, SOLUTION INTRAMUSCULAR; INTRAVENOUS at 22:28

## 2023-04-30 RX ADMIN — THIAMINE HCL TAB 100 MG 100 MG: 100 TAB at 09:27

## 2023-04-30 RX ADMIN — TRAZODONE HYDROCHLORIDE 100 MG: 100 TABLET ORAL at 20:45

## 2023-04-30 RX ADMIN — IBUPROFEN 400 MG: 400 TABLET, FILM COATED ORAL at 09:34

## 2023-04-30 RX ADMIN — MIRTAZAPINE 15 MG: 15 TABLET, FILM COATED ORAL at 20:44

## 2023-04-30 RX ADMIN — ALUMINUM HYDROXIDE, MAGNESIUM HYDROXIDE, AND SIMETHICONE 30 ML: 200; 200; 20 SUSPENSION ORAL at 17:55

## 2023-04-30 RX ADMIN — HYDROXYZINE HYDROCHLORIDE 50 MG: 50 TABLET, FILM COATED ORAL at 20:44

## 2023-04-30 RX ADMIN — FOLIC ACID 1 MG: 1 TABLET ORAL at 09:27

## 2023-04-30 RX ADMIN — HALOPERIDOL LACTATE 5 MG: 5 INJECTION, SOLUTION INTRAMUSCULAR at 22:28

## 2023-04-30 RX ADMIN — ESOMEPRAZOLE MAGNESIUM 40 MG: 40 GRANULE, DELAYED RELEASE ORAL at 09:36

## 2023-04-30 RX ADMIN — HYDROCHLOROTHIAZIDE 25 MG: 25 TABLET ORAL at 09:26

## 2023-04-30 ASSESSMENT — PAIN SCALES - GENERAL
PAINLEVEL_OUTOF10: 0
PAINLEVEL_OUTOF10: 6

## 2023-04-30 ASSESSMENT — PAIN DESCRIPTION - LOCATION
LOCATION: GENERALIZED
LOCATION: GENERALIZED

## 2023-05-01 VITALS
DIASTOLIC BLOOD PRESSURE: 86 MMHG | HEART RATE: 65 BPM | WEIGHT: 143 LBS | RESPIRATION RATE: 14 BRPM | OXYGEN SATURATION: 100 % | HEIGHT: 64 IN | TEMPERATURE: 97.6 F | SYSTOLIC BLOOD PRESSURE: 134 MMHG | BODY MASS INDEX: 24.41 KG/M2

## 2023-05-01 PROCEDURE — 6370000000 HC RX 637 (ALT 250 FOR IP): Performed by: INTERNAL MEDICINE

## 2023-05-01 PROCEDURE — 6370000000 HC RX 637 (ALT 250 FOR IP): Performed by: PSYCHIATRY & NEUROLOGY

## 2023-05-01 RX ADMIN — THIAMINE HCL TAB 100 MG 100 MG: 100 TAB at 08:20

## 2023-05-01 RX ADMIN — FOLIC ACID 1 MG: 1 TABLET ORAL at 08:19

## 2023-05-01 RX ADMIN — ESOMEPRAZOLE MAGNESIUM 40 MG: 40 GRANULE, DELAYED RELEASE ORAL at 08:20

## 2023-05-01 RX ADMIN — IBUPROFEN 400 MG: 400 TABLET, FILM COATED ORAL at 08:19

## 2023-05-01 RX ADMIN — HYDROCHLOROTHIAZIDE 25 MG: 25 TABLET ORAL at 08:19

## 2023-05-01 ASSESSMENT — PAIN SCALES - GENERAL: PAINLEVEL_OUTOF10: 8

## 2023-05-01 NOTE — DISCHARGE INSTRUCTIONS
Information:  Medications:   Medication summary provided   I understand that I should take only the medications on my list.     -why and when I need to take each medicine.     -which side effects to watch for.     -that I should carry my medication information at all times in case of     Emergency situations. I will take all of my medicines to follow up appointments.     -check with my physician or pharmacist before taking any new    Medication, over the counter product or drink alcohol.    -Ask about food, drug or dietary supplement interactions.    -discard old lists and update records with medication providers. Notify Physician:  Notify physician if you notice:   Always call 911 if you feel your life is in danger  In case of an emergency call 911 immediately! If 911 is not available call your local emergency medical system for help    Behavioral Health Follow Up:  Original Referral Source:[facility]STER  Discharge Diagnosis: Suicidal ideation [R45.851]  Depression with suicidal ideation [F32. A, R45.851]  Recommendations for Level of Care: Follow up with [facility]  Patient status at discharge: Stable  My hospital  was: [facility]  Aftercare plan faxed: yes   -faxed by: RN   -date: 5/1/2023   -time: 1500  Prescriptions: verified    Smoking: Quit Smoking. Call the [employer]'s smoking quitline at 7-562-71Q-QUIT  Know the signs of a heart attack   If you have any of the following symptoms call 911 immediately, do not wait more    Than five minutes. 1. Pressure, fullness and/ or squeezing in the center of the chest spreading to    The jaw, neck or shoulder. 2. Chest discomfort with light headedness, fainting, sweating, nausea or    Shortness of breath. 3. Upper abdominal pressure or discomfort. 4. Lower chest pain, back pain, unusual fatigue, shortness of breath, nausea   Or dizziness.      General Information:   Questions regarding your bill: Call HELP program [phone]     Suicide Hotline

## 2023-05-01 NOTE — DISCHARGE SUMMARY
Provider Discharge Summary     Patient ID:  Bailey Low  [id number]  5 y.o.  1975    Admit date: 4/25/2023    Discharge date and time: 5/1/2023  4:18 PM     Admitting Physician: Shandra Nguyen MD     Discharge Physician: EFRAIN Mcguire - CNP    Admission Diagnoses: Suicidal ideation [R45.851]  Depression with suicidal ideation [F32. Becca Sharon    Discharge Diagnoses:     Major depressive disorder, recurrent, severe with psychotic features [facility])     Patient Active Problem List   Diagnosis Code    Essential hypertension I10    Smoking F17.200    Other chronic pain G89.29    Mass on back R22.2    Depression with suicidal ideation F32. A, R45.851    Major depressive disorder, recurrent severe without psychotic features ([address] F33.2    Cannabis abuse F12.10    Cocaine use disorder ([facility] [address] F14.10    Major depressive disorder, recurrent, severe with psychotic features ([facility] [address] [facility] F33.3    Suicidal ideation R45.851        Admission Condition: poor    Discharged Condition: stable    Indication for Admission: threat to self    History of Present Illness:   Bailey Low is a 49 y.o. female who has a past medical history of mental illness, cocaine abuse, hypertension, and chronic back pain who presents to the ER with increased depression and suicidal ideation to walk in front of traffic. Jen Covington has had multiple previous admissions to the [facility] in the past couple of months with last admission being 2/9/2023. At that time she was discharged on Cymbalta and Remeron. Jen Covington is agreeable to assessment in unit milieu today. She reports significant depression recently related to the death of her daughter back in November. She denies ever receiving grief counseling for this. She continues to utilize cocaine and alcohol to cope with her grief. She also reports that she is homeless and has lost things such as her home and car. She states she is very hopeless and helpless.   Jen Covington reports that for the last couple of

## 2023-05-01 NOTE — DISCHARGE INSTR - DIET

## 2023-05-01 NOTE — GROUP NOTE
Group Therapy Note    Date: 5/1/2023    Group Start Time: 0900  Group End Time: 0930  Group Topic: Community Meeting    [facility] A    [address] [employer] Meeting Group Note        Date: 5/1/2023  Start Time: 9am  End Time: 0930      Number of Participants in Group & Unit Census:  5/9    Topic: Goal setting    Goal of Group:Set a short term goal for the day      Comments:     Patient did not participate in [employer] group, despite staff encouragement and explanation of benefits. Patient remain seclusive to self. Q15 minute safety checks maintained for patient safety and will continue to encourage patient to attend unit programming.

## 2023-05-01 NOTE — BH NOTE
Pt is agitated as evidence by requesting shots on approach with writer. Staff attempted to find and relieve the distress by talking to pt, refocusing on new activity, offering suggestions, etc. Pt offered IM Haldol 5mg and Benadryl 50mg and pt was accepting. . Pt is currently in behavioral control resting in bed. Will continue to monitor.

## 2023-05-01 NOTE — BH NOTE
[facility]  Discharge Note    Pt discharged with followings belongings:   Dental Appliances: None  Vision - Corrective Lenses: None  Hearing Aid: None  Jewelry: None  Body Piercings Removed: No  Clothing: Undergarments, Socks, Shirt, Pants, Footwear  Other Valuables: Lighter/Matches, Wig, Personal Toiletries   Valuables sent home with or returned to patient. Patient educated on aftercare instructions: yes  Information faxed to [facility] by Staff  at 6:34 PM .Patient verbalize understanding of AVS:  Yes.     Status EXAM upon discharge:  Mental Status and Behavioral Exam  Normal: No  Level of Assistance: Independent/Self  Facial Expression: Exaggerated  Affect: Blunt  Level of Consciousness: Alert  Frequency of Checks: 4 times per hour, close  Mood:Normal: No  Mood: Suspicious  Motor Activity:Normal: Yes  Motor Activity: Decreased  Eye Contact: Good  Observed Behavior: Preoccupied  Sexual Misconduct History: Current - no  Preception: [place] to person, [place] to time, [place] to place, [place] to situation  Attention:Normal: No  Attention: Distractible  Thought Processes: Circumstantial  Thought Content:Normal: No  Thought Content: Preoccupations  Depression Symptoms: Impaired concentration  Anxiety Symptoms: Generalized  Maria Teresa Symptoms: Poor judgment  Hallucinations: None  Delusions: No  Delusions: Persecutory  Memory:Normal: Yes  Memory: Poor recent  Insight and Judgment: No  Insight and Judgment: Poor judgment    Tobacco Screening:  Practical Counseling, on admission, jayme X, if applicable and completed (first 3 are required if patient doesn't refuse):            ( ) Recognizing danger situations (included triggers and roadblocks)                    ( ) Coping skills (new ways to manage stress,relaxation techniques, changing routine, distraction)                                                           ( ) Basic information about quitting (benefits of quitting, techniques in how to quit, available

## 2023-05-01 NOTE — CARE COORDINATION
Writer spoke to Mcak at [employer]. Patient can be discharged directly to recovery housing today until inpatient bed opens up.    Leticia Neal [address].  [address] [address]

## 2023-05-01 NOTE — PROGRESS NOTES
Pharmacy Med Education Group Note    Date: 5/1/23  Start Time: 1430  End Time: 9925    Number Participants in Group:  5    Goal:  Patient will demonstrate an understanding of the medications intended purpose and possible adverse effects  Topic: [place] for Pharmacy Med Ed Group    Discipline Responsible:     OT  AT  [facility].  RT     X Other       Participation Level:     None  Minimal      X Active Listener    X Interactive    Monopolizing         Participation Quality:    X Appropriate  Inappropriate     X       Attentive        Intrusive          Sharing        Resistant          Supportive        Lethargic       Affective:     X Congruent  Incongruent  Blunted  Flat    Constricted  Anxious  Elated  Angry    Labile  Depressed  Other         Cognitive:    X Alert  Oriented PPTP     Concentration   X G  F  P   Attention Span   X G  F  P   Short-Term Memory   X G  F  P   Long-Term Memory  G  F  P   ProblemSolving/  Decision Making  G  F  P   Ability to Process  Information   X G  F  P      Contributing Factors             Delusional             Hallucinating             Flight of Ideas             Other:       Modes of Intervention:    X Education   X Support  Exploration    Clarifying  Problem Solving  Confrontation    Socialization  Limit Setting  Reality Testing    Activity  Movement  Media    Other:            Response to Learning:    X Able to verbalize current knowledge/experience    Able to verbalize/acknowledge new learning    Able to retain information    Capable of insight    Able to change behavior    Progressing to goal    Other:        Comments:     Alyssa Delvalle RPH,PharmD,  5/1/2023, 3:41 PM      .

## 2023-05-01 NOTE — PROGRESS NOTES
CLINICAL PHARMACY NOTE: MEDS TO BEDS    Total # of Prescriptions Filled: 2   The following medications were delivered to the patient:  Mirtazapine 15mg  Trazodone HCL 100mg    Additional Documentation:  Delivered medications to nurses station

## 2023-05-01 NOTE — BH NOTE
Patient given tobacco quitline number 41056506205 at this time, refusing to call at this time, states \" I just dont want to quit now\"- patient given information as to the dangers of long term tobacco use. Continue to reinforce the importance of tobacco cessation.

## 2023-05-02 NOTE — CARE COORDINATION
Social work attempted to follow up with patient by phone today but her phone does not ring it goes straight to voicemail. Left a voice message asking her to call SW back directly.

## 2023-05-02 NOTE — CARE COORDINATION
Name: Scarlett Vargas    : 1975    Discharge Date: 23    Primary Auth/Cert #: [id number]    Destination: [facility]    Discharge Medications:      Medication List        CHANGE how you take these medications      hydrOXYzine HCl 50 MG tablet  Commonly known as: ATARAX  Take 1 tablet by mouth 3 times daily as needed for Anxiety  What changed: reasons to take this  Notes to patient: To help reduce anxiety     traZODone 100 MG tablet  Commonly known as: DESYREL  Take 1 tablet by mouth nightly as needed for Sleep  What changed:   medication strength  how much to take  Notes to patient: To help improve sleep            CONTINUE taking these medications      esomeprazole Magnesium 40 MG Pack  Commonly known as: [address]  Notes to patient: To reduce stomach acid levels     folic acid 1 MG tablet  Commonly known as: FOLVITE  Take 1 tablet by mouth daily  Notes to patient: Supplement     hydroCHLOROthiazide 25 MG tablet  Commonly known as: HYDRODIURIL  Notes to patient: To help improve heart health     mirtazapine 15 MG tablet  Commonly known as: REMERON  Take 1 tablet by mouth nightly  Notes to patient:  To help improve mood     vitamin B-1 100 MG tablet  Commonly known as: THIAMINE  Take 1 tablet by mouth daily  Notes to patient: Supplement            STOP taking these medications      diphenoxylate-atropine 2.5-0.025 MG per tablet  Commonly known as: LOMOTIL     DULoxetine 60 MG extended release capsule  Commonly known as: CYMBALTA     meloxicam 7.5 MG tablet  Commonly known as: MOBIC     vilazodone HCl 40 MG Tabs  Commonly known as: VIIBRYD               Where to Get Your Medications        These medications were sent to [facility] [id number]   [address] [facility] [address]      Phone: [phone]   hydrOXYzine HCl 50 MG tablet  mirtazapine 15 MG tablet  traZODone 100 MG tablet         Follow Up Appointment: Team

## 2023-06-16 ENCOUNTER — HOSPITAL ENCOUNTER (EMERGENCY)
Age: 48
Discharge: HOME | End: 2023-06-16
Attending: EMERGENCY MEDICINE
Payer: MEDICAID

## 2023-06-16 VITALS
BODY MASS INDEX: 23.9 KG/M2 | OXYGEN SATURATION: 97 % | HEIGHT: 64 IN | RESPIRATION RATE: 17 BRPM | DIASTOLIC BLOOD PRESSURE: 89 MMHG | WEIGHT: 140 LBS | SYSTOLIC BLOOD PRESSURE: 142 MMHG | TEMPERATURE: 98.3 F | HEART RATE: 72 BPM

## 2023-06-16 DIAGNOSIS — R12 HEART BURN: ICD-10-CM

## 2023-06-16 DIAGNOSIS — R10.13 ABDOMINAL PAIN, EPIGASTRIC: Primary | ICD-10-CM

## 2023-06-16 LAB
ALBUMIN SERPL-MCNC: 4.5 G/DL (ref 3.5–5.2)
ALBUMIN/GLOB SERPL: 1.3 {RATIO} (ref 1–2.5)
ALP SERPL-CCNC: 65 U/L (ref 35–104)
ALT SERPL-CCNC: 13 U/L (ref 5–33)
ANION GAP SERPL CALCULATED.3IONS-SCNC: 13 MMOL/L (ref 9–17)
AST SERPL-CCNC: 16 U/L
BASOPHILS # BLD: 0 K/UL (ref 0–0.2)
BASOPHILS NFR BLD: 0 % (ref 0–2)
BILIRUB SERPL-MCNC: 0.3 MG/DL (ref 0.3–1.2)
BUN SERPL-MCNC: 14 MG/DL (ref 6–20)
CALCIUM SERPL-MCNC: 9.7 MG/DL (ref 8.6–10.4)
CHLORIDE SERPL-SCNC: 103 MMOL/L (ref 98–107)
CO2 SERPL-SCNC: 22 MMOL/L (ref 20–31)
CREAT SERPL-MCNC: 0.78 MG/DL (ref 0.5–0.9)
EOSINOPHIL # BLD: 0.05 K/UL (ref 0–0.4)
EOSINOPHILS RELATIVE PERCENT: 1 % (ref 1–4)
ERYTHROCYTE [DISTWIDTH] IN BLOOD BY AUTOMATED COUNT: 14 % (ref 11.8–14.4)
GFR SERPL CREATININE-BSD FRML MDRD: >60 ML/MIN/1.73M2
GLUCOSE SERPL-MCNC: 96 MG/DL (ref 70–99)
HCT VFR BLD AUTO: 40.9 % (ref 36.3–47.1)
HGB BLD-MCNC: 13.6 G/DL (ref 11.9–15.1)
IMM GRANULOCYTES # BLD AUTO: 0 K/UL (ref 0–0.3)
IMM GRANULOCYTES NFR BLD: 0 %
LACTIC ACID, WHOLE BLOOD: 0.7 MMOL/L (ref 0.7–2.1)
LIPASE SERPL-CCNC: 55 U/L (ref 13–60)
LYMPHOCYTES # BLD: 39 % (ref 24–44)
LYMPHOCYTES NFR BLD: 1.99 K/UL (ref 1–4.8)
MAGNESIUM SERPL-MCNC: 2.3 MG/DL (ref 1.6–2.6)
MCH RBC QN AUTO: 29.3 PG (ref 25.2–33.5)
MCHC RBC AUTO-ENTMCNC: 33.3 G/DL (ref 28.4–34.8)
MCV RBC AUTO: 88.1 FL (ref 82.6–102.9)
MONOCYTES NFR BLD: 0.36 K/UL (ref 0.1–0.8)
MONOCYTES NFR BLD: 7 % (ref 1–7)
MORPHOLOGY: NORMAL
NEUTROPHILS NFR BLD: 53 % (ref 36–66)
NEUTS SEG NFR BLD: 2.7 K/UL (ref 1.8–7.7)
NRBC AUTOMATED: 0 PER 100 WBC
PLATELET # BLD AUTO: NORMAL K/UL (ref 138–453)
PLATELET, FLUORESCENCE: 175 K/UL (ref 138–453)
PLATELETS.RETICULATED NFR BLD AUTO: 11.6 % (ref 1.1–10.3)
POTASSIUM SERPL-SCNC: 4 MMOL/L (ref 3.7–5.3)
PROT SERPL-MCNC: 7.9 G/DL (ref 6.4–8.3)
RBC # BLD AUTO: 4.64 M/UL (ref 3.95–5.11)
SODIUM SERPL-SCNC: 138 MMOL/L (ref 135–144)
WBC OTHER # BLD: 5.1 K/UL (ref 3.5–11.3)

## 2023-06-16 PROCEDURE — 83735 ASSAY OF MAGNESIUM: CPT

## 2023-06-16 PROCEDURE — 99284 EMERGENCY DEPT VISIT MOD MDM: CPT

## 2023-06-16 PROCEDURE — 96375 TX/PRO/DX INJ NEW DRUG ADDON: CPT

## 2023-06-16 PROCEDURE — 85027 COMPLETE CBC AUTOMATED: CPT

## 2023-06-16 PROCEDURE — 6370000000 HC RX 637 (ALT 250 FOR IP): Performed by: PEDIATRICS

## 2023-06-16 PROCEDURE — 2580000003 HC RX 258: Performed by: PEDIATRICS

## 2023-06-16 PROCEDURE — 80053 COMPREHEN METABOLIC PANEL: CPT

## 2023-06-16 PROCEDURE — 83690 ASSAY OF LIPASE: CPT

## 2023-06-16 PROCEDURE — A4216 STERILE WATER/SALINE, 10 ML: HCPCS | Performed by: PEDIATRICS

## 2023-06-16 PROCEDURE — 83605 ASSAY OF LACTIC ACID: CPT

## 2023-06-16 PROCEDURE — 2500000003 HC RX 250 WO HCPCS: Performed by: PEDIATRICS

## 2023-06-16 PROCEDURE — 96374 THER/PROPH/DIAG INJ IV PUSH: CPT

## 2023-06-16 PROCEDURE — 6360000002 HC RX W HCPCS: Performed by: PEDIATRICS

## 2023-06-16 PROCEDURE — 85055 RETICULATED PLATELET ASSAY: CPT

## 2023-06-16 RX ORDER — MAGNESIUM HYDROXIDE/ALUMINUM HYDROXICE/SIMETHICONE 120; 1200; 1200 MG/30ML; MG/30ML; MG/30ML
30 SUSPENSION ORAL ONCE
Status: COMPLETED | OUTPATIENT
Start: 2023-06-16 | End: 2023-06-16

## 2023-06-16 RX ORDER — HYDROCHLOROTHIAZIDE 25 MG/1
25 TABLET ORAL DAILY
Qty: 30 TABLET | Refills: 0 | Status: SHIPPED | OUTPATIENT
Start: 2023-06-16

## 2023-06-16 RX ORDER — KETOROLAC TROMETHAMINE 30 MG/ML
30 INJECTION, SOLUTION INTRAMUSCULAR; INTRAVENOUS ONCE
Status: COMPLETED | OUTPATIENT
Start: 2023-06-16 | End: 2023-06-16

## 2023-06-16 RX ORDER — PROCHLORPERAZINE EDISYLATE 5 MG/ML
10 INJECTION INTRAMUSCULAR; INTRAVENOUS ONCE
Status: COMPLETED | OUTPATIENT
Start: 2023-06-16 | End: 2023-06-16

## 2023-06-16 RX ORDER — 0.9 % SODIUM CHLORIDE 0.9 %
1000 INTRAVENOUS SOLUTION INTRAVENOUS ONCE
Status: COMPLETED | OUTPATIENT
Start: 2023-06-16 | End: 2023-06-16

## 2023-06-16 RX ORDER — HYDROXYZINE 50 MG/1
50 TABLET, FILM COATED ORAL 3 TIMES DAILY PRN
Qty: 30 TABLET | Refills: 0 | Status: SHIPPED | OUTPATIENT
Start: 2023-06-16

## 2023-06-16 RX ORDER — SODIUM CHLORIDE 0.9 % (FLUSH) 0.9 %
3 SYRINGE (ML) INJECTION EVERY 8 HOURS
Status: DISCONTINUED | OUTPATIENT
Start: 2023-06-16 | End: 2023-06-16 | Stop reason: HOSPADM

## 2023-06-16 RX ORDER — ESOMEPRAZOLE MAGNESIUM 40 MG/1
40 FOR SUSPENSION ORAL DAILY
Qty: 30 PACKET | Refills: 0 | Status: SHIPPED | OUTPATIENT
Start: 2023-06-16

## 2023-06-16 RX ORDER — DIPHENHYDRAMINE HYDROCHLORIDE 50 MG/ML
25 INJECTION INTRAMUSCULAR; INTRAVENOUS ONCE
Status: COMPLETED | OUTPATIENT
Start: 2023-06-16 | End: 2023-06-16

## 2023-06-16 RX ADMIN — ALUMINUM HYDROXIDE, MAGNESIUM HYDROXIDE, AND SIMETHICONE 30 ML: 200; 200; 20 SUSPENSION ORAL at 03:55

## 2023-06-16 RX ADMIN — PROCHLORPERAZINE EDISYLATE 10 MG: 5 INJECTION INTRAMUSCULAR; INTRAVENOUS at 02:21

## 2023-06-16 RX ADMIN — SODIUM CHLORIDE 1000 ML: 9 INJECTION, SOLUTION INTRAVENOUS at 02:36

## 2023-06-16 RX ADMIN — SODIUM CHLORIDE, PRESERVATIVE FREE 3 ML: 5 INJECTION INTRAVENOUS at 02:37

## 2023-06-16 RX ADMIN — FAMOTIDINE 20 MG: 10 INJECTION, SOLUTION INTRAVENOUS at 03:56

## 2023-06-16 RX ADMIN — DIPHENHYDRAMINE HYDROCHLORIDE 25 MG: 50 INJECTION, SOLUTION INTRAMUSCULAR; INTRAVENOUS at 02:18

## 2023-06-16 RX ADMIN — KETOROLAC TROMETHAMINE 30 MG: 30 INJECTION, SOLUTION INTRAMUSCULAR; INTRAVENOUS at 02:20

## 2023-06-16 ASSESSMENT — ENCOUNTER SYMPTOMS
NAUSEA: 1
VOMITING: 1
ABDOMINAL PAIN: 1

## 2023-06-16 ASSESSMENT — PAIN SCALES - GENERAL
PAINLEVEL_OUTOF10: 10
PAINLEVEL_OUTOF10: 10
PAINLEVEL_OUTOF10: 8

## 2023-06-16 ASSESSMENT — PAIN - FUNCTIONAL ASSESSMENT: PAIN_FUNCTIONAL_ASSESSMENT: 0-10

## 2023-06-16 NOTE — DISCHARGE INSTRUCTIONS
Nexium, hydrochlorothiazide, and atarax sent to your pharmacy. Please call a primary care doctor to be seen as soon as possible. A clinic was provided to you, you can get on their cancellation list as well if their appointments are too far out for you. You labs were normal today, please follow up with a physician as soon as you can to continue your medications.

## 2023-06-16 NOTE — ED NOTES
Patient presents to the ED with abdominal pain with N/V that started this morning. States that the pain has gotten worse since taking a sip of beer this evening. Patient denies SOB, chest pain, diarrhea or chills. Also states that she has has a Hx of migraines and fibromyalgia which is also causing pain. Alert and oriented, Note hypertension, but otherwise stable vitals.       Izaiah Whitlock RN  06/16/23 5766

## 2023-06-16 NOTE — ED PROVIDER NOTES
[address] ED  Emergency Department Encounter  Emergency Medicine Resident     Pt Abdi Rowell  MRN: [id number]  [place] 1975  Date of evaluation: 23  PCP:  No primary care provider on file. 2:27 AM EDT     CHIEF COMPLAINT       Chief Complaint   Patient presents with    Abdominal Pain     Nausea and vomiting       HISTORY OF PRESENT ILLNESS  (Location/Symptom, Timing/Onset, Context/Setting, Quality, Duration, Modifying Factors, Severity.)      Marzena Mejia is a 49 y.o. female who presents with epigastric abdominal pain. Started 36 hours ago, decreased PO intake. Emesis x4, and NBNB. She reports being out of her home meds for past month. Requesting referral to a PCP. Denies fever, has some diarrhea - reports this is chronic. No flank pain, no vag discharge, no dysuria, urgency or frequency. No CP  or SOB. Has had this pain before, states she tried to drink some beer at the advice of a friend and this cause worse pain. No meds taken PTA. PAST MEDICAL / SURGICAL / SOCIAL / FAMILY HISTORY      has a past medical history of Fibromyalgia, Headache, Hyperlipidemia, Hypertension, Hypothyroidism, Lupus ([address] and Thyroid disease. has a past surgical history that includes  section; Foot surgery; and Hysterectomy.       Social History     Socioeconomic History    Marital status: Single     Spouse name: Not on file    Number of children: Not on file    Years of education: Not on file    Highest education level: Not on file   Occupational History    Not on file   Tobacco Use    Smoking status: Every Day     Packs/day: 0.50     Years: 23.00     Pack years: 11.50     Types: Cigarettes    Smokeless tobacco: Never   Vaping Use    Vaping Use: Never used   Substance and Sexual Activity    Alcohol use: Yes    Drug use: Yes     Types: Marijuana ([place]), Cocaine     Comment: Deysi-puffs    Sexual activity: Yes   Other Topics Concern    Not on file   Social History

## 2023-06-16 NOTE — ED NOTES
Inforned patient on urine sample needed. States she doesn't need to go to the bathroom right now.  Urine cup laid out     Aron Mckinley RN  06/16/23 6636

## 2023-06-16 NOTE — ED NOTES
...Patient in need of transportation home. SW contacted Bill. ETA unknown.       Yonas Frias [facility]  06/16/23 2719

## 2023-06-17 NOTE — ED PROVIDER NOTES
[facility]   Emergency Department  Faculty Attestation       I performed a history and physical examination of the patient and discussed management with the resident. I reviewed the residents note and agree with the documented findings including all diagnostic interpretations and plan of care. Any areas of disagreement are noted on the chart. I was personally present for the key portions of any procedures. I have documented in the chart those procedures where I was not present during the key portions. I have reviewed the emergency nurses triage note. I agree with the chief complaint, past medical history, past surgical history, allergies, medications, social and family history as documented unless otherwise noted below. For Physician Assistant/ Nurse Practitioner cases/documentation I have personally evaluated this patient and have completed at least one if not all key elements of the E/M (history, physical exam, and MDM). Additional findings are as noted. Patient Name: Adele Crain  MRN: [id number]  : 1975  Primary Care Physician: No primary care provider on file. Date of evaluationa: 2023   Note Started: 7:57 AM EDT    Pertinent Comments     Chief Complaint:   Chief Complaint   Patient presents with    Abdominal Pain     Nausea and vomiting        Initial vitals: (If not listed, please see nursing documentation)  ED Triage Vitals [23 0200]   BP Temp Temp Source Pulse Respirations SpO2 Height Weight - Scale   (!) 182/114 98.3 °F (36.8 °C) Oral 88 18 97 % 5' 4\" (1.626 m) 140 lb (63.5 kg)        HPI/PE/Impression: This is a 49 y.o. female who presents to the Emergency Department with upper abdominal pain that started 1 to 2 days ago and is associated with nausea and approximately 4 episodes of emesis. No new changes in bowel habits. No urinary symptoms. No vaginal discharge. On exam, patient is resting comfortably but does awaken to voice.   Heart sounds regular

## 2023-07-24 ENCOUNTER — HOSPITAL ENCOUNTER (EMERGENCY)
Age: 48
Discharge: HOME | End: 2023-07-24

## 2023-07-24 VITALS
SYSTOLIC BLOOD PRESSURE: 154 MMHG | WEIGHT: 130.95 LBS | DIASTOLIC BLOOD PRESSURE: 104 MMHG | RESPIRATION RATE: 12 BRPM | HEIGHT: 64 IN | HEART RATE: 75 BPM | BODY MASS INDEX: 22.36 KG/M2 | TEMPERATURE: 97.5 F | OXYGEN SATURATION: 99 %

## 2023-07-24 ASSESSMENT — PAIN - FUNCTIONAL ASSESSMENT: PAIN_FUNCTIONAL_ASSESSMENT: 0-10

## 2023-07-24 ASSESSMENT — PAIN SCALES - GENERAL: PAINLEVEL_OUTOF10: 10

## 2023-08-14 ENCOUNTER — HOSPITAL ENCOUNTER (EMERGENCY)
Age: 48
Discharge: NURSING, REHAB OR LONG-TERM CARE | End: 2023-08-15
Attending: EMERGENCY MEDICINE
Payer: MEDICAID

## 2023-08-14 VITALS
SYSTOLIC BLOOD PRESSURE: 112 MMHG | OXYGEN SATURATION: 99 % | RESPIRATION RATE: 19 BRPM | DIASTOLIC BLOOD PRESSURE: 74 MMHG | HEART RATE: 52 BPM | TEMPERATURE: 97.2 F

## 2023-08-14 DIAGNOSIS — R45.851 SUICIDAL IDEATION: Primary | ICD-10-CM

## 2023-08-14 LAB
ALBUMIN SERPL-MCNC: 4.1 G/DL (ref 3.5–5.2)
ALBUMIN/GLOB SERPL: 1.3 {RATIO} (ref 1–2.5)
ALP SERPL-CCNC: 73 U/L (ref 35–104)
ALT SERPL-CCNC: 9 U/L (ref 5–33)
AMPHET UR QL SCN: NEGATIVE
ANION GAP SERPL CALCULATED.3IONS-SCNC: 12 MMOL/L (ref 9–17)
AST SERPL-CCNC: 14 U/L
BARBITURATES UR QL SCN: NEGATIVE
BASOPHILS # BLD: <0.03 K/UL (ref 0–0.2)
BASOPHILS NFR BLD: 1 % (ref 0–2)
BENZODIAZ UR QL: NEGATIVE
BILIRUB SERPL-MCNC: 0.2 MG/DL (ref 0.3–1.2)
BUN SERPL-MCNC: 11 MG/DL (ref 6–20)
CALCIUM SERPL-MCNC: 9.6 MG/DL (ref 8.6–10.4)
CANNABINOIDS UR QL SCN: POSITIVE
CHLORIDE SERPL-SCNC: 103 MMOL/L (ref 98–107)
CO2 SERPL-SCNC: 24 MMOL/L (ref 20–31)
COCAINE UR QL SCN: POSITIVE
CREAT SERPL-MCNC: 0.9 MG/DL (ref 0.5–0.9)
EOSINOPHIL # BLD: 0.05 K/UL (ref 0–0.44)
EOSINOPHILS RELATIVE PERCENT: 1 % (ref 1–4)
ERYTHROCYTE [DISTWIDTH] IN BLOOD BY AUTOMATED COUNT: 14.2 % (ref 11.8–14.4)
FENTANYL UR QL: NEGATIVE
FLUAV AG SPEC QL: NEGATIVE
FLUBV AG SPEC QL: NEGATIVE
GFR SERPL CREATININE-BSD FRML MDRD: >60 ML/MIN/1.73M2
GLUCOSE SERPL-MCNC: 105 MG/DL (ref 70–99)
HCG SERPL QL: NEGATIVE
HCT VFR BLD AUTO: 41 % (ref 36.3–47.1)
HGB BLD-MCNC: 13.3 G/DL (ref 11.9–15.1)
IMM GRANULOCYTES # BLD AUTO: <0.03 K/UL (ref 0–0.3)
IMM GRANULOCYTES NFR BLD: 0 %
LYMPHOCYTES NFR BLD: 1.81 K/UL (ref 1.1–3.7)
LYMPHOCYTES RELATIVE PERCENT: 43 % (ref 24–43)
MCH RBC QN AUTO: 29.1 PG (ref 25.2–33.5)
MCHC RBC AUTO-ENTMCNC: 32.4 G/DL (ref 28.4–34.8)
MCV RBC AUTO: 89.7 FL (ref 82.6–102.9)
METHADONE UR QL: NEGATIVE
MONOCYTES NFR BLD: 0.41 K/UL (ref 0.1–1.2)
MONOCYTES NFR BLD: 10 % (ref 3–12)
NEUTROPHILS NFR BLD: 45 % (ref 36–65)
NEUTS SEG NFR BLD: 1.93 K/UL (ref 1.5–8.1)
NRBC BLD-RTO: 0 PER 100 WBC
OPIATES UR QL SCN: NEGATIVE
OXYCODONE UR QL SCN: NEGATIVE
PCP UR QL SCN: NEGATIVE
PLATELET # BLD AUTO: 160 K/UL (ref 138–453)
PMV BLD AUTO: 13 FL (ref 8.1–13.5)
POTASSIUM SERPL-SCNC: 3.5 MMOL/L (ref 3.7–5.3)
PROT SERPL-MCNC: 7.2 G/DL (ref 6.4–8.3)
RBC # BLD AUTO: 4.57 M/UL (ref 3.95–5.11)
SARS-COV-2 RDRP RESP QL NAA+PROBE: NOT DETECTED
SODIUM SERPL-SCNC: 139 MMOL/L (ref 135–144)
SPECIMEN DESCRIPTION: NORMAL
TEST INFORMATION: ABNORMAL
WBC OTHER # BLD: 4.2 K/UL (ref 3.5–11.3)

## 2023-08-14 PROCEDURE — 99285 EMERGENCY DEPT VISIT HI MDM: CPT

## 2023-08-14 PROCEDURE — 6370000000 HC RX 637 (ALT 250 FOR IP): Performed by: STUDENT IN AN ORGANIZED HEALTH CARE EDUCATION/TRAINING PROGRAM

## 2023-08-14 PROCEDURE — 80053 COMPREHEN METABOLIC PANEL: CPT

## 2023-08-14 PROCEDURE — 84703 CHORIONIC GONADOTROPIN ASSAY: CPT

## 2023-08-14 PROCEDURE — 85025 COMPLETE CBC W/AUTO DIFF WBC: CPT

## 2023-08-14 PROCEDURE — 87804 INFLUENZA ASSAY W/OPTIC: CPT

## 2023-08-14 PROCEDURE — 80307 DRUG TEST PRSMV CHEM ANLYZR: CPT

## 2023-08-14 PROCEDURE — 87635 SARS-COV-2 COVID-19 AMP PRB: CPT

## 2023-08-14 RX ORDER — HYDROXYZINE HYDROCHLORIDE 25 MG/1
25 TABLET, FILM COATED ORAL ONCE
Status: COMPLETED | OUTPATIENT
Start: 2023-08-14 | End: 2023-08-14

## 2023-08-14 RX ADMIN — HYDROXYZINE HYDROCHLORIDE 25 MG: 25 TABLET, FILM COATED ORAL at 20:21

## 2023-08-14 ASSESSMENT — ENCOUNTER SYMPTOMS
ANAL BLEEDING: 0
SORE THROAT: 0
CHEST TIGHTNESS: 0
ABDOMINAL DISTENTION: 0
SHORTNESS OF BREATH: 0
RHINORRHEA: 0
NAUSEA: 0
VOMITING: 0
PHOTOPHOBIA: 0
CONSTIPATION: 0
DIARRHEA: 0

## 2023-08-15 NOTE — ED NOTES
[facility] here for transport. Pt loaded onto there cot without incident.       Carlie Mckeon RN  08/15/23 8894
MPD bedside for change out     Brook Bernheim, RN  08/14/23 4777
Patient accepted at [facility] by Dr. Lyubov Weber. Libra Fay and Sana transportation scheduled with ETA [id number]. Nurse report number [phone]. Room assignment to be given upon patient arrival to [facility].       JAQUAN Turcios  08/14/23 5577
Pt given meal kurt Arellano RN  08/14/23 8824
Pt resting on stretcher. Respirations easy and regular. 1:1 sitter at bedside. Refreshments and toileting offered.         Ofelia Alonso RN  08/14/23 6695
Pt to ED for suicidal thoughts and a rash on the back of her neck. Pt states she recently lost her daughter and since then has been having thoughts of harming herself. She states she plans on jumping in front of a car to kill herself. Pt states she also needs her medications refilled. Pt states rash on back of neck too stating she thinks she saw \"little white worms\". Pt denies hallucinations.        Krzysztof Javier RN  08/14/23 1791
Report given to accepting facility spoken with lorenzo all questions answered     Kiara Hermosillo RN  08/15/23 0029
Staci Franco RN  08/14/23 1536
Writer initiated case with the [facility]. They will begin looking for placement outside of the [facility] as patient was found to have [facility] and is not in network with [facility].       JAQUAN Wood  08/14/23 6756
Writer met with patient at bedside regarding concerns for depression with suicidal ideations. Patient states that she does not want to live anymore and wishes that she could die. She states that she has plans to overdose or step into traffic. Patient reports that her daughter passed away in November of last year and she has not been able to cope with the loss since. She reports that she is not sleeping at night and has been using drugs and alcohol to numb her pain and to try and stop thinking about her. She reports significant feelings of hopelessness and helplessness. Patient reports that she feels terrible all the time and is not able to function. Patient was admitted to the [facility] in April of this year and was prescribed medications for depression. She reports that she felt they helped some, however, she states that she did not follow up with outpatient treatment upon discharge and ran out of her medications 3 weeks ago. Patient is tearful and states that she feels she can no longer keep her self safe. She reports to family support but they are not understanding of her continued grief and inability to cope with this loss. Patient is requesting help at this time and is in agreement to return to the [facility] for treatment. She remains at a high risk of harm to herself and thus is not safe to return to the community at this time. Social work will consult with on call psychiatry when patient is medically cleared.       JAQUAN Delacruz  08/14/23 00448 [facility], [address] [place] [address]  08/14/23 4265
No   []  Plastics  Completed  [] yes [] No   []  ENT  Completed  [] yes [] No   []  Other:  Completed  [] yes [] No    Consults:  IP CONSULT TO SOCIAL WORK     Treatment Team:   Treatment Team: Attending Provider: Anu Beltre MD; Registered Nurse: Angeline Fernandez RN; Resident: Arthur Travis DO    Treatment:  ED Course as of 08/14/23 1901   Mon Aug 14, 2023   1843 Patient presents with suicidal ideation and plan to jump off a bridge or take a bunch of pills. No homicidal ideation. No auditory visual hallucinations. Smokes crack today. Denies IV drug use. States compliant with her depression medications. Denies any pain or distress.   Will obtain labs and have social work see [ZE]      ED Course User Index  [ZE] Arthur Travis DO              Skin Assessment:   Skin Integumentary   Skin Integrity: Blister  Location: back of neck  Nails: Within Defined Limits    Pain Score:         SOCIAL HISTORY       Social History     Socioeconomic History    Marital status: Single     Spouse name: None    Number of children: None    Years of education: None    Highest education level: None   Tobacco Use    Smoking status: Every Day     Packs/day: 0.50     Years: 23.00     Pack years: 11.50     Types: Cigarettes    Smokeless tobacco: Never   Vaping Use    Vaping Use: Never used   Substance and Sexual Activity    Alcohol use: Yes    Drug use: Yes     Types: Marijuana (Adolm Sang), Cocaine     Comment: Deysi-puffs    Sexual activity: Yes       FAMILY HISTORY       Family History   Problem Relation Age of Onset    High Blood Pressure Mother     Heart Disease Father     Stroke Maternal Grandmother     High Blood Pressure Maternal Grandfather     Heart Disease Paternal Grandmother     Heart Disease Paternal Grandfather        ALLERGIES     Bactrim [sulfamethoxazole-trimethoprim]    CURRENT MEDICATIONS       Previous Medications    ESOMEPRAZOLE MAGNESIUM (NEXIUM) 40 MG PACK    Take 1 packet by mouth daily    FOLIC ACID ([employer])

## 2023-12-24 ENCOUNTER — HOSPITAL ENCOUNTER (EMERGENCY)
Age: 48
Discharge: ANOTHER HOSPITAL | End: 2023-12-24
Attending: EMERGENCY MEDICINE
Payer: MEDICAID

## 2023-12-24 VITALS
DIASTOLIC BLOOD PRESSURE: 77 MMHG | SYSTOLIC BLOOD PRESSURE: 119 MMHG | HEART RATE: 63 BPM | OXYGEN SATURATION: 100 % | WEIGHT: 127.21 LBS | HEIGHT: 64 IN | BODY MASS INDEX: 21.72 KG/M2 | TEMPERATURE: 98.8 F | RESPIRATION RATE: 18 BRPM

## 2023-12-24 DIAGNOSIS — R45.851 SUICIDAL IDEATION: Primary | ICD-10-CM

## 2023-12-24 LAB
ALBUMIN SERPL-MCNC: 3.6 G/DL (ref 3.5–5.2)
ALBUMIN/GLOB SERPL: 0.9 {RATIO} (ref 1–2.5)
ALP SERPL-CCNC: 91 U/L (ref 35–104)
ALT SERPL-CCNC: 8 U/L (ref 5–33)
AMPHET UR QL SCN: NEGATIVE
ANION GAP SERPL CALCULATED.3IONS-SCNC: 13 MMOL/L (ref 9–17)
APAP SERPL-MCNC: 10 UG/ML (ref 10–30)
AST SERPL-CCNC: 19 U/L
BARBITURATES UR QL SCN: NEGATIVE
BASOPHILS # BLD: 0.06 K/UL (ref 0–0.2)
BASOPHILS NFR BLD: 1 % (ref 0–2)
BENZODIAZ UR QL: NEGATIVE
BILIRUB SERPL-MCNC: 0.2 MG/DL (ref 0.3–1.2)
BUN SERPL-MCNC: 9 MG/DL (ref 6–20)
CALCIUM SERPL-MCNC: 9 MG/DL (ref 8.6–10.4)
CANNABINOIDS UR QL SCN: POSITIVE
CHLORIDE SERPL-SCNC: 101 MMOL/L (ref 98–107)
CO2 SERPL-SCNC: 23 MMOL/L (ref 20–31)
COCAINE UR QL SCN: POSITIVE
CREAT SERPL-MCNC: 0.6 MG/DL (ref 0.5–0.9)
EOSINOPHIL # BLD: 0.12 K/UL (ref 0–0.44)
EOSINOPHILS RELATIVE PERCENT: 2 % (ref 1–4)
ERYTHROCYTE [DISTWIDTH] IN BLOOD BY AUTOMATED COUNT: 13.2 % (ref 11.8–14.4)
ETHANOL PERCENT: <0.01 %
ETHANOLAMINE SERPL-MCNC: <10 MG/DL
FENTANYL UR QL: NEGATIVE
FLUAV AG SPEC QL: NEGATIVE
FLUBV AG SPEC QL: NEGATIVE
GFR SERPL CREATININE-BSD FRML MDRD: >60 ML/MIN/1.73M2
GLUCOSE SERPL-MCNC: 100 MG/DL (ref 70–99)
HCG SERPL QL: NEGATIVE
HCT VFR BLD AUTO: 39.4 % (ref 36.3–47.1)
HGB BLD-MCNC: 12.8 G/DL (ref 11.9–15.1)
IMM GRANULOCYTES # BLD AUTO: <0.03 K/UL (ref 0–0.3)
IMM GRANULOCYTES NFR BLD: 0 %
LYMPHOCYTES NFR BLD: 1.33 K/UL (ref 1.1–3.7)
LYMPHOCYTES RELATIVE PERCENT: 22 % (ref 24–43)
MCH RBC QN AUTO: 28.8 PG (ref 25.2–33.5)
MCHC RBC AUTO-ENTMCNC: 32.5 G/DL (ref 28.4–34.8)
MCV RBC AUTO: 88.5 FL (ref 82.6–102.9)
METHADONE UR QL: NEGATIVE
MONOCYTES NFR BLD: 0.79 K/UL (ref 0.1–1.2)
MONOCYTES NFR BLD: 13 % (ref 3–12)
NEUTROPHILS NFR BLD: 62 % (ref 36–65)
NEUTS SEG NFR BLD: 3.64 K/UL (ref 1.5–8.1)
NRBC BLD-RTO: 0 PER 100 WBC
OPIATES UR QL SCN: NEGATIVE
OXYCODONE UR QL SCN: NEGATIVE
PCP UR QL SCN: NEGATIVE
PLATELET # BLD AUTO: 242 K/UL (ref 138–453)
PMV BLD AUTO: 12.6 FL (ref 8.1–13.5)
POTASSIUM SERPL-SCNC: 4.3 MMOL/L (ref 3.7–5.3)
PROT SERPL-MCNC: 7.8 G/DL (ref 6.4–8.3)
RBC # BLD AUTO: 4.45 M/UL (ref 3.95–5.11)
SALICYLATES SERPL-MCNC: <1 MG/DL (ref 3–10)
SARS-COV-2 RDRP RESP QL NAA+PROBE: NOT DETECTED
SODIUM SERPL-SCNC: 137 MMOL/L (ref 135–144)
SPECIMEN DESCRIPTION: NORMAL
TEST INFORMATION: ABNORMAL
TOXIC TRICYCLIC SC,BLOOD: NEGATIVE
WBC OTHER # BLD: 6 K/UL (ref 3.5–11.3)

## 2023-12-24 PROCEDURE — 80179 DRUG ASSAY SALICYLATE: CPT

## 2023-12-24 PROCEDURE — 80053 COMPREHEN METABOLIC PANEL: CPT

## 2023-12-24 PROCEDURE — 85025 COMPLETE CBC W/AUTO DIFF WBC: CPT

## 2023-12-24 PROCEDURE — 80143 DRUG ASSAY ACETAMINOPHEN: CPT

## 2023-12-24 PROCEDURE — G0480 DRUG TEST DEF 1-7 CLASSES: HCPCS

## 2023-12-24 PROCEDURE — 87635 SARS-COV-2 COVID-19 AMP PRB: CPT

## 2023-12-24 PROCEDURE — 87804 INFLUENZA ASSAY W/OPTIC: CPT

## 2023-12-24 PROCEDURE — 80307 DRUG TEST PRSMV CHEM ANLYZR: CPT

## 2023-12-24 PROCEDURE — 99285 EMERGENCY DEPT VISIT HI MDM: CPT | Performed by: EMERGENCY MEDICINE

## 2023-12-24 PROCEDURE — 6370000000 HC RX 637 (ALT 250 FOR IP)

## 2023-12-24 PROCEDURE — 84703 CHORIONIC GONADOTROPIN ASSAY: CPT

## 2023-12-24 RX ORDER — HYDROXYZINE HYDROCHLORIDE 25 MG/1
25 TABLET, FILM COATED ORAL ONCE
Status: COMPLETED | OUTPATIENT
Start: 2023-12-24 | End: 2023-12-24

## 2023-12-24 RX ADMIN — HYDROXYZINE HYDROCHLORIDE 25 MG: 25 TABLET, FILM COATED ORAL at 11:30

## 2023-12-24 NOTE — ED NOTES
Labeled blood specimens sent to lab via tube system.     [x] Lavender   [] on ice   [] Blue   [x] Green/yellow  [] Green/black [] on ice  [] Pink  [] Red  [] Yellow  [] on ice  [] Blood Cultures \

## 2023-12-24 NOTE — ED NOTES
Pt to room 27 with suicidal thoughts. Pt reports that she lost a child a couple of years ago, and around the holidays, she starts to feel suicidal, states that she has a plan to walk out in front of traffic. Diego Arvizu Pt changed out to safety clothes. [employer] at bedside and belongings locked up. Safeguard initiated. Pt alert and oriented x4, talking in complete sentences, respirations even and unlabored. Pt acting age appropriate.  White board updated, will continue to plan of care

## 2023-12-24 NOTE — ED NOTES
Patient's extremely pleasant & tearful while relating intense grief over the death 1 year ago of her daughter. Patient states her daughter was her best friend & she thinks about her every day with the same amount of pain as the day she . Patient states she has felt suicidal off & on the past year & today decided she needed help. Patient states she's suicidal & if she were to kill herself she would walk into traffic. Patient states she has been hospitalized several times in the past year. Patient denies hx suicide attempts. Patient denies HI, auditory hallucinations. Patient states she sometimes sees rapidly moving shadows out of the corner of her eyes but does not find it bothersome. Patient states she hasn't been on her medications for the past 8 months. Patient states she needs to get back on her meds as they did help when she took them. Patient confirms racing thoughts, poor sleep & poor concentration. Patient confirms daily marijuana use, most recently yesterday. Patient confirms drinking 2-3 glasses of wine daily, most recent use yesterday. Patient requests inpatient admission. Writer informed patient [facility] is on extensive wait list, patient chose to apply to [address] [place] [address],[address]. Writer to fax packet once labs result.

## 2023-12-24 NOTE — ED NOTES
[] [address]    [] [address]    [x]  [address] ASSESSMENT      Y  N     [x] [] In the past two weeks have you had thoughts of hurting yourself in any way? [x] [] In the past two weeks have you had thoughts that you would be better off dead? [] [x] Have you made a suicide attempt in the past two months? [][x] Do you have a plan for hurting yourself or suicide? [] [x] Presence of hallucinations/voices related to hurting himself or herself or someone else. SUICIDE/SECURITY WATCH PRECAUTION CHECKLIST     Orders    [x]  Suicide/Security Watch Precautions initiated as checked below:   12/24/23 11:23 AM EST 27/27    [x] Notified physician:  Johnson Wakefield MD  12/24/23 11:23 AM EST    [x] Orders obtained as appropriate:     [x] 1:1 Observer     [] Psych Consult     [] Psych Consult    Name:  Date:  Time:    [x] 1:1 Observer, Notified by:  Gio Read RN    Contact Nurse Supervisor    [x] Remove all personal clothes from room and place in snap/paper gown/pants. Slipper only    [x] Remove all personal belongings from room and secured away from patient. Documentation    [x] Initiate Suicide/Security Watch Precaution Flow Sheet    [x] Initiate individualized Care Plan/Problem    [x] Document why precautions initiated on flow sheet (Initiate Nursing Care Plan/Problem)    [x] 1:1 Observer in place; instructions provided. Suicide precautions require observer be within arms length. [x] Nurse-Observer Communication Hand-off initiated by RN, reviewed with Observer. Subsequently used as Hand Off between Observers. [x] Initiate every 15 minute observations per observer as delegated by the RN.     [x] Initiate RN assessment and documentation    Environmental Scan  Search Criteria and Process: OPTIONAL, see Search Policy    [x] Reason for search: SI    [x] Nursing in presence of second person to search patient    [x] Patient notified of reason for body assessment

## 2023-12-25 NOTE — ED NOTES
Report received from [facility]. Pt A&Ox4, rr even and nonlabored, nad. Pt laying on cot and denies needs at this time. Sitter at bedside.

## 2024-01-03 ENCOUNTER — HOSPITAL ENCOUNTER (EMERGENCY)
Age: 49
Discharge: HOME | End: 2024-01-03
Attending: EMERGENCY MEDICINE
Payer: MEDICAID

## 2024-01-03 VITALS
TEMPERATURE: 98.1 F | DIASTOLIC BLOOD PRESSURE: 74 MMHG | RESPIRATION RATE: 14 BRPM | OXYGEN SATURATION: 98 % | SYSTOLIC BLOOD PRESSURE: 95 MMHG | HEART RATE: 86 BPM

## 2024-01-03 DIAGNOSIS — M79.7 FIBROMYALGIA: Primary | ICD-10-CM

## 2024-01-03 PROCEDURE — 96372 THER/PROPH/DIAG INJ SC/IM: CPT

## 2024-01-03 PROCEDURE — 6360000002 HC RX W HCPCS: Performed by: EMERGENCY MEDICINE

## 2024-01-03 PROCEDURE — 99284 EMERGENCY DEPT VISIT MOD MDM: CPT

## 2024-01-03 RX ORDER — HYDROXYZINE 50 MG/1
50 TABLET, FILM COATED ORAL EVERY 8 HOURS PRN
Qty: 30 TABLET | Refills: 0 | Status: SHIPPED | OUTPATIENT
Start: 2024-01-03

## 2024-01-03 RX ORDER — MIRTAZAPINE 30 MG/1
30 TABLET, FILM COATED ORAL NIGHTLY
Qty: 15 TABLET | Refills: 3 | Status: SHIPPED | OUTPATIENT
Start: 2024-01-03

## 2024-01-03 RX ORDER — TRAZODONE HYDROCHLORIDE 100 MG/1
150 TABLET ORAL NIGHTLY PRN
Qty: 15 TABLET | Refills: 0 | Status: SHIPPED | OUTPATIENT
Start: 2024-01-03

## 2024-01-03 RX ORDER — ORPHENADRINE CITRATE 30 MG/ML
60 INJECTION INTRAMUSCULAR; INTRAVENOUS ONCE
Status: COMPLETED | OUTPATIENT
Start: 2024-01-03 | End: 2024-01-03

## 2024-01-03 RX ORDER — HYDROCHLOROTHIAZIDE 25 MG/1
25 TABLET ORAL DAILY
Qty: 15 TABLET | Refills: 0 | Status: SHIPPED | OUTPATIENT
Start: 2024-01-03

## 2024-01-03 RX ORDER — BUSPIRONE HYDROCHLORIDE 10 MG/1
10 TABLET ORAL 3 TIMES DAILY
Qty: 45 TABLET | Refills: 0 | Status: SHIPPED | OUTPATIENT
Start: 2024-01-03 | End: 2024-02-02

## 2024-01-03 RX ORDER — KETOROLAC TROMETHAMINE 30 MG/ML
30 INJECTION, SOLUTION INTRAMUSCULAR; INTRAVENOUS ONCE
Status: COMPLETED | OUTPATIENT
Start: 2024-01-03 | End: 2024-01-03

## 2024-01-03 RX ADMIN — KETOROLAC TROMETHAMINE 30 MG: 30 INJECTION INTRAMUSCULAR; INTRAVENOUS at 03:32

## 2024-01-03 RX ADMIN — ORPHENADRINE CITRATE 60 MG: 60 INJECTION INTRAMUSCULAR; INTRAVENOUS at 03:32

## 2024-01-03 ASSESSMENT — PAIN SCALES - GENERAL
PAINLEVEL_OUTOF10: 10
PAINLEVEL_OUTOF10: 10

## 2024-01-03 ASSESSMENT — PAIN - FUNCTIONAL ASSESSMENT: PAIN_FUNCTIONAL_ASSESSMENT: 0-10

## 2024-01-03 NOTE — ED PROVIDER NOTES
Jarad Forrest APRN - CNP   thiamine mononitrate (THIAMINE) 100 MG tablet Take 1 tablet by mouth daily 2/15/23   Jarad Forrest APRN - CNP         REVIEW OF SYSTEMS       Review of Systems   Constitutional:  Negative for chills and fever.   Respiratory:  Negative for cough.    Cardiovascular:  Negative for chest pain.   Musculoskeletal:  Positive for myalgias.       PHYSICAL EXAM      INITIAL VITALS:   BP 95/74   Pulse 86   Temp 98.1 °F (36.7 °C) (Oral)   Resp 14   SpO2 98%     Physical Exam  Constitutional:       Appearance: Normal appearance.   HENT:      Head: Normocephalic and atraumatic.   Cardiovascular:      Rate and Rhythm: Normal rate and regular rhythm.   Pulmonary:      Effort: Pulmonary effort is normal.   Abdominal:      General: Abdomen is flat.      Palpations: Abdomen is soft.   Skin:     General: Skin is warm and dry.   Neurological:      General: No focal deficit present.      Mental Status: She is alert and oriented to person, place, and time.   Psychiatric:         Mood and Affect: Mood normal.         Behavior: Behavior normal.           DDX/DIAGNOSTIC RESULTS / EMERGENCY DEPARTMENT COURSE / MDM     Differential Diagnosis included but not limited: rehab need, homelessness, fibromyalgia pain, viral illness, medication refill.     Diagnoses Considered but Do Not Suspect: N/A    Decision Rules/Scores utilized: N/A    Tests considered but not ordered and why: N/A    MIPS: N/A     Code Status Discussion:  Not Discussed    Additional Patient Education Provided: None     Medical Decision Making    Medical Decision Making  Patient well-appearing, stable vitals.  Patient was recently discharged from a acute inpatient detox facility.  Patient is currently homeless.  Patient describes pain all over, myalgic leg pain.  Plan to provide pain control and muscle relaxant.  Patient denies SI.  Attempted to provide resources to patient concerning rehab facilities in the area.  Allow patient to stay in

## 2024-01-04 ASSESSMENT — ENCOUNTER SYMPTOMS: COUGH: 0

## 2024-01-11 ENCOUNTER — HOSPITAL ENCOUNTER (EMERGENCY)
Age: 49
Discharge: PSYCHIATRIC OR SUBSTANCE USE FACILITY | DRG: 751 | End: 2024-01-12
Attending: EMERGENCY MEDICINE
Payer: MEDICAID

## 2024-01-11 DIAGNOSIS — R45.851 DEPRESSION WITH SUICIDAL IDEATION: Primary | ICD-10-CM

## 2024-01-11 DIAGNOSIS — F32.A DEPRESSION WITH SUICIDAL IDEATION: Primary | ICD-10-CM

## 2024-01-11 LAB
ALBUMIN SERPL-MCNC: 4.4 G/DL (ref 3.5–5.2)
ALBUMIN/GLOB SERPL: 1.2 {RATIO} (ref 1–2.5)
ALP SERPL-CCNC: 80 U/L (ref 35–104)
ALT SERPL-CCNC: 17 U/L (ref 5–33)
AMPHET UR QL SCN: NEGATIVE
ANION GAP SERPL CALCULATED.3IONS-SCNC: 14 MMOL/L (ref 9–17)
APAP SERPL-MCNC: <5 UG/ML (ref 10–30)
AST SERPL-CCNC: 19 U/L
BACTERIA URNS QL MICRO: ABNORMAL
BARBITURATES UR QL SCN: NEGATIVE
BASOPHILS # BLD: 0.04 K/UL (ref 0–0.2)
BASOPHILS NFR BLD: 1 % (ref 0–2)
BENZODIAZ UR QL: NEGATIVE
BILIRUB SERPL-MCNC: 0.3 MG/DL (ref 0.3–1.2)
BILIRUB UR QL STRIP: NEGATIVE
BUN SERPL-MCNC: 26 MG/DL (ref 6–20)
CALCIUM SERPL-MCNC: 9.5 MG/DL (ref 8.6–10.4)
CANNABINOIDS UR QL SCN: POSITIVE
CASTS #/AREA URNS LPF: ABNORMAL /LPF (ref 0–8)
CHLORIDE SERPL-SCNC: 97 MMOL/L (ref 98–107)
CLARITY UR: CLEAR
CO2 SERPL-SCNC: 24 MMOL/L (ref 20–31)
COCAINE UR QL SCN: POSITIVE
COLOR UR: YELLOW
CREAT SERPL-MCNC: 0.7 MG/DL (ref 0.5–0.9)
EOSINOPHIL # BLD: 0.04 K/UL (ref 0–0.44)
EOSINOPHILS RELATIVE PERCENT: 1 % (ref 1–4)
EPI CELLS #/AREA URNS HPF: ABNORMAL /HPF (ref 0–5)
ERYTHROCYTE [DISTWIDTH] IN BLOOD BY AUTOMATED COUNT: 14 % (ref 11.8–14.4)
ETHANOL PERCENT: <0.01 %
ETHANOLAMINE SERPL-MCNC: <10 MG/DL
FENTANYL UR QL: NEGATIVE
GFR SERPL CREATININE-BSD FRML MDRD: >60 ML/MIN/1.73M2
GLUCOSE SERPL-MCNC: 101 MG/DL (ref 70–99)
GLUCOSE UR STRIP-MCNC: NEGATIVE MG/DL
HCG SERPL QL: NEGATIVE
HCT VFR BLD AUTO: 41.2 % (ref 36.3–47.1)
HGB BLD-MCNC: 14 G/DL (ref 11.9–15.1)
HGB UR QL STRIP.AUTO: NEGATIVE
IMM GRANULOCYTES # BLD AUTO: <0.03 K/UL (ref 0–0.3)
IMM GRANULOCYTES NFR BLD: 0 %
KETONES UR STRIP-MCNC: NEGATIVE MG/DL
LEUKOCYTE ESTERASE UR QL STRIP: ABNORMAL
LYMPHOCYTES NFR BLD: 1.97 K/UL (ref 1.1–3.7)
LYMPHOCYTES RELATIVE PERCENT: 42 % (ref 24–43)
MCH RBC QN AUTO: 28.9 PG (ref 25.2–33.5)
MCHC RBC AUTO-ENTMCNC: 34 G/DL (ref 28.4–34.8)
MCV RBC AUTO: 84.9 FL (ref 82.6–102.9)
METHADONE UR QL: NEGATIVE
MONOCYTES NFR BLD: 0.45 K/UL (ref 0.1–1.2)
MONOCYTES NFR BLD: 10 % (ref 3–12)
NEUTROPHILS NFR BLD: 46 % (ref 36–65)
NEUTS SEG NFR BLD: 2.2 K/UL (ref 1.5–8.1)
NITRITE UR QL STRIP: NEGATIVE
NRBC BLD-RTO: 0 PER 100 WBC
OPIATES UR QL SCN: NEGATIVE
OXYCODONE UR QL SCN: NEGATIVE
PCP UR QL SCN: NEGATIVE
PH UR STRIP: 6 [PH] (ref 5–8)
PLATELET # BLD AUTO: 237 K/UL (ref 138–453)
PMV BLD AUTO: 11.7 FL (ref 8.1–13.5)
POTASSIUM SERPL-SCNC: 3.6 MMOL/L (ref 3.7–5.3)
PROT SERPL-MCNC: 8.2 G/DL (ref 6.4–8.3)
PROT UR STRIP-MCNC: NEGATIVE MG/DL
RBC # BLD AUTO: 4.85 M/UL (ref 3.95–5.11)
RBC #/AREA URNS HPF: ABNORMAL /HPF (ref 0–4)
SALICYLATES SERPL-MCNC: <1 MG/DL (ref 3–10)
SARS-COV-2 RDRP RESP QL NAA+PROBE: NOT DETECTED
SODIUM SERPL-SCNC: 135 MMOL/L (ref 135–144)
SP GR UR STRIP: 1.02 (ref 1–1.03)
SPECIMEN DESCRIPTION: NORMAL
TEST INFORMATION: ABNORMAL
TOXIC TRICYCLIC SC,BLOOD: NEGATIVE
UROBILINOGEN UR STRIP-ACNC: NORMAL EU/DL (ref 0–1)
WBC #/AREA URNS HPF: ABNORMAL /HPF (ref 0–5)
WBC OTHER # BLD: 4.7 K/UL (ref 3.5–11.3)

## 2024-01-11 PROCEDURE — G0480 DRUG TEST DEF 1-7 CLASSES: HCPCS

## 2024-01-11 PROCEDURE — 87635 SARS-COV-2 COVID-19 AMP PRB: CPT

## 2024-01-11 PROCEDURE — 81001 URINALYSIS AUTO W/SCOPE: CPT

## 2024-01-11 PROCEDURE — 80307 DRUG TEST PRSMV CHEM ANLYZR: CPT

## 2024-01-11 PROCEDURE — 6370000000 HC RX 637 (ALT 250 FOR IP): Performed by: STUDENT IN AN ORGANIZED HEALTH CARE EDUCATION/TRAINING PROGRAM

## 2024-01-11 PROCEDURE — 80053 COMPREHEN METABOLIC PANEL: CPT

## 2024-01-11 PROCEDURE — 80179 DRUG ASSAY SALICYLATE: CPT

## 2024-01-11 PROCEDURE — 80143 DRUG ASSAY ACETAMINOPHEN: CPT

## 2024-01-11 PROCEDURE — 85025 COMPLETE CBC W/AUTO DIFF WBC: CPT

## 2024-01-11 PROCEDURE — 84703 CHORIONIC GONADOTROPIN ASSAY: CPT

## 2024-01-11 PROCEDURE — 93005 ELECTROCARDIOGRAM TRACING: CPT | Performed by: STUDENT IN AN ORGANIZED HEALTH CARE EDUCATION/TRAINING PROGRAM

## 2024-01-11 PROCEDURE — 99285 EMERGENCY DEPT VISIT HI MDM: CPT | Performed by: EMERGENCY MEDICINE

## 2024-01-11 RX ORDER — POTASSIUM CHLORIDE 20 MEQ/1
40 TABLET, EXTENDED RELEASE ORAL ONCE
Status: COMPLETED | OUTPATIENT
Start: 2024-01-11 | End: 2024-01-11

## 2024-01-11 RX ADMIN — POTASSIUM CHLORIDE 40 MEQ: 1500 TABLET, EXTENDED RELEASE ORAL at 20:33

## 2024-01-11 ASSESSMENT — ENCOUNTER SYMPTOMS
ABDOMINAL PAIN: 0
WHEEZING: 0
PHOTOPHOBIA: 0
DIARRHEA: 0
VOMITING: 0
RHINORRHEA: 0
NAUSEA: 0
SHORTNESS OF BREATH: 0
COUGH: 1
BACK PAIN: 0
SORE THROAT: 0

## 2024-01-11 ASSESSMENT — PAIN - FUNCTIONAL ASSESSMENT: PAIN_FUNCTIONAL_ASSESSMENT: NONE - DENIES PAIN

## 2024-01-11 NOTE — ED TRIAGE NOTES
Pt arrived to ED for feelings of depression. Pt verbalized suicidal feeling this morning with a plan to walk in traffic.   Sitter and resident at bedside.    Pt changed into safety clothes

## 2024-01-11 NOTE — ED NOTES
The following labs were labeled with appropriate pt sticker and tubed to lab:     [] Blue     [x] Lavender   [] on ice  [x] Green/yellow  [] Green/black [] on ice  [] Salgado  [] on ice  [x] Yellow  [] Red  [] Pink  [] Type/ Screen  [] ABG  [] VBG    [] COVID-19 swab    [] Rapid  [] PCR  [] Flu swab  [] Peds Viral Panel     [] Urine Sample  [] Fecal Sample  [] Pelvic Cultures  [] Blood Cultures  [] X 2  [] STREP Cultures  [] Wound Cultures

## 2024-01-11 NOTE — ED NOTES
Met with tearful patient at bedside. Patient endorses today that she is just not feeling good, her depression is getting worse and shes been having suicidal thoughts since yesterday.   She stated that she has thoughts of walking into traffic or doing anything she can to hurt herself. She stated I just wish I was dead and not alive. Reported that she has just shut down and doesn't want to be bothered with anyone.   Is hearing voices on and off that tell her to kill herself and occasionally sees things running past her but knows that its not real and its her mind playing tricks on her.   Denied any homicidal ideations.   Reported that she is not sleeping much and feels anxious.   Was at [facility] recently for a few wks but left because she did not feel they were giving her the help she needs. SW inquired if she was set up with outpatient services or medication and she denied.   Is not linked with any outpatient mental health services and not on any medication. Relayed that she has diagnosis of PTSD, bi polar and anxiety.   Did talk about how she lost her daughter a yr ago and that she just can't get over the loss/grief.   Admitted alcohol use today of a few beers and a shot of liquor as well as smoking some weed to escape the pain.   Patient stated she really does want help and called someone to bring her to the hospital today.   Has been staying with her mother but stated her mom is never there.   Will present patient to [facility] once labs completed.

## 2024-01-12 ENCOUNTER — HOSPITAL ENCOUNTER (INPATIENT)
Age: 49
LOS: 6 days | Discharge: HOME | DRG: 751 | End: 2024-01-18
Attending: PSYCHIATRY & NEUROLOGY | Admitting: PSYCHIATRY & NEUROLOGY
Payer: MEDICAID

## 2024-01-12 VITALS
RESPIRATION RATE: 16 BRPM | WEIGHT: 131.39 LBS | OXYGEN SATURATION: 98 % | BODY MASS INDEX: 22.55 KG/M2 | SYSTOLIC BLOOD PRESSURE: 118 MMHG | TEMPERATURE: 97.2 F | HEART RATE: 77 BPM | DIASTOLIC BLOOD PRESSURE: 78 MMHG

## 2024-01-12 PROBLEM — F43.81 PROLONGED GRIEF DISORDER: Status: ACTIVE | Noted: 2024-01-12

## 2024-01-12 LAB
EKG ATRIAL RATE: 83 BPM
EKG P AXIS: 69 DEGREES
EKG P-R INTERVAL: 168 MS
EKG Q-T INTERVAL: 418 MS
EKG QRS DURATION: 100 MS
EKG QTC CALCULATION (BAZETT): 491 MS
EKG R AXIS: 70 DEGREES
EKG T AXIS: 26 DEGREES
EKG VENTRICULAR RATE: 83 BPM

## 2024-01-12 PROCEDURE — 6370000000 HC RX 637 (ALT 250 FOR IP): Performed by: NURSE PRACTITIONER

## 2024-01-12 PROCEDURE — 6370000000 HC RX 637 (ALT 250 FOR IP): Performed by: PSYCHIATRY & NEUROLOGY

## 2024-01-12 PROCEDURE — 84156 ASSAY OF PROTEIN URINE: CPT

## 2024-01-12 PROCEDURE — 6370000000 HC RX 637 (ALT 250 FOR IP): Performed by: INTERNAL MEDICINE

## 2024-01-12 PROCEDURE — 6370000000 HC RX 637 (ALT 250 FOR IP)

## 2024-01-12 PROCEDURE — APPSS60 APP SPLIT SHARED TIME 46-60 MINUTES

## 2024-01-12 PROCEDURE — 93010 ELECTROCARDIOGRAM REPORT: CPT | Performed by: INTERNAL MEDICINE

## 2024-01-12 PROCEDURE — 1240000000 HC EMOTIONAL WELLNESS R&B

## 2024-01-12 PROCEDURE — 84166 PROTEIN E-PHORESIS/URINE/CSF: CPT

## 2024-01-12 RX ORDER — MIRTAZAPINE 15 MG/1
7.5 TABLET, FILM COATED ORAL NIGHTLY
Status: DISCONTINUED | OUTPATIENT
Start: 2024-01-12 | End: 2024-01-13

## 2024-01-12 RX ORDER — POTASSIUM CHLORIDE 20 MEQ/1
40 TABLET, EXTENDED RELEASE ORAL ONCE
Status: COMPLETED | OUTPATIENT
Start: 2024-01-12 | End: 2024-01-12

## 2024-01-12 RX ORDER — HYDROXYZINE 50 MG/1
50 TABLET, FILM COATED ORAL 3 TIMES DAILY PRN
Status: DISCONTINUED | OUTPATIENT
Start: 2024-01-12 | End: 2024-01-18 | Stop reason: HOSPADM

## 2024-01-12 RX ORDER — MAGNESIUM HYDROXIDE/ALUMINUM HYDROXICE/SIMETHICONE 120; 1200; 1200 MG/30ML; MG/30ML; MG/30ML
30 SUSPENSION ORAL EVERY 6 HOURS PRN
Status: DISCONTINUED | OUTPATIENT
Start: 2024-01-12 | End: 2024-01-18 | Stop reason: HOSPADM

## 2024-01-12 RX ORDER — BUSPIRONE HYDROCHLORIDE 10 MG/1
10 TABLET ORAL 3 TIMES DAILY
Status: DISCONTINUED | OUTPATIENT
Start: 2024-01-12 | End: 2024-01-18 | Stop reason: HOSPADM

## 2024-01-12 RX ORDER — ACETAMINOPHEN 500 MG
1000 TABLET ORAL ONCE
Status: COMPLETED | OUTPATIENT
Start: 2024-01-12 | End: 2024-01-12

## 2024-01-12 RX ORDER — POLYETHYLENE GLYCOL 3350 17 G/17G
17 POWDER, FOR SOLUTION ORAL DAILY PRN
Status: DISCONTINUED | OUTPATIENT
Start: 2024-01-12 | End: 2024-01-18 | Stop reason: HOSPADM

## 2024-01-12 RX ORDER — ACETAMINOPHEN 325 MG/1
650 TABLET ORAL EVERY 4 HOURS PRN
Status: DISCONTINUED | OUTPATIENT
Start: 2024-01-12 | End: 2024-01-18 | Stop reason: HOSPADM

## 2024-01-12 RX ORDER — TRAZODONE HYDROCHLORIDE 50 MG/1
50 TABLET ORAL NIGHTLY PRN
Status: DISCONTINUED | OUTPATIENT
Start: 2024-01-12 | End: 2024-01-13

## 2024-01-12 RX ORDER — IBUPROFEN 400 MG/1
400 TABLET ORAL EVERY 6 HOURS PRN
Status: DISCONTINUED | OUTPATIENT
Start: 2024-01-12 | End: 2024-01-18 | Stop reason: HOSPADM

## 2024-01-12 RX ORDER — NICOTINE 21 MG/24HR
1 PATCH, TRANSDERMAL 24 HOURS TRANSDERMAL DAILY
Status: DISCONTINUED | OUTPATIENT
Start: 2024-01-12 | End: 2024-01-16

## 2024-01-12 RX ADMIN — POTASSIUM CHLORIDE 40 MEQ: 1500 TABLET, EXTENDED RELEASE ORAL at 14:53

## 2024-01-12 RX ADMIN — ACETAMINOPHEN 1000 MG: 500 TABLET ORAL at 01:00

## 2024-01-12 RX ADMIN — HYDROXYZINE HYDROCHLORIDE 50 MG: 50 TABLET, FILM COATED ORAL at 21:10

## 2024-01-12 RX ADMIN — MIRTAZAPINE 7.5 MG: 15 TABLET, FILM COATED ORAL at 21:10

## 2024-01-12 RX ADMIN — TRAZODONE HYDROCHLORIDE 50 MG: 50 TABLET ORAL at 21:10

## 2024-01-12 RX ADMIN — BUSPIRONE HYDROCHLORIDE 10 MG: 10 TABLET ORAL at 21:10

## 2024-01-12 ASSESSMENT — PATIENT HEALTH QUESTIONNAIRE - PHQ9
2. FEELING DOWN, DEPRESSED OR HOPELESS: 1
SUM OF ALL RESPONSES TO PHQ QUESTIONS 1-9: 2
SUM OF ALL RESPONSES TO PHQ9 QUESTIONS 1 & 2: 2
SUM OF ALL RESPONSES TO PHQ QUESTIONS 1-9: 2
SUM OF ALL RESPONSES TO PHQ QUESTIONS 1-9: 2
1. LITTLE INTEREST OR PLEASURE IN DOING THINGS: 1
SUM OF ALL RESPONSES TO PHQ QUESTIONS 1-9: 2

## 2024-01-12 ASSESSMENT — PAIN SCALES - GENERAL
PAINLEVEL_OUTOF10: 0
PAINLEVEL_OUTOF10: 0
PAINLEVEL_OUTOF10: 9

## 2024-01-12 ASSESSMENT — PAIN DESCRIPTION - LOCATION: LOCATION: BACK

## 2024-01-12 ASSESSMENT — SLEEP AND FATIGUE QUESTIONNAIRES
AVERAGE NUMBER OF SLEEP HOURS: 6
DO YOU USE A SLEEP AID: NO
DO YOU HAVE DIFFICULTY SLEEPING: NO

## 2024-01-12 ASSESSMENT — LIFESTYLE VARIABLES
HOW MANY STANDARD DRINKS CONTAINING ALCOHOL DO YOU HAVE ON A TYPICAL DAY: 1 OR 2
HOW OFTEN DO YOU HAVE A DRINK CONTAINING ALCOHOL: MONTHLY OR LESS

## 2024-01-12 NOTE — ED NOTES
Pt resting on stretcher, alert, RR even and unlabored. Pt c/o chronic back and knee pain, requesting pain medication. Sitter at bedside. Dr. Marcial ntfd of pt request.

## 2024-01-12 NOTE — BH NOTE
Patient given tobacco quitline number 33569771449 at this time, refusing to call at this time, states \" I just dont want to quit now\"- patient given information as to the dangers of long term tobacco use. Continue to reinforce the importance of tobacco cessation.

## 2024-01-12 NOTE — CARE COORDINATION
[facility] Biopsychosocial Assessment    Current Level of Psychosocial Functioning     Independent xxx  Dependent    Minimal Assist     Comments:    Psychosocial High Risk Factors (check all that apply)    Unable to obtain meds   Chronic illness/pain    Substance abuse   Lack of Family Support   Financial stress   Isolation   Inadequate Community Resources  Suicide attempt(s)  Not taking medications   Victim of crime   Developmental Delay  Unable to manage personal needs    Age 65 or older   Homeless  No transportation   Readmission within 30 days  Unemployment  Traumatic Event    Comments:   Psychiatric Advanced Directives: none reported     Family to Involve in Treatment: pt reports her mom is supportive     Sexual Orientation:  n/a    Patient Strengths: pt reports mom is supportive    Patient Barriers: pt is not linked to outpatient treatment, reports polysubstance abuse      Opiate Education Provided:  pt has history of AOD/recovery treatment at [facility] 5/2023      [facility]HC/mental health history: pt has history of referral to [facility] does not want to return, is not currently linked with outpt services.     Plan of Care   medication management, group/individual therapies, family meetings, psycho -education, treatment team meetings to assist with stabilization    Initial Discharge Plan:  pt given AOD recovery folder and is considering AOD placement       Clinical Summary:  Sruthi is a 48 year old single female who has been admitted to [facility] with report of suicidal ideation with plan to jump into traffic. Pt reports she lives with her mom who is supportive. Pt states she has been off medication because she is not linked with outpatient provider. Pt/SW spoke about AOD recovery options, given folder. SW offered ongoing support, encouragement.

## 2024-01-12 NOTE — ED NOTES
Writer received phone call from Rosemarie VILLARREAL Rosalie.   Patient will be going to [facility] at [facility] Rm 209 bed 2.     # for nurse to nurse report is [phone].

## 2024-01-12 NOTE — GROUP NOTE
Group Therapy Note    Date: 1/12/2024    Group Start Time: 1000  Group End Time: 1040  Group Topic: Psychoeducation    Sruthi Rangel, MICHELLEW        Group Therapy Note    Attendees: 3/11       patient refused to attend psychoeducation group at 10a after encouragement from staff.  1:1 talk time provided as alternative to group session

## 2024-01-12 NOTE — PLAN OF CARE
Problem: Self Harm/Suicidality  Goal: Will have no self-injury during hospital stay  Description: INTERVENTIONS:  1.  Ensure constant observer at bedside with Q15M safety checks  2.  Maintain a safe environment  3.  Secure patient belongings  4.  Ensure family/visitors adhere to safety recommendations  5.  Ensure safety tray has been added to patient's diet order  6.  Every shift and PRN: Re-assess suicidal risk via Frequent Screener    Outcome: Progressing - patient endorses fleeting SI and having command auditory hallucinations to harm self and contracts for safety; patient remains free from self-inflicted injuries and agrees to seek staff should her ideations/voices worsen; safety checks maintained and continues every 15 minutes and at random intervals     Problem: Depression  Goal: Will be euthymic at discharge  Description: INTERVENTIONS:  1. Administer medication as ordered  2. Provide emotional support via 1:1 interaction with staff  3. Encourage involvement in milieu/groups/activities  4. Monitor for social isolation  Outcome: Not Progressing - patient continues to report depression, rating at a 7/10, is very tearful during conversation; patient mentions her  daughter and the image of her in the casket, something she can't ever get out of her head and it is very depressing for her     Problem: Pain  Goal: Verbalizes/displays adequate comfort level or baseline comfort level  Outcome: Progressing - patient has no complaints of pain

## 2024-01-12 NOTE — ED NOTES
Pt resting on Cot, 1:1 at bedside.    VSS, RR equal and non labored, NAD, rise and fall of chest noted    Call light within reach  Following plan of care

## 2024-01-12 NOTE — ED NOTES
DIOMEDES made telephone call to "[facility], [facility]". DIOMEDES spoke with PHIL Stevens to began process. Writer provided patient initial information and was told to call back once all labs are completed..

## 2024-01-12 NOTE — GROUP NOTE
Group Therapy Note    Date: 1/12/2024    Group Start Time: 0900  Group End Time: 0915  Group Topic: Community Meeting    Yakelin Coulter RN        Group Therapy Note    Attendees: 0/10       Community Meeting Group Note        Date: January 12, 2024 Start Time: 9am  End Time: 915am      Number of Participants in Group & Unit Census:  0/10    Topic: Goals    Goal of Group: express daily goals      Comments:     Patient did not participate in Community Meeting group, despite staff encouragement and explanation of benefits.  Patient remain seclusive to self.  Q15 minute safety checks maintained for patient safety and will continue to encourage patient to attend unit programming.        Signature:  Yakelin Eden RN

## 2024-01-12 NOTE — ED PROVIDER NOTES
[facility]   Emergency Department  Emergency Medicine Attending Sign-out   Note started: 1:34 AM EST    Care of Sruthi You was assumed from previous attending Dr. Edwards at 1:30 AM and is being seen for Suicidal  .  The patient's initial evaluation and plan have been discussed with the prior provider who initially evaluated the patient.     Attestation  I was available and discussed any additional care issues that arose and coordinated the management plans with the resident(s) caring for the patient during my duty period. Any areas of disagreement with resident's documentation of care or procedures are noted on the chart. I was personally present for the key portions of any/all procedures, during my duty period. I have documented in the chart those procedures where I was not present during the key portions.     BRIEF PATIENT SUMMARY/MDM COURSE PER INITIAL PROVIDER:   RECENT VITALS:     Temp: 97.5 °F (36.4 °C),  Pulse: 82, Respirations: 18, BP: 104/74, SpO2: 99 %    This patient is a 48 y.o. Female with suicidla ideation.    DIAGNOSTICS/MEDICATIONS:     MEDICATIONS GIVEN:  ED Medication Orders (From admission, onward)      Start Ordered     Status Ordering Provider    01/12/24 0015 01/12/24 0010  acetaminophen (TYLENOL) tablet 1,000 mg  ONCE         Last MAR action: Given - by INOCENCIA CHAWLA on 01/12/24 at 0100 ORLANDO BARRIOS    01/11/24 1915 01/11/24 1902  potassium chloride (KLOR-CON M) extended release tablet 40 mEq  ONCE         Last MAR action: Given - by BACILIO HAILE on 01/11/24 at 2033 JOSE ROSAS            LABS    Labs Reviewed   COMPREHENSIVE METABOLIC PANEL - Abnormal; Notable for the following components:       Result Value    Potassium 3.6 (*)     Chloride 97 (*)     Glucose 101 (*)     BUN 26 (*)     All other components within normal limits   URINALYSIS WITH MICROSCOPIC - Abnormal; Notable for the following components:    Leukocyte Esterase, Urine MODERATE (*) 
      [facility] ED     Emergency Department     Faculty Attestation    I performed a history and physical examination of the patient and discussed management with the resident. I reviewed the resident’s note and agree with the documented findings and plan of care. Any areas of disagreement are noted on the chart. I was personally present for the key portions of any procedures. I have documented in the chart those procedures where I was not present during the key portions. I have reviewed the emergency nurses triage note. I agree with the chief complaint, past medical history, past surgical history, allergies, medications, social and family history as documented unless otherwise noted below. For Physician Assistant/ Nurse Practitioner cases/documentation I have personally evaluated this patient and have completed at least one if not all key elements of the E/M (history, physical exam, and MDM). Additional findings are as noted.    Note Started: 6:04 PM EST    Patient with suicidal thoughts specific plan to \"jump in front of a car,\" does admit to alcohol use today but clinically sober normal pupils normal speech normal gait.  Will order requisite labs social work to see    EKG interpretation: Sinus rhythm 83 normal intervals normal axis.  No acute ST or T changes, nonspecific T wave changes however similar to prior on 8/5/2022    Critical Care     none    Magno Edwards MD, FACEP, FAAEM  Attending Emergency  Physician           Magno Edwards MD  01/11/24 1916    12:55 AM EST  EMTALA form completed, printed, and signed by me at this time.         Magno Edwards MD  01/12/24 0055    
     [facility] ED  Emergency Department  Emergency Medicine Resident Turn-Over     Note Started: 7:55 PM EST    Care of Sruthi You was assumed from Dr. Reynoso and is being seen for Suicidal  .  The patient's initial evaluation and plan have been discussed with the prior provider who initially evaluated the patient.     EMERGENCY DEPARTMENT COURSE / MEDICAL DECISION MAKING:       MEDICATIONS GIVEN:  Orders Placed This Encounter   Medications    potassium chloride (KLOR-CON M) extended release tablet 40 mEq    acetaminophen (TYLENOL) tablet 1,000 mg       LABS / RADIOLOGY:     Labs Reviewed   COMPREHENSIVE METABOLIC PANEL - Abnormal; Notable for the following components:       Result Value    Potassium 3.6 (*)     Chloride 97 (*)     Glucose 101 (*)     BUN 26 (*)     All other components within normal limits   URINALYSIS WITH MICROSCOPIC - Abnormal; Notable for the following components:    Leukocyte Esterase, Urine MODERATE (*)     All other components within normal limits   URINE DRUG SCREEN - Abnormal; Notable for the following components:    Cocaine Metabolite, Urine POSITIVE (*)     Cannabinoid Scrn, Ur POSITIVE (*)     All other components within normal limits   TOX SCR, BLD, ED - Abnormal; Notable for the following components:    Acetaminophen Level <5 (*)     Salicylate Lvl <1.0 (*)     All other components within normal limits   COVID-19, RAPID   CBC WITH AUTO DIFFERENTIAL   HCG, SERUM, QUALITATIVE       No results found.    RECENT VITALS:     Temp: 97.2 °F (36.2 °C),  Pulse: 77, Respirations: 16, BP: 118/78, SpO2: 98 %    This patient is a 48 y.o. Female with SI with plan to jump in front a car. BHI ordered.   Awaiting uds.     ED Course as of 01/12/24 0519   Thu Jan 11, 2024   1836 SARS-CoV-2, Rapid: Not Detected [GC]   1901 hCG Qual: NEGATIVE [GC]   1901 CMP with mild hypokalemia of 3.6.  Replacing with 40 mill equivalents of oral potassium.  Renal function normal. [GC]   1902 CBC with 
exudate or posterior oropharyngeal erythema.   Eyes:      General:         Right eye: No discharge.         Left eye: No discharge.      Extraocular Movements: Extraocular movements intact.      Conjunctiva/sclera: Conjunctivae normal.      Pupils: Pupils are equal, round, and reactive to light.   Cardiovascular:      Rate and Rhythm: Normal rate and regular rhythm.      Pulses: Normal pulses.      Heart sounds: Normal heart sounds.   Pulmonary:      Effort: Pulmonary effort is normal. No respiratory distress.      Breath sounds: Normal breath sounds.   Abdominal:      General: Abdomen is flat. There is no distension.      Palpations: Abdomen is soft.      Tenderness: There is no abdominal tenderness. There is no guarding.   Musculoskeletal:         General: No tenderness, deformity or signs of injury.      Cervical back: Normal range of motion and neck supple. No tenderness.      Right lower leg: No edema.      Left lower leg: No edema.   Skin:     General: Skin is warm and dry.      Findings: No erythema or rash.   Neurological:      Mental Status: She is alert and oriented to person, place, and time. Mental status is at baseline.      Sensory: No sensory deficit.      Motor: No weakness.   Psychiatric:      Comments: Depressed mood.  Flat affect.  Positive suicidal ideations with plan. Tearful           DDX/DIAGNOSTIC RESULTS / EMERGENCY DEPARTMENT COURSE / MDM     Medical Decision Making  48 y.o. female with PMH including major depressive disorder and history of suicidal ideations who presents emergency department with depression and suicidal ideations.  See HPI for detail.  She incidentally states that she has been experiencing upper respiratory symptoms with cough, congestion, and headache.  She arrives with a depressed mood and flat affect.  Hemodynamically stable with a heart rate of 94 and blood pressure 135/93 satting 98% on room air without increased work of breathing or conversational dyspnea.  Afebrile

## 2024-01-12 NOTE — ED NOTES
Faxed to [facility], awaiting response. Medical necessity form sent to [employer] via fax.

## 2024-01-12 NOTE — BH NOTE
[facility]  Admission Note     Admission Type:   Admission Type: Voluntary    Reason for admission:  Reason for Admission: suicidal ideations to walk into traffic, voices telling her to kill herself, lost daughter a year ago and holidays bring it back in her mind      Addictive Behavior:   Addictive Behavior  In the Past 3 Months, Have You Felt or Has Someone Told You That You Have a Problem With  : None    Medical Problems:   Past Medical History:   Diagnosis Date    Fibromyalgia     Headache     migraines    Hyperlipidemia     Hypertension     Hypothyroidism     Lupus (HCC)     Thyroid disease        Status EXAM:  Mental Status and Behavioral Exam  Normal: No  Level of Assistance: Independent/Self  Facial Expression: Brightened  Affect: Incongruent  Level of Consciousness: Alert  Frequency of Checks: 4 times per hour, close  Mood:Normal: No  Mood: Depressed, Anxious, Helpless  Motor Activity:Normal: Yes  Eye Contact: Good  Observed Behavior: Friendly, Cooperative, Preoccupied  Sexual Misconduct History: Current - no  Preception: [place] to person, [place] to time, [place] to place, [place] to situation  Attention:Normal: Yes  Thought Processes: Unremarkable  Thought Content:Normal: No  Thought Content: Preoccupations  Depression Symptoms: Feelings of hopelessess, Feelings of helplessness  Anxiety Symptoms: Generalized  Maria Teresa Symptoms: Poor judgment  Hallucinations: None  Delusions: No  Memory:Normal: Yes  Insight and Judgment: No  Insight and Judgment: Poor judgment, Poor insight    Tobacco Screening:  Practical Counseling, on admission, jayme X, if applicable and completed (first 3 are required if patient doesn't refuse)            ( x) Recognizing danger situations (included triggers and roadblocks)                    ( x) Coping skills (new ways to manage stress,relaxation techniques, changing routine, distraction)                                                           (x ) Basic information

## 2024-01-12 NOTE — GROUP NOTE
Group Therapy Note    Date: 1/12/2024    Group Start Time: 1100  Group End Time: 1135  Group Topic: Cognitive Skills    Britt Bell CTRS        Group Therapy Note    Attendees: 3/11    Cognitive Skills Group Note        Date: January 12, 2024 Start Time: 11am  End Time: 11:35am      Number of Participants in Group & Unit Census:  3/11    Topic:  interpersonal skills, memory recall, concentration     Goal of Group: To improve interpersonal skills and memory recall through collaborating with peers and concentrating on a presented task.      Comments:     Patient did not participate in Cognitive Skills group, despite staff encouragement and explanation of benefits.  Patient remain seclusive to self.  Q15 minute safety checks maintained for patient safety and will continue to encourage patient to attend unit programming.      Signature:  CINDI Cunningham

## 2024-01-12 NOTE — H&P
(L) 98 - 107 mmol/L    CO2 24 20 - 31 mmol/L    Anion Gap 14 9 - 17 mmol/L    Glucose 101 (H) 70 - 99 mg/dL    BUN 26 (H) 6 - 20 mg/dL    Creatinine 0.7 0.5 - 0.9 mg/dL    Est, Glom Filt Rate >60 >60 mL/min/1.73m2    Calcium 9.5 8.6 - 10.4 mg/dL    Total Protein 8.2 6.4 - 8.3 g/dL    Albumin 4.4 3.5 - 5.2 g/dL    Albumin/Globulin Ratio 1.2 1.0 - 2.5    Total Bilirubin 0.3 0.3 - 1.2 mg/dL    Alkaline Phosphatase 80 35 - 104 U/L    ALT 17 5 - 33 U/L    AST 19 <32 U/L   CBC with Auto Differential    Collection Time: 01/11/24  6:25 PM   Result Value Ref Range    WBC 4.7 3.5 - 11.3 k/uL    RBC 4.85 3.95 - 5.11 m/uL    Hemoglobin 14.0 11.9 - 15.1 g/dL    Hematocrit 41.2 36.3 - 47.1 %    MCV 84.9 82.6 - 102.9 fL    MCH 28.9 25.2 - 33.5 pg    MCHC 34.0 28.4 - 34.8 g/dL    RDW 14.0 11.8 - 14.4 %    Platelets 237 138 - 453 k/uL    MPV 11.7 8.1 - 13.5 fL    NRBC Automated 0.0 0.0 per 100 WBC    Neutrophils % 46 36 - 65 %    Lymphocytes % 42 24 - 43 %    Monocytes % 10 3 - 12 %    Eosinophils % 1 1 - 4 %    Basophils % 1 0 - 2 %    Immature Granulocytes 0 0 %    Neutrophils Absolute 2.20 1.50 - 8.10 k/uL    Lymphocytes Absolute 1.97 1.10 - 3.70 k/uL    Monocytes Absolute 0.45 0.10 - 1.20 k/uL    Eosinophils Absolute 0.04 0.00 - 0.44 k/uL    Basophils Absolute 0.04 0.00 - 0.20 k/uL    Absolute Immature Granulocyte <0.03 0.00 - 0.30 k/uL   TOX SCR, BLD, ED    Collection Time: 01/11/24  6:25 PM   Result Value Ref Range    Acetaminophen Level <5 (L) 10 - 30 ug/mL    Ethanol <10 <10 mg/dL    Ethanol percent <0.010 <0.010 %    Salicylate Lvl <1.0 (L) 3 - 10 mg/dL    Toxic Tricyclic Sc,Blood NEGATIVE NEGATIVE   HCG Qualitative, Serum    Collection Time: 01/11/24  6:25 PM   Result Value Ref Range    hCG Qual NEGATIVE NEGATIVE   Urinalysis with Microscopic    Collection Time: 01/11/24  9:27 PM   Result Value Ref Range    Color, UA Yellow Yellow    Turbidity UA Clear Clear    Glucose, Ur NEGATIVE NEGATIVE mg/dL    Bilirubin Urine 
understand and utilize treatments.    Goals:    1) Remission of suicidality.  2) Stabilization of symptoms prior to discharge.  3) Establish efficacy and tolerability of medications.       Sruthi You is a 48 y.o. female being evaluated face to face    --EFRAIN Morton CNP on 1/12/2024 at 12:05 PM    An electronic signature was used to authenticate this note.     Please note that this chart was generated using voice recognition Dragon dictation software.  Although every effort was made to ensure the accuracy of this automated transcription, some errors in transcription may have occurred.     I independently saw and evaluated the patient.  I reviewed the nurse practitioners documentation above.  Principle diagnosis we are treating for is Severe episode of recurrent major depressive disorder, with psychotic features (HCC). Any additional comments or changes to the nurse practitioners documentation are stated below otherwise agree with assessment.  Plan will be as follows:  After discussion of risk benefits and alternatives patient was agreeable to retitrate her Remeron.  Will start at 7.5 mg p.o. nightly and titrate to effect.  Patient also reported improvement with BuSpar for anxiety in the past and will restart that medication.  Will monitor in the inpatient unit for improvement  Electronically signed by MARICEL SANDS MD on 1/12/2024 at 6:18 PM

## 2024-01-12 NOTE — ED NOTES
Writer called [facility] and spoke with PHIL Stevens. Writer connected to [facility] nurse Rosemarie and Harmony Payton for Dr. Borjas..    Patient presented for psych consult. Patient admitted with diagnosis Depression with suicidal ideations for five days under Dr. Johnson. /*

## 2024-01-12 NOTE — ED NOTES
SW met with patient at bedside. Patient stated she still wants help and is willing to go voluntary if accepted to inpatient facility.   SW informed patient she will be presented to on call psych for inpatient treatment.   SW will update patient on plan.

## 2024-01-13 PROCEDURE — 1240000000 HC EMOTIONAL WELLNESS R&B

## 2024-01-13 PROCEDURE — 6370000000 HC RX 637 (ALT 250 FOR IP): Performed by: PSYCHIATRY & NEUROLOGY

## 2024-01-13 PROCEDURE — APPSS30 APP SPLIT SHARED TIME 16-30 MINUTES: Performed by: NURSE PRACTITIONER

## 2024-01-13 PROCEDURE — 6370000000 HC RX 637 (ALT 250 FOR IP): Performed by: NURSE PRACTITIONER

## 2024-01-13 RX ORDER — MIRTAZAPINE 15 MG/1
15 TABLET, FILM COATED ORAL NIGHTLY
Status: DISCONTINUED | OUTPATIENT
Start: 2024-01-13 | End: 2024-01-18 | Stop reason: HOSPADM

## 2024-01-13 RX ORDER — TRAZODONE HYDROCHLORIDE 100 MG/1
100 TABLET ORAL NIGHTLY
Status: DISCONTINUED | OUTPATIENT
Start: 2024-01-13 | End: 2024-01-14

## 2024-01-13 RX ADMIN — BUSPIRONE HYDROCHLORIDE 10 MG: 10 TABLET ORAL at 09:28

## 2024-01-13 RX ADMIN — BUSPIRONE HYDROCHLORIDE 10 MG: 10 TABLET ORAL at 20:48

## 2024-01-13 RX ADMIN — MIRTAZAPINE 15 MG: 15 TABLET, FILM COATED ORAL at 20:48

## 2024-01-13 RX ADMIN — BUSPIRONE HYDROCHLORIDE 10 MG: 10 TABLET ORAL at 14:48

## 2024-01-13 RX ADMIN — HYDROXYZINE HYDROCHLORIDE 50 MG: 50 TABLET, FILM COATED ORAL at 09:27

## 2024-01-13 RX ADMIN — TRAZODONE HYDROCHLORIDE 100 MG: 100 TABLET ORAL at 20:48

## 2024-01-13 RX ADMIN — HYDROXYZINE HYDROCHLORIDE 50 MG: 50 TABLET, FILM COATED ORAL at 20:48

## 2024-01-13 NOTE — GROUP NOTE
Group Therapy Note    Date: 1/13/2024    Group Start Time: 1100  Group End Time: 1145  Group Topic: Music Therapy    Jovanni Daly        Group Therapy Note    Attendees: 8/16     Patient's Goal:  Patients shared music and were asked to dedicate songs to important people in their lives. Answered questions about these people/relationships relating to what they shared about them or their song selections. Goals to increase self-expression; Increase sense of community; Increase socialization; Demonstrate positive use of time; Reflect on relationships;    Notes:  Patient attended and participated in group having positive interactions with peers and staff throughout. Patient shared music and dedicated it to herself. Then took back her statement and stated it was selfish and laughed. Encouraged patient that it was okay to think of herself as \"an important person\" and asked why she wanted to dedicate it to herself. Expressed in her everyday life she lets people walk all over her, and then she'll compensate the other way and push back against people. States inside she really just wants to be with others and that \"I have a good heart.\"    Status After Intervention:  Improved    Participation Level: Active Listener and Interactive    Participation Quality: Appropriate, Attentive, and Sharing      Speech:  normal      Thought Process/Content: Logical  Linear      Affective Functioning: Congruent      Mood: euthymic      Level of consciousness:  Alert and Attentive      Response to Learning: Able to verbalize current knowledge/experience and Progressing to goal      Endings: None Reported    Modes of Intervention: Support, Socialization, Exploration, Activity, Media, and Reality-testing      Discipline Responsible: Psychoeducational Specialist      Signature:  Jovanni Umaña

## 2024-01-13 NOTE — GROUP NOTE
Group Therapy Note    Date: 1/13/2024    Group Start Time: 0900  Group End Time: 0906  Group Topic: Group Documentation    Dee Valenzuela, RN        Group Therapy Note    Attendees: 4/16    Community Meeting Group Note        Date: January 13, 2024 Start Time: 9am  End Time:  9:06am      Number of Participants in Group & Unit Census:  4/16    Topic: Goals group    Goal of Group: To establish attainable daily goal(s)      Comments:     Patient did not participate in Community Meeting group, despite staff encouragement and explanation of benefits.  Patient remain seclusive to self.  Q15 minute safety checks maintained for patient safety and will continue to encourage patient to attend unit programming.

## 2024-01-13 NOTE — PLAN OF CARE
Problem: Self Harm/Suicidality  Goal: Will have no self-injury during hospital stay  Description: INTERVENTIONS:  1.  Ensure constant observer at bedside with Q15M safety checks  2.  Maintain a safe environment  3.  Secure patient belongings  4.  Ensure family/visitors adhere to safety recommendations  5.  Ensure safety tray has been added to patient's diet order  6.  Every shift and PRN: Re-assess suicidal risk via Frequent Screener    1/13/2024 0412 by Indira Luna LPN  Outcome: Progressing     Problem: Depression  Goal: Will be euthymic at discharge  Description: INTERVENTIONS:  1. Administer medication as ordered  2. Provide emotional support via 1:1 interaction with staff  3. Encourage involvement in milieu/groups/activities  4. Monitor for social isolation  1/13/2024 0412 by Indira Luna LPN  Outcome: Progressing   Patient denies harm to self or others, denies hallucinations, and delusions. Patient positive for anxiety and depression regarding life in general. Engages in 1:1 talk with staff, medication complaint. Every 15 minute checks continue per unit protocol, safe environment maintained.

## 2024-01-13 NOTE — BH NOTE
Patient is complaining of feeling anxious and requested their 50mg oral of Atarax per orders.  Patient is currently sitting in the day area, and showing no signs of distress.  Safety checks are maintained every 15 minutes and at irregular intervals.

## 2024-01-13 NOTE — PLAN OF CARE
Problem: Self Harm/Suicidality  Goal: Will have no self-injury during hospital stay  Description: INTERVENTIONS:  1.  Ensure constant observer at bedside with Q15M safety checks  2.  Maintain a safe environment  3.  Secure patient belongings  4.  Ensure family/visitors adhere to safety recommendations  5.  Ensure safety tray has been added to patient's diet order  6.  Every shift and PRN: Re-assess suicidal risk via Frequent Screener    1/13/2024 1301 by Rekha Don LPN  Note: Patient denies suicidal/homicidal ideations but reports general anxiety and depression. Patient is guarded with fair eye contact, she is medication compliant with no behaviors and isolative to room. Programming benefits discussed and encouraged.     Problem: Depression  Goal: Will be euthymic at discharge  Description: INTERVENTIONS:  1. Administer medication as ordered  2. Provide emotional support via 1:1 interaction with staff  3. Encourage involvement in milieu/groups/activities  4. Monitor for social isolation  1/13/2024 1301 by Rekha Don LPN  Note: Patient is attempting to progress towards goal.     Problem: Pain  Goal: Verbalizes/displays adequate comfort level or baseline comfort level  Note: Patient denies any discomfort and exhibits no symptoms.

## 2024-01-14 PROCEDURE — 6370000000 HC RX 637 (ALT 250 FOR IP): Performed by: PSYCHIATRY & NEUROLOGY

## 2024-01-14 PROCEDURE — 1240000000 HC EMOTIONAL WELLNESS R&B

## 2024-01-14 PROCEDURE — APPSS30 APP SPLIT SHARED TIME 16-30 MINUTES: Performed by: NURSE PRACTITIONER

## 2024-01-14 PROCEDURE — 6370000000 HC RX 637 (ALT 250 FOR IP): Performed by: NURSE PRACTITIONER

## 2024-01-14 RX ORDER — QUETIAPINE FUMARATE 50 MG/1
50 TABLET, FILM COATED ORAL NIGHTLY
Status: DISCONTINUED | OUTPATIENT
Start: 2024-01-14 | End: 2024-01-16

## 2024-01-14 RX ORDER — TRAZODONE HYDROCHLORIDE 150 MG/1
150 TABLET ORAL NIGHTLY
Status: DISCONTINUED | OUTPATIENT
Start: 2024-01-14 | End: 2024-01-18 | Stop reason: HOSPADM

## 2024-01-14 RX ADMIN — ACETAMINOPHEN 650 MG: 325 TABLET ORAL at 08:06

## 2024-01-14 RX ADMIN — MIRTAZAPINE 15 MG: 15 TABLET, FILM COATED ORAL at 20:50

## 2024-01-14 RX ADMIN — BUSPIRONE HYDROCHLORIDE 10 MG: 10 TABLET ORAL at 15:08

## 2024-01-14 RX ADMIN — BUSPIRONE HYDROCHLORIDE 10 MG: 10 TABLET ORAL at 08:07

## 2024-01-14 RX ADMIN — BUSPIRONE HYDROCHLORIDE 10 MG: 10 TABLET ORAL at 20:50

## 2024-01-14 RX ADMIN — HYDROXYZINE HYDROCHLORIDE 50 MG: 50 TABLET, FILM COATED ORAL at 01:54

## 2024-01-14 RX ADMIN — HYDROXYZINE HYDROCHLORIDE 50 MG: 50 TABLET, FILM COATED ORAL at 08:07

## 2024-01-14 RX ADMIN — HYDROXYZINE HYDROCHLORIDE 50 MG: 50 TABLET, FILM COATED ORAL at 15:08

## 2024-01-14 RX ADMIN — QUETIAPINE FUMARATE 50 MG: 50 TABLET ORAL at 20:50

## 2024-01-14 RX ADMIN — ALUMINUM HYDROXIDE, MAGNESIUM HYDROXIDE, AND SIMETHICONE 30 ML: 1200; 120; 1200 SUSPENSION ORAL at 18:54

## 2024-01-14 RX ADMIN — TRAZODONE HYDROCHLORIDE 150 MG: 150 TABLET ORAL at 20:50

## 2024-01-14 ASSESSMENT — PAIN SCALES - GENERAL: PAINLEVEL_OUTOF10: 2

## 2024-01-14 NOTE — GROUP NOTE
Group Therapy Note    Date: 1/14/2024    Group Start Time: 1400  Group End Time: 1500  Group Topic: Music Therapy    Jovanni Santamaria        Group Therapy Note    Attendees: 6/14     Patient's Goal:  Patients were asked to share music and analyze lyrics for themes, and share advice based on themes within music. Patinet reflected on cognitive distortion of black and white thinking towards end of group, and using strengths to help during times of difficulty. Goals to increase sense of community; Engage in peer support; Increase self-expression; Increase socialization;     Notes:  Patient attended and participated in group having positive interactions with peers and staff throughout. Patient was pleasant and engaging. Shared music and expressed advice about grief, and life being hard. Expressed it was harder with symptoms of mental illness and told peers to \"take your meds, and go to your therapy, and do the things you have to do and the other stuff will become easier.\"    Status After Intervention:  Improved    Participation Level: Active Listener and Interactive    Participation Quality: Appropriate, Attentive, Sharing, and Supportive      Speech:  normal      Thought Process/Content: Logical  Linear      Affective Functioning: Congruent      Mood: euthymic      Level of consciousness:  Alert and Attentive      Response to Learning: Able to verbalize current knowledge/experience and Progressing to goal      Endings: None Reported    Modes of Intervention: Support, Socialization, Exploration, Activity, Media, and Reality-testing      Discipline Responsible: Psychoeducational Specialist      Signature:  Jovanni Umaña

## 2024-01-14 NOTE — PLAN OF CARE
Problem: Self Harm/Suicidality  Goal: Will have no self-injury during hospital stay  Description: INTERVENTIONS:  1.  Ensure constant observer at bedside with Q15M safety checks  2.  Maintain a safe environment  3.  Secure patient belongings  4.  Ensure family/visitors adhere to safety recommendations  5.  Ensure safety tray has been added to patient's diet order  6.  Every shift and PRN: Re-assess suicidal risk via Frequent Screener    1/13/2024 2307 by Domi Rios  Outcome: Progressing     Problem: Depression  Goal: Will be euthymic at discharge  Description: INTERVENTIONS:  1. Administer medication as ordered  2. Provide emotional support via 1:1 interaction with staff  3. Encourage involvement in milieu/groups/activities  4. Monitor for social isolation  1/13/2024 2307 by Domi Rios  Outcome: Progressing   Patient reports some anxiety and depression at this time. Patient reports thoughts of self harm but agrees to be safe on unit and is free from self harm at this time. Patient is seen in dayroom, social with select peers. Patient is cooperative. Q15 safety checks

## 2024-01-15 LAB
P E INTERPRETATION, U: NORMAL
PATHOLOGIST: NORMAL
SPECIMEN TYPE: NORMAL
URINE TOTAL PROTEIN: 8 MG/DL

## 2024-01-15 PROCEDURE — 6370000000 HC RX 637 (ALT 250 FOR IP): Performed by: PSYCHIATRY & NEUROLOGY

## 2024-01-15 PROCEDURE — 6370000000 HC RX 637 (ALT 250 FOR IP): Performed by: NURSE PRACTITIONER

## 2024-01-15 PROCEDURE — 1240000000 HC EMOTIONAL WELLNESS R&B

## 2024-01-15 PROCEDURE — APPSS30 APP SPLIT SHARED TIME 16-30 MINUTES: Performed by: NURSE PRACTITIONER

## 2024-01-15 RX ADMIN — HYDROXYZINE HYDROCHLORIDE 50 MG: 50 TABLET, FILM COATED ORAL at 20:28

## 2024-01-15 RX ADMIN — MIRTAZAPINE 15 MG: 15 TABLET, FILM COATED ORAL at 20:28

## 2024-01-15 RX ADMIN — BUSPIRONE HYDROCHLORIDE 10 MG: 10 TABLET ORAL at 13:53

## 2024-01-15 RX ADMIN — ALUMINUM HYDROXIDE, MAGNESIUM HYDROXIDE, AND SIMETHICONE 30 ML: 1200; 120; 1200 SUSPENSION ORAL at 19:54

## 2024-01-15 RX ADMIN — QUETIAPINE FUMARATE 50 MG: 50 TABLET ORAL at 20:28

## 2024-01-15 RX ADMIN — TRAZODONE HYDROCHLORIDE 150 MG: 150 TABLET ORAL at 20:28

## 2024-01-15 RX ADMIN — BUSPIRONE HYDROCHLORIDE 10 MG: 10 TABLET ORAL at 08:47

## 2024-01-15 RX ADMIN — BUSPIRONE HYDROCHLORIDE 10 MG: 10 TABLET ORAL at 20:28

## 2024-01-15 NOTE — GROUP NOTE
Group Therapy Note    Date: 1/14/2024    Group Start Time: 1700  Group End Time: 1730  Group Topic: Nursing    Davina Rodriguez LPN        Group Therapy Note    Attendees: 15/15       Patient participated with staff doing safety checks of room. No contraband found.

## 2024-01-15 NOTE — GROUP NOTE
Group Therapy Note    Date: 1/15/2024    Group Start Time: 1045  Group End Time: 1120  Group Topic: Cognitive Skills    Britt Bell CTRS        Group Therapy Note    Attendees: 8/15    Cognitive Skills Group Note        Date: January 15, 2024 Start Time: 10:45am  End Time: 11:20am      Number of Participants in Group & Unit Census:  8/15    Topic: interpersonal skills, decision-making, concentration     Goal of Group: To improve interpersonal skills and decision-making through collaborating with peers and concentrating on a presented task.       Comments:     Patient did not participate in Cognitive Skills group, despite staff encouragement and explanation of benefits.  Patient remain seclusive to self.  Q15 minute safety checks maintained for patient safety and will continue to encourage patient to attend unit programming.        Signature:  CINDI Cunningham

## 2024-01-15 NOTE — PLAN OF CARE
[facility]  Day 3 Interdisciplinary Treatment Plan NOTE    Review Date & Time: 1.14.24 1300    Admission Type:   Admission Type: Voluntary    Reason for admission:  Reason for Admission: suicidal ideations to walk into traffic, voices telling her to kill herself, lost daughter a year ago and holidays bring it back in her mind  Estimated Length of Stay: 5-7 days  Estimated Discharge Date Update: to be determined by physician    PATIENT STRENGTHS:  Patient Strengths    Patient Strengths and Limitations:Limitations: Multiple barriers to leisure interests, Difficulty problem solving/relies on others to help solve problems, Tendency to isolate self, External locus of control, Difficult relationships / poor social skills  Addictive Behavior:Addictive Behavior  In the Past 3 Months, Have You Felt or Has Someone Told You That You Have a Problem With  : None  Medical Problems:  Past Medical History:   Diagnosis Date    Fibromyalgia     Headache     migraines    Hyperlipidemia     Hypertension     Hypothyroidism     Lupus (HCC)     Thyroid disease        Risk:  Fall Risk   Sidney Scale Sidney Scale Score: 22  BVC    Change in scores no Changes to plan of Care no    Status EXAM:   Mental Status and Behavioral Exam  Normal: No  Level of Assistance: Independent/Self  Facial Expression: Flat  Affect: Congruent  Level of Consciousness: Alert  Frequency of Checks: 4 times per hour, close  Mood:Normal: No  Mood: Anxious, Depressed, Irritable  Motor Activity:Normal: Yes  Eye Contact: Good  Observed Behavior: Withdrawn, Friendly, Guarded  Sexual Misconduct History: Current - no  Preception: [place] to person, [place] to time, [place] to place, [place] to situation  Attention:Normal: No  Attention: Distractible  Thought Processes: Unremarkable  Thought Content:Normal: No  Thought Content: Preoccupations  Depression Symptoms: Change in energy level, Loss of interest  Anxiety Symptoms: Generalized  Maria Teresa Symptoms: No

## 2024-01-15 NOTE — PLAN OF CARE
Problem: Self Harm/Suicidality  Goal: Will have no self-injury during hospital stay  Description: INTERVENTIONS:  1.  Ensure constant observer at bedside with Q15M safety checks  2.  Maintain a safe environment  3.  Secure patient belongings  4.  Ensure family/visitors adhere to safety recommendations  5.  Ensure safety tray has been added to patient's diet order  6.  Every shift and PRN: Re-assess suicidal risk via Frequent Screener    1/14/2024 2230 by Arielle Archibald RN  Outcome: Progressing     Problem: Pain  Goal: Verbalizes/displays adequate comfort level or baseline comfort level  1/14/2024 2230 by Arielle Archibald, RN  Outcome: Progressing     The patient has been out in the day room social with peers. She denies current thoughts of self harm. She denies experiencing any pain. While out in the day room with peers, she engaged with many inappropriate conversations talking about other patient's physical appearances and the work ethic of staff. Writer had to redirect the patient for appropriateness while conversing with others. It took several attempts, she was eventually redirectable. Writer will continue to offer emotional support. Q15 minute checks to continue for safety.

## 2024-01-15 NOTE — BH NOTE
Had an unsuccessful attempt at external fetal version.   section planned for 2019.    Moving .  Has a team that will help.  HAs a lot of [place] Pagan.    Discussed what to expect for her  section and her postpartum recovery.     Pt remains seclusive, comes out for meals.

## 2024-01-15 NOTE — CARE COORDINATION
SW met with pt regarding discharge plans, pt given AOD folder has no expressed preferences on potential AOD/recovery program, SW offered ongoing support with developing safe/appropriate d/c plan.

## 2024-01-15 NOTE — GROUP NOTE
Group Therapy Note    Date: 1/15/2024    Group Start Time: 1330  Group End Time: 1410  Group Topic: Psychoeducation    Britt Bell CTRS        Group Therapy Note    Attendees: 8/14    Psych-Ed/Relapse Prevention Group Note        Date: January 15, 2024 Start Time: 1:30pm  End Time: 2:10pm      Number of Participants in Group & Unit Census:  8/14    Topic: interpersonal skills, coping skills, self-expression    Goal of Group: To improve interpersonal skills and coping skills awareness through collaborating with peers and demonstrating self-expression.      Comments:     Patient did not participate in Psych-Ed/Relapse Prevention group, despite staff encouragement and explanation of benefits.  Patient remain seclusive to self.  Q15 minute safety checks maintained for patient safety and will continue to encourage patient to attend unit programming.        Signature:  CINDI Cunningham

## 2024-01-16 PROCEDURE — APPSS30 APP SPLIT SHARED TIME 16-30 MINUTES

## 2024-01-16 PROCEDURE — 99232 SBSQ HOSP IP/OBS MODERATE 35: CPT | Performed by: INTERNAL MEDICINE

## 2024-01-16 PROCEDURE — 1240000000 HC EMOTIONAL WELLNESS R&B

## 2024-01-16 PROCEDURE — 6370000000 HC RX 637 (ALT 250 FOR IP): Performed by: NURSE PRACTITIONER

## 2024-01-16 PROCEDURE — 6370000000 HC RX 637 (ALT 250 FOR IP): Performed by: PSYCHIATRY & NEUROLOGY

## 2024-01-16 RX ORDER — HYDROCORTISONE 25 MG/G
CREAM TOPICAL 2 TIMES DAILY
Status: DISCONTINUED | OUTPATIENT
Start: 2024-01-16 | End: 2024-01-18 | Stop reason: HOSPADM

## 2024-01-16 RX ORDER — POLYETHYLENE GLYCOL 3350 17 G
2 POWDER IN PACKET (EA) ORAL
Status: DISCONTINUED | OUTPATIENT
Start: 2024-01-16 | End: 2024-01-18 | Stop reason: HOSPADM

## 2024-01-16 RX ORDER — QUETIAPINE FUMARATE 100 MG/1
100 TABLET, FILM COATED ORAL NIGHTLY
Status: DISCONTINUED | OUTPATIENT
Start: 2024-01-16 | End: 2024-01-18 | Stop reason: HOSPADM

## 2024-01-16 RX ADMIN — QUETIAPINE FUMARATE 100 MG: 100 TABLET ORAL at 20:45

## 2024-01-16 RX ADMIN — BUSPIRONE HYDROCHLORIDE 10 MG: 10 TABLET ORAL at 10:05

## 2024-01-16 RX ADMIN — BUSPIRONE HYDROCHLORIDE 10 MG: 10 TABLET ORAL at 20:44

## 2024-01-16 RX ADMIN — NICOTINE POLACRILEX 2 MG: 2 LOZENGE ORAL at 10:25

## 2024-01-16 RX ADMIN — ALUMINUM HYDROXIDE, MAGNESIUM HYDROXIDE, AND SIMETHICONE 30 ML: 1200; 120; 1200 SUSPENSION ORAL at 20:48

## 2024-01-16 RX ADMIN — TRAZODONE HYDROCHLORIDE 150 MG: 150 TABLET ORAL at 20:45

## 2024-01-16 RX ADMIN — HYDROXYZINE HYDROCHLORIDE 50 MG: 50 TABLET, FILM COATED ORAL at 15:13

## 2024-01-16 RX ADMIN — MIRTAZAPINE 15 MG: 15 TABLET, FILM COATED ORAL at 20:45

## 2024-01-16 RX ADMIN — NICOTINE POLACRILEX 2 MG: 2 LOZENGE ORAL at 14:15

## 2024-01-16 RX ADMIN — BUSPIRONE HYDROCHLORIDE 10 MG: 10 TABLET ORAL at 14:15

## 2024-01-16 ASSESSMENT — LIFESTYLE VARIABLES
HOW OFTEN DO YOU HAVE A DRINK CONTAINING ALCOHOL: MONTHLY OR LESS
HOW MANY STANDARD DRINKS CONTAINING ALCOHOL DO YOU HAVE ON A TYPICAL DAY: 1 OR 2

## 2024-01-16 ASSESSMENT — PAIN DESCRIPTION - LOCATION: LOCATION: ABDOMEN

## 2024-01-16 NOTE — GROUP NOTE
Group Therapy Note    Date: 1/16/2024    Group Start Time: 1000  Group End Time: 1045  Group Topic: Psychotherapy    [facility] Nan Moncada MSW, LSW        Group Therapy Note    Attendees: 7/14       Patient's Goal:  Recognizing symptoms of Anxiety, the types of Anxiety and treatments.    Notes:  Pt left group early after being redirected multiple times.    Status After Intervention:  Improved    Participation Level: Monopolizing    Participation Quality: Intrusive      Speech:  normal      Thought Process/Content: Logical      Affective Functioning: Congruent      Mood: angry and euthymic      Level of consciousness:  Alert and Attentive      Response to Learning: Able to verbalize current knowledge/experience, Able to verbalize/acknowledge new learning, and Able to retain information      Endings: None Reported    Modes of Intervention: Education, Support, and Socialization      Discipline Responsible: /Counselor      Signature:  WESLEY Matt LSW

## 2024-01-16 NOTE — GROUP NOTE
Group Therapy Note    Date: 1/16/2024    Group Start Time: 1330  Group End Time: 1415  Group Topic: Psychoeducation    [facility] Marleni Chaparro CTRS    Group Therapy Note    Attendees: 6/14     Patient's Goal:  Patient will demonstrate improved interpersonal skills and offer peer support.    Notes:  Patient attended group and participated.  Patient needs redirection for inappropriate comments throughout group.      Status After Intervention:  Unchanged    Participation Level: Interactive    Participation Quality: Appropriate, Sharing, Supportive, and Intrusive      Speech:  normal      Thought Process/Content: Logical  Linear      Affective Functioning: Constricted/Restricted      Mood: euthymic      Level of consciousness:  Alert, Oriented x4, and Attentive      Response to Learning: Able to verbalize current knowledge/experience, Able to verbalize/acknowledge new learning, and Able to change behavior      Endings: None Reported    Modes of Intervention: Education, Support, Socialization, and Exploration      Discipline Responsible: Psychoeducational Specialist      Signature:  CINDI Zamora

## 2024-01-16 NOTE — BH NOTE
Dr. Savage notified of best practice advisory suggesting to place patient on suicide precautions. Provider to discontinue the order as patient does not meet criteria for suicide precautions at this time.

## 2024-01-16 NOTE — GROUP NOTE
Group Therapy Note    Date: 1/16/2024    Group Start Time: 1100  Group End Time: 1145  Group Topic: Psychoeducation    [facility]    Marleni Smith CTRS    Group Therapy Note    Attendees: 6/14     Patient's Goal:  Patient will identify benefits of leisure for coping and stress management    Notes:  Patient attended group and participated. Patient needs redirection to stay on task.      Status After Intervention:  Improved    Participation Level: Active Listener and Interactive    Participation Quality: Appropriate, Attentive, Sharing, and Intrusive      Speech:  normal      Thought Process/Content: Logical  Linear      Affective Functioning: Congruent      Mood: euthymic      Level of consciousness:  Alert, Oriented x4, and Attentive      Response to Learning: Able to verbalize current knowledge/experience, Able to verbalize/acknowledge new learning, Able to retain information, Able to change behavior, and Progressing to goal      Endings: None Reported    Modes of Intervention: Education, Support, Socialization, and Exploration      Discipline Responsible: Psychoeducational Specialist      Signature:  CINDI Zamora

## 2024-01-16 NOTE — PLAN OF CARE
Problem: Self Harm/Suicidality  Goal: Will have no self-injury during hospital stay  Description: INTERVENTIONS:  1.  Ensure constant observer at bedside with Q15M safety checks  2.  Maintain a safe environment  3.  Secure patient belongings  4.  Ensure family/visitors adhere to safety recommendations  5.  Ensure safety tray has been added to patient's diet order  6.  Every shift and PRN: Re-assess suicidal risk via Frequent Screener    Outcome: Progressing     Problem: Depression  Goal: Will be euthymic at discharge  Description: INTERVENTIONS:  1. Administer medication as ordered  2. Provide emotional support via 1:1 interaction with staff  3. Encourage involvement in milieu/groups/activities  4. Monitor for social isolation  Outcome: Progressing  Patient denies harm to self or others, denies hallucinations, delusions, and depression. Patient positive for anxiety regarding life stressors. Engages in 1:1 talk with staff, medication complaint. Every 15 minute checks continue per unit protocol, safe environment maintained.

## 2024-01-16 NOTE — PLAN OF CARE
Problem: Self Harm/Suicidality  Goal: Will have no self-injury during hospital stay  Description: INTERVENTIONS:  1.  Ensure constant observer at bedside with Q15M safety checks  2.  Maintain a safe environment  3.  Secure patient belongings  4.  Ensure family/visitors adhere to safety recommendations  5.  Ensure safety tray has been added to patient's diet order  6.  Every shift and PRN: Re-assess suicidal risk via Frequent Screener    Outcome: Progressing     Problem: Depression  Goal: Will be euthymic at discharge  Description: INTERVENTIONS:  1. Administer medication as ordered  2. Provide emotional support via 1:1 interaction with staff  3. Encourage involvement in milieu/groups/activities  4. Monitor for social isolation  Outcome: Progressing     Problem: Pain  Goal: Verbalizes/displays adequate comfort level or baseline comfort level  Outcome: Progressing     Patient remains safe on unit, free from injury and self-harm.

## 2024-01-17 LAB
BASOPHILS # BLD: 0.07 K/UL (ref 0–0.2)
BASOPHILS NFR BLD: 2 % (ref 0–2)
EOSINOPHIL # BLD: 0.17 K/UL (ref 0–0.4)
EOSINOPHILS RELATIVE PERCENT: 5 % (ref 0–4)
ERYTHROCYTE [DISTWIDTH] IN BLOOD BY AUTOMATED COUNT: 14.8 % (ref 11.5–14.9)
HCT VFR BLD AUTO: 35.6 % (ref 36–46)
HGB BLD-MCNC: 11.8 G/DL (ref 12–16)
LYMPHOCYTES NFR BLD: 1.38 K/UL (ref 1–4.8)
LYMPHOCYTES RELATIVE PERCENT: 42 % (ref 24–44)
MCH RBC QN AUTO: 29.2 PG (ref 26–34)
MCHC RBC AUTO-ENTMCNC: 33.1 G/DL (ref 31–37)
MCV RBC AUTO: 88.1 FL (ref 80–100)
MONOCYTES NFR BLD: 0.36 K/UL (ref 0.1–1.3)
MONOCYTES NFR BLD: 11 % (ref 1–7)
MORPHOLOGY: ABNORMAL
MORPHOLOGY: ABNORMAL
NEUTROPHILS NFR BLD: 40 % (ref 36–66)
NEUTS SEG NFR BLD: 1.32 K/UL (ref 1.3–9.1)
PLATELET # BLD AUTO: 159 K/UL (ref 150–450)
PMV BLD AUTO: 10.2 FL (ref 6–12)
RBC # BLD AUTO: 4.04 M/UL (ref 4–5.2)
WBC OTHER # BLD: 3.3 K/UL (ref 3.5–11)

## 2024-01-17 PROCEDURE — 6370000000 HC RX 637 (ALT 250 FOR IP): Performed by: PSYCHIATRY & NEUROLOGY

## 2024-01-17 PROCEDURE — 6370000000 HC RX 637 (ALT 250 FOR IP): Performed by: NURSE PRACTITIONER

## 2024-01-17 PROCEDURE — 99232 SBSQ HOSP IP/OBS MODERATE 35: CPT | Performed by: INTERNAL MEDICINE

## 2024-01-17 PROCEDURE — 6370000000 HC RX 637 (ALT 250 FOR IP): Performed by: INTERNAL MEDICINE

## 2024-01-17 PROCEDURE — APPSS30 APP SPLIT SHARED TIME 16-30 MINUTES

## 2024-01-17 PROCEDURE — 1240000000 HC EMOTIONAL WELLNESS R&B

## 2024-01-17 PROCEDURE — 85025 COMPLETE CBC W/AUTO DIFF WBC: CPT

## 2024-01-17 PROCEDURE — 36415 COLL VENOUS BLD VENIPUNCTURE: CPT

## 2024-01-17 RX ORDER — LORAZEPAM 1 MG/1
2 TABLET ORAL EVERY 6 HOURS PRN
Status: DISCONTINUED | OUTPATIENT
Start: 2024-01-17 | End: 2024-01-18

## 2024-01-17 RX ORDER — LORAZEPAM 1 MG/1
2 TABLET ORAL EVERY 4 HOURS PRN
Status: DISCONTINUED | OUTPATIENT
Start: 2024-01-17 | End: 2024-01-17

## 2024-01-17 RX ORDER — CYCLOBENZAPRINE HCL 10 MG
5 TABLET ORAL 3 TIMES DAILY PRN
Status: DISCONTINUED | OUTPATIENT
Start: 2024-01-17 | End: 2024-01-18 | Stop reason: HOSPADM

## 2024-01-17 RX ORDER — HALOPERIDOL 5 MG/1
5 TABLET ORAL EVERY 6 HOURS PRN
Status: DISCONTINUED | OUTPATIENT
Start: 2024-01-17 | End: 2024-01-18 | Stop reason: HOSPADM

## 2024-01-17 RX ADMIN — BUSPIRONE HYDROCHLORIDE 10 MG: 10 TABLET ORAL at 15:51

## 2024-01-17 RX ADMIN — QUETIAPINE FUMARATE 100 MG: 100 TABLET ORAL at 21:55

## 2024-01-17 RX ADMIN — HYDROXYZINE HYDROCHLORIDE 50 MG: 50 TABLET, FILM COATED ORAL at 21:55

## 2024-01-17 RX ADMIN — BUSPIRONE HYDROCHLORIDE 10 MG: 10 TABLET ORAL at 21:55

## 2024-01-17 RX ADMIN — MIRTAZAPINE 15 MG: 15 TABLET, FILM COATED ORAL at 21:55

## 2024-01-17 RX ADMIN — TRAZODONE HYDROCHLORIDE 150 MG: 150 TABLET ORAL at 21:55

## 2024-01-17 RX ADMIN — ALUMINUM HYDROXIDE, MAGNESIUM HYDROXIDE, AND SIMETHICONE 30 ML: 1200; 120; 1200 SUSPENSION ORAL at 21:55

## 2024-01-17 RX ADMIN — HYDROCORTISONE: 25 CREAM TOPICAL at 22:02

## 2024-01-17 RX ADMIN — IBUPROFEN 400 MG: 400 TABLET, FILM COATED ORAL at 19:46

## 2024-01-17 RX ADMIN — BUSPIRONE HYDROCHLORIDE 10 MG: 10 TABLET ORAL at 09:21

## 2024-01-17 RX ADMIN — HYDROXYZINE HYDROCHLORIDE 50 MG: 50 TABLET, FILM COATED ORAL at 09:22

## 2024-01-17 ASSESSMENT — PAIN SCALES - GENERAL
PAINLEVEL_OUTOF10: 0
PAINLEVEL_OUTOF10: 6

## 2024-01-17 ASSESSMENT — PAIN DESCRIPTION - DESCRIPTORS: DESCRIPTORS: ACHING

## 2024-01-17 ASSESSMENT — PAIN DESCRIPTION - LOCATION: LOCATION: BACK;SHOULDER

## 2024-01-17 NOTE — GROUP NOTE
Group Therapy Note    Date: 1/17/2024    Group Start Time: 1000  Group End Time: 1045  Group Topic: Psychoeducation    [facility]    Zoila Pham LISW-S        Group Therapy Note    Attendees: 8/15       Patient's Goal:  Increase interpersonal relationship skills    Notes:  Patient was an active participant in group discussion however appeared to be sexually focused and often had to be reminded of group expectations and keeping conversations appropriate to group.      Status After Intervention:  Unchanged    Participation Level: Active Listener and Interactive    Participation Quality: Attentive, Sharing, and Inappropriate      Speech:  normal      Thought Process/Content: Logical  Linear      Affective Functioning: Congruent      Mood: irritable      Level of consciousness:  Alert, Oriented x4, Attentive, and Preoccupied      Response to Learning: Able to verbalize current knowledge/experience, Able to verbalize/acknowledge new learning, and Able to retain information      Endings: None Reported    Modes of Intervention: Support, Socialization, and Exploration      Discipline Responsible: /Counselor      Signature:  LIDA Thompson

## 2024-01-17 NOTE — CARE COORDINATION
SW spoke to [facility] Liaison regarding pt's admission to program. Pt has been accepted to [facility] and is to arrive on 1/18 at 10AM. Medications to be sent to [facility]. Nursing staff and pt notified. Pt is agreeable to plan.

## 2024-01-17 NOTE — CARE COORDINATION
SW approached pt regarding AoD placement. Pt voiced interest in [facility], [facility], [facility], [facility], and [facility]. SW reminded pt of discharge timeline. She states \"I know, but these people are going to call me back\". DIOMEDES suggested [facility] and [facility] for walk in clinics. Pt declines and states \"I hate those places\".     Completed application sent via secure email to [facility] per pt's request.     Phone call placed to [facility].

## 2024-01-17 NOTE — GROUP NOTE
Group Therapy Note    Date: 1/17/2024    Group Start Time: 0900  Group End Time: 0930  Group Topic: Orientation Group    Symone Zamudio LPN        Group Therapy Note    Attendees: 7/15       Patient's Goal:  Goal setting.    Notes:     Patient didn't participate in the morning goa setting group despite staff invite to attend.

## 2024-01-17 NOTE — DISCHARGE INSTRUCTIONS
Information:  Medications:   Medication summary provided   I understand that I should take only the medications on my list.     -why and when I need to take each medicine.     -which side effects to watch for.     -that I should carry my medication information at all times in case of     Emergency situations.    I will take all of my medicines to follow up appointments.     -check with my physician or pharmacist before taking any new    Medication, over the counter product or drink alcohol.    -Ask about food, drug or dietary supplement interactions.    -discard old lists and update records with medication providers.    Notify Physician:  Notify physician if you notice:   Always call 911 if you feel your life is in danger  In case of an emergency call 911 immediately!  If 911 is not available call your local emergency medical system for help    Behavioral Health Follow Up:  Original Referral Source:[email or web address]   Discharge Diagnosis: Depression with suicidal ideation [F32.A, R45.851]  Recommendations for Level of Care: continue medications attend follow up appointment   Patient status at discharge: stable   My hospital  was: [facility]   Aftercare plan faxed: yes   -faxed by: staff   -date: 1/18/24   -time: 1400  Prescriptions: [facility]     Smoking: Quit Smoking.   Call the [employer]'s smoking quitline at 0-496-51K-QUIT  Know the signs of a heart attack   If you have any of the following symptoms call 911 immediately, do not wait more    Than five minutes.    1. Pressure, fullness and/ or squeezing in the center of the chest spreading to    The jaw, neck or shoulder.    2. Chest discomfort with light headedness, fainting, sweating, nausea or    Shortness of breath.   3. Upper abdominal pressure or discomfort.   4. Lower chest pain, back pain, unusual fatigue, shortness of breath, nausea   Or dizziness.     General Information:   Questions regarding your bill: Call HELP program [phone]     Suicide

## 2024-01-17 NOTE — GROUP NOTE
Group Therapy Note    Date: 1/17/2024    Group Start Time: 1100  Group End Time: 1135  Group Topic: Psychoeducation    [facility]    Marleni Smith CTRS    Group Therapy Note    Attendees: 6/15     Patient's Goal:  Patient will identify benefits of mental health advocacy to decrease stigma and increase treatment availability.    Notes:  Patient attended group and participated.  Patient needed frequent redirection for inappropriate sexual comments.  Patient is difficult to redirect and often argumentative.  Patient shows minimal insight and is unable to maintain group parameters.      Status After Intervention:  Unchanged    Participation Level: Interactive and Monopolizing    Participation Quality: Sharing, Inappropriate, and Intrusive      Speech:  normal      Thought Process/Content: Logical  Linear      Affective Functioning: Congruent      Mood: euthymic      Level of consciousness:  Alert, Oriented x4, and Preoccupied      Response to Learning: Able to verbalize current knowledge/experience, Able to verbalize/acknowledge new learning, and Able to retain information      Endings: None Reported    Modes of Intervention: Education, Support, Socialization, and Problem-solving      Discipline Responsible: Psychoeducational Specialist      Signature:  CINDI Zamora

## 2024-01-17 NOTE — PLAN OF CARE
Problem: Self Harm/Suicidality  Goal: Will have no self-injury during hospital stay  Description: INTERVENTIONS:  1.  Ensure constant observer at bedside with Q15M safety checks  2.  Maintain a safe environment  3.  Secure patient belongings  4.  Ensure family/visitors adhere to safety recommendations  5.  Ensure safety tray has been added to patient's diet order  6.  Every shift and PRN: Re-assess suicidal risk via Frequent Screener    1/16/2024 2235 by Domi Rios  Outcome: Progressing     Problem: Depression  Goal: Will be euthymic at discharge  Description: INTERVENTIONS:  1. Administer medication as ordered  2. Provide emotional support via 1:1 interaction with staff  3. Encourage involvement in milieu/groups/activities  4. Monitor for social isolation  1/16/2024 2235 by Domi Rios  Outcome: Progressing     Patient is seen mostly in her room this shift. Patient stated that she is tired but has been sleeping well. Patient showered this evening. Patient is overall pleasant and cooperative. Patient reports some generalized anxiety. Patient denies any depression or thoughts of self harm. Patient is free from self harm at this time and agrees to be safe on unit. Patient is accepting of 1:1 Talk time with staff. Patient stated that she has to \"stay pilled up\" in order to cope with the death of her daughter. Safety checks maintained q15 minutes

## 2024-01-17 NOTE — GROUP NOTE
Group Therapy Note    Date: 1/17/2024    Group Start Time: 1330  Group End Time: 1415  Group Topic: Psychoeducation    [facility] Marleni Chaparro CTRS    Group Therapy Note    Attendees: 6/15     Patient's Goal:  Patient will demonstrate improved interpersonal skills and offer peer support    Notes:  Patient attended group and participated.      Status After Intervention:  Improved    Participation Level: Interactive    Participation Quality: Appropriate, Attentive, Sharing, and Supportive      Speech:  normal      Thought Process/Content: Logical  Linear      Affective Functioning: Congruent      Mood: euthymic      Level of consciousness:  Alert, Oriented x4, and Attentive      Response to Learning: Able to verbalize current knowledge/experience, Able to verbalize/acknowledge new learning, Able to retain information, Able to change behavior, and Progressing to goal      Endings: None Reported    Modes of Intervention: Education, Support, Socialization, and Exploration      Discipline Responsible: Psychoeducational Specialist      Signature:  CINDI Zamora

## 2024-01-17 NOTE — BH NOTE
Staff witness patient stating sexually inappropriate comments in day room. Patient was re-directed. Will continue to monitor.

## 2024-01-17 NOTE — PLAN OF CARE
Problem: Self Harm/Suicidality  Goal: Will have no self-injury during hospital stay  Description: INTERVENTIONS:  1.  Ensure constant observer at bedside with Q15M safety checks  2.  Maintain a safe environment  3.  Secure patient belongings  4.  Ensure family/visitors adhere to safety recommendations  5.  Ensure safety tray has been added to patient's diet order  6.  Every shift and PRN: Re-assess suicidal risk via Frequent Screener    1/17/2024 1008 by Francisco Franklin, RN  Outcome: Progressing     Problem: Depression  Goal: Will be euthymic at discharge  Description: INTERVENTIONS:  1. Administer medication as ordered  2. Provide emotional support via 1:1 interaction with staff  3. Encourage involvement in milieu/groups/activities  4. Monitor for social isolation  1/17/2024 1008 by Francisco Franklin, RN  Outcome: Progressing     Problem: Pain  Goal: Verbalizes/displays adequate comfort level or baseline comfort level  Outcome: Progressing         Patient denies thoughts of suicide or self harm. Denies any hallucinations, or delusions. Patient has slept well, eating well   General anxiety. Was irritable with staff today, improving with time. Selectively social in day room. Making phone calls today for placement attends no groups, mood improving Will continue to provide encouragement and support as needed. Safe environment maintained. Safety checks continued every 15 minutes.

## 2024-01-17 NOTE — PROGRESS NOTES
Behavioral Services  Medicare Certification Upon Admission    I certify that this patient's inpatient psychiatric hospital admission is medically necessary for:    [x] (1) Treatment which could reasonably be expected to improve this patient's condition,       [x] (2) Or for diagnostic study;     AND     [x](2) The inpatient psychiatric services are provided while the individual is under the care of a physician and are included in the individualized plan of care.    Estimated length of stay/service 4 to 7 days    Plan for post-hospital care home with outpatient community mental health follow-up    Electronically signed by MARICEL SANDS MD on 1/12/2024 at 6:17 PM      
    [facility] Internal Medicine  Rashard Fajardo MD; Neto Quintanilla MD; Kvng Dumont MD; MD Nelsy Griggs MD; Magdaleno Velásquez MD    [facility] Internal Medicine   IN-PATIENT SERVICE   [facility]     HISTORY AND PHYSICAL EXAMINATION            Date:   2024  Patient name:  Sruthi You  Date of admission:  2024  5:00 AM  MRN:   [id number]  Account:  [id number]  YOB: 1975  PCP:    No primary care provider on file.  Room:   [address] [place] [address]  Code Status:    Full Code      Chief Complaint:     Hypokalemia    History Obtained From:     Patient medical record nursing    History of Present Illness:     48-year-old pleasant -American lady history of cocaine abuse history of hypertension hypothyroidism history of lupus patient came in remission currently not taking any medications for above noted to have hypokalemia potassium 3.6 patient denies any nausea vomiting diarrhea no cathartic diuretic abuse no paresthesia    Past Medical History:     Past Medical History:   Diagnosis Date    Fibromyalgia     Headache     migraines    Hyperlipidemia     Hypertension     Hypothyroidism     Lupus (HCC)     Thyroid disease         Past Surgical History:     Past Surgical History:   Procedure Laterality Date     SECTION      FOOT SURGERY      right    HYSTERECTOMY (CERVIX STATUS UNKNOWN)          Medications Prior to Admission:     Prior to Admission medications    Medication Sig Start Date End Date Taking? Authorizing Provider   traZODone (DESYREL) 100 MG tablet Take 1.5 tablets by mouth nightly as needed for Sleep  Patient not taking: Reported on 2024 1/3/24   Butch Mixon, DO   hydrOXYzine HCl (ATARAX) 50 MG tablet Take 1 tablet by mouth every 8 hours as needed for Anxiety  Patient not taking: Reported on 2024 1/3/24   Butch Mixon,    hydroCHLOROthiazide (HYDRODIURIL) 25 MG tablet Take 1 tablet by 
    [facility] Internal Medicine  Rashard Fajardo MD; Neto Quintanilla MD; Kvng Dumont MD; MD Nelsy Griggs MD; Magdaleno Velásquez MD    [facility] Internal Medicine   IN-PATIENT SERVICE   [facility]     HISTORY AND PHYSICAL EXAMINATION            Date:   2024  Patient name:  Sruthi You  Date of admission:  2024  5:00 AM  MRN:   [id number]  Account:  [id number]  YOB: 1975  PCP:    No primary care provider on file.  Room:   [address] [place] [address]  Code Status:    Full Code      Chief Complaint:     Hypokalemia    History Obtained From:     Patient medical record nursing    History of Present Illness:     48-year-old pleasant -American lady history of cocaine abuse history of hypertension hypothyroidism history of lupus patient came in remission currently not taking any medications for above noted to have hypokalemia potassium 3.6 patient denies any nausea vomiting diarrhea no cathartic diuretic abuse no paresthesia    Past Medical History:     Past Medical History:   Diagnosis Date    Fibromyalgia     Headache     migraines    Hyperlipidemia     Hypertension     Hypothyroidism     Lupus (HCC)     Thyroid disease         Past Surgical History:     Past Surgical History:   Procedure Laterality Date     SECTION      FOOT SURGERY      right    HYSTERECTOMY (CERVIX STATUS UNKNOWN)          Medications Prior to Admission:     Prior to Admission medications    Medication Sig Start Date End Date Taking? Authorizing Provider   traZODone (DESYREL) 100 MG tablet Take 1.5 tablets by mouth nightly as needed for Sleep  Patient not taking: Reported on 2024 1/3/24   Butch Mixon, DO   hydrOXYzine HCl (ATARAX) 50 MG tablet Take 1 tablet by mouth every 8 hours as needed for Anxiety  Patient not taking: Reported on 2024 1/3/24   Butch Mixon,    hydroCHLOROthiazide (HYDRODIURIL) 25 MG tablet Take 1 tablet by 
  Daily Progress Note  1/14/2024    Patient Name: Sruthi You    CHIEF COMPLAINT:  Depression with suicidal ideation           SUBJECTIVE:      Patient is seen today for a follow up assessment.  Upon approach she is found sleeping in bed, arouses to verbal stimuli.  Interview conducted in patient's room with the door open, roommate not present during encounter.  She reports no change in mood, states she feels depressed and endorses significant anxiety.  Endorses suicidal ideation, does not share a plan but states, \"I just wish I were dead\".  Denies homicidal ideation.  Denies auditory hallucinations.  Endorses visual hallucinations, states she is \"seeing shadows out of the corner of my eye\".  Reports ongoing difficulty initiating and maintaining sleep.  Patient remains compliant with taking scheduled psychotropic medication, denies adverse effects.  Per unit nursing staff patient maintains behavioral control, no need for emergency medication for the past 24 hours    At this time patient is not able to contract for safety outside of the hospital    Appetite:  [x] Adequate/Unchanged  [] Increased  [] Decreased      Sleep:       [] Adequate/Unchanged  [] Fair  [x] Poor      Group Attendance on Unit:   [] Yes   [x] Selectively    [] No    Compliant with scheduled medications: [x] Yes  [] No    Received emergency medications in past 24 hrs: [] Yes   [x] No    Medication Side Effects: Denies         Mental Status Exam  Level of consciousness: Alert and awake   Appearance: Appropriate attire for setting, resting in bed, with fair  grooming and hygiene   Behavior/Motor: Approachable, engages with interviewer, no psychomotor abnormalities   Attitude toward examiner: Cooperative, attentive, good eye contact  Speech: Normal rate, volume, and tone  Mood:  \"the same\"  Affect: blunted  Thought processes: linear and coherent   Thought content: Denies homicidal ideation  Suicidal Ideation: Endorses suicidal ideations, 
  Daily Progress Note  1/15/2024    Patient Name: Sruthi You    CHIEF COMPLAINT:  Depression with suicidal ideation           SUBJECTIVE:      Patient is seen today for a follow up assessment.  Upon approach she is found sleeping in bed, arouses to verbal stimuli.  Interview conducted in patient's room with the door open, roommate not present during encounter.  She reports improvement in mood, states she feels less depressed, less anxious, and fleeting suicidal ideation.  Denies homicidal ideation.  Denies auditory hallucinations.  Reports improvement in visual hallucinations, states she has not experienced any perceptual disturbances today. States she slept better last night, denies problems with appetite.  Patient remains compliant with taking scheduled psychotropic medication and denies adverse effects.  Per unit nursing staff patient maintains behavioral control, no need for emergency medication for the past 24 hours.  She expresses interest in AOD program, [facility].    Patient feels her symptoms are improving however at this time she is not able to contract for safety outside of the hospital.    Appetite:  [x] Adequate/Unchanged  [] Increased  [] Decreased      Sleep:       [] Adequate/Unchanged  [x] Fair  [] Poor      Group Attendance on Unit:   [] Yes   [x] Selectively    [] No    Compliant with scheduled medications: [x] Yes  [] No    Received emergency medications in past 24 hrs: [] Yes   [x] No    Medication Side Effects: Denies         Mental Status Exam  Level of consciousness: Alert and awake   Appearance: Appropriate attire for setting, resting in bed, with fair  grooming and hygiene   Behavior/Motor: Approachable, engages with interviewer, no psychomotor abnormalities   Attitude toward examiner: Cooperative, attentive, good eye contact  Speech: Normal rate, volume, and tone  Mood:  \"the same\"  Affect: blunted  Thought processes: linear and coherent   Thought content: Denies homicidal 
  Daily Progress Note  1/16/2024    Patient Name: Sruthi You    CHIEF COMPLAINT:  Depression with suicidal ideation          SUBJECTIVE:      Patient is seen today for a follow up assessment. Sruthi is compliant with scheduled medications. She remains behaviorally in control and has not required emergency medications in the past 24 hours.  Sruthi is agreeable to assessment at bedside today.  When asked about events leading up to hospitalization she reports that she has really been struggling with the loss of her daughter.  She states, \"I just wanted to die and be with her.\"  She states she has been trying to cope by utilizing \"street drugs instead of getting the right help.\"  She reports continued depression and anxiety.  She reports that she slept a bit better with the addition of Seroquel.  She reports her appetite is improving.     Sruthi continues to endorse fleeting suicidal ideation stating the thoughts continue to come and go.  She states, \"I'm always going to want to get into that casket with my daughter.\" She denies homicidal ideation. She continues to feel that she would be unsafe out of the hospital. She denies auditory and visual hallucinations. She denies paranoia.  She reports that she is going to attempt to make phone calls to find treatment for discharge and she does so after assessment.  She denies side effects to medications however is requesting an increase in Seroquel to help with sleep.     Appetite:  [x] Normal/Adequate/Unchanged  [] Increased  [] Decreased      Sleep:       [] Normal/Adequate/Unchanged  [x] Fair  [] Poor      Group Attendance on Unit:   [x] Yes  [] Selectively    [] No    Medication Side Effects:  Patient denies any medication side effects at the time of assessment.         Mental Status Exam  Level of consciousness: Alert and awake.   Appearance: Appropriate attire for setting, seated on bed, with fair  grooming and hygiene.   Behavior/Motor: Approachable, tearful at 
  Daily Progress Note  1/17/2024    Patient Name: Sruthi You    CHIEF COMPLAINT:  Depression with suicidal ideation          SUBJECTIVE:      Patient is seen today for a follow up assessment. Sruthi is compliant with scheduled medications. She remains behaviorally in control and has not required emergency medications in the past 24 hours.  Nursing staff did however message writer today asking for emergency medications to be ordered because Sruthi was making sexually inappropriate comments and was getting amped up earlier on the unit.  Haldol and Ativan oral have been ordered in case she can no longer be redirected, however nursing staff were able to redirect patient earlier and she did not require emergency medications.    Sruthi is agreeable to assessment in unit sensory room today.  She is slightly more elevated and irritable today than she was yesterday.  She reports she is feeling \"really good\" today.  She reports significant improvement in her depression and anxiety.  She states that she slept well however she then backtracks and states, \"I just wish I could fall asleep faster.\"  She then begins to speak about how her sleep is poor and asks for something such as \"Valium, Xanax or Klonopin\" to help her sleep.  This writer informs patient that she will not be ordering any controlled substances.  She states \"well you gotta start something.\"  Patient was educated that she is already on 4 different psychiatric medications and that she does not need anything more.  Reiterated to patient that therapy will be her most useful tool going forward.  Sruthi continues to endorse fleeting suicidal ideation and states she cannot contract for safety outside of the hospital. She is fearful for relapse without safe disposition plan.  She has been making phone calls to treatment facilities and is waiting on a few phone calls back.  At this time due to risk of relapse as well as suicidal ideation, Sruthi continues to 
Pharmacy Med Education Group Note    Date: 1/15/24  Start Time: 1435  End Time: 1510    Number Participants in Group:  9    Goal:  Patient will demonstrate an understanding of the medication’s intended purpose and possible adverse effects  Topic: [place] for Pharmacy Med Ed Group    Discipline Responsible:     OT  AT  [facility].  RT     X Other       Participation Level:     None  Minimal      X Active Listener    X Interactive    Monopolizing         Participation Quality:    X Appropriate  Inappropriate     X       Attentive        Intrusive          Sharing        Resistant          Supportive        Lethargic       Affective:     X Congruent  Incongruent  Blunted  Flat    Constricted  Anxious  Elated  Angry    Labile  Depressed  Other         Cognitive:    X Alert  Oriented PPTP     Concentration   X G  F  P   Attention Span   X G  F  P   Short-Term Memory   X G  F  P   Long-Term Memory  G  F  P   ProblemSolving/  Decision Making  G  F  P   Ability to Process  Information   X G  F  P      Contributing Factors             Delusional             Hallucinating             Flight of Ideas             Other:       Modes of Intervention:    X Education   X Support  Exploration    Clarifying  Problem Solving  Confrontation    Socialization  Limit Setting  Reality Testing    Activity  Movement  Media    Other:            Response to Learning:    X Able to verbalize current knowledge/experience    Able to verbalize/acknowledge new learning    Able to retain information    Capable of insight    Able to change behavior    Progressing to goal    Other:        Comments:       Armando Muller PharmD, BCPS, BCPP  1/15/2024 3:21 PM  [phone]    
Pharmacy Medication History Note      List of current medications patient is taking is complete.    Source of information: [facility] ([phone]), [facility] ([phone]), [facility], PDMP, [facility] dispense report    Changes made to medication list:  Medications removed (include reason, ex. therapy complete or physician discontinued, noncompliance):  Esomeprazole (list clean up), Folic acid (list clean up), Thiamine (list clean up)    Medications flagged for provider review:  none    Medications added/doses adjusted:  none    Other notes (ex. Recent course of antibiotics, Coumadin dosing):  The patient had prescriptions for Trazodone, Hydroxyzine, Hydrochlorothiazide, Buspirone, Mirtazapine, and a two day supply of Augmentin sent to [facility] on 1/3/24 but the patient never picked up these prescriptions.       Current Home Medication List at Time of Admission:  Prior to Admission medications    Medication Sig   traZODone (DESYREL) 100 MG tablet Take 1.5 tablets by mouth nightly as needed for Sleep  Patient not taking: Reported on 1/12/2024   hydrOXYzine HCl (ATARAX) 50 MG tablet Take 1 tablet by mouth every 8 hours as needed for Anxiety  Patient not taking: Reported on 1/12/2024   hydroCHLOROthiazide (HYDRODIURIL) 25 MG tablet Take 1 tablet by mouth daily  Patient not taking: Reported on 1/12/2024   busPIRone (BUSPAR) 10 MG tablet Take 1 tablet by mouth 3 times daily  Patient not taking: Reported on 1/12/2024   mirtazapine (REMERON) 30 MG tablet Take 1 tablet by mouth nightly  Patient not taking: Reported on 1/12/2024         Please let me know if you have any questions about this encounter. Thank you!    Electronically signed by Armando Muller RPH on 1/12/2024 at 10:26 AM     
RT ASSESSMENT TREATMENT GOALS    [x]Pt Goal:  Pt will identify 1-2 positive coping skills by time of discharge.    []Pt Goal:  Pt will identify 1-2 positive aspects of self by time of discharge.    []Pt Goal:  Pt will remain on task/topic for 15-30 minutes during group by time of discharge.    []Pt Goal:  Pt will identify 1-2 aspects of relapse prevention plan by time of discharge.    [x]Pt Goal:  Pt will join in conversation with peers 1-2 times per group by time of discharge.    []Pt Goal:  Pt will identify 1-2 new leisure interests by time of discharge.    []Pt Goal:  Pt will not voice any delusional content by time of discharge.   
mouth daily  Patient not taking: Reported on 1/12/2024 1/3/24   Butch Mixon DO   busPIRone (BUSPAR) 10 MG tablet Take 1 tablet by mouth 3 times daily  Patient not taking: Reported on 1/12/2024 1/3/24 2/2/24  Butch Mixon DO   mirtazapine (REMERON) 30 MG tablet Take 1 tablet by mouth nightly  Patient not taking: Reported on 1/12/2024 1/3/24   Butch Mixon DO        Allergies:     Bactrim [sulfamethoxazole-trimethoprim]    Social History:     Tobacco:    reports that she has been smoking cigarettes. She has a 11.5 pack-year smoking history. She has never used smokeless tobacco.  Alcohol:      reports current alcohol use.  Drug Use:  reports current drug use. Drugs: Marijuana (Weed) and Cocaine.    Family History:     Family History   Problem Relation Age of Onset    High Blood Pressure Mother     Heart Disease Father     Stroke Maternal Grandmother     High Blood Pressure Maternal Grandfather     Heart Disease Paternal Grandmother     Heart Disease Paternal Grandfather        Review of Systems:     Positive and Negative as described in HPI.    CONSTITUTIONAL:  negative for fevers, chills, sweats, fatigue, weight loss  HEENT:  negative for vision, hearing changes, runny nose, throat pain  RESPIRATORY:  negative for shortness of breath, cough, congestion, wheezing.  CARDIOVASCULAR:  negative for chest pain, palpitations.  GASTROINTESTINAL:  negative for nausea, vomiting, diarrhea, constipation, change in bowel habits, abdominal pain   GENITOURINARY:  negative for difficulty of urination, burning with urination, frequency   INTEGUMENT:  negative for rash, skin lesions, easy bruising   HEMATOLOGIC/LYMPHATIC:  negative for swelling/edema   ALLERGIC/IMMUNOLOGIC:  negative for urticaria , itching  ENDOCRINE:  negative increase in drinking, increase in urination, hot or cold intolerance  MUSCULOSKELETAL:  negative joint pains, muscle aches, swelling of joints  NEUROLOGICAL:  negative 
for headaches, dizziness, lightheadedness, numbness, pain, tingling extremities      Physical Exam:     BP (!) 136/96   Pulse 91   Temp 97 °F (36.1 °C) (Temporal)   Resp 14   Ht 1.626 m (5' 4\")   Wt 59 kg (130 lb)   SpO2 100%   BMI 22.31 kg/m²   Temp (24hrs), Av °F (36.1 °C), Min:97 °F (36.1 °C), Max:97 °F (36.1 °C)    No results for input(s): \"POCGLU\" in the last 72 hours.  No intake or output data in the 24 hours ending 24 1636    General Appearance:  alert, well appearing, and in no acute distress  Mental status: oriented to person, place, and time   Head:  normocephalic, atraumatic.  Neck: supple, no carotid bruits, thyroid not palpable  Lungs: Bilateral equal air entry, clear to ausculation, no wheezing, rales or rhonchi, normal effort  Cardiovascular: normal rate, regular rhythm, no murmur, gallop, rub.  Abdomen: Soft, nontender, nondistended, normal bowel sounds, no hepatomegaly or splenomegaly  Neurologic: There are no new focal motor or sensory deficits, moving all extremities spontaneously   Skin: No gross lesions, rashes, bruising or bleeding on exposed skin area  Extremities:  peripheral pulses palpable, no pedal edema or calf pain with palpation    Investigations:      Laboratory Testing:  Recent Results (from the past 24 hour(s))   CBC with Auto Differential    Collection Time: 24  6:19 AM   Result Value Ref Range    WBC 3.3 (L) 3.5 - 11.0 k/uL    RBC 4.04 4.0 - 5.2 m/uL    Hemoglobin 11.8 (L) 12.0 - 16.0 g/dL    Hematocrit 35.6 (L) 36 - 46 %    MCV 88.1 80 - 100 fL    MCH 29.2 26 - 34 pg    MCHC 33.1 31 - 37 g/dL    RDW 14.8 11.5 - 14.9 %    Platelets 159 150 - 450 k/uL    MPV 10.2 6.0 - 12.0 fL    Neutrophils % 40 36 - 66 %    Lymphocytes % 42 24 - 44 %    Monocytes % 11 (H) 1 - 7 %    Eosinophils % 5 (H) 0 - 4 %    Basophils % 2 0 - 2 %    Neutrophils Absolute 1.32 1.3 - 9.1 k/uL    Lymphocytes Absolute 1.38 1.0 - 4.8 k/uL    Monocytes Absolute 0.36 0.1 - 1.3 k/uL    
frequency of PRN medications.  Encourage participation in groups and milieu.  Attempt to develop insight.  Psycho-education conducted.  Medication management and discharge plan per attending physician.  Follow-up daily while inpatient.     Patient continues to be monitored in the inpatient psychiatric facility at [facility] for safety and stabilization. Patient continues to need, on a daily basis, active treatment furnished directly by or requiring the supervision of inpatient psychiatric personnel.    Electronically signed by EFRAIN Banegas CNP on 1/13/2024 at 4:34 PM    **This report has been created using voice recognition software. It may contain minor errors which are inherent in voice recognition technology.**     I independently saw and evaluated the patient.  I reviewed the nurse practitioners documentation above.  Principle diagnosis we are treating for is Severe episode of recurrent major depressive disorder, with psychotic features (HCC). Any additional comments or changes to the nurse practitioners documentation are stated below otherwise agree with assessment.  Plan will be as follows:  Patient denying side effects to medication.  Reports poor sleep.  After discussion of risk benefits and alternatives agreeable to schedule trazodone 100 mg at bedtime, and increase Remeron to 15 mg p.o. nightly  PLAN  Patient s symptoms   show no change  Med adjustments as above  Attempt to develop insight  Psycho-education conducted.  Supportive Therapy conducted.  Probable discharge is undetermined at this time  Follow-up daily while on inpatient unit

## 2024-01-18 VITALS
RESPIRATION RATE: 14 BRPM | WEIGHT: 130 LBS | HEART RATE: 72 BPM | HEIGHT: 64 IN | OXYGEN SATURATION: 100 % | BODY MASS INDEX: 22.2 KG/M2 | TEMPERATURE: 97.2 F | SYSTOLIC BLOOD PRESSURE: 110 MMHG | DIASTOLIC BLOOD PRESSURE: 73 MMHG

## 2024-01-18 PROCEDURE — 6370000000 HC RX 637 (ALT 250 FOR IP): Performed by: NURSE PRACTITIONER

## 2024-01-18 PROCEDURE — 6370000000 HC RX 637 (ALT 250 FOR IP): Performed by: PSYCHIATRY & NEUROLOGY

## 2024-01-18 RX ORDER — MIRTAZAPINE 15 MG/1
15 TABLET, FILM COATED ORAL NIGHTLY
Qty: 30 TABLET | Refills: 0 | Status: SHIPPED | OUTPATIENT
Start: 2024-01-18

## 2024-01-18 RX ORDER — QUETIAPINE FUMARATE 100 MG/1
100 TABLET, FILM COATED ORAL NIGHTLY
Qty: 30 TABLET | Refills: 0 | Status: SHIPPED | OUTPATIENT
Start: 2024-01-18

## 2024-01-18 RX ORDER — TRAZODONE HYDROCHLORIDE 150 MG/1
150 TABLET ORAL NIGHTLY
Qty: 30 TABLET | Refills: 0 | Status: SHIPPED | OUTPATIENT
Start: 2024-01-18

## 2024-01-18 RX ORDER — HYDROCORTISONE 25 MG/G
CREAM TOPICAL
Qty: 28 G | Refills: 0 | Status: SHIPPED | OUTPATIENT
Start: 2024-01-18

## 2024-01-18 RX ORDER — BUSPIRONE HYDROCHLORIDE 10 MG/1
10 TABLET ORAL 3 TIMES DAILY
Qty: 90 TABLET | Refills: 0 | Status: SHIPPED | OUTPATIENT
Start: 2024-01-18 | End: 2024-02-17

## 2024-01-18 RX ORDER — HYDROXYZINE 50 MG/1
50 TABLET, FILM COATED ORAL 3 TIMES DAILY PRN
Qty: 30 TABLET | Refills: 0 | Status: SHIPPED | OUTPATIENT
Start: 2024-01-18 | End: 2024-01-28

## 2024-01-18 RX ADMIN — HYDROCORTISONE: 25 CREAM TOPICAL at 09:04

## 2024-01-18 RX ADMIN — BUSPIRONE HYDROCHLORIDE 10 MG: 10 TABLET ORAL at 09:04

## 2024-01-18 RX ADMIN — IBUPROFEN 400 MG: 400 TABLET, FILM COATED ORAL at 09:04

## 2024-01-18 RX ADMIN — NICOTINE POLACRILEX 2 MG: 2 LOZENGE ORAL at 10:01

## 2024-01-18 RX ADMIN — HYDROXYZINE HYDROCHLORIDE 50 MG: 50 TABLET, FILM COATED ORAL at 09:04

## 2024-01-18 ASSESSMENT — PAIN SCALES - GENERAL: PAINLEVEL_OUTOF10: 10

## 2024-01-18 ASSESSMENT — PAIN DESCRIPTION - LOCATION: LOCATION: BACK;NECK

## 2024-01-18 ASSESSMENT — PAIN DESCRIPTION - DESCRIPTORS: DESCRIPTORS: ACHING;SORE

## 2024-01-18 ASSESSMENT — PAIN - FUNCTIONAL ASSESSMENT: PAIN_FUNCTIONAL_ASSESSMENT: PREVENTS OR INTERFERES SOME ACTIVE ACTIVITIES AND ADLS

## 2024-01-18 NOTE — PLAN OF CARE
Problem: Self Harm/Suicidality  Goal: Will have no self-injury during hospital stay  Description: INTERVENTIONS:  1.  Ensure constant observer at bedside with Q15M safety checks  2.  Maintain a safe environment  3.  Secure patient belongings  4.  Ensure family/visitors adhere to safety recommendations  5.  Ensure safety tray has been added to patient's diet order  6.  Every shift and PRN: Re-assess suicidal risk via Frequent Screener    1/17/2024 2237 by Sarah Stone  Outcome: Progressing  Flowsheets (Taken 1/17/2024 2237)  Will have no self-injury during hospital stay: Maintain a safe environment     Problem: Depression  Goal: Will be euthymic at discharge  Description: INTERVENTIONS:  1. Administer medication as ordered  2. Provide emotional support via 1:1 interaction with staff  3. Encourage involvement in milieu/groups/activities  4. Monitor for social isolation  1/17/2024 2237 by Sarah Stone  Outcome: Progressing   Patient denies depression but exhibits generalized anxiety. Patient was brightened in the day room social with select peers. Patient cooperative and friendly upon assessment. Patient denies suicide idealization at this time, patient agrees to seek out staff for needs.

## 2024-01-18 NOTE — BH NOTE
Patient given tobacco quitline number 30524648642 at this time, refusing to call at this time, states \" I just dont want to quit now\"- patient given information as to the dangers of long term tobacco use. Continue to reinforce the importance of tobacco cessation.

## 2024-01-18 NOTE — TRANSITION OF CARE
Morphology HYPOCHROMIA PRESENT     Morphology ANISOCYTOSIS PRESENT        Immunizations administered during this encounter:   Immunization History   Administered Date(s) Administered    TDaP, ADACEL (age 10y-64y), BOOSTRIX (age 10y+), IM, 0.5mL 06/16/2021     None of the above/Not documented/Unable to determine from medical record documentation    Screening for Metabolic Disorders for Patients on Antipsychotic Medications  (Data obtained from the EMR)    Estimated Body Mass Index  Body mass index is 22.31 kg/m².      Vital Signs/Blood Pressure  /73   Pulse 72   Temp 97.2 °F (36.2 °C)   Resp 14   Ht 1.626 m (5' 4\")   Wt 59 kg (130 lb)   SpO2 100%   BMI 22.31 kg/m²      Fasting Blood Glucose or Hemoglobin A1c  No results found for: \"GLU\", \"GLUCPOC\"    Hemoglobin A1C   Date Value Ref Range Status   02/10/2023 5.6 4.0 - 6.0 % Final       Discharge Diagnosis: Major Depressive Disorder    Discharge Plan/Destination: follow up at [facility] for rehab assessment    Discharge Medication List and Instructions:      Medication List        START taking these medications      hydrocortisone 2.5 % Crea rectal cream  Commonly known as: ANUSOL-HC  Apply to affected area twice daily  Notes to patient: For homorrhoids     QUEtiapine 100 MG tablet  Commonly known as: SEROQUEL  Take 1 tablet by mouth nightly  Notes to patient: For mood/clears thoughts            CHANGE how you take these medications      hydrOXYzine HCl 50 MG tablet  Commonly known as: ATARAX  Take 1 tablet by mouth 3 times daily as needed for Anxiety  What changed: when to take this  Notes to patient: For anxiety     mirtazapine 15 MG tablet  Commonly known as: REMERON  Take 1 tablet by mouth nightly  What changed:   medication strength  how much to take  Notes to patient: For mood/sleep     traZODone 150 MG tablet  Commonly known as: DESYREL  Take 1 tablet by mouth nightly  What changed:   medication strength  when to take this  reasons to take this  Notes

## 2024-01-18 NOTE — DISCHARGE SUMMARY
Provider Discharge Summary     Patient ID:  Sruthi You  [id number]  48 y.o.  1975    Admit date: 1/12/2024    Discharge date and time: 1/18/2024  6:05 PM     Admitting Physician: Orlando Savage MD     Discharge Physician: Orlando Savage MD    Admission Diagnoses: Depression with suicidal ideation [F32.A, R45.851]    Discharge Diagnoses:      Severe episode of recurrent major depressive disorder, with psychotic features (HCC)     Patient Active Problem List   Diagnosis Code    Essential hypertension I10    Smoking F17.200    Other chronic pain G89.29    Mass on back R22.2    Depression with suicidal ideation F32.A, R45.851    Major depressive disorder, recurrent severe without psychotic features (HCC) F33.2    Cannabis abuse F12.10    Cocaine use disorder (HCC) F14.10    Severe episode of recurrent major depressive disorder, with psychotic features (HCC) F33.3    Suicidal ideation R45.851    Prolonged grief disorder F43.81        Admission Condition: poor    Discharged Condition: stable    Indication for Admission: threat to self    History of Present Illnes (present tense wording is of findings from admission exam and are not necessarily indicative of current findings):   Sruthi You is a 48 y.o. female who has a past medical history of fibromyalgia, hyperlipidemia, hypertension, hypothyroidism, lupus, thyroid disease.  Per previous documentation the patient presented to the ED endorsing suicidal ideation with thoughts of walking into traffic.  She admits to not feeling well and her depression increasing over the past few days.  She states that she wishes she was dead and not alive.  She admits to prolonged grief over the past year since her daughter passed away in November 2022.  Was [facility] recently for a few weeks with left she did not feel they were giving her the help she needed.  She is not linked with any outpatient services at the time and not on any meds.     Upon presentation however was

## 2024-01-18 NOTE — BH NOTE
[facility]  Discharge Note    Pt discharged with followings belongings:   Dental Appliances: None  Vision - Corrective Lenses: None  Hearing Aid: None  Jewelry: None  Body Piercings Removed: N/A  Clothing: Footwear, Jacket/Coat, Undergarments, Socks, Pants  Other Valuables: Keys, Lighter/Matches   Valuables returned to patient. Patient educated on aftercare instructions: yes, [facility] appointment and medication education. Information faxed to [facility] by staff  at 10:44 AM .Patient verbalize understanding of AVS:  yes. Patient left in cab and transported to [facility] for intake appointment.     Status EXAM upon discharge:  Mental Status and Behavioral Exam  Normal: No  Level of Assistance: Independent/Self  Facial Expression: Brightened  Affect: Blunt  Level of Consciousness: Alert  Frequency of Checks: 4 times per hour, close  Mood:Normal: No  Mood: Anxious, Irritable  Motor Activity:Normal: Yes  Eye Contact: Good  Observed Behavior: Cooperative  Sexual Misconduct History: Current - no  Preception: Sylvia to person, Sylvia to time, Sylvia to place, Sylvia to situation  Attention:Normal: Yes  Attention: Distractible  Thought Processes: Perseveration  Thought Content:Normal: No  Thought Content: Preoccupations  Depression Symptoms: No problems reported or observed.  Anxiety Symptoms: Generalized  Maria Teresa Symptoms: No problems reported or observed.  Hallucinations: None  Delusions: No  Memory:Normal: Yes  Memory: Poor recent  Insight and Judgment: No  Insight and Judgment: Poor judgment, Poor insight    Tobacco Screening:  Practical Counseling, on admission, jayme X, if applicable and completed (first 3 are required if patient doesn't refuse):            ( ) Recognizing danger situations (included triggers and roadblocks)                    ( ) Coping skills (new ways to manage stress,relaxation techniques, changing routine, distraction)                                                           ( ) Basic

## 2024-01-18 NOTE — CARE COORDINATION
Name: Sruthi You    : 1975    Auth number: [id number]     Discharge Date: 2024    Destination: [facility] for intake appt    *If you have any specific discharge questions, please contact the assigned /discharge planner: Sruthi ([phone]) or Nan ([phone])      Discharge Medications:      Medication List        START taking these medications      hydrocortisone 2.5 % Crea rectal cream  Commonly known as: ANUSOL-HC  Apply to affected area twice daily  Notes to patient: For homorrhoids     QUEtiapine 100 MG tablet  Commonly known as: SEROQUEL  Take 1 tablet by mouth nightly  Notes to patient: For mood/clears thoughts            CHANGE how you take these medications      hydrOXYzine HCl 50 MG tablet  Commonly known as: ATARAX  Take 1 tablet by mouth 3 times daily as needed for Anxiety  What changed: when to take this  Notes to patient: For anxiety     mirtazapine 15 MG tablet  Commonly known as: REMERON  Take 1 tablet by mouth nightly  What changed:   medication strength  how much to take  Notes to patient: For mood/sleep     traZODone 150 MG tablet  Commonly known as: DESYREL  Take 1 tablet by mouth nightly  What changed:   medication strength  when to take this  reasons to take this  Notes to patient: For sleep            CONTINUE taking these medications      busPIRone 10 MG tablet  Commonly known as: BUSPAR  Take 1 tablet by mouth 3 times daily  Notes to patient: Anxiety            STOP taking these medications      hydroCHLOROthiazide 25 MG tablet  Commonly known as: HYDRODIURIL               Where to Get Your Medications        These [facility] [address] [facility] P [phone] - F [phone]  [address] [place] [address] [facility] [address]      Phone: [phone]   busPIRone 10 MG tablet  hydrocortisone 2.5 % Crea rectal cream  hydrOXYzine HCl 50 MG tablet  mirtazapine 15 MG tablet  QUEtiapine 100 MG

## 2024-01-31 ENCOUNTER — HOSPITAL ENCOUNTER (EMERGENCY)
Age: 49
Discharge: ANOTHER HOSPITAL | DRG: 751 | End: 2024-02-01
Attending: EMERGENCY MEDICINE
Payer: MEDICAID

## 2024-01-31 DIAGNOSIS — R45.851 SUICIDAL IDEATIONS: Primary | ICD-10-CM

## 2024-01-31 LAB
ALBUMIN SERPL-MCNC: 4.5 G/DL (ref 3.5–5.2)
ALBUMIN/GLOB SERPL: 1.3 {RATIO} (ref 1–2.5)
ALP SERPL-CCNC: 77 U/L (ref 35–104)
ALT SERPL-CCNC: 34 U/L (ref 5–33)
AMPHET UR QL SCN: NEGATIVE
ANION GAP SERPL CALCULATED.3IONS-SCNC: 11 MMOL/L (ref 9–17)
APAP SERPL-MCNC: <5 UG/ML (ref 10–30)
AST SERPL-CCNC: 41 U/L
BACTERIA URNS QL MICRO: ABNORMAL
BARBITURATES UR QL SCN: NEGATIVE
BASOPHILS # BLD: 0.05 K/UL (ref 0–0.2)
BASOPHILS NFR BLD: 1 % (ref 0–2)
BENZODIAZ UR QL: NEGATIVE
BILIRUB SERPL-MCNC: 0.4 MG/DL (ref 0.3–1.2)
BILIRUB UR QL STRIP: NEGATIVE
BUN SERPL-MCNC: 16 MG/DL (ref 6–20)
CALCIUM SERPL-MCNC: 9.6 MG/DL (ref 8.6–10.4)
CANNABINOIDS UR QL SCN: POSITIVE
CASTS #/AREA URNS LPF: ABNORMAL /LPF (ref 0–8)
CHLORIDE SERPL-SCNC: 103 MMOL/L (ref 98–107)
CLARITY UR: CLEAR
CO2 SERPL-SCNC: 24 MMOL/L (ref 20–31)
COCAINE UR QL SCN: POSITIVE
COLOR UR: YELLOW
CREAT SERPL-MCNC: 0.7 MG/DL (ref 0.5–0.9)
EOSINOPHIL # BLD: 0.17 K/UL (ref 0–0.44)
EOSINOPHILS RELATIVE PERCENT: 3 % (ref 1–4)
EPI CELLS #/AREA URNS HPF: ABNORMAL /HPF (ref 0–5)
ERYTHROCYTE [DISTWIDTH] IN BLOOD BY AUTOMATED COUNT: 14.8 % (ref 11.8–14.4)
ETHANOL PERCENT: <0.01 %
ETHANOLAMINE SERPL-MCNC: <10 MG/DL
FENTANYL UR QL: NEGATIVE
GFR SERPL CREATININE-BSD FRML MDRD: >60 ML/MIN/1.73M2
GLUCOSE SERPL-MCNC: 86 MG/DL (ref 70–99)
GLUCOSE UR STRIP-MCNC: NEGATIVE MG/DL
HCG SERPL QL: NEGATIVE
HCT VFR BLD AUTO: 41.6 % (ref 36.3–47.1)
HGB BLD-MCNC: 13.5 G/DL (ref 11.9–15.1)
HGB UR QL STRIP.AUTO: NEGATIVE
IMM GRANULOCYTES # BLD AUTO: <0.03 K/UL (ref 0–0.3)
IMM GRANULOCYTES NFR BLD: 0 %
KETONES UR STRIP-MCNC: ABNORMAL MG/DL
LEUKOCYTE ESTERASE UR QL STRIP: ABNORMAL
LYMPHOCYTES NFR BLD: 1.28 K/UL (ref 1.1–3.7)
LYMPHOCYTES RELATIVE PERCENT: 19 % (ref 24–43)
MCH RBC QN AUTO: 28.3 PG (ref 25.2–33.5)
MCHC RBC AUTO-ENTMCNC: 32.5 G/DL (ref 28.4–34.8)
MCV RBC AUTO: 87.2 FL (ref 82.6–102.9)
METHADONE UR QL: NEGATIVE
MONOCYTES NFR BLD: 0.43 K/UL (ref 0.1–1.2)
MONOCYTES NFR BLD: 6 % (ref 3–12)
NEUTROPHILS NFR BLD: 71 % (ref 36–65)
NEUTS SEG NFR BLD: 4.75 K/UL (ref 1.5–8.1)
NITRITE UR QL STRIP: NEGATIVE
NRBC BLD-RTO: 0 PER 100 WBC
OPIATES UR QL SCN: NEGATIVE
OXYCODONE UR QL SCN: NEGATIVE
PCP UR QL SCN: NEGATIVE
PH UR STRIP: 6 [PH] (ref 5–8)
PLATELET # BLD AUTO: ABNORMAL K/UL (ref 138–453)
PLATELET, FLUORESCENCE: ABNORMAL K/UL (ref 138–453)
POTASSIUM SERPL-SCNC: 3.8 MMOL/L (ref 3.7–5.3)
PROT SERPL-MCNC: 8 G/DL (ref 6.4–8.3)
PROT UR STRIP-MCNC: NEGATIVE MG/DL
RBC # BLD AUTO: 4.77 M/UL (ref 3.95–5.11)
RBC # BLD: ABNORMAL 10*6/UL
RBC #/AREA URNS HPF: ABNORMAL /HPF (ref 0–4)
SALICYLATES SERPL-MCNC: <1 MG/DL (ref 3–10)
SODIUM SERPL-SCNC: 138 MMOL/L (ref 135–144)
SP GR UR STRIP: 1.01 (ref 1–1.03)
TEST INFORMATION: ABNORMAL
TOXIC TRICYCLIC SC,BLOOD: NEGATIVE
UROBILINOGEN UR STRIP-ACNC: NORMAL EU/DL (ref 0–1)
WBC #/AREA URNS HPF: ABNORMAL /HPF (ref 0–5)
WBC OTHER # BLD: 6.7 K/UL (ref 3.5–11.3)

## 2024-01-31 PROCEDURE — G0480 DRUG TEST DEF 1-7 CLASSES: HCPCS

## 2024-01-31 PROCEDURE — 6370000000 HC RX 637 (ALT 250 FOR IP): Performed by: STUDENT IN AN ORGANIZED HEALTH CARE EDUCATION/TRAINING PROGRAM

## 2024-01-31 PROCEDURE — 80307 DRUG TEST PRSMV CHEM ANLYZR: CPT

## 2024-01-31 PROCEDURE — 6360000002 HC RX W HCPCS

## 2024-01-31 PROCEDURE — 84703 CHORIONIC GONADOTROPIN ASSAY: CPT

## 2024-01-31 PROCEDURE — 80179 DRUG ASSAY SALICYLATE: CPT

## 2024-01-31 PROCEDURE — 96372 THER/PROPH/DIAG INJ SC/IM: CPT

## 2024-01-31 PROCEDURE — 99285 EMERGENCY DEPT VISIT HI MDM: CPT

## 2024-01-31 PROCEDURE — 81001 URINALYSIS AUTO W/SCOPE: CPT

## 2024-01-31 PROCEDURE — 85025 COMPLETE CBC W/AUTO DIFF WBC: CPT

## 2024-01-31 PROCEDURE — 80053 COMPREHEN METABOLIC PANEL: CPT

## 2024-01-31 PROCEDURE — 85055 RETICULATED PLATELET ASSAY: CPT

## 2024-01-31 PROCEDURE — 80143 DRUG ASSAY ACETAMINOPHEN: CPT

## 2024-01-31 RX ORDER — HALOPERIDOL 5 MG/ML
5 INJECTION INTRAMUSCULAR ONCE
Status: COMPLETED | OUTPATIENT
Start: 2024-01-31 | End: 2024-01-31

## 2024-01-31 RX ORDER — DIPHENHYDRAMINE HYDROCHLORIDE 50 MG/ML
INJECTION INTRAMUSCULAR; INTRAVENOUS
Status: DISCONTINUED
Start: 2024-01-31 | End: 2024-01-31

## 2024-01-31 RX ORDER — CEPHALEXIN 500 MG/1
500 CAPSULE ORAL ONCE
Status: COMPLETED | OUTPATIENT
Start: 2024-01-31 | End: 2024-01-31

## 2024-01-31 RX ORDER — HALOPERIDOL 5 MG/ML
INJECTION INTRAMUSCULAR
Status: COMPLETED
Start: 2024-01-31 | End: 2024-01-31

## 2024-01-31 RX ORDER — BUSPIRONE HYDROCHLORIDE 10 MG/1
10 TABLET ORAL 3 TIMES DAILY
Status: DISCONTINUED | OUTPATIENT
Start: 2024-01-31 | End: 2024-02-01 | Stop reason: HOSPADM

## 2024-01-31 RX ORDER — HYDROXYZINE HYDROCHLORIDE 25 MG/1
25 TABLET, FILM COATED ORAL ONCE
Status: DISCONTINUED | OUTPATIENT
Start: 2024-01-31 | End: 2024-02-01 | Stop reason: HOSPADM

## 2024-01-31 RX ORDER — DIPHENHYDRAMINE HYDROCHLORIDE 50 MG/ML
50 INJECTION INTRAMUSCULAR; INTRAVENOUS ONCE
Status: COMPLETED | OUTPATIENT
Start: 2024-01-31 | End: 2024-01-31

## 2024-01-31 RX ORDER — DIPHENHYDRAMINE HYDROCHLORIDE 50 MG/ML
50 INJECTION INTRAMUSCULAR; INTRAVENOUS EVERY 6 HOURS PRN
Status: DISCONTINUED | OUTPATIENT
Start: 2024-01-31 | End: 2024-02-01 | Stop reason: HOSPADM

## 2024-01-31 RX ADMIN — DIPHENHYDRAMINE HYDROCHLORIDE 50 MG: 50 INJECTION INTRAMUSCULAR; INTRAVENOUS at 19:58

## 2024-01-31 RX ADMIN — HALOPERIDOL 5 MG: 5 INJECTION INTRAMUSCULAR at 19:58

## 2024-01-31 RX ADMIN — HALOPERIDOL LACTATE 5 MG: 5 INJECTION, SOLUTION INTRAMUSCULAR at 19:58

## 2024-01-31 RX ADMIN — CEPHALEXIN 500 MG: 500 CAPSULE ORAL at 16:17

## 2024-01-31 ASSESSMENT — ENCOUNTER SYMPTOMS
SHORTNESS OF BREATH: 0
ABDOMINAL PAIN: 0

## 2024-01-31 NOTE — ED NOTES
Patient accepted to the [facility] by Dr. Montague for depression with SI.  Voluntary faxed, await bed placement.  JACINTO Fernandez

## 2024-01-31 NOTE — ED NOTES
ED SW met with patient at bedside.  Patient states she has been feeling suicidal since her daughter  a year ago.  Patient states she is diagnosed with depression and PTSD.  Patient states she was just discharged from [facility] yesterday and lives with her mother.  Patient states she has a plan to walk in traffic and states she did attempts this in 2023, but was unharmed.   Patient denies HI or legal issues.  Patient does admit to occasional THC and crack cocaine usage, but does not believe this is a problem for her.  Patient feels she needs to be inpatient despite being in the [facility] from 2024 to 2024.  Patient was in [facility] prior to that for an inpatient stay.  SW discussed with patient the limitations with continuing to go for inpatient treatment and asked if she goes to outpatient counseling or support groups and she does not.  SW encouraged this along with med compliance.  SW will consult [facility] for admission when labs complete and med clearance documented in the chart.  JACINTO Fernandez

## 2024-01-31 NOTE — ED PROVIDER NOTES
[facility] ED     Emergency Department     Faculty Attestation    I performed a history and physical examination of the patient and discussed management with the resident. I reviewed the resident’s note and agree with the documented findings and plan of care. Any areas of disagreement are noted on the chart. I was personally present for the key portions of any procedures. I have documented in the chart those procedures where I was not present during the key portions. I have reviewed the emergency nurses triage note. I agree with the chief complaint, past medical history, past surgical history, allergies, medications, social and family history as documented unless otherwise noted below. For Physician Assistant/ Nurse Practitioner cases/documentation I have personally evaluated this patient and have completed at least one if not all key elements of the E/M (history, physical exam, and MDM). Additional findings are as noted.    Note Started: 2:09 PM EST    Suicidal hearing voices telling her to kill herself but no specific plan.  States she was at [facility] yesterday discharged.  States she just does not feel safe.  No urinary complaints but asked to be \"checked for UTI.\"  Well-appearing on exam nontoxic normal pupils normal speech normal mental status.  Will order labs for psychiatry as well as urinalysis,  to see      Critical Care     none    Magno Edwards MD, FACEP, FAAEM  Attending Emergency  Physician           Magno Edwards MD  01/31/24 1417

## 2024-01-31 NOTE — ED PROVIDER NOTES
[facility]  FACULTY HANDOFF       Handoff taken on the following patient from prior Attending Physician:  Pt Name: Sruthi You  PCP:  No primary care provider on file.    Attestation  I was available and discussed any additional care issues that arose and coordinated the management plans with the resident(s) caring for the patient during my duty period. Any areas of disagreement with resident's documentation of care or procedures are noted on the chart. I was personally present for the key portions of any/all procedures during my duty period. I have documented in the chart those procedures where I was not present during the key portions.           Mikey Jaimes MD  01/31/24 5424

## 2024-01-31 NOTE — ED NOTES
Pt to ED for suicidal ideations x1 day. Pt states her daughter  a few months ago and she is depressed and feels like she does not have a reason to keep on living. Pt states her plan is to walk into traffic. Pt states she smoke mariajuana and did cocaine yesterday. Pt denies auditory or visual hallucinations. Denies homicidal ideations. Pt is tearful during exam and states she wants to be admitted to [facility] so she does not hurt herself. Patient alert and oriented x4, talking in complete sentences. Respirations even and unlabored. Pt changed into paper scrubs. Suicide precautions implemented. Sitter at bedside

## 2024-01-31 NOTE — ED PROVIDER NOTES
Cocaine     Comment: Deysi-puffs    Sexual activity: Yes   Other Topics Concern    Not on file   Social History Narrative    Not on file     Social Determinants of Health     Financial Resource Strain: Low Risk  (11/16/2021)    Overall Financial Resource Strain (CARDIA)     Difficulty of Paying Living Expenses: Not very hard   Food Insecurity: Patient Declined (1/12/2024)    Hunger Vital Sign     Worried About Running Out of Food in the Last Year: Patient declined     Ran Out of Food in the Last Year: Patient declined   Transportation Needs: Patient Declined (1/12/2024)    PRAPARE - Transportation     Lack of Transportation (Medical): Patient declined     Lack of Transportation (Non-Medical): Patient declined   Physical Activity: Not on file   Stress: Not on file   Social Connections: Not on file   Intimate Partner Violence: Not on file   Housing Stability: Patient Declined (1/12/2024)    Housing Stability Vital Sign     Unable to Pay for Housing in the Last Year: Patient declined     Number of Places Lived in the Last Year: Not on file     Unstable Housing in the Last Year: Patient declined       Family History   Problem Relation Age of Onset    High Blood Pressure Mother     Heart Disease Father     Stroke Maternal Grandmother     High Blood Pressure Maternal Grandfather     Heart Disease Paternal Grandmother     Heart Disease Paternal Grandfather        Allergies:  Bactrim [sulfamethoxazole-trimethoprim]    Home Medications:  Prior to Admission medications    Medication Sig Start Date End Date Taking? Authorizing Provider   busPIRone (BUSPAR) 10 MG tablet Take 1 tablet by mouth 3 times daily 1/18/24 2/17/24  Orlando Savage MD   hydrocortisone (ANUSOL-HC) 2.5 % CREA rectal cream Apply to affected area twice daily 1/18/24   Orlando Savage MD   mirtazapine (REMERON) 15 MG tablet Take 1 tablet by mouth nightly 1/18/24   Orlando Savage MD   QUEtiapine (SEROQUEL) 100 MG tablet Take 1 tablet by mouth nightly  brought to ED due to concern she would walk into traffic. + EOTH, marijuana and crack use. Recent inpatient evaluation. Will obtain screening labs and urinalysis as patient requested testing for UTI for attending physician. Inpatient vs outpatient treatment after evaluated by SW and case presented to [facility].    Amount and/or Complexity of Data Reviewed  Labs: ordered. Decision-making details documented in ED Course.    Risk  Prescription drug management.        EKG  none    All EKG's are interpreted by the Emergency Department Physician who either signs or Co-signs this chart in the absence of a cardiologist.    EMERGENCY DEPARTMENT COURSE:    ED Course as of 01/31/24 2249 Wed Jan 31, 2024   1544 Leukocyte Esterase, Urine(!): LARGE  Will treat with keflex. Medically clear for discharge.  [CP]   1857 EMTALA completed, awaiting transport [CP]   1928 Requesting home medications will give atarax and buspar [CP]   2000 Called by nursing staff to come to bedside. Patient had earlier requested to go outside to smoke a cigarette. Offered patient a nicotine patch as we could not allow her to go outside due to SI. Patient started yelling saying she didn't want \"no damn patch.\" I informed patient I would be happy to order a patch if she should change her mind.     Patient becoming aggressive and threatening to leave if she could not go outside. Patient making threats against medical staff and calling staff inappropriate names. Administed 5mg Haldol and 50mg Benadryl with MPD at bedside. Patient placed on Pink slip due to threats against staff and threats to leave the facility with concern for endangering her own life.  [CP]   2009 Patient with suicidal ideation, plan to walk into traffic. Cooperative until recent, violence and security called. Medicated haldol, benadryl. Pink slipped, awaiting transfer to [facility].  [HO]      ED Course User Index  [CP] Ferdinand Sloan MD  [HO] Marci Renee MD

## 2024-01-31 NOTE — ED NOTES
[] [facility]    [] [facility]    [x]  [facility]    SUICIDE RISK ASSESSMENT      Y  N     [] [] In the past two weeks have you had thoughts of hurting yourself in any way?    [] [] In the past two weeks have you had thoughts that you would be better off dead?   [] [] Have you made a suicide attempt in the past two months?   [] [] Do you have a plan for hurting yourself or suicide?   [] [] Presence of hallucinations/voices related to hurting himself or herself or someone else.    SUICIDE/SECURITY WATCH PRECAUTION CHECKLIST     Orders    [x]  Suicide/Security Watch Precautions initiated as checked below:   1/31/24 2:03 PM EST 29/H29A    [x] Notified physician:  Magno Edwards MD  1/31/24 2:03 PM EST    [x] Orders obtained as appropriate:     [] 1:1 Observer     [] Psych Consult     [] Psych Consult    Name:  Date:  Time:    [x] 1:1 Observer, Notified by:  Sarah Schmid RN    Contact Nurse Supervisor    [] Remove all personal clothes from room and place in snap/paper gown/pants.  Slipper only    [] Remove all personal belongings from room and secured away from patient.    Documentation    [x] Initiate Suicide/Security Watch Precaution Flow Sheet    [x] Initiate individualized Care Plan/Problem    [x] Document why precautions initiated on flow sheet (Initiate Nursing Care Plan/Problem)    [x] 1:1 Observer in place; instructions provided.  Suicide precautions require observer be within arms length.    [x] Nurse-Observer Communication Hand-off initiated by RN, reviewed with Observer.  Subsequently used as Hand Off between Observers.    [x] Initiate every 15 minute observations per observer as delegated by the RN.    [x] Initiate RN assessment and documentation    Environmental Scan  Search Criteria and Process: OPTIONAL, see Search Policy    [] Reason for search:    [] Nursing in presence of second person to search patient    [] Patient notified of reason for body assessment and belongings  Depression  [] Suicide attempt      [] Low self-esteem  [] Hallucinations      [] Feeling of Hopelessness  [] Substance abuse or withdrawal    [] Dysfunctional family  [] Major traumatic event, eg., divorce, etc   [] Excessive stress/anxiety    1/31/24    Expected Outcomes    Patient will:   [] Patient will remain safe for the duration of their stay   [] Patient's environment will be safe, eg. Free of potential suicide weapons   [] Verbalize Recovery from suicidal episode and improvement in self-worth   [] Discuss feeling that precipitated suicide attempt/thoughts/behavior   [] Will describe available resources for crisis prevention and management   [] Will verbalize positive coping skills     Nursing Intervention   [x] Assessment and Observations hourly   [x] Suicide Precautions implemented with patient, should be 1:1 observation   [x] Document observation [id number] and RN assessment hourly   [] Consult physician for:    [] Psychiatric consult    [] Pharmacological therapy    [] Other:    [x] Patient search completed by security   [x] Initiated appropriate safety protocols by removing from the patient's environment anything that could be used to inflict self injury, eg. Order safe tray, snap gown, etc   [x] Maintain open, warm, caring, non-judgmental attitude/manner towards patient   [] Discuss advantages and disadvantages of existing coping methods/skills   [x] Assist and educate patient with identifying present strengths and coping skills   [x] Keep patient informed regarding plan of care and provide clear concise explanations.  Provide the patient/family education information as well as telephone numbers and other information about crisis centers, hot lines, and counselors.    Discharge Planning:   [] Referral  [] Groups [] Health agencies  [] Other:

## 2024-02-01 ENCOUNTER — HOSPITAL ENCOUNTER (INPATIENT)
Age: 49
LOS: 4 days | Discharge: HOME | DRG: 751 | End: 2024-02-05
Attending: PSYCHIATRY & NEUROLOGY | Admitting: PSYCHIATRY & NEUROLOGY
Payer: MEDICAID

## 2024-02-01 VITALS
OXYGEN SATURATION: 95 % | HEART RATE: 73 BPM | SYSTOLIC BLOOD PRESSURE: 104 MMHG | RESPIRATION RATE: 13 BRPM | DIASTOLIC BLOOD PRESSURE: 66 MMHG | TEMPERATURE: 97.2 F

## 2024-02-01 PROCEDURE — APPSS60 APP SPLIT SHARED TIME 46-60 MINUTES: Performed by: NURSE PRACTITIONER

## 2024-02-01 PROCEDURE — 6370000000 HC RX 637 (ALT 250 FOR IP): Performed by: PSYCHIATRY & NEUROLOGY

## 2024-02-01 PROCEDURE — 99222 1ST HOSP IP/OBS MODERATE 55: CPT | Performed by: PSYCHIATRY & NEUROLOGY

## 2024-02-01 PROCEDURE — 6370000000 HC RX 637 (ALT 250 FOR IP): Performed by: INTERNAL MEDICINE

## 2024-02-01 PROCEDURE — 99222 1ST HOSP IP/OBS MODERATE 55: CPT | Performed by: INTERNAL MEDICINE

## 2024-02-01 PROCEDURE — 6370000000 HC RX 637 (ALT 250 FOR IP): Performed by: NURSE PRACTITIONER

## 2024-02-01 PROCEDURE — 1240000000 HC EMOTIONAL WELLNESS R&B

## 2024-02-01 RX ORDER — TRAZODONE HYDROCHLORIDE 50 MG/1
50 TABLET ORAL NIGHTLY PRN
Status: DISCONTINUED | OUTPATIENT
Start: 2024-02-01 | End: 2024-02-05 | Stop reason: HOSPADM

## 2024-02-01 RX ORDER — IBUPROFEN 400 MG/1
400 TABLET ORAL EVERY 6 HOURS PRN
Status: DISCONTINUED | OUTPATIENT
Start: 2024-02-01 | End: 2024-02-05 | Stop reason: HOSPADM

## 2024-02-01 RX ORDER — HYDROXYZINE 50 MG/1
50 TABLET, FILM COATED ORAL 3 TIMES DAILY PRN
Status: DISCONTINUED | OUTPATIENT
Start: 2024-02-01 | End: 2024-02-05 | Stop reason: HOSPADM

## 2024-02-01 RX ORDER — ACETAMINOPHEN 325 MG/1
650 TABLET ORAL EVERY 4 HOURS PRN
Status: DISCONTINUED | OUTPATIENT
Start: 2024-02-01 | End: 2024-02-05 | Stop reason: HOSPADM

## 2024-02-01 RX ORDER — BUSPIRONE HYDROCHLORIDE 10 MG/1
10 TABLET ORAL 3 TIMES DAILY
Status: DISCONTINUED | OUTPATIENT
Start: 2024-02-01 | End: 2024-02-05 | Stop reason: HOSPADM

## 2024-02-01 RX ORDER — QUETIAPINE FUMARATE 100 MG/1
100 TABLET, FILM COATED ORAL NIGHTLY
Status: DISCONTINUED | OUTPATIENT
Start: 2024-02-01 | End: 2024-02-05 | Stop reason: HOSPADM

## 2024-02-01 RX ORDER — CEPHALEXIN 500 MG/1
500 CAPSULE ORAL EVERY 8 HOURS SCHEDULED
Status: DISCONTINUED | OUTPATIENT
Start: 2024-02-01 | End: 2024-02-05 | Stop reason: HOSPADM

## 2024-02-01 RX ORDER — MIRTAZAPINE 15 MG/1
15 TABLET, FILM COATED ORAL NIGHTLY
Status: DISCONTINUED | OUTPATIENT
Start: 2024-02-01 | End: 2024-02-05 | Stop reason: HOSPADM

## 2024-02-01 RX ORDER — TRAZODONE HYDROCHLORIDE 150 MG/1
150 TABLET ORAL NIGHTLY
Status: DISCONTINUED | OUTPATIENT
Start: 2024-02-01 | End: 2024-02-05 | Stop reason: HOSPADM

## 2024-02-01 RX ORDER — MAGNESIUM HYDROXIDE/ALUMINUM HYDROXICE/SIMETHICONE 120; 1200; 1200 MG/30ML; MG/30ML; MG/30ML
30 SUSPENSION ORAL EVERY 6 HOURS PRN
Status: DISCONTINUED | OUTPATIENT
Start: 2024-02-01 | End: 2024-02-05 | Stop reason: HOSPADM

## 2024-02-01 RX ORDER — POLYETHYLENE GLYCOL 3350 17 G
2 POWDER IN PACKET (EA) ORAL
Status: DISCONTINUED | OUTPATIENT
Start: 2024-02-01 | End: 2024-02-05 | Stop reason: HOSPADM

## 2024-02-01 RX ORDER — NICOTINE 21 MG/24HR
1 PATCH, TRANSDERMAL 24 HOURS TRANSDERMAL DAILY
Status: DISCONTINUED | OUTPATIENT
Start: 2024-02-01 | End: 2024-02-01

## 2024-02-01 RX ORDER — POLYETHYLENE GLYCOL 3350 17 G/17G
17 POWDER, FOR SOLUTION ORAL DAILY PRN
Status: DISCONTINUED | OUTPATIENT
Start: 2024-02-01 | End: 2024-02-05 | Stop reason: HOSPADM

## 2024-02-01 RX ORDER — HYDROCORTISONE 25 MG/G
CREAM TOPICAL 2 TIMES DAILY
Status: DISCONTINUED | OUTPATIENT
Start: 2024-02-01 | End: 2024-02-05 | Stop reason: HOSPADM

## 2024-02-01 RX ADMIN — MIRTAZAPINE 15 MG: 15 TABLET, FILM COATED ORAL at 22:54

## 2024-02-01 RX ADMIN — TRAZODONE HYDROCHLORIDE 150 MG: 150 TABLET ORAL at 22:54

## 2024-02-01 RX ADMIN — CEPHALEXIN 500 MG: 500 CAPSULE ORAL at 20:54

## 2024-02-01 RX ADMIN — QUETIAPINE FUMARATE 100 MG: 100 TABLET ORAL at 22:53

## 2024-02-01 RX ADMIN — NICOTINE POLACRILEX 2 MG: 2 LOZENGE ORAL at 20:54

## 2024-02-01 RX ADMIN — HYDROXYZINE HYDROCHLORIDE 50 MG: 50 TABLET, FILM COATED ORAL at 20:54

## 2024-02-01 RX ADMIN — TRAZODONE HYDROCHLORIDE 50 MG: 50 TABLET ORAL at 20:54

## 2024-02-01 RX ADMIN — BUSPIRONE HYDROCHLORIDE 10 MG: 10 TABLET ORAL at 22:54

## 2024-02-01 RX ADMIN — IBUPROFEN 400 MG: 400 TABLET, FILM COATED ORAL at 20:54

## 2024-02-01 RX ADMIN — CEPHALEXIN 500 MG: 500 CAPSULE ORAL at 06:00

## 2024-02-01 RX ADMIN — HYDROCORTISONE: 25 CREAM TOPICAL at 20:54

## 2024-02-01 RX ADMIN — CEPHALEXIN 500 MG: 500 CAPSULE ORAL at 13:33

## 2024-02-01 ASSESSMENT — PAIN SCALES - GENERAL: PAINLEVEL_OUTOF10: 4

## 2024-02-01 ASSESSMENT — PATIENT HEALTH QUESTIONNAIRE - PHQ9
SUM OF ALL RESPONSES TO PHQ QUESTIONS 1-9: 2
1. LITTLE INTEREST OR PLEASURE IN DOING THINGS: 1
SUM OF ALL RESPONSES TO PHQ QUESTIONS 1-9: 2
SUM OF ALL RESPONSES TO PHQ QUESTIONS 1-9: 2
2. FEELING DOWN, DEPRESSED OR HOPELESS: 1
SUM OF ALL RESPONSES TO PHQ9 QUESTIONS 1 & 2: 2
SUM OF ALL RESPONSES TO PHQ QUESTIONS 1-9: 2

## 2024-02-01 ASSESSMENT — SLEEP AND FATIGUE QUESTIONNAIRES
DO YOU USE A SLEEP AID: NO
DO YOU HAVE DIFFICULTY SLEEPING: NO
AVERAGE NUMBER OF SLEEP HOURS: 6

## 2024-02-01 ASSESSMENT — LIFESTYLE VARIABLES: HOW MANY STANDARD DRINKS CONTAINING ALCOHOL DO YOU HAVE ON A TYPICAL DAY: 1 OR 2

## 2024-02-01 NOTE — PROGRESS NOTES
Writer responded to consult for visit \"per best practices.\" Pt was not interested in a visit this afternoon.    02/01/24 6149   Encounter Summary   Encounter Overview/Reason  Initial Encounter   Service Provided For: Patient   Referral/Consult From: Nurse   Last Encounter  02/01/24   Complexity of Encounter Low   Spiritual/Emotional needs   Type Spiritual Support   Assessment/Intervention/Outcome   Assessment Unable to assess   Intervention Active listening;Prayer (assurance of)/[place];Sustaining Presence/Ministry of presence   Outcome Refused/Declined

## 2024-02-01 NOTE — H&P
Department of Psychiatry  Attending Physician Psychiatric Assessment     Reason for Admission to Psychiatric Unit:  Threat to self requiring 24 hour professional observation    A mental disorder causing major disability in social, interpersonal, occupational, and/or educational functioning that is leading to dangerous or life-threatening functioning, and that can only be addressed in an acute inpatient setting     Concerns about patient's safety in the community    CHIEF COMPLAINT: Depression with suicidal ideation with plan and intent to walk into traffic    History obtained from: Patient, electronic medical record          HISTORY OF PRESENT ILLNESS:    Sruthi You is a 48 y.o. female who has a past medical history of fibromyalgia, hyperlipidemia, hypertension, hypothyroidism, lupus, thyroid disease, depression with and without psychosis, and substance use.  Patient was discharged from [facility] on 1/18/2024.  According to ED documentation, patient had been discharged from [facility] a day before going to the ED. She reported ongoing suicidal thoughts to end her life due to the overwhelming grief following the loss of her daughter in November 2022.  Patient has not maintained treatment in the community.  While in the ED, patient became agitated and attempted to elope because she was not allowed to go outside to smoke a cigarette.  She required emergency medications to regain behavioral control.  Patient is known to this facility and was admitted in January under similar circumstances.    Patient was seen for initial evaluation today.  She was resting in bed on approach but responded to verbal stimuli.  She was calm and cooperative but made no eye contact with writer.  Thoughts and speech were logical and organized but she was difficult to engage in sustained, spontaneous conversation.  Her responses to questions were mostly limited and superficial.  Patient is reporting intermittent use of prescribed medications 
screening only.  The absence of expected drug(s) and/or metabolite(s) may indicate diluted or adulterated urine, limitations of testing or timing of collection.   Urinalysis with Microscopic    Collection Time: 01/31/24  2:03 PM   Result Value Ref Range    Color, UA Yellow Yellow    Turbidity UA Clear Clear    Glucose, Ur NEGATIVE NEGATIVE mg/dL    Bilirubin Urine NEGATIVE NEGATIVE    Ketones, Urine TRACE (A) NEGATIVE mg/dL    Specific Gravity, UA 1.014 1.005 - 1.030    Urine Hgb NEGATIVE NEGATIVE    pH, UA 6.0 5.0 - 8.0    Protein, UA NEGATIVE NEGATIVE mg/dL    Urobilinogen, Urine Normal 0.0 - 1.0 EU/dL    Nitrite, Urine NEGATIVE NEGATIVE    Leukocyte Esterase, Urine LARGE (A) NEGATIVE    WBC, UA 10 TO 20 0 - 5 /HPF    RBC, UA 2 TO 5 0 - 4 /HPF    Casts UA  0 - 8 /LPF     None Reference range defined for non-centrifuged specimen.    Epithelial Cells UA 5 TO 10 0 - 5 /HPF    Bacteria, UA None None   CBC with Auto Differential    Collection Time: 01/31/24  2:16 PM   Result Value Ref Range    WBC 6.7 3.5 - 11.3 k/uL    RBC 4.77 3.95 - 5.11 m/uL    Hemoglobin 13.5 11.9 - 15.1 g/dL    Hematocrit 41.6 36.3 - 47.1 %    MCV 87.2 82.6 - 102.9 fL    MCH 28.3 25.2 - 33.5 pg    MCHC 32.5 28.4 - 34.8 g/dL    RDW 14.8 (H) 11.8 - 14.4 %    Platelets See Reflexed IPF Result 138 - 453 k/uL    Platelet, Fluorescence Platelet clumps present, count appears adequate. 138 - 453 k/uL    NRBC Automated 0.0 0.0 per 100 WBC    RBC Morphology ANISOCYTOSIS PRESENT     Neutrophils % 71 (H) 36 - 65 %    Lymphocytes % 19 (L) 24 - 43 %    Monocytes % 6 3 - 12 %    Eosinophils % 3 1 - 4 %    Basophils % 1 0 - 2 %    Immature Granulocytes 0 0 %    Neutrophils Absolute 4.75 1.50 - 8.10 k/uL    Lymphocytes Absolute 1.28 1.10 - 3.70 k/uL    Monocytes Absolute 0.43 0.10 - 1.20 k/uL    Eosinophils Absolute 0.17 0.00 - 0.44 k/uL    Basophils Absolute 0.05 0.00 - 0.20 k/uL    Absolute Immature Granulocyte <0.03 0.00 - 0.30 k/uL   CMP    Collection Time:

## 2024-02-01 NOTE — ED NOTES
Pt put on an involuntary hold by Dr. Sloan due to threatening to leave. LFN Martin notified as well as Shalini at [facility]. Transport paper rewritten per request of [facility] and faxed.

## 2024-02-01 NOTE — ED PROVIDER NOTES
[facility] ED  Emergency Department  Emergency Medicine Resident Sign-out     Care of Sruthi You was assumed from Dr. Sloan and is being seen for Suicidal (States feeling depressed. Has no plan )  .  The patient's initial evaluation and plan have been discussed with the prior provider who initially evaluated the patient.     EMERGENCY DEPARTMENT COURSE / MEDICAL DECISION MAKING:       MEDICATIONS GIVEN:  Orders Placed This Encounter   Medications    cephALEXin (KEFLEX) capsule 500 mg     Order Specific Question:   Antimicrobial Indications     Answer:   Urinary Tract Infection    DISCONTD: busPIRone (BUSPAR) tablet 10 mg    DISCONTD: hydrOXYzine HCl (ATARAX) tablet 25 mg    haloperidol lactate (HALDOL) 5 MG/ML injection     Reilly, David: cabinet override    DISCONTD: diphenhydrAMINE (BENADRYL) 50 MG/ML injection     Reilly, David: cabinet override    haloperidol lactate (HALDOL) injection 5 mg    DISCONTD: diphenhydrAMINE (BENADRYL) injection 50 mg    diphenhydrAMINE (BENADRYL) injection 50 mg       LABS / RADIOLOGY:     Labs Reviewed   CBC WITH AUTO DIFFERENTIAL - Abnormal; Notable for the following components:       Result Value    RDW 14.8 (*)     Neutrophils % 71 (*)     Lymphocytes % 19 (*)     All other components within normal limits   COMPREHENSIVE METABOLIC PANEL - Abnormal; Notable for the following components:    ALT 34 (*)     AST 41 (*)     All other components within normal limits   TOX SCR, BLD, ED - Abnormal; Notable for the following components:    Acetaminophen Level <5 (*)     Salicylate Lvl <1.0 (*)     All other components within normal limits   URINE DRUG SCREEN - Abnormal; Notable for the following components:    Cocaine Metabolite, Urine POSITIVE (*)     Cannabinoid Scrn, Ur POSITIVE (*)     All other components within normal limits   URINALYSIS WITH MICROSCOPIC - Abnormal; Notable for the following components:    Ketones, Urine TRACE (*)     Leukocyte Esterase,  Urine LARGE (*)     All other components within normal limits   HCG, SERUM, QUALITATIVE       No results found.    RECENT VITALS:     Temp: 97.2 °F (36.2 °C),  Pulse: 73, Respirations: 13, BP: 104/66, SpO2: 95 %      This patient is a 48 y.o. Female with suicidal ideation, plan to walk into traffic. Cooperative until recently, violence to staff and security called. Medicated with haldol, benadryl. Pink slipped, awaiting transfer to [facility].       ED Course as of 02/01/24 0559   Wed Jan 31, 2024   1544 Leukocyte Esterase, Urine(!): LARGE  Will treat with keflex. Medically clear for discharge.  [CP]   1857 EMTALA completed, awaiting transport [CP]   1928 Requesting home medications will give atarax and buspar [CP]   2000 Called by nursing staff to come to bedside. Patient had earlier requested to go outside to smoke a cigarette. Offered patient a nicotine patch as we could not allow her to go outside due to SI. Patient started yelling saying she didn't want \"no damn patch.\" I informed patient I would be happy to order a patch if she should change her mind.     Patient becoming aggressive and threatening to leave if she could not go outside. Patient making threats against medical staff and calling staff inappropriate names. Administed 5mg Haldol and 50mg Benadryl with MPD at bedside. Patient placed on Pink slip due to threats against staff and threats to leave the facility with concern for endangering her own life.  [CP]   2009 Patient care assumed from Dr. Sloan [HO]      ED Course User Index  [CP] Ferdinand Sloan MD  [HO] Marci Renee MD       OUTSTANDING TASKS / RECOMMENDATIONS:    Awaiting transportation to [facility]     FINAL IMPRESSION:     1. Suicidal ideations        DISPOSITION:         DISPOSITION:  []  Discharge   [x]  Transfer -[facility]   []  Admission -     []  Against Medical Advice   []  Eloped   FOLLOW-UP: No follow-up provider specified.   DISCHARGE MEDICATIONS: Discharge Medication List as of 2/1/2024

## 2024-02-01 NOTE — ED NOTES
Patient becoming verbally aggressive and yelling, Ebonie HAMILTON called, Dr. Sloan notified and at bedside.

## 2024-02-01 NOTE — ED NOTES
SW received phone call from [employer] reporting pt is going to room 110 at [facility]. Per [employer] ETA is 0100. Report # is [id number]

## 2024-02-01 NOTE — ED PROVIDER NOTES
Faculty Sign-Out Attestation  Handoff taken on the following patient from prior Attending Physician: Theodore  Note Started: 11:04 PM EST    I was available and discussed any additional care issues that arose and coordinated the management plans with the resident(s) caring for the patient during my duty period. Any areas of disagreement with resident’s documentation of care or procedures are noted on the chart. I was personally present for the key portions of any/all procedures during my duty period. I have documented in the chart those procedures where I was not present during the key portions.    Suicidal, >>> 0100 / going to [facility]    Eamon Hendricks DO  Attending Physician       Eamon Hendricks DO  01/31/24 8067

## 2024-02-01 NOTE — ED NOTES
Pt resting comfortably with eyes closed, respirations even and unlabored. Call light in reach, all needs met at this time

## 2024-02-01 NOTE — ED NOTES
Geovanna received phone call from Shalini at [facility] informing that pt's wig must be removed due to safety concerns. PHIL Smith informed. Per GEOVANNA GOODWIN were notified that wig needs to be removed and put in pt's belongings before leaving.

## 2024-02-01 NOTE — PROGRESS NOTES

## 2024-02-01 NOTE — PROGRESS NOTES
Pharmacy Medication History Note      List of current medications patient is taking is complete.    Patient was discharged from [facility] on 1/18/24.  No change to medication list from [facility]. Confirmed with [facility] ([phone]) that prescriptions were delivered on 1/19/24.    Current Home Medication List at Time of Admission:  Prior to Admission medications    Medication Sig   busPIRone (BUSPAR) 10 MG tablet Take 1 tablet by mouth 3 times daily   hydrocortisone (ANUSOL-HC) 2.5 % CREA rectal cream Apply to affected area twice daily   mirtazapine (REMERON) 15 MG tablet Take 1 tablet by mouth nightly   QUEtiapine (SEROQUEL) 100 MG tablet Take 1 tablet by mouth nightly   traZODone (DESYREL) 150 MG tablet Take 1 tablet by mouth nightly       Please let me know if you have any questions about this encounter. Thank you!    Electronically signed by Armando Muller RPH on 2/1/2024 at 10:05 AM

## 2024-02-01 NOTE — ED NOTES
[facility] at bedside to pick patient up to take over to [facility]. [facility] [employer] called and belongings placed on stretcher.

## 2024-02-01 NOTE — CARE COORDINATION
BHI Biopsychosocial Assessment    Current Level of Psychosocial Functioning     Independent   Dependent    Minimal Assist XX     Psychosocial High Risk Factors (check all that apply)    Unable to obtain meds   Chronic illness/pain    Substance abuse   Lack of Family Support   Financial stress XX  Isolation   Inadequate Community Resources  Suicide attempt(s) XX  Not taking medications XX  Victim of crime   Developmental Delay  Unable to manage personal needs XX  Age 65 or older   Homeless  No transportation   Readmission within 30 days XX  Unemployment XX  Traumatic Event XX    Psychiatric Advanced Directives: N/A    Family to Involve in Treatment: Patient identifies her mom as supportive.    Sexual Orientation: N/A    Patient Strengths: Patient identifies strengths as having her mom be supportive and a place to live.    Patient Barriers: Patient barriers include lack of income, hx of suicidal ideation and attempts, non-compliance with medication and treatment, and trauma.    Opiate Education Provided:  Patient denies opiate use. Patient states she uses crack/cocaine and marijuana. Patient's urine screen was positive for cocaine metabolites and cannabis.     CMHC/mental health history: Patient has hx of inpatient treatment. Patient has hx with multiple [facility] and [facility] recovery programs. Patient was most recently linked at [facility].    Plan of Care   medication management, group/individual therapies, family meetings, psycho -education, treatment team meetings to assist with stabilization    Initial Discharge Plan: Patient's initial plan is to discharge to [facility] recovery program    Clinical Summary:  Patient is a 49 yo Black female who was admitted for suicidal ideation. Patient was recently in [facility]; discharged 1/12/23. Patient has hx of mental health treatment and concerns. Patient barriers include lack of income, hx of suicidal ideation and attempts, non-compliance with medication and treatment, and

## 2024-02-01 NOTE — PLAN OF CARE
[facility]  Initial Interdisciplinary Treatment Plan NO      Original treatment plan Date & Time: 2/1/24 0900    Admission Type:  Admission Type: Involuntary    Reason for admission:   Reason for Admission: Patient is admitted to the [facility] via a pink slip due to suicidal ideation with a plan to walk into traffic. Patient reports that she is suicidal due to her daughter recently passing away.    Estimated Length of Stay:  5-7days  Estimated Discharge Date: to be determined by physician    PATIENT STRENGTHS:  Patient Strengths:   Patient Strengths and Limitations:   Addictive Behavior: Addictive Behavior  In the Past 3 Months, Have You Felt or Has Someone Told You That You Have a Problem With  : None  Medical Problems:  Past Medical History:   Diagnosis Date    Fibromyalgia     Headache     migraines    Hyperlipidemia     Hypertension     Hypothyroidism     Lupus (HCC)     Thyroid disease      Status EXAM:Mental Status and Behavioral Exam  Normal: No  Level of Assistance: Independent/Self  Facial Expression: Sad, Worried  Affect: Congruent  Level of Consciousness: Alert  Frequency of Checks: 4 times per hour, close  Mood:Normal: No  Mood: Depressed, Anxious, Helpless  Motor Activity:Normal: Yes  Eye Contact: Fair  Observed Behavior: Withdrawn, Guarded  Sexual Misconduct History: Current - no  Preception: [place] to person, [place] to time, [place] to place, [place] to situation  Attention:Normal: Yes  Thought Processes: Unremarkable  Thought Content:Normal: Yes  Depression Symptoms: Feelings of helplessness, Feelings of hopelessess, Feelings of worthlessness  Anxiety Symptoms: Generalized  Maria Teresa Symptoms: No problems reported or observed.  Hallucinations: None  Delusions: No  Memory:Normal: Yes  Insight and Judgment: No  Insight and Judgment: Poor judgment, Poor insight    EDUCATION:   Learner Progress Toward Treatment Goals: reviewed group plans and strategies for care    Method:group therapy, medication

## 2024-02-02 PROCEDURE — 1240000000 HC EMOTIONAL WELLNESS R&B

## 2024-02-02 PROCEDURE — 99232 SBSQ HOSP IP/OBS MODERATE 35: CPT | Performed by: PSYCHIATRY & NEUROLOGY

## 2024-02-02 PROCEDURE — 6370000000 HC RX 637 (ALT 250 FOR IP): Performed by: PSYCHIATRY & NEUROLOGY

## 2024-02-02 PROCEDURE — APPSS30 APP SPLIT SHARED TIME 16-30 MINUTES: Performed by: NURSE PRACTITIONER

## 2024-02-02 PROCEDURE — 99231 SBSQ HOSP IP/OBS SF/LOW 25: CPT | Performed by: INTERNAL MEDICINE

## 2024-02-02 PROCEDURE — 6370000000 HC RX 637 (ALT 250 FOR IP): Performed by: NURSE PRACTITIONER

## 2024-02-02 RX ADMIN — BUSPIRONE HYDROCHLORIDE 10 MG: 10 TABLET ORAL at 10:27

## 2024-02-02 RX ADMIN — HYDROCORTISONE: 25 CREAM TOPICAL at 10:27

## 2024-02-02 RX ADMIN — ALUMINUM HYDROXIDE, MAGNESIUM HYDROXIDE, AND SIMETHICONE 30 ML: 1200; 120; 1200 SUSPENSION ORAL at 19:03

## 2024-02-02 RX ADMIN — BUSPIRONE HYDROCHLORIDE 10 MG: 10 TABLET ORAL at 15:34

## 2024-02-02 RX ADMIN — QUETIAPINE FUMARATE 100 MG: 100 TABLET ORAL at 21:29

## 2024-02-02 RX ADMIN — HYDROXYZINE HYDROCHLORIDE 50 MG: 50 TABLET, FILM COATED ORAL at 14:33

## 2024-02-02 RX ADMIN — HYDROXYZINE HYDROCHLORIDE 50 MG: 50 TABLET, FILM COATED ORAL at 21:30

## 2024-02-02 RX ADMIN — CEPHALEXIN 500 MG: 500 CAPSULE ORAL at 14:33

## 2024-02-02 RX ADMIN — TRAZODONE HYDROCHLORIDE 150 MG: 150 TABLET ORAL at 21:29

## 2024-02-02 RX ADMIN — CEPHALEXIN 500 MG: 500 CAPSULE ORAL at 21:30

## 2024-02-02 RX ADMIN — BUSPIRONE HYDROCHLORIDE 10 MG: 10 TABLET ORAL at 21:29

## 2024-02-02 RX ADMIN — CEPHALEXIN 500 MG: 500 CAPSULE ORAL at 06:48

## 2024-02-02 RX ADMIN — MIRTAZAPINE 15 MG: 15 TABLET, FILM COATED ORAL at 21:30

## 2024-02-02 ASSESSMENT — PAIN SCALES - GENERAL: PAINLEVEL_OUTOF10: 6

## 2024-02-02 ASSESSMENT — PAIN DESCRIPTION - LOCATION: LOCATION: BACK

## 2024-02-02 ASSESSMENT — PAIN DESCRIPTION - ORIENTATION: ORIENTATION: LOWER

## 2024-02-02 NOTE — PROGRESS NOTES
IN-PATIENT SERVICE  [facility]    CONSULTATION / HISTORY AND PHYSICAL EXAMINATION            Date:   2024  Patient name:  Sruthi You  Date of admission:  2024 12:27 AM  MRN:   [id number]  Account:  [id number]  YOB: 1975  PCP:    No primary care provider on file.  Room:   [address] [place] [address]  Code Status:    Full Code    Physician Requesting Consult: Orlando Savage MD    Reason for Consult:  medical management    Chief Complaint:     No chief complaint on file.      History Obtained From:     Patient medical record nursing staff    History of Present Illness:   Patient, has past medical history multiple medical problem which include major depression, hypertension, lupus, admitted to [facility] as a transfer from [place] with major depression and suicidal ideation  She was found positive for UTI at [facility] started on Keflex  Patient complaining of generalized pain which is chronic,  Patient denying any complaints of chest pain, shortness of breath, abdominal pain    Past Medical History:     Past Medical History:   Diagnosis Date    Fibromyalgia     Headache     migraines    Hyperlipidemia     Hypertension     Hypothyroidism     Lupus (HCC)     Thyroid disease         Past Surgical History:     Past Surgical History:   Procedure Laterality Date     SECTION      FOOT SURGERY      right    HYSTERECTOMY (CERVIX STATUS UNKNOWN)          Medications Prior to Admission:     Prior to Admission medications    Medication Sig Start Date End Date Taking? Authorizing Provider   busPIRone (BUSPAR) 10 MG tablet Take 1 tablet by mouth 3 times daily 24  Orlando Savage MD   hydrocortisone (ANUSOL-HC) 2.5 % CREA rectal cream Apply to affected area twice daily 24   Orlando Savage MD   mirtazapine (REMERON) 15 MG tablet Take 1 tablet by mouth nightly 24   Orlando Savage MD   QUEtiapine (SEROQUEL) 100 MG

## 2024-02-02 NOTE — PLAN OF CARE
Problem: Self Harm/Suicidality  Goal: Will have no self-injury during hospital stay  Description: INTERVENTIONS:  1.  Ensure constant observer at bedside with Q15M safety checks  2.  Maintain a safe environment  3.  Secure patient belongings  4.  Ensure family/visitors adhere to safety recommendations  5.  Ensure safety tray has been added to patient's diet order  6.  Every shift and PRN: Re-assess suicidal risk via Frequent Screener    Outcome: Progressing  Note: Patient denies any suicidal/homicidal ideation at this time. Will continue to monitor, assess, and provide a safe environment through Q15 minute safety checks.      Problem: Anxiety  Goal: Will report anxiety at manageable levels  Description: INTERVENTIONS:  1. Administer medication as ordered  2. Teach and rehearse alternative coping skills  3. Provide emotional support with 1:1 interaction with staff  Outcome: Progressing  Flowsheets (Taken 2/2/2024 1332)  Will report anxiety at manageable levels:   Administer medication as ordered   Teach and rehearse alternative coping skills   Provide emotional support with 1:1 interaction with staff     Problem: Risk for Elopement  Goal: Patient will not exit the unit/facility without proper excort  Outcome: Progressing  Flowsheets (Taken 2/2/2024 1339)  Nursing Interventions for Elopement Risk:   Make sure patient has all necessary personal care items   Place patient in room far away from exits and stairways   Reduce environmental triggers

## 2024-02-02 NOTE — PROGRESS NOTES
Daily Progress Note  2/2/2024    Patient Name: Sruthi You    CHIEF COMPLAINT:  Depression with suicidal ideation with plan and intent to walk into traffic          SUBJECTIVE:    Sruthi was seen for follow-up assessment today.  She has been compliant with scheduled medications and behaviorally controlled.  She has not required any emergency medications in the past 24 hours.  Nursing staff report she has been mostly friendly and cooperative today.  She has been more discharge focused.  Patient was resting in bed on approach.  She reports that mood is improving and she has not had any intrusive thoughts of wanting to end her life today.  Patient may be minimizing of recent thoughts and feelings and states that she is ready to go and wants to be discharged.  She was encouraged not to rush her stay.  She is requesting to follow-up at [facility].  She is currently denying auditory and visual hallucinations.  Thoughts and speech were organized and linear.  She was difficult to engage in sustained conversation and had little interest in meaningful dialogue at time of assessment although modestly improving patient has yet to demonstrate sustained stability and remains a risk to herself and warrants further hospitalization for safety and stabilization.    Appetite:  [x] Adequate/Unchanged  [] Increased  [] Decreased      Sleep:       [x] Adequate/Unchanged  [] Fair  [] Poor      Group Attendance on Unit:   [] Yes   [] Selectively    [x] No    Compliant with scheduled medications: [x] Yes  [] No    Received emergency medications in past 24 hrs: [] Yes   [x] No    Medication Side Effects: Denies         Mental Status Exam  Level of consciousness: Awake and alert  Appearance:  Appropriate attire, resting in bed, fair grooming   Behavior/Motor: Approachable, engages with interviewer, no psychomotor abnormalities  Attitude toward examiner:  Cooperative, attentive, good eye contact  Speech: Normal rate, volume, and somewhat

## 2024-02-02 NOTE — PROGRESS NOTES
Behavioral Services  Medicare Certification Upon Admission    I certify that this patient's inpatient psychiatric hospital admission is medically necessary for:    [x] (1) Treatment which could reasonably be expected to improve this patient's condition,       [x] (2) Or for diagnostic study;     AND     [x](2) The inpatient psychiatric services are provided while the individual is under the care of a physician and are included in the individualized plan of care.    Estimated length of stay/service 4 to 7 days    Plan for post-hospital care AOD placement    Electronically signed by MARICEL SANDS MD on 2/1/2024 at 9:54 PM

## 2024-02-03 PROCEDURE — 6370000000 HC RX 637 (ALT 250 FOR IP): Performed by: NURSE PRACTITIONER

## 2024-02-03 PROCEDURE — 6370000000 HC RX 637 (ALT 250 FOR IP): Performed by: PSYCHIATRY & NEUROLOGY

## 2024-02-03 PROCEDURE — 1240000000 HC EMOTIONAL WELLNESS R&B

## 2024-02-03 PROCEDURE — 99232 SBSQ HOSP IP/OBS MODERATE 35: CPT | Performed by: NURSE PRACTITIONER

## 2024-02-03 PROCEDURE — 6360000002 HC RX W HCPCS: Performed by: NURSE PRACTITIONER

## 2024-02-03 RX ORDER — DIPHENHYDRAMINE HYDROCHLORIDE 50 MG/ML
50 INJECTION INTRAMUSCULAR; INTRAVENOUS EVERY 6 HOURS PRN
Status: DISCONTINUED | OUTPATIENT
Start: 2024-02-03 | End: 2024-02-05 | Stop reason: HOSPADM

## 2024-02-03 RX ORDER — HALOPERIDOL 5 MG/ML
5 INJECTION INTRAMUSCULAR EVERY 6 HOURS PRN
Status: DISCONTINUED | OUTPATIENT
Start: 2024-02-03 | End: 2024-02-05 | Stop reason: HOSPADM

## 2024-02-03 RX ORDER — HALOPERIDOL 5 MG/1
5 TABLET ORAL EVERY 6 HOURS PRN
Status: DISCONTINUED | OUTPATIENT
Start: 2024-02-03 | End: 2024-02-05 | Stop reason: HOSPADM

## 2024-02-03 RX ADMIN — HYDROXYZINE HYDROCHLORIDE 50 MG: 50 TABLET, FILM COATED ORAL at 13:15

## 2024-02-03 RX ADMIN — MIRTAZAPINE 15 MG: 15 TABLET, FILM COATED ORAL at 20:04

## 2024-02-03 RX ADMIN — TRAZODONE HYDROCHLORIDE 150 MG: 150 TABLET ORAL at 20:04

## 2024-02-03 RX ADMIN — BUSPIRONE HYDROCHLORIDE 10 MG: 10 TABLET ORAL at 08:02

## 2024-02-03 RX ADMIN — CEPHALEXIN 500 MG: 500 CAPSULE ORAL at 06:40

## 2024-02-03 RX ADMIN — CEPHALEXIN 500 MG: 500 CAPSULE ORAL at 20:04

## 2024-02-03 RX ADMIN — BUSPIRONE HYDROCHLORIDE 10 MG: 10 TABLET ORAL at 13:15

## 2024-02-03 RX ADMIN — HYDROXYZINE HYDROCHLORIDE 50 MG: 50 TABLET, FILM COATED ORAL at 20:03

## 2024-02-03 RX ADMIN — DIPHENHYDRAMINE HYDROCHLORIDE 50 MG: 50 INJECTION INTRAMUSCULAR; INTRAVENOUS at 20:04

## 2024-02-03 RX ADMIN — NICOTINE POLACRILEX 2 MG: 2 LOZENGE ORAL at 09:23

## 2024-02-03 RX ADMIN — CEPHALEXIN 500 MG: 500 CAPSULE ORAL at 13:15

## 2024-02-03 RX ADMIN — BUSPIRONE HYDROCHLORIDE 10 MG: 10 TABLET ORAL at 20:04

## 2024-02-03 RX ADMIN — ALUMINUM HYDROXIDE, MAGNESIUM HYDROXIDE, AND SIMETHICONE 30 ML: 1200; 120; 1200 SUSPENSION ORAL at 12:05

## 2024-02-03 RX ADMIN — HALOPERIDOL LACTATE 5 MG: 5 INJECTION, SOLUTION INTRAMUSCULAR at 20:05

## 2024-02-03 RX ADMIN — QUETIAPINE FUMARATE 100 MG: 100 TABLET ORAL at 20:04

## 2024-02-03 ASSESSMENT — LIFESTYLE VARIABLES: HOW OFTEN DO YOU HAVE A DRINK CONTAINING ALCOHOL: NEVER

## 2024-02-03 ASSESSMENT — PAIN SCALES - GENERAL: PAINLEVEL_OUTOF10: 2

## 2024-02-03 ASSESSMENT — PAIN DESCRIPTION - DESCRIPTORS: DESCRIPTORS: DISCOMFORT

## 2024-02-03 NOTE — PLAN OF CARE
[facility]  Day 3 Interdisciplinary Treatment Plan NOTE    Review Date & Time: 23@4 0900    Admission Type:   Admission Type: Involuntary    Reason for admission:  Reason for Admission: Patient is admitted to the [facility] via a pink slip due to suicidal ideation with a plan to walk into traffic. Patient reports that she is suicidal due to her daughter recently passing away.  Estimated Length of Stay:  5-7 days  Estimated Discharge Date Update:   to be determined by physician    PATIENT STRENGTHS:  Patient Strengths:   Patient Strengths and Limitations:Limitations: Multiple barriers to leisure interests, Inappropriate/potentially harmful leisure interests, Difficult relationships / poor social skills, Difficulty problem solving/relies on others to help solve problems, External locus of control  Addictive Behavior:Addictive Behavior  In the Past 3 Months, Have You Felt or Has Someone Told You That You Have a Problem With  : None  Medical Problems:  Past Medical History:   Diagnosis Date    Fibromyalgia     Headache     migraines    Hyperlipidemia     Hypertension     Hypothyroidism     Lupus (HCC)     Thyroid disease        Risk:  Fall Risk   Sidney Scale Sidney Scale Score: 22  BVC    Change in scores:  No Changes to plan of Care:  No    Status EXAM:   Mental Status and Behavioral Exam  Normal: No  Level of Assistance: Independent/Self  Facial Expression: Brightened  Affect: Appropriate, Congruent  Level of Consciousness: Alert  Frequency of Checks: 4 times per hour, close  Mood:Normal: No  Mood: Anxious (per pt \"very happy\")  Motor Activity:Normal: Yes  Eye Contact: Good  Observed Behavior: Cooperative, Friendly  Sexual Misconduct History: Current - no  Preception: [place] to person, Math[place] to time, Math[place] to place, Math[place] to situation  Attention:Normal: Yes  Attention: Distractible  Thought Processes: Unremarkable  Thought Content:Normal: Yes  Thought Content: Preoccupations  Depression Symptoms: No

## 2024-02-03 NOTE — PLAN OF CARE
Problem: Self Harm/Suicidality  Goal: Will have no self-injury during hospital stay  Description: INTERVENTIONS:  1.  Ensure constant observer at bedside with Q15M safety checks  2.  Maintain a safe environment  3.  Secure patient belongings  4.  Ensure family/visitors adhere to safety recommendations  5.  Ensure safety tray has been added to patient's diet order  6.  Every shift and PRN: Re-assess suicidal risk via Frequent Screener  Outcome: Progressing     Patient denies thoughts to harm self/others. Patient is brightened, friendly and social. Patient described her mood \"very happy\". Q15M checks continue per policy and safety maintained.     Problem: Anxiety  Goal: Will report anxiety at manageable levels  Description: INTERVENTIONS:  1. Administer medication as ordered  2. Teach and rehearse alternative coping skills  3. Provide emotional support with 1:1 interaction with staff  Outcome: Progressing     Patient reports anxiety. 1:1 emotional support provided.     Problem: Risk for Elopement  Goal: Patient will not exit the unit/facility without proper excort  Outcome: Progressing     Problem: Pain  Goal: Verbalizes/displays adequate comfort level or baseline comfort level  Outcome: Progressing

## 2024-02-03 NOTE — PLAN OF CARE
Problem: Self Harm/Suicidality  Goal: Will have no self-injury during hospital stay  Description: INTERVENTIONS:  1.  Ensure constant observer at bedside with Q15M safety checks  2.  Maintain a safe environment  3.  Secure patient belongings  4.  Ensure family/visitors adhere to safety recommendations  5.  Ensure safety tray has been added to patient's diet order  6.  Every shift and PRN: Re-assess suicidal risk via Frequent Screener    2/3/2024 1122 by Ariana Soria LPN  Outcome: Progressing  Note: Patient denies any current suicidal thoughts and agrees to seek out staff if thoughts arise. Endorses some mild anxiety. Brightened and friendly during talk time. Can be irritable and labile. Out in dayroom for most of the morning and is social with peers. Patient compliant with medications and staff.

## 2024-02-03 NOTE — PROGRESS NOTES
Daily Progress Note  2/3/2024    Patient Name: Sruthi You    CHIEF COMPLAINT:  Depression with suicidal ideation with plan and intent to walk into traffic          SUBJECTIVE:    Sruthi was seen for follow-up assessment today.  She has been compliant with scheduled medications and behaviorally controlled.  She has not required any emergency medications in the past 24 hours. Nursing staff requested PRN emergency medications earlier in the day as she had become somewhat agitated. Patient did not end up needing the medications. She was seated in the day are with a peer signing along to music on the TV. She was pleasant but distracted. Writer offered discussion in privacy over her room, but she declined. Patient remains focused on wanting to be discharged and requested discharge from writer. Writer inquired about AOD plans and if she has secured placement for treatment. She is reporting that she will be going to [facility] housing. This has not yet been confirmed with  ans she was encouraged to follow up. She described her mood as \"great\" today. She denied any suicidal or homicidal ideation and denied hallucinations.     Appetite:  [x] Adequate/Unchanged  [] Increased  [] Decreased      Sleep:       [x] Adequate/Unchanged  [] Fair  [] Poor      Group Attendance on Unit:   [] Yes   [x] Selectively    [] No    Compliant with scheduled medications: [x] Yes  [] No    Received emergency medications in past 24 hrs: [] Yes   [x] No    Medication Side Effects: Denies         Mental Status Exam  Level of consciousness: Awake and alert  Appearance:  Appropriate attire, seated in TV area, fair grooming   Behavior/Motor: Approachable, engages with interviewer, no psychomotor abnormalities  Attitude toward examiner:  Cooperative, distracted, fair eye contact  Speech: Normal rate, volume, and tone.  Mood: Euthymic  Affect: mood congruent  Thought processes:  Goal directed, linear  Thought content: improving

## 2024-02-04 PROCEDURE — 1240000000 HC EMOTIONAL WELLNESS R&B

## 2024-02-04 PROCEDURE — 6370000000 HC RX 637 (ALT 250 FOR IP): Performed by: PSYCHIATRY & NEUROLOGY

## 2024-02-04 PROCEDURE — 99232 SBSQ HOSP IP/OBS MODERATE 35: CPT | Performed by: NURSE PRACTITIONER

## 2024-02-04 PROCEDURE — 6370000000 HC RX 637 (ALT 250 FOR IP): Performed by: NURSE PRACTITIONER

## 2024-02-04 RX ADMIN — BUSPIRONE HYDROCHLORIDE 10 MG: 10 TABLET ORAL at 21:11

## 2024-02-04 RX ADMIN — CEPHALEXIN 500 MG: 500 CAPSULE ORAL at 14:05

## 2024-02-04 RX ADMIN — CEPHALEXIN 500 MG: 500 CAPSULE ORAL at 06:51

## 2024-02-04 RX ADMIN — HYDROXYZINE HYDROCHLORIDE 50 MG: 50 TABLET, FILM COATED ORAL at 21:11

## 2024-02-04 RX ADMIN — TRAZODONE HYDROCHLORIDE 150 MG: 150 TABLET ORAL at 21:11

## 2024-02-04 RX ADMIN — BUSPIRONE HYDROCHLORIDE 10 MG: 10 TABLET ORAL at 08:32

## 2024-02-04 RX ADMIN — MIRTAZAPINE 15 MG: 15 TABLET, FILM COATED ORAL at 21:11

## 2024-02-04 RX ADMIN — QUETIAPINE FUMARATE 100 MG: 100 TABLET ORAL at 21:11

## 2024-02-04 RX ADMIN — BUSPIRONE HYDROCHLORIDE 10 MG: 10 TABLET ORAL at 14:05

## 2024-02-04 RX ADMIN — CEPHALEXIN 500 MG: 500 CAPSULE ORAL at 21:11

## 2024-02-04 ASSESSMENT — LIFESTYLE VARIABLES: HOW OFTEN DO YOU HAVE A DRINK CONTAINING ALCOHOL: NEVER

## 2024-02-04 NOTE — PROGRESS NOTES
Daily Progress Note  2/4/2024    Patient Name: Sruthi You    CHIEF COMPLAINT:  Depression with suicidal ideation with plan and intent to walk into traffic          SUBJECTIVE:    Sruthi required emergency IM medications last night around 8 PM for agitation.  She began shouting at staff and calling them bitches and began checking unit doors.  Patient has been in bed for much of the day today.  She was awake in her room and agreeable to conversation with writer but she continues to be focused on staff.  She immediately states to writer \"those nurses out there are a bunch of bitches\".  She cannot identify specific reasons for why she felt this way.  Writer inquired if patient had determined AOD placement and she stated \"you know what I talked to my family and my grandkids need me, I think I am just going to go home to see them before I go to rehab\".  She reflected on recent suicidal ideation and the death of her daughter.  She stated she is no longer feeling this way today and remains focused on discharge.  She is denying homicidal ideation.  Thoughts and speech were organized and linear.  She is denying auditory and visual hallucinations.      Patient is not signed in and day 3 is tomorrow, 2/5/2024 at 8:04 PM.    Received emergency medications in past 24 hrs: [x] Yes   [] No    Compliant with scheduled medications: [x] Yes  [] No    Appetite:  [x] Adequate/Unchanged  [] Increased  [] Decreased      Sleep:       [x] Adequate/Unchanged  [] Fair  [] Poor      Group Attendance on Unit:   [] Yes   [x] Selectively    [] No    Medication Side Effects: Denies         Mental Status Exam  Level of consciousness: Awake and alert  Appearance:  Appropriate attire, seated on bed, fair grooming   Behavior/Motor: Approachable, engages with interviewer, no psychomotor abnormalities  Attitude toward examiner:  Cooperative, distracted, fair eye contact  Speech: Normal rate, volume, and tone.  Mood: Euthymic  Affect: mood

## 2024-02-04 NOTE — PLAN OF CARE
Problem: Risk for Elopement  Goal: Patient will not exit the unit/facility without proper excort  Outcome: Not Progressing  Note: Patient running and checking doors to unit this shift.      Problem: Self Harm/Suicidality  Goal: Will have no self-injury during hospital stay  Description: INTERVENTIONS:  1.  Ensure constant observer at bedside with Q15M safety checks  2.  Maintain a safe environment  3.  Secure patient belongings  4.  Ensure family/visitors adhere to safety recommendations  5.  Ensure safety tray has been added to patient's diet order  6.  Every shift and PRN: Re-assess suicidal risk via Frequent Screener    2/4/2024 0023 by Tyesha Rodriguez LPN  Outcome: Progressing  Note: Patient is free of self harm at this time.  Patient agrees to seek out staff if thoughts to harm self arise.  Staff will provide support and reassurance as needed.  Safety checks maintained every 15 minutes.      Problem: Anxiety  Goal: Will report anxiety at manageable levels  Description: INTERVENTIONS:  1. Administer medication as ordered  2. Teach and rehearse alternative coping skills  3. Provide emotional support with 1:1 interaction with staff  Outcome: Progressing  Note: Patient admits to anxiety this shift. Patient is irritable out in dayroom, yelling at staff this shift. Patient denies suicidal/homicidal ideations and hallucinations this shift. Patient educated and agrees to come to staff if needed this shift. Patient monitored every 15 minutes with environmental safety checks.      Problem: Pain  Goal: Verbalizes/displays adequate comfort level or baseline comfort level  Outcome: Progressing     Problem: Risk for Elopement  Goal: Patient will not exit the unit/facility without proper excort  Outcome: Not Progressing  Note: Patient running and checking doors to unit this shift.

## 2024-02-04 NOTE — PLAN OF CARE
Problem: Self Harm/Suicidality  Goal: Will have no self-injury during hospital stay  Description: INTERVENTIONS:  1.  Ensure constant observer at bedside with Q15M safety checks  2.  Maintain a safe environment  3.  Secure patient belongings  4.  Ensure family/visitors adhere to safety recommendations  5.  Ensure safety tray has been added to patient's diet order  6.  Every shift and PRN: Re-assess suicidal risk via Frequent Screener    2/4/2024 1333 by Ariana Soria LPN  Outcome: Progressing  Note: Patient denies any current suicidal thoughts and agrees to seek out staff if thoughts arise. Patient endorses anxiety. Has been in room asleep for most of the day. Pleasant and friendly today. Compliant with medications and staff.      Problem: Risk for Elopement  Goal: Patient will not exit the unit/facility without proper excort  2/4/2024 0023 by Tyesha Rodriguez LPN  Outcome: Not Progressing  Note: Patient running and checking doors to unit this shift.

## 2024-02-05 VITALS
TEMPERATURE: 98.4 F | BODY MASS INDEX: 22.2 KG/M2 | RESPIRATION RATE: 14 BRPM | WEIGHT: 130 LBS | OXYGEN SATURATION: 100 % | DIASTOLIC BLOOD PRESSURE: 89 MMHG | HEIGHT: 64 IN | SYSTOLIC BLOOD PRESSURE: 116 MMHG | HEART RATE: 84 BPM

## 2024-02-05 PROCEDURE — 99239 HOSP IP/OBS DSCHRG MGMT >30: CPT | Performed by: PSYCHIATRY & NEUROLOGY

## 2024-02-05 PROCEDURE — 6370000000 HC RX 637 (ALT 250 FOR IP): Performed by: PSYCHIATRY & NEUROLOGY

## 2024-02-05 PROCEDURE — 6370000000 HC RX 637 (ALT 250 FOR IP): Performed by: NURSE PRACTITIONER

## 2024-02-05 RX ORDER — QUETIAPINE FUMARATE 100 MG/1
100 TABLET, FILM COATED ORAL NIGHTLY
Qty: 30 TABLET | Refills: 0 | Status: SHIPPED | OUTPATIENT
Start: 2024-02-05

## 2024-02-05 RX ORDER — HYDROXYZINE 50 MG/1
50 TABLET, FILM COATED ORAL 3 TIMES DAILY PRN
Qty: 30 TABLET | Refills: 0 | Status: SHIPPED | OUTPATIENT
Start: 2024-02-05 | End: 2024-02-15

## 2024-02-05 RX ORDER — TRAZODONE HYDROCHLORIDE 150 MG/1
150 TABLET ORAL NIGHTLY
Qty: 30 TABLET | Refills: 0 | Status: SHIPPED | OUTPATIENT
Start: 2024-02-05 | End: 2024-02-05

## 2024-02-05 RX ORDER — QUETIAPINE FUMARATE 100 MG/1
100 TABLET, FILM COATED ORAL NIGHTLY
Qty: 30 TABLET | Refills: 0 | Status: SHIPPED | OUTPATIENT
Start: 2024-02-05 | End: 2024-02-05

## 2024-02-05 RX ORDER — MIRTAZAPINE 15 MG/1
15 TABLET, FILM COATED ORAL NIGHTLY
Qty: 30 TABLET | Refills: 0 | Status: SHIPPED | OUTPATIENT
Start: 2024-02-05 | End: 2024-02-05

## 2024-02-05 RX ORDER — CEPHALEXIN 500 MG/1
500 CAPSULE ORAL EVERY 8 HOURS SCHEDULED
Qty: 8 CAPSULE | Refills: 0 | Status: SHIPPED | OUTPATIENT
Start: 2024-02-05 | End: 2024-02-05

## 2024-02-05 RX ORDER — CEPHALEXIN 500 MG/1
500 CAPSULE ORAL EVERY 8 HOURS SCHEDULED
Qty: 8 CAPSULE | Refills: 0 | Status: SHIPPED | OUTPATIENT
Start: 2024-02-05 | End: 2024-02-08

## 2024-02-05 RX ORDER — HYDROXYZINE 50 MG/1
50 TABLET, FILM COATED ORAL 3 TIMES DAILY PRN
Qty: 30 TABLET | Refills: 0 | Status: SHIPPED | OUTPATIENT
Start: 2024-02-05 | End: 2024-02-05

## 2024-02-05 RX ORDER — TRAZODONE HYDROCHLORIDE 150 MG/1
150 TABLET ORAL NIGHTLY
Qty: 30 TABLET | Refills: 0 | Status: SHIPPED | OUTPATIENT
Start: 2024-02-05

## 2024-02-05 RX ORDER — BUSPIRONE HYDROCHLORIDE 10 MG/1
10 TABLET ORAL 3 TIMES DAILY
Qty: 90 TABLET | Refills: 0 | Status: SHIPPED | OUTPATIENT
Start: 2024-02-05 | End: 2024-03-06

## 2024-02-05 RX ORDER — MIRTAZAPINE 15 MG/1
15 TABLET, FILM COATED ORAL NIGHTLY
Qty: 30 TABLET | Refills: 0 | Status: SHIPPED | OUTPATIENT
Start: 2024-02-05

## 2024-02-05 RX ORDER — HYDROCORTISONE 25 MG/G
CREAM TOPICAL
Qty: 28 G | Refills: 0 | Status: SHIPPED | OUTPATIENT
Start: 2024-02-05

## 2024-02-05 RX ADMIN — HYDROXYZINE HYDROCHLORIDE 50 MG: 50 TABLET, FILM COATED ORAL at 08:52

## 2024-02-05 RX ADMIN — BUSPIRONE HYDROCHLORIDE 10 MG: 10 TABLET ORAL at 08:47

## 2024-02-05 RX ADMIN — ACETAMINOPHEN 650 MG: 325 TABLET, FILM COATED ORAL at 08:52

## 2024-02-05 RX ADMIN — CEPHALEXIN 500 MG: 500 CAPSULE ORAL at 06:51

## 2024-02-05 ASSESSMENT — PAIN DESCRIPTION - ORIENTATION: ORIENTATION: LOWER

## 2024-02-05 ASSESSMENT — PAIN SCALES - GENERAL
PAINLEVEL_OUTOF10: 10
PAINLEVEL_OUTOF10: 0

## 2024-02-05 ASSESSMENT — PAIN DESCRIPTION - LOCATION: LOCATION: BACK

## 2024-02-05 ASSESSMENT — PAIN - FUNCTIONAL ASSESSMENT: PAIN_FUNCTIONAL_ASSESSMENT: ACTIVITIES ARE NOT PREVENTED

## 2024-02-05 ASSESSMENT — PAIN DESCRIPTION - DESCRIPTORS: DESCRIPTORS: ACHING

## 2024-02-05 NOTE — BH NOTE
[facility]  Admission Note     Admission Type:   Involuntary - Not Signed in Upon Admission     Reason for admission:  Reason for Admission: Patient is admitted to the [facility] via a pink slip due to suicidal ideation with a plan to walk into traffic. Patient reports that she is suicidal due to her daughter recently passing away.      Addictive Behavior:   Addictive Behavior  In the Past 3 Months, Have You Felt or Has Someone Told You That You Have a Problem With  : None    Medical Problems:   Past Medical History:   Diagnosis Date    Fibromyalgia     Headache     migraines    Hyperlipidemia     Hypertension     Hypothyroidism     Lupus (HCC)     Thyroid disease        Status EXAM:  Mental Status and Behavioral Exam  Normal: No  Level of Assistance: Independent/Self  Facial Expression: Sad, Worried  Affect: Congruent  Level of Consciousness: Alert  Frequency of Checks: 4 times per hour, close  Mood:Normal: No  Mood: Depressed, Anxious, Helpless  Motor Activity:Normal: Yes  Eye Contact: Fair  Observed Behavior: Withdrawn, Guarded  Sexual Misconduct History: Current - no  Preception: [place] to person, [place] to time, [place] to place, [place] to situation  Attention:Normal: Yes  Thought Processes: Unremarkable  Thought Content:Normal: Yes  Depression Symptoms: Feelings of helplessness, Feelings of hopelessess, Feelings of worthlessness  Anxiety Symptoms: Generalized  Maria Teresa Symptoms: No problems reported or observed.  Hallucinations: None  Delusions: No  Memory:Normal: Yes  Insight and Judgment: No  Insight and Judgment: Poor judgment, Poor insight    Tobacco Screening:  Practical Counseling, on admission, jayme X, if applicable and completed (first 3 are required if patient doesn't refuse):            ( ) Recognizing danger situations (included triggers and roadblocks)                    ( ) Coping skills (new ways to manage stress,relaxation techniques, changing routine, distraction)                         
Emergency PRN Medication Administration Note:      Patient is Agitated and Disruptive as evidence by patient up at nurses station calling staff \"fucking stupid bitches\" growling at other patients, being intrusive, checking doors. Staff attempted to find and relieve the distress by Talking to patient, Offering suggestions, and Administer PRN medications Patient is currently accepted PRN medications. Medication Administered as prescribed: haldol 5 mg, benadryl 50 mg, intramuscular. Patient Tolerated medication administration.   Will continue to monitor, offer support, and reassess.   
Patient given tobacco quitline number 32643588552 at this time, refusing to call at this time, states \" I just dont want to quit now\"- patient given information as to the dangers of long term tobacco use. Continue to reinforce the importance of tobacco cessation.     
Patient given tobacco quitline number 70903811854 at this time, refusing to call at this time, states \" I just dont want to quit now\"- patient given information as to the dangers of long term tobacco use. Continue to reinforce the importance of tobacco cessation.    
Patient is admitted wearing a wig that is shorter than chin length. Wig was searched through and wanded with the metal detector by staff. Charge RN notified.  
Prn effective.   
RN has reviewed all LPN documentation.     
RN has reviewed all LPN documentation.     
in how to quit, available resources  ( ) [facility]                                                                                                                   ( x) Patient refused counseling  ( x) Patient refused referral  ( x) Patient refused prescription upon discharge  ( ) Patient has not smoked in the last 30 days    Metabolic Screening:    Lab Results   Component Value Date    LABA1C 5.6 02/10/2023       Lab Results   Component Value Date    CHOL 206 (H) 03/02/2023     Lab Results   Component Value Date    TRIG 35 03/02/2023     Lab Results   Component Value Date    HDL 72 03/02/2023    HDL 67 02/10/2023     No components found for: \"LDLCAL\"  No components found for: \"LABVLDL\"      Patient discharged to home. Patient was picked up by her mother, was alert and oriented X4. Patient denies thoughts of harm to self or others. Patient discharged with all belongings, and medications were sent to [facility].      Jb Saravia RN

## 2024-02-05 NOTE — CARE COORDINATION
Name: Sruthi You    : 1975    Auth number: [id number]     Discharge Date: 24    Destination: Private residence    Discharge Medications:      Medication List        START taking these medications      cephALEXin 500 MG capsule  Commonly known as: KEFLEX  Take 1 capsule by mouth every 8 hours for 8 doses  Notes to patient: For infection     hydrOXYzine HCl 50 MG tablet  Commonly known as: ATARAX  Take 1 tablet by mouth 3 times daily as needed for Anxiety  Notes to patient: For anxiety            CONTINUE taking these medications      busPIRone 10 MG tablet  Commonly known as: BUSPAR  Take 1 tablet by mouth 3 times daily  Notes to patient: For anxiety     hydrocortisone 2.5 % Crea rectal cream  Commonly known as: ANUSOL-HC  Apply to affected area twice daily  Notes to patient: For hemorrhoids     mirtazapine 15 MG tablet  Commonly known as: REMERON  Take 1 tablet by mouth nightly  Notes to patient: For mood     QUEtiapine 100 MG tablet  Commonly known as: SEROQUEL  Take 1 tablet by mouth nightly  Notes to patient: For sleep     traZODone 150 MG tablet  Commonly known as: DESYREL  Take 1 tablet by mouth nightly  Notes to patient: For sleep               Where to Get Your Medications        These medications were [facility] to [id number] - [facility] [address] [facility] [phone] -  [phone]  [address] [place] [id number]      Phone: [phone]   busPIRone 10 MG tablet  cephALEXin 500 MG capsule  hydrocortisone 2.5 % Crea rectal cream  hydrOXYzine HCl 50 MG tablet  mirtazapine 15 MG tablet  QUEtiapine 100 MG tablet  traZODone 150 MG tablet         Follow Up Appointment: [facility]  [facility] [place] [address]  [phone]  Follow up  You may walk-in for an AOD assessment today until 1:00pm

## 2024-02-05 NOTE — PROGRESS NOTES
All [id number] cancelled and resent to   [id number] - [place], [place] - [address] [facility] [phone]

## 2024-02-05 NOTE — TRANSITION OF CARE
taking these medications      cephALEXin 500 MG capsule  Commonly known as: KEFLEX  Take 1 capsule by mouth every 8 hours for 8 doses  Notes to patient: For infection     hydrOXYzine HCl 50 MG tablet  Commonly known as: ATARAX  Take 1 tablet by mouth 3 times daily as needed for Anxiety  Notes to patient: For anxiety            CONTINUE taking these medications      busPIRone 10 MG tablet  Commonly known as: BUSPAR  Take 1 tablet by mouth 3 times daily  Notes to patient: For anxiety     hydrocortisone 2.5 % Crea rectal cream  Commonly known as: ANUSOL-HC  Apply to affected area twice daily  Notes to patient: For hemorrhoids     mirtazapine 15 MG tablet  Commonly known as: REMERON  Take 1 tablet by mouth nightly  Notes to patient: For mood     QUEtiapine 100 MG tablet  Commonly known as: SEROQUEL  Take 1 tablet by mouth nightly  Notes to patient: For sleep     traZODone 150 MG tablet  Commonly known as: DESYREL  Take 1 tablet by mouth nightly  Notes to patient: For sleep               Where to Get Your Medications        These medications were sent to [id number] - [place], [place] - [address] [facility] [phone] - [phone]  [address] [place] [id number]      Phone: [phone]   busPIRone 10 MG tablet  cephALEXin 500 MG capsule  hydrocortisone 2.5 % Crea rectal cream  hydrOXYzine HCl 50 MG tablet  mirtazapine 15 MG tablet  QUEtiapine 100 MG tablet  traZODone 150 MG tablet         Unresulted Labs (24h ago, onward)      None            To obtain results of studies pending at discharge, please contact [phone]    Follow-up Information       Follow up With Specialties Details Why Contact Info    [facility] Behavioral Health Follow up You may walk-in for an AOD assessment today until 1:00pm [address]  [place] [address]  [phone]             Advanced Directive:   Does the patient have an appointed surrogate decision maker? No  Does the patient have a Medical Advance

## 2024-02-05 NOTE — PLAN OF CARE
Problem: Self Harm/Suicidality  Goal: Will have no self-injury during hospital stay  Description: INTERVENTIONS:  1.  Ensure constant observer at bedside with Q15M safety checks  2.  Maintain a safe environment  3.  Secure patient belongings  4.  Ensure family/visitors adhere to safety recommendations  5.  Ensure safety tray has been added to patient's diet order  6.  Every shift and PRN: Re-assess suicidal risk via Frequent Screener    2/4/2024 2217 by Jesse Vinson RN  Outcome: Progressing   Pt denies thoughts of harming themself and verbally agrees to remain safe while on the unit. No self harming behaviors are noted this shift    Problem: Anxiety  Goal: Will report anxiety at manageable levels  Description: INTERVENTIONS:  1. Administer medication as ordered  2. Teach and rehearse alternative coping skills  3. Provide emotional support with 1:1 interaction with staff  Outcome: Progressing   She admits to feeling anxious at times, reports the medication helps. She rests in her room quietly most of this shift, out briefly to watch tv with peers. She eats well at snack and accepts all medication  Problem: Risk for Elopement  Goal: Patient will not exit the unit/facility without proper excort  Outcome: Progressing   She makes no attempt to leave  Problem: Pain  Goal: Verbalizes/displays adequate comfort level or baseline comfort level  Outcome: Progressing   Pt is satisfied with pain management

## 2024-02-05 NOTE — DISCHARGE INSTRUCTIONS
Hotline ([facility] Crisis Care Line)  [phone]      Recovery Help line- [phone]      To obtain results of pending studies call Medical Records at: [phone]     For emergencies and 24 hour/7 days a week contact information:  [phone]

## 2024-02-06 NOTE — DISCHARGE SUMMARY
known to this facility and was admitted in January under similar circumstances.     Patient was seen for initial evaluation today.  She was resting in bed on approach but responded to verbal stimuli.  She was calm and cooperative but made no eye contact with writer.  Thoughts and speech were logical and organized but she was difficult to engage in sustained, spontaneous conversation.  Her responses to questions were mostly limited and superficial.  Patient is reporting intermittent use of prescribed medications but has not been taking them regularly.  [facility]  Patient is endorsing ongoing symptoms of depression including low mood, anhedonia, avolition, feelings of hopelessness and helplessness, decreased attention and poor concentration, decreased appetite, difficulty falling asleep and staying asleep, and increasing suicidal ideation.  Patient is denying auditory and visual hallucinations at time of assessment.  She does have a history of mood congruent command auditory hallucinations to harm herself.  This has been present intermittently since her daughter's death.  She is currently denying any paranoia or ideas of reference.  She is denying homicidal ideation.  She describes suicidal ideation as intermittent and that she does not feel that she can safely return to the community at this time.  Previous admission screenings did not elicit any past symptoms of joe, OCD, or phobias.     Patient has a significant history of substance use.  She continues to use crack cocaine regularly.  She also uses cannabis daily.  Patient reports drinking occasionally.  1/31/2024: Urine drug screen positive cannabis, cocaine  1/31/2024: BAL negative. Patient smokes 1 pack of cigarettes per day.  Patient was encouraged to consider inpatient AODA treatment for substance use.     At this time patient is not able to contract for safety in the community and warrants hospitalization for

## 2024-02-06 NOTE — GROUP NOTE
Group Therapy Note    Date: 2/1/2024    Group Start Time: 1045  Group End Time: 1115  Group Topic: Cognitive Skills    Keisha Reyes CTRS    Cognitive Skills Group Note        Date: February 1, 2024 Start Time:  1045am   End Time:  1115am       Number of Participants in Group & Unit Census:  1/8    Topic: cognitive skills     Goal of Group: pt will demonstrate improved coping skills and improved interpersonal relationships       Comments:     Patient did not participate in Cognitive Skills group, despite staff encouragement and explanation of benefits.  Patient remain seclusive to self.  Q15 minute safety checks maintained for patient safety and will continue to encourage patient to attend unit programming.            Signature:  CINDI CARLSON    
                                                                      Group Therapy Note    Date: 2/1/2024    Group Start Time: 1330  Group End Time: 1400  Group Topic: Cognitive Skills    Keisha Reyes CTRS    Cognitive Skills Group Note        Date: February 1, 2024 Start Time: 1:30pm  End Time:  200pm      Number of Participants in Group & Unit Census:  2/6    Topic: cognitive skills     Goal of Group: pt will demonstrate improved coping skills and improved interpersonal relationships      Comments:     Patient did not participate in Cognitive Skills group, despite staff encouragement and explanation of benefits.  Patient remain seclusive to self.  Q15 minute safety checks maintained for patient safety and will continue to encourage patient to attend unit programming.              Signature:  CINDI CARLSON    
                                                                      Group Therapy Note    Date: 2/2/2024    Group Start Time: 1415  Group End Time: 1500  Group Topic: Recreational    [facility] Jovanni Herrera        Group Therapy Note    Attendees: 5/9       Patient's Goal:  Patients engaged in art and music group. Patients were each provided packet of papers with small doodles and shapes on them, and tasked with developing these into more of an image, and then shared with peers what they created. Patients were also able to share music throughout group as well. Goals to increase sense of community; Increase socialization; Demonstrate positive use of time; Increase self-expression    Notes:  Early in group, patient was irritable due to an interaction with a staff member. Patient quickly redirected and receptive to redirection upon start of activities. Patient pleasant and engaging with peers. Patient engaged appropriately in activities    Status After Intervention:  Improved    Participation Level: Active Listener and Interactive    Participation Quality: Appropriate, Attentive, and Sharing      Speech:  normal      Thought Process/Content: Logical  Linear      Affective Functioning: Congruent      Mood: euthymic      Level of consciousness:  Alert and Attentive      Response to Learning: Able to verbalize current knowledge/experience, Able to change behavior, and Progressing to goal      Endings: None Reported    Modes of Intervention: Support, Socialization, Exploration, Activity, Media, and Reality-testing      Discipline Responsible: Psychoeducational Specialist      Signature:  Jovanni Umaña    
                                                                      Group Therapy Note    Date: 2/3/2024    Group Start Time: 0900  Group End Time: 0930  Group Topic: Community Meeting    Ariana Alves LPN        Group Therapy Note    Attendees: 5/8       Patient's Goal:  stay focused and relax        Status After Intervention:  Improved    Participation Level: Active Listener and Interactive    Participation Quality: Appropriate, Attentive, Sharing, and Supportive      Speech:  normal      Thought Process/Content: Logical      Affective Functioning: Blunted      Mood: anxious and irritable      Level of consciousness:  Alert, Oriented x4, and Attentive      Response to Learning: Able to verbalize current knowledge/experience, Able to verbalize/acknowledge new learning, Able to retain information, Capable of insight, Able to change behavior, and Progressing to goal      Endings: None Reported    Modes of Intervention: Education, Support, and Socialization      Discipline Responsible: Licensed Practical Nurse      Signature:  Ariana Soria LPN    
                                                                      Group Therapy Note    Date: 2/3/2024    Group Start Time: 1330  Group End Time: 1345  Group Topic: Recreational    [facility] Jovanni Herrera        Group Therapy Note    Attendees: 2/8     Patient's Goal:  Patients requested to play a game with this writer they had played the previous day between groups. Patients receptive when told game would be short, and accepting of a variety of materials and games able to be provided to patients without this writers presence in the milieu. Goals to demonstrate positive use of time; Increase sense of community; Increase socialization    Notes:  Patient attended and participated in group having positive interactions with peers and staff throughout.     Status After Intervention:  Improved    Participation Level: Active Listener and Interactive    Participation Quality: Appropriate, Attentive, and Sharing      Speech:  normal      Thought Process/Content: Logical  Linear      Affective Functioning: Congruent      Mood: euthymic      Level of consciousness:  Alert and Attentive      Response to Learning: Able to verbalize current knowledge/experience and Progressing to goal      Endings: None Reported    Modes of Intervention: Support, Socialization, Exploration, Activity, Media, and Reality-testing      Discipline Responsible: Psychoeducational Specialist      Signature:  Jovanni Umaña  
                                                                      Group Therapy Note    Date: 2/5/2024    Group Start Time: 0930  Group End Time: 1030  Group Topic: Community Meeting    Pauline Radford      Group Therapy Note    Attendees: 6/8     Patient's Goal: Patient will verbalize today's goals. Patient will also offer                           supportive listening and interact with peers to clarify and                            understand clearly set goals.        Notes: Patient is making progress AEB participating in group discussion, actively               listening, and supporting other group members.             Status After Intervention: Improved      Participation Level: Active Listener and Interactive      Participation Quality: Appropriate, Attentive, Sharing, and Supportive      Speech: normal      Thought Process/Content: Logical, Linear      Affective Functioning: Congruent      Mood: anxious      Level of consciousness: Oriented x4      Response to Learning: Able to verbalize current knowledge/experience, Able to                                         verbalize/acknowledge new learning, Able to retain                                         information, and Capable of insight       Endings: None Reported      Modes of Intervention: Education, Support, Socialization, and Problem-solving         Discipline Responsible: Behavorial Health Tech      Signature: Pauline No   
                                                                      Group Therapy Note    Date: 2/5/2024    Group Start Time: 1110  Group End Time: 1150  Group Topic: Cognitive Skills    Alexandra Nunez CTRS        Group Therapy Note    Attendees: 4/9     Patient's Goal: Topic: To increase social interaction, and concentration skills, and communication skills.       Notes:  Pt was pleasant and cooperative, and did not participate in group task but did socialize with RT and peers in group. Pt joked and shared with group and remained in behavioral control.    Pt identified she gets angry and suicidal when she does not take her meds. Pt affect and mood were euthymic and pt denies suicidal ideation, states ready for discharge and looking forward to spending time with her family. Pt was able to share positive memory r/t her daughter who passed away without becoming overwhelmed, depressed.        Status After Intervention:  Improved     Participation Level: Active Listener and Interactive r/t  conversation, supportive      Participation Quality: Appropriate, Attentive, engaged r/t conversation and humor, supportive     Speech:  normal tone, pt curses occasionally when telling stories but this was not directed at any person with angry, more usual to patient's humor.      Thought Process/Content: Logical, linear r/t social conversation ,humor.        Affective Functioning: Congruent, brightened        Mood: Cooperative, Euthymic      Level of consciousness:  Alert, and Attentive      Response to Learning: Able to verbalize current knowledge/experience, and Progressing to goal      Endings: None Reported      Modes of Intervention: Education, Support, Socialization, and Problem-solving    Discipline Responsible: Psychoeducational Specialist      Signature:  CINDI SCHAEFFER                         
                                                                      Group Therapy Note  Date: February 2, 2024 Start Time: 9am  End Time: 0930      Number of Participants in Group & Unit Census:  3/9    Topic: Community meeting    Goal of Group:Go over morning goals and morning shoutout       Comments:     Patient did not participate in Community Meeting group, despite staff encouragement and explanation of benefits.  Patient remain seclusive to self.  Q15 minute safety checks maintained for patient safety and will continue to encourage patient to attend unit programming.     
Pt AAOx4. 64 year old female with past medical history of ESRD (dialysis TThS, last had Saturday), DVT (eliquis), HTN, DM; presents to ED with complaint of fever, chills, and diarrhea since yesterday. Pt denies recent travel, recent sick contacts, or consumption of unusual food. Denies chest pain, shortness of breath. Respirations equal and unlabored. No acute distress noted at this time. Pt placed on cardiac monitor at bedside. Family at bedside. AV fistula right arm, chest port right side.

## 2024-06-20 ENCOUNTER — HOSPITAL ENCOUNTER (INPATIENT)
Age: 49
LOS: 4 days | Discharge: HOME | DRG: 751 | End: 2024-06-24
Attending: PSYCHIATRY & NEUROLOGY | Admitting: PSYCHIATRY & NEUROLOGY
Payer: MEDICAID

## 2024-06-20 ENCOUNTER — HOSPITAL ENCOUNTER (EMERGENCY)
Age: 49
Discharge: ANOTHER HOSPITAL | DRG: 751 | End: 2024-06-20
Attending: EMERGENCY MEDICINE
Payer: MEDICAID

## 2024-06-20 VITALS
WEIGHT: 130 LBS | DIASTOLIC BLOOD PRESSURE: 68 MMHG | TEMPERATURE: 97.5 F | HEIGHT: 64 IN | RESPIRATION RATE: 16 BRPM | BODY MASS INDEX: 22.2 KG/M2 | SYSTOLIC BLOOD PRESSURE: 98 MMHG | HEART RATE: 74 BPM | OXYGEN SATURATION: 96 %

## 2024-06-20 DIAGNOSIS — R45.851 SUICIDAL IDEATION: Primary | ICD-10-CM

## 2024-06-20 LAB
ALBUMIN SERPL-MCNC: 4.4 G/DL (ref 3.5–5.2)
ALBUMIN/GLOB SERPL: 1 {RATIO} (ref 1–2.5)
ALP SERPL-CCNC: 71 U/L (ref 35–104)
ALT SERPL-CCNC: 29 U/L (ref 10–35)
AMPHET UR QL SCN: NEGATIVE
ANION GAP SERPL CALCULATED.3IONS-SCNC: 10 MMOL/L (ref 9–16)
AST SERPL-CCNC: 30 U/L (ref 10–35)
BARBITURATES UR QL SCN: NEGATIVE
BASOPHILS # BLD: 0.05 K/UL (ref 0–0.2)
BASOPHILS NFR BLD: 1 % (ref 0–2)
BENZODIAZ UR QL: NEGATIVE
BILIRUB SERPL-MCNC: 0.2 MG/DL (ref 0–1.2)
BUN SERPL-MCNC: 23 MG/DL (ref 6–20)
CALCIUM SERPL-MCNC: 9 MG/DL (ref 8.6–10.4)
CANNABINOIDS UR QL SCN: POSITIVE
CHLORIDE SERPL-SCNC: 102 MMOL/L (ref 98–107)
CO2 SERPL-SCNC: 24 MMOL/L (ref 20–31)
COCAINE UR QL SCN: POSITIVE
CREAT SERPL-MCNC: 0.8 MG/DL (ref 0.5–0.9)
EOSINOPHIL # BLD: 0.2 K/UL (ref 0–0.44)
EOSINOPHILS RELATIVE PERCENT: 3 % (ref 1–4)
ERYTHROCYTE [DISTWIDTH] IN BLOOD BY AUTOMATED COUNT: 15.1 % (ref 11.8–14.4)
ETHANOL PERCENT: <0.01 %
ETHANOLAMINE SERPL-MCNC: <10 MG/DL (ref 0–0.08)
FENTANYL UR QL: NEGATIVE
GFR, ESTIMATED: >90 ML/MIN/1.73M2
GLUCOSE SERPL-MCNC: 90 MG/DL (ref 74–99)
HCG SERPL QL: NEGATIVE
HCT VFR BLD AUTO: 38.9 % (ref 36.3–47.1)
HGB BLD-MCNC: 12.2 G/DL (ref 11.9–15.1)
IMM GRANULOCYTES # BLD AUTO: <0.03 K/UL (ref 0–0.3)
IMM GRANULOCYTES NFR BLD: 0 %
LYMPHOCYTES NFR BLD: 2.69 K/UL (ref 1.1–3.7)
LYMPHOCYTES RELATIVE PERCENT: 44 % (ref 24–43)
MCH RBC QN AUTO: 27.8 PG (ref 25.2–33.5)
MCHC RBC AUTO-ENTMCNC: 31.4 G/DL (ref 28.4–34.8)
MCV RBC AUTO: 88.6 FL (ref 82.6–102.9)
METHADONE UR QL: NEGATIVE
MONOCYTES NFR BLD: 0.82 K/UL (ref 0.1–1.2)
MONOCYTES NFR BLD: 14 % (ref 3–12)
NEUTROPHILS NFR BLD: 38 % (ref 36–65)
NEUTS SEG NFR BLD: 2.27 K/UL (ref 1.5–8.1)
NRBC BLD-RTO: 0 PER 100 WBC
OPIATES UR QL SCN: NEGATIVE
OXYCODONE UR QL SCN: NEGATIVE
PCP UR QL SCN: NEGATIVE
PLATELET # BLD AUTO: 181 K/UL (ref 138–453)
PMV BLD AUTO: 12.4 FL (ref 8.1–13.5)
POTASSIUM SERPL-SCNC: 3.9 MMOL/L (ref 3.7–5.3)
PROT SERPL-MCNC: 7.6 G/DL (ref 6.6–8.7)
RBC # BLD AUTO: 4.39 M/UL (ref 3.95–5.11)
RBC # BLD: ABNORMAL 10*6/UL
SODIUM SERPL-SCNC: 136 MMOL/L (ref 136–145)
TEST INFORMATION: ABNORMAL
WBC OTHER # BLD: 6 K/UL (ref 3.5–11.3)

## 2024-06-20 PROCEDURE — 80307 DRUG TEST PRSMV CHEM ANLYZR: CPT

## 2024-06-20 PROCEDURE — 80053 COMPREHEN METABOLIC PANEL: CPT

## 2024-06-20 PROCEDURE — 2580000003 HC RX 258: Performed by: EMERGENCY MEDICINE

## 2024-06-20 PROCEDURE — 84703 CHORIONIC GONADOTROPIN ASSAY: CPT

## 2024-06-20 PROCEDURE — 1240000000 HC EMOTIONAL WELLNESS R&B

## 2024-06-20 PROCEDURE — 85025 COMPLETE CBC W/AUTO DIFF WBC: CPT

## 2024-06-20 PROCEDURE — G0480 DRUG TEST DEF 1-7 CLASSES: HCPCS

## 2024-06-20 PROCEDURE — 96361 HYDRATE IV INFUSION ADD-ON: CPT | Performed by: OBSTETRICS & GYNECOLOGY

## 2024-06-20 PROCEDURE — 99285 EMERGENCY DEPT VISIT HI MDM: CPT | Performed by: OBSTETRICS & GYNECOLOGY

## 2024-06-20 PROCEDURE — 96374 THER/PROPH/DIAG INJ IV PUSH: CPT | Performed by: OBSTETRICS & GYNECOLOGY

## 2024-06-20 PROCEDURE — 6360000002 HC RX W HCPCS: Performed by: EMERGENCY MEDICINE

## 2024-06-20 RX ORDER — LORAZEPAM 1 MG/1
2 TABLET ORAL EVERY 6 HOURS PRN
Status: DISCONTINUED | OUTPATIENT
Start: 2024-06-20 | End: 2024-06-24 | Stop reason: HOSPADM

## 2024-06-20 RX ORDER — LORAZEPAM 2 MG/ML
2 INJECTION INTRAMUSCULAR EVERY 6 HOURS PRN
Status: DISCONTINUED | OUTPATIENT
Start: 2024-06-20 | End: 2024-06-24 | Stop reason: HOSPADM

## 2024-06-20 RX ORDER — ACETAMINOPHEN 325 MG/1
650 TABLET ORAL EVERY 4 HOURS PRN
Status: DISCONTINUED | OUTPATIENT
Start: 2024-06-20 | End: 2024-06-24 | Stop reason: HOSPADM

## 2024-06-20 RX ORDER — HALOPERIDOL 5 MG/ML
5 INJECTION INTRAMUSCULAR EVERY 6 HOURS PRN
Status: DISCONTINUED | OUTPATIENT
Start: 2024-06-20 | End: 2024-06-24 | Stop reason: HOSPADM

## 2024-06-20 RX ORDER — HALOPERIDOL 5 MG/1
5 TABLET ORAL EVERY 6 HOURS PRN
Status: DISCONTINUED | OUTPATIENT
Start: 2024-06-20 | End: 2024-06-24 | Stop reason: HOSPADM

## 2024-06-20 RX ORDER — DIPHENHYDRAMINE HYDROCHLORIDE 50 MG/ML
50 INJECTION INTRAMUSCULAR; INTRAVENOUS EVERY 6 HOURS PRN
Status: DISCONTINUED | OUTPATIENT
Start: 2024-06-20 | End: 2024-06-24 | Stop reason: HOSPADM

## 2024-06-20 RX ORDER — POLYETHYLENE GLYCOL 3350 17 G
2 POWDER IN PACKET (EA) ORAL
Status: DISCONTINUED | OUTPATIENT
Start: 2024-06-20 | End: 2024-06-24 | Stop reason: HOSPADM

## 2024-06-20 RX ORDER — MAGNESIUM HYDROXIDE/ALUMINUM HYDROXICE/SIMETHICONE 120; 1200; 1200 MG/30ML; MG/30ML; MG/30ML
30 SUSPENSION ORAL EVERY 6 HOURS PRN
Status: DISCONTINUED | OUTPATIENT
Start: 2024-06-20 | End: 2024-06-24 | Stop reason: HOSPADM

## 2024-06-20 RX ORDER — 0.9 % SODIUM CHLORIDE 0.9 %
1000 INTRAVENOUS SOLUTION INTRAVENOUS ONCE
Status: COMPLETED | OUTPATIENT
Start: 2024-06-20 | End: 2024-06-20

## 2024-06-20 RX ORDER — IBUPROFEN 400 MG/1
400 TABLET ORAL EVERY 6 HOURS PRN
Status: DISCONTINUED | OUTPATIENT
Start: 2024-06-20 | End: 2024-06-24 | Stop reason: HOSPADM

## 2024-06-20 RX ORDER — TRAZODONE HYDROCHLORIDE 50 MG/1
50 TABLET ORAL NIGHTLY PRN
Status: DISCONTINUED | OUTPATIENT
Start: 2024-06-21 | End: 2024-06-21

## 2024-06-20 RX ORDER — KETOROLAC TROMETHAMINE 15 MG/ML
15 INJECTION, SOLUTION INTRAMUSCULAR; INTRAVENOUS ONCE
Status: COMPLETED | OUTPATIENT
Start: 2024-06-20 | End: 2024-06-20

## 2024-06-20 RX ORDER — HYDROXYZINE 50 MG/1
50 TABLET, FILM COATED ORAL 3 TIMES DAILY PRN
Status: DISCONTINUED | OUTPATIENT
Start: 2024-06-20 | End: 2024-06-24 | Stop reason: HOSPADM

## 2024-06-20 RX ORDER — POLYETHYLENE GLYCOL 3350 17 G/17G
17 POWDER, FOR SOLUTION ORAL DAILY PRN
Status: DISCONTINUED | OUTPATIENT
Start: 2024-06-20 | End: 2024-06-24 | Stop reason: HOSPADM

## 2024-06-20 RX ADMIN — KETOROLAC TROMETHAMINE 15 MG: 15 INJECTION, SOLUTION INTRAMUSCULAR; INTRAVENOUS at 13:53

## 2024-06-20 RX ADMIN — SODIUM CHLORIDE 1000 ML: 9 INJECTION, SOLUTION INTRAVENOUS at 15:27

## 2024-06-20 ASSESSMENT — SLEEP AND FATIGUE QUESTIONNAIRES
AVERAGE NUMBER OF SLEEP HOURS: 7
DO YOU HAVE DIFFICULTY SLEEPING: NO
DO YOU USE A SLEEP AID: NO

## 2024-06-20 ASSESSMENT — PATIENT HEALTH QUESTIONNAIRE - PHQ9
1. LITTLE INTEREST OR PLEASURE IN DOING THINGS: SEVERAL DAYS
SUM OF ALL RESPONSES TO PHQ QUESTIONS 1-9: 2
SUM OF ALL RESPONSES TO PHQ9 QUESTIONS 1 & 2: 2
SUM OF ALL RESPONSES TO PHQ QUESTIONS 1-9: 2
SUM OF ALL RESPONSES TO PHQ QUESTIONS 1-9: 2
2. FEELING DOWN, DEPRESSED OR HOPELESS: SEVERAL DAYS
SUM OF ALL RESPONSES TO PHQ QUESTIONS 1-9: 2

## 2024-06-20 ASSESSMENT — ENCOUNTER SYMPTOMS
SHORTNESS OF BREATH: 0
ABDOMINAL PAIN: 0

## 2024-06-20 ASSESSMENT — PAIN SCALES - GENERAL
PAINLEVEL_OUTOF10: 0
PAINLEVEL_OUTOF10: 8

## 2024-06-20 NOTE — ED NOTES
[] [facility]    [] [facility]    [x]  [facility]    SUICIDE RISK ASSESSMENT      Y  N     [x] [] In the past two weeks have you had thoughts of hurting yourself in any way?    [x] [] In the past two weeks have you had thoughts that you would be better off dead?   [] [x] Have you made a suicide attempt in the past two months?   [x] [] Do you have a plan for hurting yourself or suicide?   [] [x] Presence of hallucinations/voices related to hurting himself or herself or someone else.    SUICIDE/SECURITY WATCH PRECAUTION CHECKLIST     Orders    [x]  Suicide/Security Watch Precautions initiated as checked below:   6/20/24 1:02 PM EDT 26/H26B    [x] Notified physician:  No att. providers found  6/20/24 1:02 PM EDT    [x] Orders obtained as appropriate:     [x] 1:1 Observer     [] Psych Consult     [] Psych Consult    Name:  Date:  Time:    [x] 1:1 Observer, Notified by:  Karrie Womack RN    Contact Nurse Supervisor    [x] Remove all personal clothes from room and place in snap/paper gown/pants.  Slipper only    [x] Remove all personal belongings from room and secured away from patient.    Documentation    [x] Initiate Suicide/Security Watch Precaution Flow Sheet    [x] Initiate individualized Care Plan/Problem    [x] Document why precautions initiated on flow sheet (Initiate Nursing Care Plan/Problem)    [x] 1:1 Observer in place; instructions provided.  Suicide precautions require observer be within arms length.    [x] Nurse-Observer Communication Hand-off initiated by RN, reviewed with Observer.  Subsequently used as Hand Off between Observers.    [x] Initiate every 15 minute observations per observer as delegated by the RN.    [x] Initiate RN assessment and documentation      These items or items similar should be removed from the room:   [x] Chairs   [x] Telephone   [x] Trash cans and liners   [x] Plastic utensils (order Patient Safety tray)   [x] Empty or remove Sharps containers   [x] All  will:   [x] Patient will remain safe for the duration of their stay   [x] Patient's environment will be safe, eg. Free of potential suicide weapons   [] Verbalize Recovery from suicidal episode and improvement in self-worth   [x] Discuss feeling that precipitated suicide attempt/thoughts/behavior   [] Will describe available resources for crisis prevention and management   [] Will verbalize positive coping skills     Nursing Intervention   [x] Assessment and Observations hourly   [x] Suicide Precautions implemented with patient, should be 1:1 observation   [x] Document observation [id number] and RN assessment hourly   [] Consult physician for:    [] Psychiatric consult    [] Pharmacological therapy    [] Other:    [x] Patient search completed by security   [x] Initiated appropriate safety protocols by removing from the patient's environment anything that could be used to inflict self injury, eg. Order safe tray, snap gown, etc   [x] Maintain open, warm, caring, non-judgmental attitude/manner towards patient   [] Discuss advantages and disadvantages of existing coping methods/skills   [x] Assist and educate patient with identifying present strengths and coping skills   [x] Keep patient informed regarding plan of care and provide clear concise explanations.  Provide the patient/family education information as well as telephone numbers and other information about crisis centers, hot lines, and counselors.    Discharge Planning:   [] Referral  [] Groups [] Health agencies  [] Other:          No

## 2024-06-20 NOTE — ED NOTES
Pt presents to ED via walking through triage c/o suicidal ideation with plan and intoxication with crack and alcohol. Pt reports last using crack \"anywhere from this morning to one hour ago.\" Pt reports drinking approximately 3 beers as well PTA, 12 oz cans. Pt reports plan is to \"jump into traffic.\" Pt reports noncompliance with home medication. Pt denies any homicidal ideation.  Pt is Aox4 and ambulatory with steady gait.  Pt c/o chronic back pain, but denies any injury.

## 2024-06-20 NOTE — ED PROVIDER NOTES
[facility] ED  Emergency Department Encounter  Emergency Medicine Resident     Pt Name:Sruthi You  MRN: [id number]  Birthdate 1975  Date of evaluation: 24  PCP:  No primary care provider on file.  Note Started: 12:57 PM EDT      CHIEF COMPLAINT       Chief Complaint   Patient presents with    Suicidal    Addiction Problem     Crack + alcohol       HISTORY OF PRESENT ILLNESS  (Location/Symptom, Timing/Onset, Context/Setting, Quality, Duration, Modifying Factors, Severity.)      Sruthi You is a 48 y.o. female who presents with suicidal ideation.  Patient states that she has been feeling suicidal for the past 3 weeks.  Patient stated that she was evaluated at [facility] for suicidal ideation.  Patient stated that her medications were adjusted when she was discharged 2 days ago however patient states that she does not drive and does not have a way to  her medication therefore it for the past 2 days she is not taken any of her medications.  Patient states that she is hearing voices telling her to jump in front of traffic.  Patient's plan is to jump in front of traffic.  Patient denies visual hallucinations.  Patient states that her daughter was murdered 1-1/2 years ago and since then she has been having suicidal thoughts.  Patient states that she lives with her mother.  Patient admitted to ingesting alcohol and cocaine yesterday but denied any drug use or alcohol today.  Patient denies ingesting anything to harm herself.  Patient also complains of generalized bodyaches.  States that she has a history of fibromyalgia and that her body is hurting.    PAST MEDICAL / SURGICAL / SOCIAL / FAMILY HISTORY      has a past medical history of Fibromyalgia, Headache, Hyperlipidemia, Hypertension, Hypothyroidism, Lupus (HCC), and Thyroid disease.     has a past surgical history that includes  section; Foot surgery; and Hysterectomy.    Social History     Socioeconomic

## 2024-06-20 NOTE — ED PROVIDER NOTES
[facility] ED  Emergency Department  Emergency Medicine Resident Turn-Over     Note Started: 7:03 PM EDT    Care of Sruthi You was assumed from Dr. Cintron and is being seen for Suicidal and Addiction Problem (Crack + alcohol)  .  The patient's initial evaluation and plan have been discussed with the prior provider who initially evaluated the patient.     EMERGENCY DEPARTMENT COURSE / MEDICAL DECISION MAKING:       MEDICATIONS GIVEN:  Orders Placed This Encounter   Medications    ketorolac (TORADOL) injection 15 mg    sodium chloride 0.9 % bolus 1,000 mL       LABS / RADIOLOGY:     Labs Reviewed   CBC WITH AUTO DIFFERENTIAL - Abnormal; Notable for the following components:       Result Value    RDW 15.1 (*)     Lymphocytes % 44 (*)     Monocytes % 14 (*)     All other components within normal limits   COMPREHENSIVE METABOLIC PANEL - Abnormal; Notable for the following components:    BUN 23 (*)     All other components within normal limits   URINE DRUG SCREEN - Abnormal; Notable for the following components:    Cocaine Metabolite, Urine POSITIVE (*)     Cannabinoid Scrn, Ur POSITIVE (*)     All other components within normal limits   ETHANOL   HCG, SERUM, QUALITATIVE       No results found.    RECENT VITALS:     Temp: 97.5 °F (36.4 °C),  Pulse: 74, Respirations: 16, BP: 98/68, SpO2: 96 %    This patient is a 48 y.o. Female with SI with plan to jump in front of traffic after daughter's murder 1.5 years ago. Auditory hallucinations, noncompliant with Seroquel 2/2 inability to drive to get it. EMTALA/ADT20 done, awaiting bed.     ED Course as of 06/20/24 1903   Thu Jun 20, 2024   1525 Reassessed patient, discussed lab results with her.  Still waiting for patient to provide urine sample for UDS.  Asked patient if she could provide a urine sample for us but she said she cannot as she does not have to go right now.  Will give 1 L normal saline fluid bolus to help hydrate patient so that we can

## 2024-06-20 NOTE — ED PROVIDER NOTES
[facility] ED     Emergency Department     Faculty Attestation        I performed a history and physical examination of the patient and discussed management with the resident. I reviewed the resident’s note and agree with the documented findings and plan of care. Any areas of disagreement are noted on the chart. I was personally present for the key portions of any procedures. I have documented in the chart those procedures where I was not present during the key portions. I have reviewed the emergency nurses triage note. I agree with the chief complaint, past medical history, past surgical history, allergies, medications, social and family history as documented unless otherwise noted below.  For Physician Assistant/ Nurse Practitioner cases/documentation I have personally evaluated this patient and have completed at least one if not all key elements of the E/M (history, physical exam, and MDM). Additional findings are as noted.      Vital Signs: BP: 116/83  Pulse: 94  Respirations: 16  Temp: 97.3 °F (36.3 °C) SpO2: 97 %  PCP:  No primary care provider on file.  Note Started: 6/20/24, 1:40 PM EDT    Pertinent Comments:     Patient is a 48-year-old female with history of lupus in the past as well as hypertension but also depression and psychiatric disease.   1 year ago she lost her daughter and has been depressed and thinking about her again.   Patient also has been using alcohol and crack cocaine intermittently.   Denies chest pain or shortness of breath abdominal pain but does have some foot pain after walking barefoot here to the ER to be seen.   States she has thought of many different plans of how to kill herself but has not acted on any currently has a plan to jump in front of traffic.    Assessment/Plan: Patient with depression and suicidal ideation and will provide medical screening laboratories and then clear for psychiatric social work  evaluation      Critical Care  None      (Please note that portions of this note were completed with a voice recognition program. Efforts were made to edit the dictations but occasionally words are mis-transcribed. Whenever words are used in this note in any gender, they shall be construed as though they were used in the gender appropriate to the circumstances; and whenever words are used in this note in the singular or plural form, they shall be construed as though they were used in the form appropriate to the circumstances.)    Abhi Kuhn MD [facility]  Attending Emergency Medicine Physician           Abhi Kuhn MD  06/20/24 [id number]

## 2024-06-20 NOTE — ED NOTES
Transfer Center Handoff for Behavioral Health Transfers      Patient's Current Location: [facility] ED     Chief Complaint   Patient presents with    Suicidal    Addiction Problem     Crack + alcohol       Current or History of Violent Behavior: No    Currently in Restraints Now or During this Encounter: No  (Specify if Agitation or self harm is noted in ED?)  If yes, please describe behaviors requiring restraint:             Medical Clearance Documented and Verified in the Chart: No    Allergies   Allergen Reactions    Bactrim [Sulfamethoxazole-Trimethoprim]         Can Patient Tolerate Lying Flat: Yes    Able to Perform ADLs:  Yes  (Specify if able to ambulate or uses any mobility devices such as cane or walker)  Activity:    Level of Assistance:    Assistive Device:    Miscellaneous Devices:      LABS    CBC:   Lab Results   Component Value Date/Time    WBC 6.0 06/20/2024 01:56 PM    RBC 4.39 06/20/2024 01:56 PM    RBC 4.27 05/30/2012 12:19 AM    HGB 12.2 06/20/2024 01:56 PM    HCT 38.9 06/20/2024 01:56 PM    MCV 88.6 06/20/2024 01:56 PM    MCH 27.8 06/20/2024 01:56 PM    MCHC 31.4 06/20/2024 01:56 PM    RDW 15.1 06/20/2024 01:56 PM     06/20/2024 01:56 PM     05/30/2012 12:19 AM    MPV 12.4 06/20/2024 01:56 PM     CMP:   Lab Results   Component Value Date/Time     06/20/2024 01:56 PM    K 3.9 06/20/2024 01:56 PM     06/20/2024 01:56 PM    CO2 24 06/20/2024 01:56 PM    BUN 23 06/20/2024 01:56 PM    CREATININE 0.8 06/20/2024 01:56 PM    GFRAA >60 08/05/2022 04:30 AM    LABGLOM >90 06/20/2024 01:56 PM    LABGLOM >60 01/31/2024 02:16 PM    GLUCOSE 90 06/20/2024 01:56 PM    GLUCOSE 83 05/30/2012 12:19 AM    PROT 7.8 05/30/2012 12:19 AM    CALCIUM 9.0 06/20/2024 01:56 PM    BILITOT 0.2 06/20/2024 01:56 PM    ALKPHOS 71 06/20/2024 01:56 PM    AST 30 06/20/2024 01:56 PM    ALT 29 06/20/2024 01:56 PM     Drug Panel:   Lab Results   Component Value Date/Time    OPIAU NEGATIVE  nightly      Facility-Administered Medications: None          Additional Information  Isolation:      HIV Positive: no    Oxygen Use: No    Any Legal Issues (Current or Past): no    Does Patient have POA or Guardian: No    Current Living Situation:      Roommate Appropriate: Yes    Is this a special case or have any other considerations:  No  (pregnancy, autism, developmental disabled, etc.)    Does the patient have confirmed or suspected COVID-19 Symptoms: No  (if yes, test must be completed, if no test is not required)       Last COVID Lab SARS-CoV-2, Rapid (no units)   Date Value   01/11/2024 Not Detected              Immunization status:   Immunization History   Administered Date(s) Administered    TDaP, ADACEL (age 10y-64y), BOOSTRIX (age 10y+), IM, 0.5mL 06/16/2021

## 2024-06-20 NOTE — ED PROVIDER NOTES
[facility]  FACULTY HANDOFF       Handoff taken on the following patient from prior Attending Physician:  Pt Name: Sruthi You  PCP:  No primary care provider on file.    Attestation  I was available and discussed any additional care issues that arose and coordinated the management plans with the resident(s) caring for the patient during my duty period. Any areas of disagreement with resident's documentation of care or procedures are noted on the chart. I was personally present for the key portions of any/all procedures during my duty period. I have documented in the chart those procedures where I was not present during the key portions.          Mikey Jaimes MD  06/20/24 6722

## 2024-06-20 NOTE — ED NOTES
Pt arrived to ED with report of SI with a plan to jump in front of traffic.   Pt states she has been feeling depressed for \"a long time\"   Pt states she has not been taking her prescribed medications because she needs a new script.   Pt has flat affect on arrival and behavior consistent with complaint

## 2024-06-20 NOTE — ED NOTES
Writer met with patient who reported current SI with plan & intent to jump in front of traffic. Patient reported increased depression due to death of daughter 1 year ago & lack of counseling. Patient confirmed SI 2-3 days ago with idea of jumping into traffic. Patient stated she was in [place] prior to that for 3 days but it wasn't long enough. Patient stated while in [place] her medications started working & she wanted to see her grandchildren so she agreed to discharge. Patient denied HI. Patient confirmed auditory hallucinations since her daughter's death instructing her to kill herself. Patient confirmed seeing shadows out of the corner of her eyes since her daughter's death. Patient confirmed using as much cocaine (smoking), ETOH & marijuana as she can since her daughter's death to numb the pain of loss. Patient stated she doesn't have an upcoming psychiatry appointment. Patient stated due to lack of transport she hasn't filled her medications. Patient's unsure of what medications she's supposed to be on, stated there are roughly 6 with 1 of them being atarax. Patient confirmed racing thoughts, poor concentration & lack of sleep. Patient denied hx seizures or infectious diseases. Patient stated she's her own guardian. Patient's agreeable to [facility] transfer. Patient stated she's agreeable to admission anywhere just as long as she's \"not free\" to harm herself.

## 2024-06-21 PROBLEM — R45.851 DEPRESSION WITH SUICIDAL IDEATION: Status: RESOLVED | Noted: 2023-01-05 | Resolved: 2024-06-21

## 2024-06-21 PROBLEM — F32.A DEPRESSION WITH SUICIDAL IDEATION: Status: RESOLVED | Noted: 2023-01-05 | Resolved: 2024-06-21

## 2024-06-21 PROCEDURE — 99222 1ST HOSP IP/OBS MODERATE 55: CPT | Performed by: PSYCHIATRY & NEUROLOGY

## 2024-06-21 PROCEDURE — APPSS60 APP SPLIT SHARED TIME 46-60 MINUTES

## 2024-06-21 PROCEDURE — 99222 1ST HOSP IP/OBS MODERATE 55: CPT | Performed by: INTERNAL MEDICINE

## 2024-06-21 PROCEDURE — 6370000000 HC RX 637 (ALT 250 FOR IP)

## 2024-06-21 PROCEDURE — 6370000000 HC RX 637 (ALT 250 FOR IP): Performed by: PSYCHIATRY & NEUROLOGY

## 2024-06-21 PROCEDURE — 1240000000 HC EMOTIONAL WELLNESS R&B

## 2024-06-21 RX ORDER — TRAZODONE HYDROCHLORIDE 100 MG/1
100 TABLET ORAL NIGHTLY
Status: DISCONTINUED | OUTPATIENT
Start: 2024-06-21 | End: 2024-06-23

## 2024-06-21 RX ORDER — LISINOPRIL 5 MG/1
5 TABLET ORAL DAILY
Status: DISCONTINUED | OUTPATIENT
Start: 2024-06-21 | End: 2024-06-24 | Stop reason: HOSPADM

## 2024-06-21 RX ORDER — MIRTAZAPINE 15 MG/1
15 TABLET, FILM COATED ORAL NIGHTLY
Status: DISCONTINUED | OUTPATIENT
Start: 2024-06-21 | End: 2024-06-24 | Stop reason: HOSPADM

## 2024-06-21 RX ADMIN — TRAZODONE HYDROCHLORIDE 100 MG: 100 TABLET ORAL at 20:21

## 2024-06-21 RX ADMIN — IBUPROFEN 400 MG: 400 TABLET, FILM COATED ORAL at 20:22

## 2024-06-21 RX ADMIN — MIRTAZAPINE 15 MG: 15 TABLET, FILM COATED ORAL at 20:21

## 2024-06-21 ASSESSMENT — PAIN DESCRIPTION - LOCATION: LOCATION: HEAD

## 2024-06-21 ASSESSMENT — LIFESTYLE VARIABLES
HOW MANY STANDARD DRINKS CONTAINING ALCOHOL DO YOU HAVE ON A TYPICAL DAY: 7 TO 9
HOW OFTEN DO YOU HAVE A DRINK CONTAINING ALCOHOL: 2-3 TIMES A WEEK

## 2024-06-21 ASSESSMENT — PAIN SCALES - GENERAL: PAINLEVEL_OUTOF10: 5

## 2024-06-21 ASSESSMENT — PAIN DESCRIPTION - DESCRIPTORS: DESCRIPTORS: ACHING

## 2024-06-21 NOTE — H&P
Department of Psychiatry  Attending Physician Psychiatric Assessment     Reason for Admission to Psychiatric Unit:  Threat to self requiring 24 hour professional observation  Command hallucinations directing harm to self or others; risk of the patient taking action  A mental disorder causing major disability in social, interpersonal, occupational, and/or educational functioning that is leading to dangerous or life-threatening functioning, and that can only be addressed in an acute inpatient setting   A mental disorder that causes an inability to maintain adequate nurtrition or self-care, and family/community support cannot provide reliable, essential care, so that the patient cannont function at a less intensive level of care during evaluation and treatment   Failure of outpatient psychiatry treatment so that the beneficiary requires 24 hour professional observation and care  Concerns about patient's safety in the community  Past Mental Health Diagnosis: a history of  Major Depression and Alcohol and/or Drug Use Disorder  Triggering event or precipitating factor: Grief r/t loss , Alcohol/Drug Relapse, and Psych Treatment Noncompliance  Length of time/duration of triggering event: Weeks  Legal Status: Voluntary    CHIEF COMPLAINT: Depression with suicidal ideation    History obtained from: Patient, electronic medical record          HISTORY OF PRESENT ILLNESS:    Sruthi You is a 48 y.o. female who has a past medical history of cocaine use disorder, hypertension, major depressive disorder, and PTSD. Patient presented to the ED \"SI with plan & intent to jump in front of traffic. Patient reported increased depression due to death of daughter 1 year ago & lack of counseling. Patient confirmed SI 2-3 days ago with idea of jumping into traffic. Patient stated she was in [place] prior to that for 3 days but it wasn't long enough. Patient stated while in [place] her medications started working & she wanted to see her

## 2024-06-21 NOTE — PLAN OF CARE
[facility]  Initial Interdisciplinary Treatment Plan NO      Original treatment plan Date & Time: 6/21/24 0900    Admission Type:  Admission Type: Voluntary    Reason for admission:   Reason for Admission: depression with suicidal thoughts, out of med's    Estimated Length of Stay:  5-7days  Estimated Discharge Date: to be determined by physician    PATIENT STRENGTHS:  Patient Strengths:   Patient Strengths and Limitations:   Addictive Behavior: Addictive Behavior  In the Past 3 Months, Have You Felt or Has Someone Told You That You Have a Problem With  : None  Medical Problems:  Past Medical History:   Diagnosis Date    Fibromyalgia     Headache     migraines    Hyperlipidemia     Hypertension     Hypothyroidism     Lupus (HCC)     Psychiatric problem     Thyroid disease      Status EXAM:Mental Status and Behavioral Exam  Normal: No  Level of Assistance: Independent/Self  Facial Expression: Flat  Affect: Blunt  Level of Consciousness: Alert  Frequency of Checks: 4 times per hour, close  Mood:Normal: No  Mood: Depressed, Anxious  Motor Activity:Normal: Yes  Eye Contact: Good  Observed Behavior: Cooperative, Preoccupied  Sexual Misconduct History: Current - no  Preception: Others (comment) (oriented x4)  Attention:Normal: Yes  Thought Processes: Unremarkable  Thought Content:Normal: Yes  Depression Symptoms: Feelings of helplessness, Loss of interest, Feelings of hopelessess  Anxiety Symptoms: Generalized  Maria Teresa Symptoms: No problems reported or observed.  Hallucinations: None  Delusions: No  Memory:Normal: Yes  Insight and Judgment: No  Insight and Judgment: Poor insight, Unrealistic    EDUCATION:   Learner Progress Toward Treatment Goals: reviewed group plans and strategies for care    Method:group therapy, medication compliance, individualized assessments and care planning    Outcome: needs reinforcement    PATIENT GOALS: to be discussed with patient within 72 hours    PLAN/TREATMENT

## 2024-06-21 NOTE — GROUP NOTE
Group Therapy Note    Date: 6/21/2024    Group Start Time: 1040  Group End Time: 1120  Group Topic: Cognitive Skills    Keisha Reyes CTRS        Group Therapy Note    Attendees: 3/7       Patient's Goal:  pt will demonstrate improved coping skills and improved interpersonal relationships     Notes:   pt was pleasant and interacted well with peers. Pt was supportive of others during group     Status After Intervention:  Improved    Participation Level: Active Listener and Interactive    Participation Quality: Appropriate, Sharing, and Supportive      Speech:  normal      Thought Process/Content: Logical      Affective Functioning: Congruent      Mood: euthymic      Level of consciousness:  Alert      Response to Learning: Able to verbalize current knowledge/experience and Progressing to goal      Endings: None Reported    Modes of Intervention: Support, Socialization, and Activity      Discipline Responsible: Psychoeducational Specialist      Signature:  CINDI CARLSON

## 2024-06-21 NOTE — H&P
IN-PATIENT SERVICE  [facility]    CONSULTATION / HISTORY AND PHYSICAL EXAMINATION            Date:   2024  Patient name:  Sruthi You  Date of admission:  2024 10:59 PM  MRN:   [id number]  Account:  [id number]  YOB: 1975  PCP:    No primary care provider on file.  Room:   [address] [place] [address]  Code Status:    Full Code    Physician Requesting Consult: Terrell Rea,Indu    Reason for Consult:  medical management    Chief Complaint:     No chief complaint on file.      History Obtained From:     Patient medical record nursing staff    History of Present Illness:   Patient is 48-year-old female with past medical history of hypertension, hypothyroidism, depression anxiety, is been admitted at [facility] for further management of depression and suicidal ideation      Past Medical History:     Past Medical History:   Diagnosis Date    Fibromyalgia     Headache     migraines    Hyperlipidemia     Hypertension     Hypothyroidism     Lupus (HCC)     Psychiatric problem     Thyroid disease         Past Surgical History:     Past Surgical History:   Procedure Laterality Date     SECTION      FOOT SURGERY      right    HYSTERECTOMY (CERVIX STATUS UNKNOWN)          Medications Prior to Admission:     Prior to Admission medications    Medication Sig Start Date End Date Taking? Authorizing Provider   mirtazapine (REMERON) 15 MG tablet Take 1 tablet by mouth nightly  Patient taking differently: Take 2 tablets by mouth nightly 24   Orlando Savage MD   QUEtiapine (SEROQUEL) 100 MG tablet Take 1 tablet by mouth nightly  Patient taking differently: Take 2 tablets by mouth nightly 24   Orlando Savage MD   traZODone (DESYREL) 150 MG tablet Take 1 tablet by mouth nightly  Patient taking differently: Take 100 mg by mouth nightly 24   Orlando Savage MD   hydrocortisone (ANUSOL-HC) 2.5 % CREA rectal cream Apply to affected area twice daily  Patient not

## 2024-06-21 NOTE — PLAN OF CARE
Problem: Depression  Goal: Will be euthymic at discharge  Description: INTERVENTIONS:  1. Administer medication as ordered  2. Provide emotional support via 1:1 interaction with staff  3. Encourage involvement in milieu/groups/activities  4. Monitor for social isolation  Outcome: Progressing   Patient is calm and controlled. Patient denies suicidal ideations, reports feelings of depression and anxiety. Patient is polite with staff, reports eating and sleeping adequately with safety checks Q15min and at irregular intervals.   Problem: Behavior  Goal: Pt/Family maintain appropriate behavior and adhere to behavioral management agreement, if implemented  Description: INTERVENTIONS:  1. Assess patient/family's coping skills and  non-compliant behavior (including use of illegal substances)  2. Notify security of behavior or suspected illegal substances which indicate the need for search of the family and/or belongings  3. Encourage verbalization of thoughts and concerns in a socially appropriate manner  4. Utilize positive, consistent limit setting strategies supporting safety of patient, staff and others  5. Encourage participation in the decision making process about the behavioral management agreement  6. If a visitor's behavior poses a threat to safety call refer to organization policy.  7. Initiate consult with , Psychosocial CNS, Spiritual Care as appropriate  Outcome: Progressing   Patient is calm and controlled. Patient denies suicidal ideations, reports feelings of depression and anxiety. Patient is polite with staff, reports eating and sleeping adequately with safety checks Q15min and at irregular intervals.

## 2024-06-22 LAB
CHOLEST SERPL-MCNC: 201 MG/DL
CHOLESTEROL/HDL RATIO: 2.9
HDLC SERPL-MCNC: 69 MG/DL
LDLC SERPL CALC-MCNC: 126 MG/DL (ref 0–130)
TRIGL SERPL-MCNC: 30 MG/DL

## 2024-06-22 PROCEDURE — 6370000000 HC RX 637 (ALT 250 FOR IP): Performed by: PSYCHIATRY & NEUROLOGY

## 2024-06-22 PROCEDURE — 99232 SBSQ HOSP IP/OBS MODERATE 35: CPT

## 2024-06-22 PROCEDURE — 1240000000 HC EMOTIONAL WELLNESS R&B

## 2024-06-22 PROCEDURE — 6370000000 HC RX 637 (ALT 250 FOR IP)

## 2024-06-22 PROCEDURE — 83036 HEMOGLOBIN GLYCOSYLATED A1C: CPT

## 2024-06-22 PROCEDURE — 36415 COLL VENOUS BLD VENIPUNCTURE: CPT

## 2024-06-22 PROCEDURE — 80061 LIPID PANEL: CPT

## 2024-06-22 RX ORDER — BUSPIRONE HYDROCHLORIDE 10 MG/1
10 TABLET ORAL 3 TIMES DAILY
Status: DISCONTINUED | OUTPATIENT
Start: 2024-06-22 | End: 2024-06-24 | Stop reason: HOSPADM

## 2024-06-22 RX ORDER — QUETIAPINE FUMARATE 50 MG/1
50 TABLET, EXTENDED RELEASE ORAL NIGHTLY
Status: DISCONTINUED | OUTPATIENT
Start: 2024-06-22 | End: 2024-06-24 | Stop reason: HOSPADM

## 2024-06-22 RX ADMIN — TRAZODONE HYDROCHLORIDE 100 MG: 100 TABLET ORAL at 21:13

## 2024-06-22 RX ADMIN — BUSPIRONE HYDROCHLORIDE 10 MG: 10 TABLET ORAL at 15:59

## 2024-06-22 RX ADMIN — HYDROXYZINE HYDROCHLORIDE 50 MG: 50 TABLET, FILM COATED ORAL at 09:41

## 2024-06-22 RX ADMIN — BUSPIRONE HYDROCHLORIDE 10 MG: 10 TABLET ORAL at 21:13

## 2024-06-22 RX ADMIN — ALUMINUM HYDROXIDE, MAGNESIUM HYDROXIDE, AND SIMETHICONE 30 ML: 1200; 120; 1200 SUSPENSION ORAL at 10:26

## 2024-06-22 RX ADMIN — MIRTAZAPINE 15 MG: 15 TABLET, FILM COATED ORAL at 21:13

## 2024-06-22 RX ADMIN — HYDROXYZINE HYDROCHLORIDE 50 MG: 50 TABLET, FILM COATED ORAL at 15:59

## 2024-06-22 RX ADMIN — ALUMINUM HYDROXIDE, MAGNESIUM HYDROXIDE, AND SIMETHICONE 30 ML: 1200; 120; 1200 SUSPENSION ORAL at 17:37

## 2024-06-22 ASSESSMENT — PAIN - FUNCTIONAL ASSESSMENT
PAIN_FUNCTIONAL_ASSESSMENT: ACTIVITIES ARE NOT PREVENTED

## 2024-06-22 ASSESSMENT — PAIN DESCRIPTION - DESCRIPTORS
DESCRIPTORS: ACHING
DESCRIPTORS: BURNING
DESCRIPTORS: BURNING

## 2024-06-22 ASSESSMENT — PAIN SCALES - WONG BAKER: WONGBAKER_NUMERICALRESPONSE: HURTS A LITTLE BIT

## 2024-06-22 ASSESSMENT — PAIN DESCRIPTION - PAIN TYPE: TYPE: CHRONIC PAIN

## 2024-06-22 ASSESSMENT — PAIN DESCRIPTION - ORIENTATION
ORIENTATION: MID;UPPER;LOWER
ORIENTATION: MID
ORIENTATION: LOWER

## 2024-06-22 ASSESSMENT — PAIN SCALES - GENERAL
PAINLEVEL_OUTOF10: 3
PAINLEVEL_OUTOF10: 4
PAINLEVEL_OUTOF10: 4

## 2024-06-22 ASSESSMENT — PAIN DESCRIPTION - LOCATION
LOCATION: ABDOMEN;CHEST
LOCATION: ABDOMEN;CHEST
LOCATION: BACK

## 2024-06-22 ASSESSMENT — PAIN DESCRIPTION - ONSET: ONSET: ON-GOING

## 2024-06-22 ASSESSMENT — PAIN DESCRIPTION - FREQUENCY: FREQUENCY: CONTINUOUS

## 2024-06-22 NOTE — PLAN OF CARE
Problem: Depression  Goal: Will be euthymic at discharge  Description: INTERVENTIONS:  1. Administer medication as ordered  2. Provide emotional support via 1:1 interaction with staff  3. Encourage involvement in milieu/groups/activities  4. Monitor for social isolation  6/22/2024 0947 by Jb Saravia RN  Outcome: Progressing  Note: Pt rates depression at 2/10 during assessment and denies SI, then approaches window and states is starting to feel suicidal - contracts for safety. Pt is socializing with peers in the dayroom at this time. Staff will continue to monitor pt and offer support as needed.        Problem: Behavior  Goal: Pt/Family maintain appropriate behavior and adhere to behavioral management agreement, if implemented  Description: INTERVENTIONS:  1. Assess patient/family's coping skills and  non-compliant behavior (including use of illegal substances)  2. Notify security of behavior or suspected illegal substances which indicate the need for search of the family and/or belongings  3. Encourage verbalization of thoughts and concerns in a socially appropriate manner  4. Utilize positive, consistent limit setting strategies supporting safety of patient, staff and others  5. Encourage participation in the decision making process about the behavioral management agreement  6. If a visitor's behavior poses a threat to safety call refer to organization policy.  7. Initiate consult with , Psychosocial CNS, Spiritual Care as appropriate  6/22/2024 0947 by Jb Saravia RN  Outcome: Progressing  Note: Pt remains in behavioral control; was calm/cooperative with assessment and pleasant towards staff. Staff will continue to monitor pt and offer support as needed.        Problem: Pain  Goal: Verbalizes/displays adequate comfort level or baseline comfort level  Outcome: Progressing  Note: Pt communicates pain to staff and accepts education regarding pharm/non-pharm interventions available to her.

## 2024-06-22 NOTE — PLAN OF CARE
Problem: Depression  Goal: Will be euthymic at discharge  Description: INTERVENTIONS:  1. Administer medication as ordered  2. Provide emotional support via 1:1 interaction with staff  3. Encourage involvement in milieu/groups/activities  4. Monitor for social isolation  6/21/2024 2049 by Alfredito Titus, RN  Outcome: Progressing   Mood has improved. Affect brightens. More social with peers.  Problem: Behavior  Goal: Pt/Family maintain appropriate behavior and adhere to behavioral management agreement, if implemented  Description: INTERVENTIONS:  1. Assess patient/family's coping skills and  non-compliant behavior (including use of illegal substances)  2. Notify security of behavior or suspected illegal substances which indicate the need for search of the family and/or belongings  3. Encourage verbalization of thoughts and concerns in a socially appropriate manner  4. Utilize positive, consistent limit setting strategies supporting safety of patient, staff and others  5. Encourage participation in the decision making process about the behavioral management agreement  6. If a visitor's behavior poses a threat to safety call refer to organization policy.  7. Initiate consult with , Psychosocial CNS, Spiritual Care as appropriate  6/21/2024 2049 by Alfredito Titus, RN  Outcome: Progressing   Pleasant/cooperative. No need for redirection or limits.

## 2024-06-23 LAB
BACTERIA URNS QL MICRO: ABNORMAL
BILIRUB UR QL STRIP: NEGATIVE
CASTS #/AREA URNS LPF: ABNORMAL /LPF
CLARITY UR: CLEAR
COLOR UR: YELLOW
EPI CELLS #/AREA URNS HPF: ABNORMAL /HPF
EST. AVERAGE GLUCOSE BLD GHB EST-MCNC: 117 MG/DL
GLUCOSE UR STRIP-MCNC: NEGATIVE MG/DL
HBA1C MFR BLD: 5.7 % (ref 4–6)
HGB UR QL STRIP.AUTO: NEGATIVE
KETONES UR STRIP-MCNC: NEGATIVE MG/DL
LEUKOCYTE ESTERASE UR QL STRIP: ABNORMAL
NITRITE UR QL STRIP: NEGATIVE
PH UR STRIP: 8 [PH] (ref 5–8)
PROT UR STRIP-MCNC: NEGATIVE MG/DL
RBC #/AREA URNS HPF: ABNORMAL /HPF
SP GR UR STRIP: 1.02 (ref 1–1.03)
UROBILINOGEN UR STRIP-ACNC: NORMAL EU/DL (ref 0–1)
WBC #/AREA URNS HPF: ABNORMAL /HPF

## 2024-06-23 PROCEDURE — 6370000000 HC RX 637 (ALT 250 FOR IP)

## 2024-06-23 PROCEDURE — 6370000000 HC RX 637 (ALT 250 FOR IP): Performed by: PSYCHIATRY & NEUROLOGY

## 2024-06-23 PROCEDURE — 81001 URINALYSIS AUTO W/SCOPE: CPT

## 2024-06-23 PROCEDURE — 87591 N.GONORRHOEAE DNA AMP PROB: CPT

## 2024-06-23 PROCEDURE — 99232 SBSQ HOSP IP/OBS MODERATE 35: CPT | Performed by: INTERNAL MEDICINE

## 2024-06-23 PROCEDURE — 99232 SBSQ HOSP IP/OBS MODERATE 35: CPT

## 2024-06-23 PROCEDURE — 87491 CHLMYD TRACH DNA AMP PROBE: CPT

## 2024-06-23 PROCEDURE — 1240000000 HC EMOTIONAL WELLNESS R&B

## 2024-06-23 RX ORDER — TRAZODONE HYDROCHLORIDE 50 MG/1
50 TABLET ORAL NIGHTLY PRN
Status: DISCONTINUED | OUTPATIENT
Start: 2024-06-23 | End: 2024-06-24 | Stop reason: HOSPADM

## 2024-06-23 RX ADMIN — HYDROXYZINE HYDROCHLORIDE 50 MG: 50 TABLET, FILM COATED ORAL at 15:04

## 2024-06-23 RX ADMIN — ALUMINUM HYDROXIDE, MAGNESIUM HYDROXIDE, AND SIMETHICONE 30 ML: 1200; 120; 1200 SUSPENSION ORAL at 00:30

## 2024-06-23 RX ADMIN — QUETIAPINE FUMARATE 50 MG: 50 TABLET, EXTENDED RELEASE ORAL at 00:30

## 2024-06-23 RX ADMIN — HYDROXYZINE HYDROCHLORIDE 50 MG: 50 TABLET, FILM COATED ORAL at 08:19

## 2024-06-23 RX ADMIN — MIRTAZAPINE 15 MG: 15 TABLET, FILM COATED ORAL at 21:46

## 2024-06-23 RX ADMIN — HYDROXYZINE HYDROCHLORIDE 50 MG: 50 TABLET, FILM COATED ORAL at 21:46

## 2024-06-23 RX ADMIN — BUSPIRONE HYDROCHLORIDE 10 MG: 10 TABLET ORAL at 15:04

## 2024-06-23 RX ADMIN — BUSPIRONE HYDROCHLORIDE 10 MG: 10 TABLET ORAL at 21:46

## 2024-06-23 RX ADMIN — QUETIAPINE FUMARATE 50 MG: 50 TABLET, EXTENDED RELEASE ORAL at 21:46

## 2024-06-23 RX ADMIN — ALUMINUM HYDROXIDE, MAGNESIUM HYDROXIDE, AND SIMETHICONE 30 ML: 1200; 120; 1200 SUSPENSION ORAL at 19:58

## 2024-06-23 RX ADMIN — IBUPROFEN 400 MG: 400 TABLET, FILM COATED ORAL at 15:04

## 2024-06-23 RX ADMIN — HYDROXYZINE HYDROCHLORIDE 50 MG: 50 TABLET, FILM COATED ORAL at 00:30

## 2024-06-23 RX ADMIN — ACETAMINOPHEN 650 MG: 325 TABLET ORAL at 08:19

## 2024-06-23 RX ADMIN — TRAZODONE HYDROCHLORIDE 50 MG: 50 TABLET ORAL at 21:46

## 2024-06-23 RX ADMIN — BUSPIRONE HYDROCHLORIDE 10 MG: 10 TABLET ORAL at 08:19

## 2024-06-23 ASSESSMENT — PAIN DESCRIPTION - DESCRIPTORS: DESCRIPTORS: ACHING

## 2024-06-23 ASSESSMENT — PAIN DESCRIPTION - ORIENTATION
ORIENTATION: MID;LOWER
ORIENTATION: LOWER

## 2024-06-23 ASSESSMENT — PAIN - FUNCTIONAL ASSESSMENT: PAIN_FUNCTIONAL_ASSESSMENT: ACTIVITIES ARE NOT PREVENTED

## 2024-06-23 ASSESSMENT — PAIN SCALES - GENERAL
PAINLEVEL_OUTOF10: 5
PAINLEVEL_OUTOF10: 6

## 2024-06-23 ASSESSMENT — PAIN DESCRIPTION - LOCATION
LOCATION: BACK
LOCATION: BACK

## 2024-06-23 NOTE — BH NOTE
Pt states Seroquel has been helpful with her symptoms regarding recurring hallucinations of her daughter who passed away, but states the med has caused her to gain a lot of weight, which is why she stopped taking it and started using crack again to help with the weight. Pt is accepting of education regarding medications; staff encouraged pt to follow up with her doctor about these concerns.

## 2024-06-23 NOTE — PLAN OF CARE
[facility]  Day 3 Interdisciplinary Treatment Plan NOTE    Review Date & Time: 6/23/24 0900    Admission Type:   Admission Type: Voluntary    Reason for admission:  Reason for Admission: depression with suicidal thoughts, out of med's  Estimated Length of Stay:  5-7 days  Estimated Discharge Date Update: to be determined by physician    PATIENT STRENGTHS:  Patient Strengths:   Patient Strengths and Limitations:Limitations: Difficulty problem solving/relies on others to help solve problems, Inappropriate/potentially harmful leisure interests  Addictive Behavior:Addictive Behavior  In the Past 3 Months, Have You Felt or Has Someone Told You That You Have a Problem With  : None  Medical Problems:  Past Medical History:   Diagnosis Date    Fibromyalgia     Headache     migraines    Hyperlipidemia     Hypertension     Hypothyroidism     Lupus (HCC)     Psychiatric problem     Thyroid disease        Risk:  Fall Risk   Sidney Scale Sidney Scale Score: 22  BVC    Change in scores:  No Changes to plan of Care:  No    Status EXAM:   Mental Status and Behavioral Exam  Normal: No  Level of Assistance: Independent/Self  Facial Expression: Brightened  Affect: Appropriate, Congruent  Level of Consciousness: Alert  Frequency of Checks: 4 times per hour, close  Mood:Normal: No  Mood: Anxious, Depressed  Motor Activity:Normal: Yes  Eye Contact: Good  Observed Behavior: Cooperative, Friendly  Sexual Misconduct History: Current - no  Preception: [place] to person, [place] to time, [place] to place, [place] to situation  Attention:Normal: Yes  Thought Processes: Unremarkable  Thought Content:Normal: Yes  Depression Symptoms: Feelings of helplessness, Feelings of hopelessess  Anxiety Symptoms: Generalized  Maria Teresa Symptoms: No problems reported or observed.  Hallucinations: None  Delusions: No  Memory:Normal: Yes  Insight and Judgment: No  Insight and Judgment: Poor insight    Daily Assessment Last Entry:   Daily Sleep (WDL):

## 2024-06-23 NOTE — GROUP NOTE
Group Therapy Note    Date: 6/23/2024    Group Start Time: 0900  Group End Time: 0920  Group Topic: Community Meeting    Pauline Radford    Group Therapy Note    Attendees: 6/9     Patient's Goal: Patient will verbalize today's goals. Patient will also offer                            supportive listening and interact with peers to clarify and                            understand clearly set goals.         Notes: Patient is making progress AEB participating in group discussion, actively               listening, and supporting other group members.           Status After Intervention: Improved    Participation Level: Active Listener and Interactive    Participation Quality: Appropriate, Attentive, Sharing, and Supportive    Speech: normal    Thought Process/Content: Logical, Linear    Affective Functioning: Congruent    Mood: anxious    Level of consciousness: Oriented x4    Response to Learning: Able to verbalize current knowledge/experience, Able to                                         verbalize/acknowledge new learning, Able to retain                                         information, and Capable of insight     Endings: None Reported    Modes of Intervention: Education, Support, Socialization, and Problem-solving       Discipline Responsible: Behavorial Health Tech    Signature: Pauline No

## 2024-06-23 NOTE — PROGRESS NOTES
Behavioral Services  Medicare Certification Upon Admission    I certify that this patient's inpatient psychiatric hospital admission is medically necessary for:    [x] (1) Treatment which could reasonably be expected to improve this patient's condition,       [x] (2) Or for diagnostic study;     AND     [x](2) The inpatient psychiatric services are provided while the individual is under the care of a physician and are included in the individualized plan of care.    Estimated length of stay/service 3-5 days    Plan for post-hospital care hc    Electronically signed by Terrell Rea MD on 6/21/2024 at 2:23 PM      
  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  Sruthi was found resting in her room.  She continues to be pleasant and cooperative.  Alert and oriented x 4.  Notes an improvement suicidal thoughts.  Denies any homicidal thoughts.  Notes an improvement in auditory hallucinations and states she has not had any as of this morning.  States she was able to sleep well last night.  Denies any concerns with appetite.  States once stable for discharge she will follow-up with [facility].  Has been compliant with her scheduled medications and no symptoms beneficial.  As noted the patient is on 3 different medications that can cause sedation.  Plan to change trazodone 100 mg daily to 50 mg as needed due to the addition of Seroquel.  No other changes at this time.  Patient is doing well overall.  Vitals are stable.  Denies any paranoia or delusions.        Appetite:  [x] Adequate/Unchanged  [] Increased  [] Decreased      Sleep:       [x] Adequate/Unchanged  [] Fair  [] Poor      Group Attendance on Unit:   [] Yes   [x] Selectively    [] No    Compliant with scheduled medications: [x] Yes  [] No    Received emergency medications in past 24 hrs: [] Yes   [x] No    Medication Side Effects: Denies         Mental Status Exam  Level of consciousness: Alert and awake   Appearance: Appropriate attire for setting, resting in bed, with fair  grooming and hygiene   Behavior/Motor: Approachable, engages with interviewer, no psychomotor abnormalities   Attitude toward examiner: Cooperative, attentive, good eye contact  Speech: spontaneous, normal rate, and normal volume   Mood: \"Better\"  Affect: Congruent with mood  Thought processes: linear, goal directed, and coherent   Thought content:  Denies homicidal ideation  Suicidal Ideation: Reports 
IN-PATIENT SERVICE  [facility]    CONSULTATION / HISTORY AND PHYSICAL EXAMINATION            Date:   2024  Patient name:  Sruthi You  Date of admission:  2024 10:59 PM  MRN:   [id number]  Account:  [id number]  YOB: 1975  PCP:    No primary care provider on file.  Room:   [address] [place] [address]  Code Status:    Full Code    Physician Requesting Consult: Terrell Rea,*    Reason for Consult:  medical management    Chief Complaint:     No chief complaint on file.      History Obtained From:     Patient medical record nursing staff    History of Present Illness:   Patient is 48-year-old female with past medical history of hypertension, hypothyroidism, depression anxiety, is been admitted at [facility] for further management of depression and suicidal ideation    Patient reports that she has been having foul-smelling discharge from her vaginal, she reports having had unprotected sex with a last known partner a week back.  She has been having the symptoms since then.  She reports that she develops the symptoms whenever she allows people to ejaculate inside her.    Denies any dysuria, denies itching, no fevers no chills.    Denies other concerns      Past Medical History:     Past Medical History:   Diagnosis Date    Fibromyalgia     Headache     migraines    Hyperlipidemia     Hypertension     Hypothyroidism     Lupus (HCC)     Psychiatric problem     Thyroid disease         Past Surgical History:     Past Surgical History:   Procedure Laterality Date     SECTION      FOOT SURGERY      right    HYSTERECTOMY (CERVIX STATUS UNKNOWN)          Medications Prior to Admission:     Prior to Admission medications    Medication Sig Start Date End Date Taking? Authorizing Provider   mirtazapine (REMERON) 15 MG tablet Take 1 tablet by mouth nightly  Patient taking differently: Take 2 tablets by mouth nightly 24   Orlando Savage MD   QUEtiapine (SEROQUEL) 100 MG 
Patient given tobacco quitline number 44076869278 at this time, refusing to call at this time, states \" I just dont want to quit now\"- patient given information as to the dangers of long term tobacco use. Continue to reinforce the importance of tobacco cessation.     
Pharmacy Medication History Note      List of current medications patient is taking is complete.    Source of information: [facility] ([phone]), [facility], PDMP, [employer] dispense report    Changes made to medication list:  Medications removed (include reason, ex. therapy complete or physician discontinued, noncompliance):  none    Medications flagged for provider review:  Hydrocortisone - not filled since February 2024    Medications added/doses adjusted:  Adjusted Mirtazapine to 30 mg nightly  Adjusted Quetiapine to 200 mg nightly  Adjusted Trazodone to 100 mg nightly    Other notes (ex. Recent course of antibiotics, Coumadin dosing):  The patient was admitted to [facility] 6/10/24 to 6/17/24.      Current Home Medication List at Time of Admission:  Prior to Admission medications    Medication Sig   mirtazapine (REMERON) 30 MG tablet Take 1 tablet by mouth nightly     QUEtiapine (SEROQUEL) 200 MG tablet Take 1 tablet by mouth nightly   traZODone (DESYREL) 100 MG tablet Take 1 tablet by mouth nightly         Please let me know if you have any questions about this encounter. Thank you!    Electronically signed by Armando Muller RPH on 6/21/2024 at 8:49 AM     
RT ASSESSMENT TREATMENT GOALS    [x]Pt Goal:  Pt will identify 1-2 positive coping skills by time of discharge.    []Pt Goal:  Pt will identify 1-2 positive aspects of self by time of discharge.    []Pt Goal:  Pt will remain on task/topic for 15-30 minutes during group by time of discharge.    [x]Pt Goal:  Pt will identify 1-2 aspects of relapse prevention plan by time of discharge.    []Pt Goal:  Pt will join in conversation with peers 1-2 times per group by time of discharge.    []Pt Goal:  Pt will identify 1-2 new leisure interests by time of discharge.    []Pt Goal:  Pt will not voice any delusional content by time of discharge.   
Treatment Center                                                                                                                   ( ) Patient refused counseling  ( ) Patient has not smoked in the last 30 days    Metabolic Screening:    Lab Results   Component Value Date    LABA1C 5.6 02/10/2023       Lab Results   Component Value Date    CHOL 206 (H) 03/02/2023     Lab Results   Component Value Date    TRIG 35 03/02/2023     Lab Results   Component Value Date    HDL 72 03/02/2023    HDL 67 02/10/2023     No components found for: \"LDLCAL\"  No components found for: \"LABVLDL\"      Body mass index is 26.09 kg/m².    BP Readings from Last 2 Encounters:   06/20/24 (!) 124/91   06/20/24 98/68         Pt admitted with followings belongings:  Dental Appliances: None  Vision - Corrective Lenses: None  Hearing Aid: None  Jewelry: None  Body Piercings Removed: N/A  Clothing: Dress, Undergarments, Footwear  Other Valuables: Purse, Keys, Wig, Personal Toiletries, Lighter/Matches  The following personal items were collected during admission. Items secured in locker/safe. Items will be returned to patient at discharge.     Discharged from [facility] 2 days ago & unable to get scripts filled. Relapsed on Cocaine & depression worsened experiencing suicidal ideation. Patient is wearing her wig as she has in the past & was cooperative taking it off for contraband check of which there was none. Cooperative with admit process.    Alfredito Titus RN         
psychiatric personnel.    Electronically signed by EFRAIN Motron CNP on 6/22/2024 at 1:29 PM    **This report has been created using voice recognition software. It may contain minor errors which are inherent in voice recognition technology.**

## 2024-06-23 NOTE — GROUP NOTE
Group Therapy Note    Date: 6/23/2024    Group Start Time: 0930  Group End Time: 1000  Group Topic: Nursing    Sharon Lebron LPN        Group Therapy Note    Attendees: 4/13     patient refused to attend goals group at 0930 after encouragement from staff.  1:1 talk time provided as alternative to group session

## 2024-06-23 NOTE — PLAN OF CARE
Problem: Depression  Goal: Will be euthymic at discharge  Description: INTERVENTIONS:  1. Administer medication as ordered  2. Provide emotional support via 1:1 interaction with staff  3. Encourage involvement in milieu/groups/activities  4. Monitor for social isolation  6/22/2024 2038 by Alfredito Titus, RN  Outcome: Progressing   Less depressed spending more time socializing in day area & playing cards with peer.  Problem: Behavior  Goal: Pt/Family maintain appropriate behavior and adhere to behavioral management agreement, if implemented  Description: INTERVENTIONS:  1. Assess patient/family's coping skills and  non-compliant behavior (including use of illegal substances)  2. Notify security of behavior or suspected illegal substances which indicate the need for search of the family and/or belongings  3. Encourage verbalization of thoughts and concerns in a socially appropriate manner  4. Utilize positive, consistent limit setting strategies supporting safety of patient, staff and others  5. Encourage participation in the decision making process about the behavioral management agreement  6. If a visitor's behavior poses a threat to safety call refer to organization policy.  7. Initiate consult with , Psychosocial CNS, Spiritual Care as appropriate  6/22/2024 2038 by Alfredito Ttius, RN  Outcome: Progressing   Upset over snack but vents appropriately maintaining control. Does not require limits or redirection.  Problem: Pain  Goal: Verbalizes/displays adequate comfort level or baseline comfort level  6/22/2024 2038 by Alfredito Titus, RN  Outcome: Progressing   Denies current pain.

## 2024-06-23 NOTE — PLAN OF CARE
Problem: Depression  Goal: Will be euthymic at discharge  Description: INTERVENTIONS:  1. Administer medication as ordered  2. Provide emotional support via 1:1 interaction with staff  3. Encourage involvement in milieu/groups/activities  4. Monitor for social isolation  6/23/2024 0950 by Jb Saravia RN  Outcome: Progressing  Note: Pt endorses anxiety and depression and is accepting of pharm/non-pharm education regarding interventions for coping with same. Pt is social in dayroom with other pts and shows good insight and motivation during assessment. Staff will continue to monitor pt and offer support as needed.        Problem: Behavior  Goal: Pt/Family maintain appropriate behavior and adhere to behavioral management agreement, if implemented  Description: INTERVENTIONS:  1. Assess patient/family's coping skills and  non-compliant behavior (including use of illegal substances)  2. Notify security of behavior or suspected illegal substances which indicate the need for search of the family and/or belongings  3. Encourage verbalization of thoughts and concerns in a socially appropriate manner  4. Utilize positive, consistent limit setting strategies supporting safety of patient, staff and others  5. Encourage participation in the decision making process about the behavioral management agreement  6. If a visitor's behavior poses a threat to safety call refer to organization policy.  7. Initiate consult with , Psychosocial CNS, Spiritual Care as appropriate  6/23/2024 0950 by Jb Saravia RN  Outcome: Progressing  Note: Pt maintains behavioral control on the unit; is calm, cooperative, and pleasant with staff during assessments; social with peers. Staff will continue to monitor pt and offer support as needed.        Problem: Pain  Goal: Verbalizes/displays adequate comfort level or baseline comfort level  6/23/2024 0950 by Jb Saravia RN  Outcome: Progressing  Note: Pt communicates pain

## 2024-06-24 VITALS
DIASTOLIC BLOOD PRESSURE: 83 MMHG | HEIGHT: 64 IN | BODY MASS INDEX: 25.95 KG/M2 | TEMPERATURE: 97.9 F | HEART RATE: 62 BPM | SYSTOLIC BLOOD PRESSURE: 121 MMHG | RESPIRATION RATE: 14 BRPM | OXYGEN SATURATION: 98 % | WEIGHT: 152 LBS

## 2024-06-24 LAB
CHLAMYDIA DNA UR QL NAA+PROBE: NEGATIVE
N GONORRHOEA DNA UR QL NAA+PROBE: NEGATIVE
SPECIMEN DESCRIPTION: NORMAL

## 2024-06-24 PROCEDURE — 6370000000 HC RX 637 (ALT 250 FOR IP): Performed by: INTERNAL MEDICINE

## 2024-06-24 PROCEDURE — 99238 HOSP IP/OBS DSCHRG MGMT 30/<: CPT | Performed by: PSYCHIATRY & NEUROLOGY

## 2024-06-24 PROCEDURE — 6370000000 HC RX 637 (ALT 250 FOR IP): Performed by: PSYCHIATRY & NEUROLOGY

## 2024-06-24 PROCEDURE — 6370000000 HC RX 637 (ALT 250 FOR IP)

## 2024-06-24 RX ORDER — LISINOPRIL 5 MG/1
5 TABLET ORAL DAILY
Qty: 30 TABLET | Refills: 0 | Status: SHIPPED | OUTPATIENT
Start: 2024-06-25

## 2024-06-24 RX ORDER — TRAZODONE HYDROCHLORIDE 50 MG/1
50 TABLET ORAL NIGHTLY PRN
Qty: 30 TABLET | Refills: 0 | Status: SHIPPED | OUTPATIENT
Start: 2024-06-24

## 2024-06-24 RX ORDER — BUSPIRONE HYDROCHLORIDE 10 MG/1
10 TABLET ORAL 3 TIMES DAILY
Qty: 90 TABLET | Refills: 0 | Status: SHIPPED | OUTPATIENT
Start: 2024-06-24

## 2024-06-24 RX ORDER — MIRTAZAPINE 15 MG/1
15 TABLET, FILM COATED ORAL NIGHTLY
Qty: 30 TABLET | Refills: 0 | Status: SHIPPED | OUTPATIENT
Start: 2024-06-24

## 2024-06-24 RX ORDER — QUETIAPINE FUMARATE 50 MG/1
50 TABLET, EXTENDED RELEASE ORAL NIGHTLY
Qty: 30 TABLET | Refills: 0 | Status: SHIPPED | OUTPATIENT
Start: 2024-06-24

## 2024-06-24 RX ADMIN — LISINOPRIL 5 MG: 5 TABLET ORAL at 08:44

## 2024-06-24 RX ADMIN — BUSPIRONE HYDROCHLORIDE 10 MG: 10 TABLET ORAL at 08:44

## 2024-06-24 RX ADMIN — HYDROXYZINE HYDROCHLORIDE 50 MG: 50 TABLET, FILM COATED ORAL at 10:58

## 2024-06-24 RX ADMIN — NICOTINE POLACRILEX 2 MG: 2 LOZENGE ORAL at 13:22

## 2024-06-24 RX ADMIN — BUSPIRONE HYDROCHLORIDE 10 MG: 10 TABLET ORAL at 13:20

## 2024-06-24 NOTE — BH NOTE
At this time patient refuses to fill out discharge OQ after writer educates patient on reasoning of questionnaire.  Patient states ' oh hell no , this is what yal want me to fill out to try and keep me here I don't like these questions . I aint doing this shit.\"

## 2024-06-24 NOTE — GROUP NOTE
Group Therapy Note    Date: 6/24/2024    Group Start Time: 1015  Group End Time: 1045  Group Topic: Psychoeducation    Francisco Wharton        Group Therapy Note    Attendees: 9/15       Patient's Goal:  PT will demonstrate increased interpersonal interaction and participate in group activities of discussing journaling.     Notes:  Patient is making progress, AEB participating in group discussion, actively listening, and supporting other group members    Status After Intervention:  Unchanged    Participation Level: Active Listener and Interactive    Participation Quality: Appropriate, Attentive, and Sharing      Speech:  normal      Thought Process/Content: Logical      Affective Functioning: Flat      Mood: anxious      Level of consciousness:  Alert, Oriented x4, and Attentive      Response to Learning: Able to verbalize/acknowledge new learning and Progressing to goal      Endings: None Reported    Modes of Intervention: Education, Support, and Socialization      Discipline Responsible: /Counselor      Signature:  Francisco Garcia

## 2024-06-24 NOTE — GROUP NOTE
Group Therapy Note    Date: 6/24/2024    Group Start Time: 0900  Group End Time: 0925  Group Topic: Community Meeting    Roseann Chavez LPN    Patient's Goal:  Learning how to cope and deal with daughter death from 2 years ago.     Status After Intervention:  Unchanged    Participation Level: Interactive    Participation Quality: Sharing      Speech:  loud      Thought Process/Content: Linear      Affective Functioning: Blunted      Mood: anxious      Level of consciousness:  Preoccupied      Response to Learning: Able to retain information      Endings: None Reported    Modes of Intervention: Clarifying      Discipline Responsible: Licensed Practical Nurse      Signature:  Roseann Lincoln LPN

## 2024-06-24 NOTE — PLAN OF CARE
Problem: Depression  Goal: Will be euthymic at discharge  Description: INTERVENTIONS:  1. Administer medication as ordered  2. Provide emotional support via 1:1 interaction with staff  3. Encourage involvement in milieu/groups/activities  4. Monitor for social isolation  Outcome: Progressing     Problem: Behavior  Goal: Pt/Family maintain appropriate behavior and adhere to behavioral management agreement, if implemented  Description: INTERVENTIONS:  1. Assess patient/family's coping skills and  non-compliant behavior (including use of illegal substances)  2. Notify security of behavior or suspected illegal substances which indicate the need for search of the family and/or belongings  3. Encourage verbalization of thoughts and concerns in a socially appropriate manner  4. Utilize positive, consistent limit setting strategies supporting safety of patient, staff and others  5. Encourage participation in the decision making process about the behavioral management agreement  6. If a visitor's behavior poses a threat to safety call refer to organization policy.  7. Initiate consult with , Psychosocial CNS, Spiritual Care as appropriate  Outcome: Progressing     Problem: Pain  Goal: Verbalizes/displays adequate comfort level or baseline comfort level  Outcome: Progressing     Patient reports anxiety and slight depression due to being here and wanting to be with grand children . Patient reports she has been struggling for the last 2 years dealing with her daughters death and she's not sure how to cope. Patient denies pain at this time. Patient is liable, irritable, social with peers, attending groups,  loud, entitled, blunt affect, accepting of redirection at times, focused on discharge and medication compliant.

## 2024-06-24 NOTE — DISCHARGE SUMMARY
appropriate criteria and states they are usually during episodes of cocaine use.  She notes that she has increased her cocaine use since November 2022.  She also endorses occasional marijuana use.  She states she is currently living with her mother and feels unsupported by her.  She states that her mother tells her \"I should just be over it by now.\"  She denies having any grief counseling yet.  She is open to receiving counseling at this time.  She states that \"I need to get clean because this is getting old.\"  She is open to rehabilitation and restarting her previous medications that were adjusted while she was admitted to [facility] from 6/10 to 6/17/2024.     She endorses ongoing PTSD symptoms consisting of nightmares, flashbacks, hyperarousal, hypervigilance, and avoidance behavior.  Patient is currently on 7.5 mg Remeron, 50 mg Seroquel, 5 mg BuSpar 3 times daily, and 25 mg Atarax 3 times daily.  She states she would like to stop the Remeron because it has caused her a 10 pound weight gain.  She states she has not taken her medication since her discharge from [facility] on 6/17 because she had no transport to get these medications.  Currently endorsing active suicidal ideation and intermittent command auditory hallucinations to harm self.  The patient is a threat to herself and requires inpatient stabilization.  After discussion with attending starting Remeron 7.5 and increasing to 15 mg and trazodone 100 mg nightly.  Stopping Seroquel at this time  Hospital Course:   Upon admission, Sruthi You was provided a safe secure environment, introduced to unit milieu. Patient participated in groups and individual therapies. Meds were adjusted as noted below. After few days of hospital care, patient began to feel improvement. Depression lifted, thoughts to harm self ceased.  Sleep improved, appetite was good. On morning rounds 6/24/2024, Sruthi You endorses feeling ready for discharge. Patient denies

## 2024-06-24 NOTE — PLAN OF CARE
Problem: Depression  Goal: Will be euthymic at discharge  Description: INTERVENTIONS:  1. Administer medication as ordered  2. Provide emotional support via 1:1 interaction with staff  3. Encourage involvement in milieu/groups/activities  4. Monitor for social isolation  6/23/2024 2203 by Andrae Gibbs RN  Outcome: Progressing     Problem: Behavior  Goal: Pt/Family maintain appropriate behavior and adhere to behavioral management agreement, if implemented  Description: INTERVENTIONS:  1. Assess patient/family's coping skills and  non-compliant behavior (including use of illegal substances)  2. Notify security of behavior or suspected illegal substances which indicate the need for search of the family and/or belongings  3. Encourage verbalization of thoughts and concerns in a socially appropriate manner  4. Utilize positive, consistent limit setting strategies supporting safety of patient, staff and others  5. Encourage participation in the decision making process about the behavioral management agreement  6. If a visitor's behavior poses a threat to safety call refer to organization policy.  7. Initiate consult with , Psychosocial CNS, Spiritual Care as appropriate  6/23/2024 2203 by Andrae Gibbs RN  Outcome: Progressing     Problem: Pain  Goal: Verbalizes/displays adequate comfort level or baseline comfort level  6/23/2024 2203 by Andrae Gibbs RN  Outcome: Progressing   Patient was agreeable to interview.  She was brightened and stated, \"I've had the best day of the year today\".  Patient feels ready for d/c.  She is complaining of having gas, PRN given and on call messaged.  Patient denies any suicidal ideation.  She reports improved mood and denies any pain.  Patient has remained in behavior control and is medication compliant.  Patient is not a fall risk at this time and routine checks every 15 minutes for safety remain appropriate.

## 2024-06-24 NOTE — BH NOTE
Patient given tobacco quitline number 8-065-389-1610 at this time, refusing to call at this time, states \" I just dont want to quit now\"- patient given information as to the dangers of long term tobacco use. Continue to reinforce the importance of tobacco cessation.

## 2024-06-24 NOTE — DISCHARGE INSTRUCTIONS
Line)  [phone]      Recovery Help line- [phone]      To obtain results of pending studies call Medical Records at: [phone]     For emergencies and 24 hour/7 days a week contact information:  [phone]

## 2024-06-24 NOTE — TRANSITION OF CARE
Behavioral Health Transition Record    Patient Name: Sruthi You  YOB: 1975   Medical Record Number: [id number]  Date of Admission: 6/20/2024 10:59 PM   Date of Discharge: 6/24/2024    Attending Provider: Terrell Rea,*   Discharging Provider: dedra   To contact this individual call [phone] and ask the  to page.  If unavailable, ask to be transferred to Behavioral Health Provider on call.  A Behavioral Health Provider will be available on call 24/7 and during holidays.    Primary Care Provider: No primary care provider on file.    Allergies   Allergen Reactions    Bactrim [Sulfamethoxazole-Trimethoprim]        Reason for Admission: DC from [facility] 2 days ago & unable to get Rx filled. Relapsed on Cocaine & depression worsened experiencing SI.     Admission Diagnosis: Depression with suicidal ideation [F32.A, R45.851]    * No surgery found *    Results for orders placed or performed during the hospital encounter of 06/20/24   C.trachomatis N.gonorrhoeae DNA, Urine    Specimen: Urine   Result Value Ref Range    Specimen Description .URINE     C. trachomatis DNA ,Urine NEGATIVE NEGATIVE    N. gonorrhoeae DNA, Urine NEGATIVE NEGATIVE   Lipid Panel   Result Value Ref Range    Cholesterol, Total 201 (H) <200 mg/dL    HDL 69 >40 mg/dL    LDL Cholesterol 126 0 - 130 mg/dL    Chol/HDL Ratio 2.9 <5    Triglycerides 30 <150 mg/dL   Hemoglobin A1C   Result Value Ref Range    Hemoglobin A1C 5.7 4.0 - 6.0 %    Estimated Avg Glucose 117 mg/dL   Urinalysis with Reflex to Culture    Specimen: Urine   Result Value Ref Range    Color, UA Yellow Yellow    Turbidity UA Clear Clear    Glucose, Ur NEGATIVE NEGATIVE mg/dL    Bilirubin, Urine NEGATIVE NEGATIVE    Ketones, Urine NEGATIVE NEGATIVE mg/dL    Specific Gravity, UA 1.016 1.000 - 1.030    Urine Hgb NEGATIVE NEGATIVE    pH, Urine 8.0 5.0 - 8.0    Protein, UA NEGATIVE NEGATIVE mg/dL    Urobilinogen, Urine Normal 0.0 - 1.0 EU/dL    Nitrite,

## 2024-06-24 NOTE — BH NOTE
[facility]  Discharge Note    Pt discharged with followings belongings:   Dental Appliances: None  Vision - Corrective Lenses: None  Hearing Aid: None  Jewelry: None  Body Piercings Removed: N/A  Clothing: Dress, Undergarments, Footwear  Other Valuables: Purse, Keys, Wig, Personal Toiletries, Lighter/Matches   Valuables sent home with patient or returned to patient. Patient educated on aftercare instructions: yes  Information faxed to [facility] by [facility] TH[facility]  at 2:06 PM .Patient verbalize understanding of AVS:  yes.    Status EXAM upon discharge:  Mental Status and Behavioral Exam  Normal: No  Level of Assistance: Independent/Self  Facial Expression: Exaggerated  Affect: Blunt  Level of Consciousness: Alert  Frequency of Checks: 4 times per hour, close  Mood:Normal: No  Mood: Labile, Irritable  Motor Activity:Normal: Yes  Eye Contact: Good  Observed Behavior: Friendly, Cooperative, Preoccupied  Sexual Misconduct History: Current - no  Preception: [place] to person, [place] to place, [place] to situation, [place] to time  Attention:Normal: No  Attention: Distractible  Thought Processes: Circumstantial  Thought Content:Normal: No  Thought Content: Preoccupations, Poverty of content  Depression Symptoms: Impaired concentration, Increased irritability  Anxiety Symptoms: No problems reported or observed.  Maria Teresa Symptoms: No problems reported or observed.  Hallucinations: None  Delusions: No  Memory:Normal: No  Memory: Poor recent  Insight and Judgment: Yes  Insight and Judgment: Poor judgment, Poor insight    Tobacco Screening:  Practical Counseling, on admission, jayme X, if applicable and completed (first 3 are required if patient doesn't refuse):            ( ) Recognizing danger situations (included triggers and roadblocks)                    ( ) Coping skills (new ways to manage stress,relaxation techniques, changing routine, distraction)                                                           ( )

## 2024-06-24 NOTE — CARE COORDINATION
BHI Biopsychosocial Assessment    Current Level of Psychosocial Functioning     Independent XX  Dependent    Minimal Assist     Comments:    Psychosocial High Risk Factors (check all that apply)    Unable to obtain meds   Chronic illness/pain    Substance abuse XX  Lack of Family Support   Financial stress   Isolation   Inadequate Community Resources XX  Suicide attempt(s)   Not taking medications   Victim of crime   Developmental Delay  Unable to manage personal needs    Age 65 or older   Homeless  No transportation   Readmission within 30 days XX  Unemployment  Traumatic Event    Comments:   Psychiatric Advanced Directives: n/a    Family to Involve in Treatment: denies    Sexual Orientation:  n/a    Patient Strengths: Patient has housing, social support, and insurance    Patient Barriers: substance abuse, hx of admissions      Opiate Education Provided:  AOD resource folder provided      CMHC/mental health history: Hx with [facility] and [facility], not currently linked    Plan of Care   medication management, group/individual therapies, family meetings, psycho -education, treatment team meetings to assist with stabilization    Initial Discharge Plan: Patient is interested in [facility]       Clinical Summary: Patient is a 48 year old female admitted to [facility] for suicidal ideation. Patient has a hx of prior admissions. Patient's most recent admission being 2/1/2024-2/5/2024. Patient states she was discharged a week ago from [facility] after a month long admission. She identifies the loss of her daughter as her primary stressor. She states sometimes she believes she is hearing her voice and seeing her. Patient states she was sent to [facility] for inpatient AOD treatment from [facility] but walked away before admitting. She states she walked 2 hours to her drug dealers house. Patient reports daily use of cocaine and marijuana. She reports occasional use of alcohol. Patient has been 
Completed application sent to [facility] per patient's request.  
Medication management appointment    Group, [facility]  [address] [address]  [phone]    Go on 6/25/2024  Walk in hours for recovery housing:  Monday, Thursday, Friday 8AM-10AM

## 2024-07-01 ENCOUNTER — HOSPITAL ENCOUNTER (EMERGENCY)
Age: 49
Discharge: HOME | End: 2024-07-02
Attending: EMERGENCY MEDICINE
Payer: MEDICAID

## 2024-07-01 VITALS
HEART RATE: 82 BPM | SYSTOLIC BLOOD PRESSURE: 106 MMHG | DIASTOLIC BLOOD PRESSURE: 80 MMHG | OXYGEN SATURATION: 96 % | BODY MASS INDEX: 24.03 KG/M2 | TEMPERATURE: 97.6 F | RESPIRATION RATE: 15 BRPM | WEIGHT: 140 LBS

## 2024-07-01 DIAGNOSIS — F32.A DEPRESSION, UNSPECIFIED DEPRESSION TYPE: ICD-10-CM

## 2024-07-01 DIAGNOSIS — R45.851 SUICIDAL IDEATION: Primary | ICD-10-CM

## 2024-07-01 LAB
ALBUMIN SERPL-MCNC: 4.6 G/DL (ref 3.5–5.2)
ALP SERPL-CCNC: 72 U/L (ref 35–104)
ALT SERPL-CCNC: 14 U/L (ref 5–33)
ANION GAP SERPL CALCULATED.3IONS-SCNC: 16 MMOL/L (ref 9–17)
APAP SERPL-MCNC: <10 UG/ML (ref 10–30)
AST SERPL-CCNC: 22 U/L
BASOPHILS # BLD: 0.04 K/UL (ref 0–0.2)
BASOPHILS NFR BLD: 1 % (ref 0–2)
BILIRUB DIRECT SERPL-MCNC: 0.2 MG/DL
BILIRUB INDIRECT SERPL-MCNC: 0.3 MG/DL (ref 0–1)
BILIRUB SERPL-MCNC: 0.5 MG/DL (ref 0.3–1.2)
BUN SERPL-MCNC: 21 MG/DL (ref 6–20)
BUN/CREAT SERPL: 21 (ref 9–20)
CALCIUM SERPL-MCNC: 9.7 MG/DL (ref 8.6–10.4)
CHLORIDE SERPL-SCNC: 101 MMOL/L (ref 98–107)
CO2 SERPL-SCNC: 21 MMOL/L (ref 20–31)
CREAT SERPL-MCNC: 1 MG/DL (ref 0.5–0.9)
EOSINOPHIL # BLD: 0.16 K/UL (ref 0–0.44)
EOSINOPHILS RELATIVE PERCENT: 3 % (ref 1–4)
ERYTHROCYTE [DISTWIDTH] IN BLOOD BY AUTOMATED COUNT: 14.6 % (ref 11.8–14.4)
ETHANOL PERCENT: <0.01 %
ETHANOLAMINE SERPL-MCNC: <10 MG/DL (ref 0–0.08)
GFR, ESTIMATED: 69 ML/MIN/1.73M2
GLUCOSE SERPL-MCNC: 89 MG/DL (ref 70–99)
HCT VFR BLD AUTO: 41.2 % (ref 36.3–47.1)
HGB BLD-MCNC: 13.5 G/DL (ref 11.9–15.1)
IMM GRANULOCYTES # BLD AUTO: 0.01 K/UL (ref 0–0.3)
IMM GRANULOCYTES NFR BLD: 0 %
LYMPHOCYTES NFR BLD: 1.54 K/UL (ref 1.1–3.7)
LYMPHOCYTES RELATIVE PERCENT: 30 % (ref 24–43)
MCH RBC QN AUTO: 28.1 PG (ref 25.2–33.5)
MCHC RBC AUTO-ENTMCNC: 32.8 G/DL (ref 28.4–34.8)
MCV RBC AUTO: 85.7 FL (ref 82.6–102.9)
MONOCYTES NFR BLD: 0.38 K/UL (ref 0.1–1.2)
MONOCYTES NFR BLD: 7 % (ref 3–12)
NEUTROPHILS NFR BLD: 59 % (ref 36–65)
NEUTS SEG NFR BLD: 3.04 K/UL (ref 1.5–8.1)
NRBC BLD-RTO: 0 PER 100 WBC
PLATELET # BLD AUTO: 181 K/UL (ref 138–453)
PMV BLD AUTO: 12 FL (ref 8.1–13.5)
POTASSIUM SERPL-SCNC: 3.5 MMOL/L (ref 3.7–5.3)
PROT SERPL-MCNC: 8.2 G/DL (ref 6.4–8.3)
RBC # BLD AUTO: 4.81 M/UL (ref 3.95–5.11)
RBC # BLD: ABNORMAL 10*6/UL
SALICYLATES SERPL-MCNC: <1 MG/DL (ref 3–10)
SODIUM SERPL-SCNC: 138 MMOL/L (ref 135–144)
WBC OTHER # BLD: 5.2 K/UL (ref 3.5–11.3)

## 2024-07-01 PROCEDURE — G0480 DRUG TEST DEF 1-7 CLASSES: HCPCS

## 2024-07-01 PROCEDURE — 80143 DRUG ASSAY ACETAMINOPHEN: CPT

## 2024-07-01 PROCEDURE — 80048 BASIC METABOLIC PNL TOTAL CA: CPT

## 2024-07-01 PROCEDURE — 80179 DRUG ASSAY SALICYLATE: CPT

## 2024-07-01 PROCEDURE — 93005 ELECTROCARDIOGRAM TRACING: CPT | Performed by: EMERGENCY MEDICINE

## 2024-07-01 PROCEDURE — 80076 HEPATIC FUNCTION PANEL: CPT

## 2024-07-01 PROCEDURE — 85025 COMPLETE CBC W/AUTO DIFF WBC: CPT

## 2024-07-01 PROCEDURE — 80307 DRUG TEST PRSMV CHEM ANLYZR: CPT

## 2024-07-01 PROCEDURE — 99285 EMERGENCY DEPT VISIT HI MDM: CPT

## 2024-07-01 PROCEDURE — 96374 THER/PROPH/DIAG INJ IV PUSH: CPT

## 2024-07-01 PROCEDURE — 6360000002 HC RX W HCPCS: Performed by: EMERGENCY MEDICINE

## 2024-07-01 RX ORDER — KETOROLAC TROMETHAMINE 30 MG/ML
30 INJECTION, SOLUTION INTRAMUSCULAR; INTRAVENOUS ONCE
Status: COMPLETED | OUTPATIENT
Start: 2024-07-01 | End: 2024-07-01

## 2024-07-01 RX ADMIN — KETOROLAC TROMETHAMINE 30 MG: 30 INJECTION, SOLUTION INTRAMUSCULAR at 23:25

## 2024-07-01 ASSESSMENT — ENCOUNTER SYMPTOMS
SHORTNESS OF BREATH: 0
DIARRHEA: 0
VOMITING: 0
RHINORRHEA: 0
EYE DISCHARGE: 0
COUGH: 0
EYE REDNESS: 0
SORE THROAT: 0
NAUSEA: 0
COLOR CHANGE: 0

## 2024-07-01 ASSESSMENT — PAIN DESCRIPTION - LOCATION: LOCATION: BACK

## 2024-07-01 ASSESSMENT — PAIN SCALES - GENERAL: PAINLEVEL_OUTOF10: 9

## 2024-07-02 LAB
AMPHET UR QL SCN: NEGATIVE
BARBITURATES UR QL SCN: NEGATIVE
BENZODIAZ UR QL: NEGATIVE
CANNABINOIDS UR QL SCN: POSITIVE
COCAINE UR QL SCN: POSITIVE
EKG ATRIAL RATE: 81 BPM
EKG P AXIS: 59 DEGREES
EKG P-R INTERVAL: 164 MS
EKG Q-T INTERVAL: 424 MS
EKG QRS DURATION: 88 MS
EKG QTC CALCULATION (BAZETT): 483 MS
EKG R AXIS: 46 DEGREES
EKG T AXIS: -72 DEGREES
EKG VENTRICULAR RATE: 78 BPM
FENTANYL UR QL: NEGATIVE
METHADONE UR QL: NEGATIVE
OPIATES UR QL SCN: NEGATIVE
OXYCODONE UR QL SCN: NEGATIVE
PCP UR QL SCN: NEGATIVE
TEST INFORMATION: ABNORMAL

## 2024-07-02 NOTE — ED NOTES
Pt presents to ED via private auto with c/o back pain and suicide attempt. Pt states she tried to throw herself in front of a car today but was not sure what time that occurred. Pt states hx of chronic back pain. Pt denies pain from suicide attempt. Pt afebrile, vitals stable. Pt able to ambulate without assist.

## 2024-07-02 NOTE — ED PROVIDER NOTES
unless otherwise noted in MDM or here.  Reports below are reviewed by myself.  No orders to display       LABS: Lab orders shown below, the results are reviewed by myself, and all abnormals are listed below.  Labs Reviewed   BASIC METABOLIC PANEL - Abnormal; Notable for the following components:       Result Value    Potassium 3.5 (*)     BUN 21 (*)     Creatinine 1.0 (*)     BUN/Creatinine Ratio 21 (*)     All other components within normal limits   CBC WITH AUTO DIFFERENTIAL - Abnormal; Notable for the following components:    RDW 14.6 (*)     All other components within normal limits   TOX SCR, BLD, ED - Abnormal; Notable for the following components:    Acetaminophen Level <10 (*)     Salicylate Lvl <1.0 (*)     All other components within normal limits   URINE DRUG SCREEN - Abnormal; Notable for the following components:    Cocaine Metabolite, Urine POSITIVE (*)     Cannabinoid Scrn, Ur POSITIVE (*)     All other components within normal limits   HEPATIC FUNCTION PANEL       Vitals Reviewed:    Vitals:    07/01/24 2238 07/01/24 2240   BP:  106/80   Pulse: 82    Resp: 15    Temp: 97.6 °F (36.4 °C)    TempSrc: Oral    SpO2: 96%    Weight: 63.5 kg (140 lb)      MEDICATIONS GIVEN TO PATIENT THIS ENCOUNTER:  Orders Placed This Encounter   Medications    ketorolac (TORADOL) injection 30 mg     DISCHARGE PRESCRIPTIONS:  New Prescriptions    No medications on file     PHYSICIAN CONSULTS ORDERED THIS ENCOUNTER:  IP CONSULT TO PSYCHIATRY  FINAL IMPRESSION      1. Suicidal ideation    2. Depression, unspecified depression type          DISPOSITION/PLAN   DISPOSITION Decision To Transfer 07/02/2024 01:57:33 AM      OUTPATIENT FOLLOW UP THE PATIENT:  No follow-up provider specified.    Jose Jarrell MD        This note was created with the assistance of a speech-recognition program. While intending to generate a document that actually reflects the content of the visit, no guarantees can be provided that every mistake

## 2024-08-10 ENCOUNTER — HOSPITAL ENCOUNTER (EMERGENCY)
Age: 49
Discharge: ANOTHER HOSPITAL | End: 2024-08-11
Attending: EMERGENCY MEDICINE
Payer: MEDICAID

## 2024-08-10 DIAGNOSIS — R45.851 SUICIDAL IDEATION: Primary | ICD-10-CM

## 2024-08-10 LAB
ALBUMIN SERPL-MCNC: 4.1 G/DL (ref 3.5–5.2)
ALBUMIN/GLOB SERPL: 1 {RATIO} (ref 1–2.5)
ALP SERPL-CCNC: 73 U/L (ref 35–104)
ALT SERPL-CCNC: 7 U/L (ref 10–35)
AMPHET UR QL SCN: NEGATIVE
ANION GAP SERPL CALCULATED.3IONS-SCNC: 13 MMOL/L (ref 9–16)
APAP SERPL-MCNC: <5 UG/ML (ref 10–30)
AST SERPL-CCNC: 17 U/L (ref 10–35)
BACTERIA URNS QL MICRO: NORMAL
BARBITURATES UR QL SCN: NEGATIVE
BASOPHILS # BLD: 0.06 K/UL (ref 0–0.2)
BASOPHILS NFR BLD: 1 % (ref 0–2)
BENZODIAZ UR QL: NEGATIVE
BILIRUB SERPL-MCNC: 0.2 MG/DL (ref 0–1.2)
BILIRUB UR QL STRIP: NEGATIVE
BUN SERPL-MCNC: 13 MG/DL (ref 6–20)
CALCIUM SERPL-MCNC: 9.2 MG/DL (ref 8.6–10.4)
CANNABINOIDS UR QL SCN: POSITIVE
CASTS #/AREA URNS LPF: NORMAL /LPF (ref 0–8)
CHLORIDE SERPL-SCNC: 104 MMOL/L (ref 98–107)
CLARITY UR: CLEAR
CO2 SERPL-SCNC: 21 MMOL/L (ref 20–31)
COCAINE UR QL SCN: POSITIVE
COLOR UR: YELLOW
CREAT SERPL-MCNC: 0.7 MG/DL (ref 0.5–0.9)
EOSINOPHIL # BLD: 0.1 K/UL (ref 0–0.44)
EOSINOPHILS RELATIVE PERCENT: 2 % (ref 1–4)
EPI CELLS #/AREA URNS HPF: NORMAL /HPF (ref 0–5)
ERYTHROCYTE [DISTWIDTH] IN BLOOD BY AUTOMATED COUNT: 14.8 % (ref 11.8–14.4)
ETHANOL PERCENT: <0.01 %
ETHANOLAMINE SERPL-MCNC: <10 MG/DL (ref 0–0.08)
FENTANYL UR QL: NEGATIVE
GFR, ESTIMATED: >90 ML/MIN/1.73M2
GLUCOSE SERPL-MCNC: 115 MG/DL (ref 74–99)
GLUCOSE UR STRIP-MCNC: NEGATIVE MG/DL
HCG UR QL: NEGATIVE
HCT VFR BLD AUTO: 43.1 % (ref 36.3–47.1)
HGB BLD-MCNC: 13.1 G/DL (ref 11.9–15.1)
HGB UR QL STRIP.AUTO: NEGATIVE
IMM GRANULOCYTES # BLD AUTO: <0.03 K/UL (ref 0–0.3)
IMM GRANULOCYTES NFR BLD: 0 %
KETONES UR STRIP-MCNC: ABNORMAL MG/DL
LEUKOCYTE ESTERASE UR QL STRIP: ABNORMAL
LYMPHOCYTES NFR BLD: 1.73 K/UL (ref 1.1–3.7)
LYMPHOCYTES RELATIVE PERCENT: 30 % (ref 24–43)
MCH RBC QN AUTO: 28.4 PG (ref 25.2–33.5)
MCHC RBC AUTO-ENTMCNC: 30.4 G/DL (ref 28.4–34.8)
MCV RBC AUTO: 93.3 FL (ref 82.6–102.9)
METHADONE UR QL: NEGATIVE
MONOCYTES NFR BLD: 0.43 K/UL (ref 0.1–1.2)
MONOCYTES NFR BLD: 8 % (ref 3–12)
NEUTROPHILS NFR BLD: 59 % (ref 36–65)
NEUTS SEG NFR BLD: 3.36 K/UL (ref 1.5–8.1)
NITRITE UR QL STRIP: NEGATIVE
NRBC BLD-RTO: 0 PER 100 WBC
OPIATES UR QL SCN: NEGATIVE
OXYCODONE UR QL SCN: NEGATIVE
PCP UR QL SCN: NEGATIVE
PH UR STRIP: 6 [PH] (ref 5–8)
PLATELET # BLD AUTO: 165 K/UL (ref 138–453)
PMV BLD AUTO: 12.7 FL (ref 8.1–13.5)
POTASSIUM SERPL-SCNC: 4.1 MMOL/L (ref 3.7–5.3)
PROT SERPL-MCNC: 7.3 G/DL (ref 6.6–8.7)
PROT UR STRIP-MCNC: NEGATIVE MG/DL
RBC # BLD AUTO: 4.62 M/UL (ref 3.95–5.11)
RBC # BLD: ABNORMAL 10*6/UL
RBC #/AREA URNS HPF: NORMAL /HPF (ref 0–4)
SALICYLATES SERPL-MCNC: <0.5 MG/DL (ref 0–10)
SODIUM SERPL-SCNC: 138 MMOL/L (ref 136–145)
SP GR UR STRIP: 1.02 (ref 1–1.03)
TEST INFORMATION: ABNORMAL
UROBILINOGEN UR STRIP-ACNC: NORMAL EU/DL (ref 0–1)
WBC #/AREA URNS HPF: NORMAL /HPF (ref 0–5)
WBC OTHER # BLD: 5.7 K/UL (ref 3.5–11.3)

## 2024-08-10 PROCEDURE — 80053 COMPREHEN METABOLIC PANEL: CPT

## 2024-08-10 PROCEDURE — 81001 URINALYSIS AUTO W/SCOPE: CPT

## 2024-08-10 PROCEDURE — 80143 DRUG ASSAY ACETAMINOPHEN: CPT

## 2024-08-10 PROCEDURE — 99285 EMERGENCY DEPT VISIT HI MDM: CPT

## 2024-08-10 PROCEDURE — 80307 DRUG TEST PRSMV CHEM ANLYZR: CPT

## 2024-08-10 PROCEDURE — 80179 DRUG ASSAY SALICYLATE: CPT

## 2024-08-10 PROCEDURE — 6370000000 HC RX 637 (ALT 250 FOR IP)

## 2024-08-10 PROCEDURE — 96372 THER/PROPH/DIAG INJ SC/IM: CPT

## 2024-08-10 PROCEDURE — 85025 COMPLETE CBC W/AUTO DIFF WBC: CPT

## 2024-08-10 PROCEDURE — 81025 URINE PREGNANCY TEST: CPT

## 2024-08-10 PROCEDURE — 6360000002 HC RX W HCPCS

## 2024-08-10 PROCEDURE — G0480 DRUG TEST DEF 1-7 CLASSES: HCPCS

## 2024-08-10 RX ORDER — DIPHENHYDRAMINE HCL 25 MG
25 TABLET ORAL ONCE
Status: COMPLETED | OUTPATIENT
Start: 2024-08-10 | End: 2024-08-10

## 2024-08-10 RX ORDER — KETOROLAC TROMETHAMINE 30 MG/ML
30 INJECTION, SOLUTION INTRAMUSCULAR; INTRAVENOUS ONCE
Status: COMPLETED | OUTPATIENT
Start: 2024-08-10 | End: 2024-08-10

## 2024-08-10 RX ADMIN — DIPHENHYDRAMINE HCL 25 MG: 25 TABLET ORAL at 15:06

## 2024-08-10 RX ADMIN — KETOROLAC TROMETHAMINE 30 MG: 30 INJECTION, SOLUTION INTRAMUSCULAR at 15:06

## 2024-08-10 ASSESSMENT — PAIN DESCRIPTION - ORIENTATION
ORIENTATION: LOWER
ORIENTATION: LOWER

## 2024-08-10 ASSESSMENT — PAIN SCALES - GENERAL
PAINLEVEL_OUTOF10: 10

## 2024-08-10 ASSESSMENT — PAIN - FUNCTIONAL ASSESSMENT
PAIN_FUNCTIONAL_ASSESSMENT: 0-10
PAIN_FUNCTIONAL_ASSESSMENT: PREVENTS OR INTERFERES SOME ACTIVE ACTIVITIES AND ADLS
PAIN_FUNCTIONAL_ASSESSMENT: ACTIVITIES ARE NOT PREVENTED
PAIN_FUNCTIONAL_ASSESSMENT: 0-10

## 2024-08-10 ASSESSMENT — PAIN DESCRIPTION - LOCATION
LOCATION: BACK

## 2024-08-10 ASSESSMENT — PAIN DESCRIPTION - DESCRIPTORS
DESCRIPTORS: ACHING
DESCRIPTORS: DISCOMFORT
DESCRIPTORS: DISCOMFORT

## 2024-08-10 ASSESSMENT — PAIN DESCRIPTION - PAIN TYPE
TYPE: ACUTE PAIN
TYPE: ACUTE PAIN

## 2024-08-10 NOTE — ED NOTES
Writer notified of patients acceptance to the [facility].  Voluntary and facesheet faxed.      EMTALA 1 documentation complete.

## 2024-08-10 NOTE — ED PROVIDER NOTES
[facility] ED  Emergency Department  Emergency Medicine Resident Sign-out     Care of Sruthi You was assumed from Dr. Veras and is being seen for Suicidal (Without a plan/\"Ran out of my meds\"), Back Pain, and Rash (\"Itchy\")  .  The patient's initial evaluation and plan have been discussed with the prior provider who initially evaluated the patient.     EMERGENCY DEPARTMENT COURSE / MEDICAL DECISION MAKING:       MEDICATIONS GIVEN:  Orders Placed This Encounter   Medications    ketorolac (TORADOL) injection 30 mg    diphenhydrAMINE (BENADRYL) tablet 25 mg       LABS / RADIOLOGY:     Labs Reviewed   CBC WITH AUTO DIFFERENTIAL - Abnormal; Notable for the following components:       Result Value    RDW 14.8 (*)     All other components within normal limits   COMPREHENSIVE METABOLIC PANEL - Abnormal; Notable for the following components:    Glucose 115 (*)     ALT 7 (*)     All other components within normal limits   URINE DRUG SCREEN - Abnormal; Notable for the following components:    Cocaine Metabolite, Urine POSITIVE (*)     Cannabinoid Scrn, Ur POSITIVE (*)     All other components within normal limits   TOX SCR, BLD, ED - Abnormal; Notable for the following components:    Acetaminophen Level <5 (*)     All other components within normal limits   URINALYSIS WITH REFLEX TO CULTURE - Abnormal; Notable for the following components:    Ketones, Urine TRACE (*)     Leukocyte Esterase, Urine SMALL (*)     All other components within normal limits   PREGNANCY, URINE   MICROSCOPIC URINALYSIS       XR CHEST (2 VW)    Result Date: 7/30/2024  PA and lateral chest: HISTORY: Cough. 2 views of the chest are obtained. Cardiac and mediastinal contours are within normal limits. Lungs are clear. There is no vascular congestion or effusion. Osseous structures appear intact. No pneumothorax identified. IMPRESSION: No acute findings. Workstation:[id number] Finalized by Abrahan Davila MD on 7/30/2024 3:13

## 2024-08-10 NOTE — ED NOTES
Writer met with patient regarding report of suicidal ideations.  Patient has had multiple ED visits for mental health crisis with limited to no follow up with outpatient treatment.  Today, patient reports that she has been feeling depressed over the past several days.  She learned that her father is terminally ill and this new stressor is what is driving her depression.  She reports suicidal thoughts with plan to walk into traffic.  She reports that she is still grieving the death of her daughter and now her father is dying.  She endorses auditory hallucinations that are commanding her to kill herself.  She states that she has been using marijuana, cocaine and alcohol daily for the past several days.  Patient is tearful and reports desire to get better.  She reports that after discharging from inpatient she will continue to take her medications and begins to feel better.  She reports that at that time she stops taking her medications as she thinks she is better.  Patient denied concerns related to housing or legal issues.  She states that she lives with her mother who is supportive.  Patient identifies Sikh as a protective factor, however, states she still has a strong desire to no longer be alive.  Patient is seeking inpatient mental health treatment today.

## 2024-08-10 NOTE — ED PROVIDER NOTES
[facility] ED  Emergency Department Encounter  Emergency Medicine Resident     Pt Name:Sruthi You  MRN: [id number]  Birthdate 1975  Date of evaluation: 8/10/24  PCP:  No primary care provider on file.  Note Started: 2:31 PM EDT      CHIEF COMPLAINT       Chief Complaint   Patient presents with    Suicidal     Without a plan  \"Ran out of my meds\"    Back Pain    Rash     \"Itchy\"       HISTORY OF PRESENT ILLNESS  (Location/Symptom, Timing/Onset, Context/Setting, Quality, Duration, Modifying Factors, Severity.)      Sruthi You is a 48 y.o. female who presents with suicidal ideation.  Patient reports that she has been having a rough time since the death of her daughter, just found out today that her father is terminally ill and is feeling \"very down\".  She initially stated that she did not have any active plans to hurt herself but later stated that she would \"do anything, probably walk into traffic\".  She reports that she has been out of her psych meds for about a month.   Additionally, she complains of chronic back pain.  She states that she has fibromyalgia and the rainy weather has caused her back pain to flareup.  Lastly, she has an itchy rash that she noticed a few weeks ago to her entire body.  She has been sleeping at her sister's house, no one else seems to have a rash.   Patient denies fever, chills, nausea, vomiting, abdominal pain, chest pain, shortness of breath.  She denies any other symptoms at this time.    PAST MEDICAL / SURGICAL / SOCIAL / FAMILY HISTORY      has a past medical history of Fibromyalgia, Headache, Hyperlipidemia, Hypertension, Hypothyroidism, Lupus (HCC), Psychiatric problem, and Thyroid disease.     has a past surgical history that includes  section; Foot surgery; and Hysterectomy.    Social History     Socioeconomic History    Marital status: Single     Spouse name: Not on file    Number of children: Not on file    Years of education: Not on  or rebound.   Musculoskeletal:         General: Normal range of motion.      Right lower leg: No edema.      Left lower leg: No edema.      Comments: Diffuse paraspinal thoracic and lumbar tenderness.   Skin:     General: Skin is warm and dry.      Comments: Scattered dermatitic rash to all extremities, abdomen, with associated excoriations.  No evidence of infectious process.   Neurological:      General: No focal deficit present.      Mental Status: She is alert and oriented to person, place, and time.         DDX/DIAGNOSTIC RESULTS / EMERGENCY DEPARTMENT COURSE / MDM     Medical Decision Making  Ms You is a 39-year-old female with a history of depression, suicide attempts, presenting with complaints of depression and suicidal ideation.  Patient reports she has been off all of her medications for about a month.  She reports thoughts of hurting herself, states that she would do anything necessary including walking in front of traffic.  She has a history of fibromyalgia, states that the recent rainy weather has exacerbated her chronic back pain.  She is requesting Toradol by name.  Also complains of a diffuse rash, unsure what caused it.  She has been sleeping on her sister's couch, no one else in the house has a rash.  On exam she is in no acute distress, does appear tearful, upset.  She confirms that she has thoughts of hurting herself.  She would like to get back on her psych medications.  She has diffuse mild thoracic and lumbar tenderness.  Also with diffuse rash, appears dermatitic in nature with excoriations from scratching.  Will provide with Toradol and Benadryl at her request, will also obtain BHI labs and discussed with psychiatry.  Disposition pending workup.    Patient is medically cleared to go to inpatient psych.     Amount and/or Complexity of Data Reviewed  Labs: ordered.    Risk  OTC drugs.  Prescription drug management.        EMERGENCY DEPARTMENT COURSE:    ED Course as of 08/11/24 0656   Sat

## 2024-08-10 NOTE — ED TRIAGE NOTES
Pt sts she ran out of her medications and is having \"suicidal intentions\"  Pt denied self injury PTA  Pt did report back pain of unknown injury/cause  Pt reported itchy rash

## 2024-08-10 NOTE — ED NOTES
[] [facility]    [] [facility]    [x]  [facility]    SUICIDE RISK ASSESSMENT      Y  N     [x] [] In the past two weeks have you had thoughts of hurting yourself in any way?    [x] [] In the past two weeks have you had thoughts that you would be better off dead?   [] [x] Have you made a suicide attempt in the past two months?   [x] [] Do you have a plan for hurting yourself or suicide?   [] [x] Presence of hallucinations/voices related to hurting himself or herself or someone else.    SUICIDE/SECURITY WATCH PRECAUTION CHECKLIST     Orders    [x]  Suicide/Security Watch Precautions initiated as checked below:   8/10/24 2:53 PM EDT 26/H26C    [x] Notified physician:  Kevin Payne MD  8/10/24 2:53 PM EDT    [x] Orders obtained as appropriate:     [x] 1:1 Observer     [] Psych Consult     [] Psych Consult    Name:  Date:  Time:    [x] 1:1 Observer, Notified by:  Cathleen No RN    Contact Nurse Supervisor    [x] Remove all personal clothes from room and place in snap/paper gown/pants.  Slipper only    [x] Remove all personal belongings from room and secured away from patient.    Documentation    [x] Initiate Suicide/Security Watch Precaution Flow Sheet    [x] Initiate individualized Care Plan/Problem    [x] Document why precautions initiated on flow sheet (Initiate Nursing Care Plan/Problem)    [x] 1:1 Observer in place; instructions provided.  Suicide precautions require observer be within arms length.    [x] Nurse-Observer Communication Hand-off initiated by RN, reviewed with Observer.  Subsequently used as Hand Off between Observers.    [x] Initiate every 15 minute observations per observer as delegated by the RN.    [x] Initiate RN assessment and documentation    Environmental Scan  Search Criteria and Process: OPTIONAL, see Search Policy    [] Reason for search:    [] Nursing in presence of second person to search patient    [] Patient notified of reason for body assessment and  belongings search:     Persons present during search:   Results of search and disposition:       Searchers Name: tech and MPD     These items or items similar should be removed from the room:   [x] Chairs   [x] Telephone   [x] Trash cans and liners   [x] Plastic utensils (order Patient Safety tray)   [x] Empty or remove Sharps containers   [x] All personal clothing/belongings removed   [x] All unnecessary lead wires, electrical cords, draw cords, etc.   [x] Flowers and plants   [x] Double check for lighters, matches, razors, any glass items etc that can be used as weapons.    Person completing Checklist: Cathleen No RN       GENERAL INFORMATION     Y  N     [x] [] Has the patient been informed that they are on a watch and what that means?   [] [x] Can the patient get out of Bed without nursing assistance?   [] [x] Can the patient use the restroom without nursing assistance?   [] [x] Can the patient walk the halls to \"stretch their legs?\"   [] [x] Does the patient have metal utensils?   [x] [] Have the patient's belongings been placed out of control of the patient?   [x] [] Have the patient and his/her belongings been checked for contraband?   [x] [] Is the patient under any visitor restrictions?   If Yes, explain: SI   [] [x] Is the patient under an alias?  Alias Name:   Authorized visitors (no more than two are to be on the list)   Name/Relationship:   Name/Relationship:    Name of Staff member that you  Received this information from?: Patient    General Description:    Sruthi You 26/H26C female 48 y.o. Admission weight: 59 kg (130 lb) Height: 162.6 cm (5' 4\")  Race: []  [x] Black  []   []   []  [] Other  Facial Hair:  [] Yes  [x] No  If yes, please describe:  Identifying Marks (i.e. Visible tattoos, scars, etc...):     NURSING CARE PLAN    Nursing Diagnosis: Risk of Self Directed Harm  [] Actual  [] Potential  Date Started: 8/10/24      Etiological Factors: (related

## 2024-08-10 NOTE — ED PROVIDER NOTES
[facility]     Emergency Department     Faculty Note/ Attestation      Pt Name: Sruthi You                                       MRN: [id number]  Birthdate 1975  Date of evaluation: 8/10/2024    Patients PCP:    No primary care provider on file.    Note Started: 2:47 PM EDT      Attestation  I performed a history and physical examination of the patient and discussed management with the resident. I reviewed the resident’s note and agree with the documented findings and plan of care. Any areas of disagreement are noted on the chart. I was personally present for the key portions of any procedures. I have documented in the chart those procedures where I was not present during the key portions. I have reviewed the emergency nurses triage note. I agree with the chief complaint, past medical history, past surgical history, allergies, medications, social and family history as documented unless otherwise noted below.    For Physician Assistant/ Nurse Practitioner cases/documentation I have personally evaluated this patient and have completed at least one if not all key elements of the E/M (history, physical exam, and MDM). Additional findings are as noted.      Initial Screens:        [place] Coma Scale  Eye Opening: Spontaneous  Best Verbal Response: Oriented  Best Motor Response: Obeys commands  Milly Coma Scale Score: 15    Vitals:    Vitals:    08/10/24 1418 08/10/24 1430 08/10/24 1441   BP: 110/78  108/71   Pulse: 70 63 75   Resp: 18 20 18   Temp: 97.3 °F (36.3 °C) 97.5 °F (36.4 °C)    TempSrc: Oral Oral    SpO2: 100%     Weight:  59 kg (130 lb)    Height:  1.626 m (5' 4\")        CHIEF COMPLAINT       Chief Complaint   Patient presents with    Suicidal     Without a plan  \"Ran out of my meds\"    Back Pain    Rash     \"Itchy\"             DIAGNOSTIC RESULTS             RADIOLOGY:   No orders to display         LABS:  Labs Reviewed - No data to display      EMERGENCY DEPARTMENT COURSE:

## 2024-08-10 NOTE — ED NOTES
Transfer Center Handoff for Behavioral Health Transfers      Patient's Current Location: [facility] ED     Chief Complaint   Patient presents with    Suicidal     Without a plan  \"Ran out of my meds\"    Back Pain    Rash     \"Itchy\"       Current or History of Violent Behavior: No    Currently in Restraints Now or During this Encounter: No  (Specify if Agitation or self harm is noted in ED?)  If yes, please describe behaviors requiring restraint:             Medical Clearance Documented and Verified in the Chart: No, patient is still in process of medical clearance.    Allergies   Allergen Reactions    Bactrim [Sulfamethoxazole-Trimethoprim]         Can Patient Tolerate Lying Flat: Yes    Able to Perform ADLs:  Yes  (Specify if able to ambulate or uses any mobility devices such as cane or walker)  Activity:    Level of Assistance:    Assistive Device:    Miscellaneous Devices:      LABS    CBC:   Lab Results   Component Value Date/Time    WBC 5.7 08/10/2024 03:11 PM    RBC 4.62 08/10/2024 03:11 PM    RBC 4.27 05/30/2012 12:19 AM    HGB 13.1 08/10/2024 03:11 PM    HCT 43.1 08/10/2024 03:11 PM    MCV 93.3 08/10/2024 03:11 PM    MCH 28.4 08/10/2024 03:11 PM    MCHC 30.4 08/10/2024 03:11 PM    RDW 14.8 08/10/2024 03:11 PM     08/10/2024 03:11 PM     05/30/2012 12:19 AM    MPV 12.7 08/10/2024 03:11 PM     CMP:   Lab Results   Component Value Date/Time     07/01/2024 11:26 PM    K 3.5 07/01/2024 11:26 PM     07/01/2024 11:26 PM    CO2 21 07/01/2024 11:26 PM    BUN 21 07/01/2024 11:26 PM    CREATININE 1.0 07/01/2024 11:26 PM    GFRAA >60 08/05/2022 04:30 AM    LABGLOM 69 07/01/2024 11:26 PM    LABGLOM >60 01/31/2024 02:16 PM    GLUCOSE 89 07/01/2024 11:26 PM    GLUCOSE 83 05/30/2012 12:19 AM    CALCIUM 9.7 07/01/2024 11:26 PM    BILITOT 0.5 07/01/2024 11:26 PM    ALKPHOS 72 07/01/2024 11:26 PM    AST 22 07/01/2024 11:26 PM    ALT 14 07/01/2024 11:26 PM     Drug Panel:   Lab Results    Component Value Date/Time    OPIAU NEGATIVE 07/01/2024 01:30 AM     UA:  Lab Results   Component Value Date/Time    COLORU Yellow 08/10/2024 03:19 PM    PROTEINU NEGATIVE 08/10/2024 03:19 PM    GLUCOSEU NEGATIVE 08/10/2024 03:19 PM    GLUCOSEU NEGATIVE 05/29/2012 12:21 AM    KETUA TRACE 08/10/2024 03:19 PM    BILIRUBINUR NEGATIVE 08/10/2024 03:19 PM    UROBILINOGEN Normal 08/10/2024 03:19 PM    NITRU NEGATIVE 08/10/2024 03:19 PM    LEUKOCYTESUR SMALL 08/10/2024 03:19 PM    WBCUA 2 TO 5 08/10/2024 03:19 PM    RBCUA 2 TO 5 08/10/2024 03:19 PM    BACTERIA None 08/10/2024 03:19 PM     PREGNANCY TEST:   Lab Results   Component Value Date/Time    PREGTESTUR NEGATIVE 08/10/2024 03:19 PM    PREGTESTUR NEGATIVE 02/24/2023 03:55 PM       Dialysis: No    Target Destination: [facility]    Insurance: Payor: [facility] MEDICAID /  /  /      Current Psychotic Symptoms  Harmful Actions Towards Others:    Orientation Level:    Speech Pattern:    General Attitude:    Emotions:    Delusions:    Hallucination:       Any Suicidal Ideations: High Risk    Homicidal Ideations/Violence Risk:   Observed Violent Behavior:    Homicidal Ideations:      Past Psychiatric History:       Diagnosis Date    Fibromyalgia     Headache     migraines    Hyperlipidemia     Hypertension     Hypothyroidism     Lupus (HCC)     Psychiatric problem     Thyroid disease        Hold (TDO/Involuntary Hold): No    The patient is not currently receiving care for the above psychiatric illness.     Home Medications  Does Patient Have Medications to Bring to the Facility: No  Medications Prior to Admission:   Prior to Admission Medications   Prescriptions Last Dose Informant Patient Reported? Taking?   QUEtiapine (SEROQUEL XR) 50 MG extended release tablet   No No   Sig: Take 1 tablet by mouth nightly   busPIRone (BUSPAR) 10 MG tablet   No No   Sig: Take 1 tablet by mouth 3 times daily   lisinopril (PRINIVIL;ZESTRIL) 5 MG tablet   No No   Sig: Take 1 tablet by

## 2024-08-11 ENCOUNTER — HOSPITAL ENCOUNTER (INPATIENT)
Age: 49
LOS: 4 days | Discharge: HOME | End: 2024-08-15
Attending: PSYCHIATRY & NEUROLOGY | Admitting: PSYCHIATRY & NEUROLOGY
Payer: MEDICAID

## 2024-08-11 VITALS
HEART RATE: 58 BPM | RESPIRATION RATE: 18 BRPM | DIASTOLIC BLOOD PRESSURE: 102 MMHG | SYSTOLIC BLOOD PRESSURE: 156 MMHG | BODY MASS INDEX: 22.2 KG/M2 | TEMPERATURE: 97.9 F | WEIGHT: 130 LBS | HEIGHT: 64 IN | OXYGEN SATURATION: 100 %

## 2024-08-11 PROBLEM — R45.851 DEPRESSION WITH SUICIDAL IDEATION: Status: ACTIVE | Noted: 2024-08-11

## 2024-08-11 PROBLEM — F32.A DEPRESSION WITH SUICIDAL IDEATION: Status: ACTIVE | Noted: 2024-08-11

## 2024-08-11 PROCEDURE — 6370000000 HC RX 637 (ALT 250 FOR IP): Performed by: INTERNAL MEDICINE

## 2024-08-11 PROCEDURE — 6370000000 HC RX 637 (ALT 250 FOR IP): Performed by: NURSE PRACTITIONER

## 2024-08-11 PROCEDURE — 99222 1ST HOSP IP/OBS MODERATE 55: CPT | Performed by: INTERNAL MEDICINE

## 2024-08-11 PROCEDURE — 1240000000 HC EMOTIONAL WELLNESS R&B

## 2024-08-11 RX ORDER — POLYETHYLENE GLYCOL 3350 17 G/17G
17 POWDER, FOR SOLUTION ORAL DAILY PRN
Status: DISCONTINUED | OUTPATIENT
Start: 2024-08-11 | End: 2024-08-15 | Stop reason: HOSPADM

## 2024-08-11 RX ORDER — IBUPROFEN 400 MG/1
400 TABLET ORAL EVERY 6 HOURS PRN
Status: DISCONTINUED | OUTPATIENT
Start: 2024-08-11 | End: 2024-08-15 | Stop reason: HOSPADM

## 2024-08-11 RX ORDER — POLYETHYLENE GLYCOL 3350 17 G
2 POWDER IN PACKET (EA) ORAL
Status: DISCONTINUED | OUTPATIENT
Start: 2024-08-11 | End: 2024-08-15 | Stop reason: HOSPADM

## 2024-08-11 RX ORDER — LISINOPRIL 5 MG/1
5 TABLET ORAL DAILY
Status: DISCONTINUED | OUTPATIENT
Start: 2024-08-11 | End: 2024-08-15 | Stop reason: HOSPADM

## 2024-08-11 RX ORDER — ACETAMINOPHEN 325 MG/1
650 TABLET ORAL EVERY 4 HOURS PRN
Status: DISCONTINUED | OUTPATIENT
Start: 2024-08-11 | End: 2024-08-15 | Stop reason: HOSPADM

## 2024-08-11 RX ORDER — MAGNESIUM HYDROXIDE/ALUMINUM HYDROXICE/SIMETHICONE 120; 1200; 1200 MG/30ML; MG/30ML; MG/30ML
30 SUSPENSION ORAL EVERY 6 HOURS PRN
Status: DISCONTINUED | OUTPATIENT
Start: 2024-08-11 | End: 2024-08-15 | Stop reason: HOSPADM

## 2024-08-11 RX ORDER — TRAZODONE HYDROCHLORIDE 50 MG/1
50 TABLET ORAL NIGHTLY PRN
Status: DISCONTINUED | OUTPATIENT
Start: 2024-08-11 | End: 2024-08-15 | Stop reason: HOSPADM

## 2024-08-11 RX ORDER — HYDROXYZINE 50 MG/1
50 TABLET, FILM COATED ORAL 3 TIMES DAILY PRN
Status: DISCONTINUED | OUTPATIENT
Start: 2024-08-11 | End: 2024-08-15 | Stop reason: HOSPADM

## 2024-08-11 RX ORDER — CEPHALEXIN 250 MG/1
250 CAPSULE ORAL EVERY 6 HOURS SCHEDULED
Status: COMPLETED | OUTPATIENT
Start: 2024-08-11 | End: 2024-08-14

## 2024-08-11 RX ADMIN — TRAZODONE HYDROCHLORIDE 50 MG: 50 TABLET ORAL at 20:43

## 2024-08-11 RX ADMIN — CEPHALEXIN 250 MG: 250 CAPSULE ORAL at 17:43

## 2024-08-11 RX ADMIN — HYDROXYZINE HYDROCHLORIDE 50 MG: 50 TABLET, FILM COATED ORAL at 20:43

## 2024-08-11 RX ADMIN — CEPHALEXIN 250 MG: 250 CAPSULE ORAL at 23:47

## 2024-08-11 ASSESSMENT — PATIENT HEALTH QUESTIONNAIRE - PHQ9
SUM OF ALL RESPONSES TO PHQ QUESTIONS 1-9: 2
2. FEELING DOWN, DEPRESSED OR HOPELESS: SEVERAL DAYS
SUM OF ALL RESPONSES TO PHQ QUESTIONS 1-9: 2
SUM OF ALL RESPONSES TO PHQ QUESTIONS 1-9: 2
1. LITTLE INTEREST OR PLEASURE IN DOING THINGS: SEVERAL DAYS
SUM OF ALL RESPONSES TO PHQ9 QUESTIONS 1 & 2: 2
SUM OF ALL RESPONSES TO PHQ QUESTIONS 1-9: 2

## 2024-08-11 ASSESSMENT — LIFESTYLE VARIABLES
HOW MANY STANDARD DRINKS CONTAINING ALCOHOL DO YOU HAVE ON A TYPICAL DAY: 5 OR 6
HOW OFTEN DO YOU HAVE A DRINK CONTAINING ALCOHOL: 4 OR MORE TIMES A WEEK
HOW MANY STANDARD DRINKS CONTAINING ALCOHOL DO YOU HAVE ON A TYPICAL DAY: 5 OR 6
HOW OFTEN DO YOU HAVE A DRINK CONTAINING ALCOHOL: 4 OR MORE TIMES A WEEK

## 2024-08-11 ASSESSMENT — SLEEP AND FATIGUE QUESTIONNAIRES
SLEEP PATTERN: DIFFICULTY FALLING ASLEEP;DISTURBED/INTERRUPTED SLEEP;INSOMNIA
DO YOU USE A SLEEP AID: YES
AVERAGE NUMBER OF SLEEP HOURS: 0
DO YOU HAVE DIFFICULTY SLEEPING: YES

## 2024-08-11 NOTE — ED NOTES
Writer received a phone call from Juliana of [facility]. She called to report they have a bed for patient. Patient will be going to room 212 on the [facility] ER unit.    Number for  nurse to nurse is as follows: [phone].

## 2024-08-11 NOTE — ED NOTES
Writer receieved phone call from [facility]. Writer informed Casa Aponte will arrive in 30 minutes to  patient.

## 2024-08-11 NOTE — CARE COORDINATION
BHI Biopsychosocial Assessment    Current Level of Psychosocial Functioning     Independent   Dependent  X  Minimal Assist     Psychosocial High Risk Factors (check all that apply)    Unable to obtain meds   Chronic illness/pain    Substance abuse X Marijuana, Cocaine   Lack of Family Support   Financial stress   Isolation X  Inadequate Community Resources   Suicide attempt(s)   Not taking medications X  Victim of crime   Developmental Delay  Unable to manage personal needs  X  Age 65 or older   Homeless   No transportation   Readmission within 30 days   Unemployment  Traumatic Event  Psychiatric Advanced Directives: none reported     Family to Involve in Treatment: Patient reports that her mother is supportive and involved in her care.    Sexual Orientation: CARLOS    Patient Strengths: adequate housing, family support, linked with outpatient treatment, insurance     Patient Barriers: presenting on admission with suicidal ideation, substance abuse     Opiate Education Provided: N/A Patient denies any current Heroin or Opiate use. Patient reports Marijuana, and Cocaine use. Patient's drug screen was positive for Cocaine and Cannabis.     CMHC/mental health history: Linked with [facility], has been staying with her mother in [place], Last Hosp at [facility] was from 6/20-6/24/2024, Reports that she did go to [facility] housing at last discharge, but states that she is unsure of how long she was there, reports interest in AOD treatment and was given resource folder.     Plan of Care   medication management, group/individual therapies, family meetings, psycho -education, treatment team meetings to assist with stabilization    Initial Discharge Plan: Patient reports a plan to explore inpatient AOD treatment and was provided with AOD treatment resource information.     Clinical Summary:  Patient is a 48 year old female who presents on admission with suicidal ideation. Patient has had multiple ED visits for mental health

## 2024-08-11 NOTE — ED NOTES
Doc terrell here to transport pt  Pts belongings given to transport  Report given  All questions answered  Pt alert and oriented x4, talking in complete sentences

## 2024-08-11 NOTE — BH NOTE
Patient given tobacco quitline number 58565070274 at this time, refusing to call at this time, states \" I just dont want to quit now\"- patient given information as to the dangers of long term tobacco use. Continue to reinforce the importance of tobacco cessation.

## 2024-08-11 NOTE — ED PROVIDER NOTES
[facility]  FACULTY HANDOFF       Handoff taken on the following patient from prior Attending Physician: Dr. Mccarthy  Pt Name: Sruthi You  PCP:  No primary care provider on file.  8:21 AM EDT    Attestation  I was available and discussed any additional care issues that arose and coordinated the management plans with the resident(s) caring for the patient during my duty period. Any areas of disagreement with resident's documentation of care or procedures are noted on the chart. I was personally present for the key portions of any/all procedures during my duty period. I have documented in the chart those procedures where I was not present during the key portions.         CHIEF COMPLAINT       Chief Complaint   Patient presents with    Suicidal     Without a plan  \"Ran out of my meds\"    Back Pain    Rash     \"Itchy\"         CURRENT MEDICATIONS     Previous Medications  Previous Medications    BUSPIRONE (BUSPAR) 10 MG TABLET    Take 1 tablet by mouth 3 times daily    LISINOPRIL (PRINIVIL;ZESTRIL) 5 MG TABLET    Take 1 tablet by mouth daily    MIRTAZAPINE (REMERON) 15 MG TABLET    Take 1 tablet by mouth nightly    QUETIAPINE (SEROQUEL XR) 50 MG EXTENDED RELEASE TABLET    Take 1 tablet by mouth nightly    TRAZODONE (DESYREL) 50 MG TABLET    Take 1 tablet by mouth nightly as needed for Sleep       Encounter Medications  Orders Placed This Encounter   Medications    ketorolac (TORADOL) injection 30 mg    diphenhydrAMINE (BENADRYL) tablet 25 mg       ALLERGIES     is allergic to bactrim [sulfamethoxazole-trimethoprim].      RECENT VITALS:   Temp: 96.9 °F (36.1 °C),  Pulse: 55, Respirations: 17, BP: 123/79    RADIOLOGY:   No orders to display       LABS:  Labs Reviewed   CBC WITH AUTO DIFFERENTIAL - Abnormal; Notable for the following components:       Result Value    RDW 14.8 (*)     All other components within normal limits   COMPREHENSIVE METABOLIC PANEL - Abnormal; Notable for the following

## 2024-08-11 NOTE — ED PROVIDER NOTES
[facility] ED  Emergency Department  Emergency Medicine Resident Turn-Over     Note Started: 3:47 AM EDT    Care of Sruthi You was assumed from Dr. Renee and is being seen for Suicidal (Without a plan/\"Ran out of my meds\"), Back Pain, and Rash (\"Itchy\")  .  The patient's initial evaluation and plan have been discussed with the prior provider who initially evaluated the patient.     EMERGENCY DEPARTMENT COURSE / MEDICAL DECISION MAKING:       MEDICATIONS GIVEN:  Orders Placed This Encounter   Medications    ketorolac (TORADOL) injection 30 mg    diphenhydrAMINE (BENADRYL) tablet 25 mg       LABS / RADIOLOGY:     Labs Reviewed   CBC WITH AUTO DIFFERENTIAL - Abnormal; Notable for the following components:       Result Value    RDW 14.8 (*)     All other components within normal limits   COMPREHENSIVE METABOLIC PANEL - Abnormal; Notable for the following components:    Glucose 115 (*)     ALT 7 (*)     All other components within normal limits   URINE DRUG SCREEN - Abnormal; Notable for the following components:    Cocaine Metabolite, Urine POSITIVE (*)     Cannabinoid Scrn, Ur POSITIVE (*)     All other components within normal limits   TOX SCR, BLD, ED - Abnormal; Notable for the following components:    Acetaminophen Level <5 (*)     All other components within normal limits   URINALYSIS WITH REFLEX TO CULTURE - Abnormal; Notable for the following components:    Ketones, Urine TRACE (*)     Leukocyte Esterase, Urine SMALL (*)     All other components within normal limits   PREGNANCY, URINE   MICROSCOPIC URINALYSIS       XR CHEST (2 VW)    Result Date: 7/30/2024  PA and lateral chest: HISTORY: Cough. 2 views of the chest are obtained. Cardiac and mediastinal contours are within normal limits. Lungs are clear. There is no vascular congestion or effusion. Osseous structures appear intact. No pneumothorax identified. IMPRESSION: No acute findings. Workstation:[id number] Finalized by Abrahan

## 2024-08-11 NOTE — ED NOTES
made telephone call to [facility]. Bettyer spoke with Brittney to arrange patient transport and obtain ETA.     Deon Lakhani will call around to see who could take this trip,  will receive a call back with ETA.

## 2024-08-11 NOTE — BH NOTE
[facility]  Admission Note     Admission Type:   Admission Type: Voluntary    Reason for admission:  Reason for Admission: Depression with suicidal ideation. Patient had plan to walk into traffic after her daughter passed away and found out that her father is terminally ill and passing. Patient stated that she has not slept in two days and is a daily user of drugs and alcohol. Patient reports increased anxiety and depression with feelings of helplesness and hoplesness.      Addictive Behavior:   Addictive Behavior  In the Past 3 Months, Have You Felt or Has Someone Told You That You Have a Problem With  : None    Medical Problems:   Past Medical History:   Diagnosis Date    Fibromyalgia     Headache     migraines    Hyperlipidemia     Hypertension     Hypothyroidism     Lupus (HCC)     Psychiatric problem     Thyroid disease        Status EXAM:  Mental Status and Behavioral Exam  Normal: No  Level of Assistance: Independent/Self  Facial Expression: Flat, Sad  Affect: Blunt  Level of Consciousness: Alert  Frequency of Checks: 4 times per hour, close  Mood:Normal: No  Mood: Depressed, Anxious, Helpless, Sad  Motor Activity:Normal: Yes  Eye Contact: Good  Observed Behavior: Cooperative, Friendly, Preoccupied  Sexual Misconduct History: Current - no  Preception: [place] to person, [place] to time, [place] to place, [place] to situation  Attention:Normal: No  Attention: Distractible  Thought Processes: Circumstantial  Thought Content:Normal: No  Thought Content: Paranoia, Preoccupations  Depression Symptoms: Feelings of helplessness, Feelings of hopelessess, Sleep disturbance  Anxiety Symptoms: Generalized  Maria Teresa Symptoms: Less need to sleep, Poor judgment  Hallucinations: Auditory (comment), Visual (comment) (pt states seeing shadow figures and things darting across the ground and hears a voice stating \"kill yourself\")  Delusions: Yes  Delusions: Controlled  Memory:Normal: Yes  Insight and Judgment:

## 2024-08-11 NOTE — ED NOTES
Writer provided nurse with patient room number for transport to [facility].   Writer provided nurse with phone number to complete nurse to nurse report.

## 2024-08-11 NOTE — H&P
[facility] Internal Medicine  Kvng Dumont MD; Juancarlos Peters MD, Neto Quintanilla MD, Екатерина Leach MD, Eduardo Springer MD; Magdaleno Velásquez MD    [facility] Internal Medicine   IN-PATIENT SERVICE   [facility]     HISTORY AND PHYSICAL EXAMINATION            Date:   2024  Patient name:  Sruthi You  Date of admission:  2024  1:13 PM  MRN:   [id number]  Account:  [id number]  YOB: 1975  PCP:    No primary care provider on file.  Room:   [address] [place] [address]  Code Status:    Prior      Chief Complaint:     Suicidal /Ac Psychosis    History Obtained From:     Patient/EMR/bedside RN     History of Present Illness:   Patient is 48-year-old female with past medical history of fibromyalgia, hypertension, hyperlipidemia hypothyroidism depression and anxiety and has been admitted in U for further management of depression suicidal ideation  Patient currently denies any chest pain shortness of breath    Past Medical History:     Past Medical History:   Diagnosis Date    Fibromyalgia     Headache     migraines    Hyperlipidemia     Hypertension     Hypothyroidism     Lupus (HCC)     Psychiatric problem     Thyroid disease         Past Surgical History:     Past Surgical History:   Procedure Laterality Date     SECTION      FOOT SURGERY      right    HYSTERECTOMY (CERVIX STATUS UNKNOWN)          Medications Prior to Admission:     Prior to Admission medications    Medication Sig Start Date End Date Taking? Authorizing Provider   mirtazapine (REMERON) 15 MG tablet Take 1 tablet by mouth nightly 24   Terrell Rea MD   busPIRone (BUSPAR) 10 MG tablet Take 1 tablet by mouth 3 times daily 24   Terrell Rea MD   traZODone (DESYREL) 50 MG tablet Take 1 tablet by mouth nightly as needed for Sleep 24   Terrell Rea MD   lisinopril (PRINIVIL;ZESTRIL) 5 MG tablet Take 1 tablet by mouth daily 24    input(s): \"POCGLU\" in the last 72 hours.  No intake or output data in the 24 hours ending 08/11/24 1329    General Appearance:  alert, well appearing, and in no acute distress  Mental status: oriented to person, place, and time   Head:  normocephalic, atraumatic.  Neck: supple, no carotid bruits, thyroid not palpable  Lungs: Bilateral equal air entry, clear to ausculation, no wheezing, rales or rhonchi, normal effort  Cardiovascular: normal rate, regular rhythm, no murmur, gallop, rub.  Abdomen: Soft, nontender, nondistended, normal bowel sounds, no hepatomegaly or splenomegaly  Neurologic: CN II-XII intact , DTR normal, no new focal motor or sensory deficits, moving all extremities spontaneously.   Skin: No gross lesions, rashes, bruising or bleeding on exposed skin area  Extremities:  peripheral pulses palpable, no pedal edema or calf pain with palpation    Investigations:      Laboratory Testing:  Recent Results (from the past 24 hour(s))   CBC with Auto Differential    Collection Time: 08/10/24  3:11 PM   Result Value Ref Range    WBC 5.7 3.5 - 11.3 k/uL    RBC 4.62 3.95 - 5.11 m/uL    Hemoglobin 13.1 11.9 - 15.1 g/dL    Hematocrit 43.1 36.3 - 47.1 %    MCV 93.3 82.6 - 102.9 fL    MCH 28.4 25.2 - 33.5 pg    MCHC 30.4 28.4 - 34.8 g/dL    RDW 14.8 (H) 11.8 - 14.4 %    Platelets 165 138 - 453 k/uL    MPV 12.7 8.1 - 13.5 fL    NRBC Automated 0.0 0.0 per 100 WBC    Neutrophils % 59 36 - 65 %    Lymphocytes % 30 24 - 43 %    Monocytes % 8 3 - 12 %    Eosinophils % 2 1 - 4 %    Basophils % 1 0 - 2 %    Immature Granulocytes % 0 0 %    Neutrophils Absolute 3.36 1.50 - 8.10 k/uL    Lymphocytes Absolute 1.73 1.10 - 3.70 k/uL    Monocytes Absolute 0.43 0.10 - 1.20 k/uL    Eosinophils Absolute 0.10 0.00 - 0.44 k/uL    Basophils Absolute 0.06 0.00 - 0.20 k/uL    Immature Granulocytes Absolute <0.03 0.00 - 0.30 k/uL    RBC Morphology ANISOCYTOSIS PRESENT    Comprehensive Metabolic Panel    Collection Time: 08/10/24  3:11 PM

## 2024-08-12 LAB — TSH SERPL DL<=0.05 MIU/L-ACNC: 3.61 UIU/ML (ref 0.3–5)

## 2024-08-12 PROCEDURE — 99223 1ST HOSP IP/OBS HIGH 75: CPT | Performed by: PSYCHIATRY & NEUROLOGY

## 2024-08-12 PROCEDURE — 84443 ASSAY THYROID STIM HORMONE: CPT

## 2024-08-12 PROCEDURE — 6370000000 HC RX 637 (ALT 250 FOR IP): Performed by: PSYCHIATRY & NEUROLOGY

## 2024-08-12 PROCEDURE — 36415 COLL VENOUS BLD VENIPUNCTURE: CPT

## 2024-08-12 PROCEDURE — 1240000000 HC EMOTIONAL WELLNESS R&B

## 2024-08-12 PROCEDURE — 99232 SBSQ HOSP IP/OBS MODERATE 35: CPT | Performed by: INTERNAL MEDICINE

## 2024-08-12 PROCEDURE — 6370000000 HC RX 637 (ALT 250 FOR IP): Performed by: INTERNAL MEDICINE

## 2024-08-12 PROCEDURE — 6370000000 HC RX 637 (ALT 250 FOR IP): Performed by: NURSE PRACTITIONER

## 2024-08-12 RX ORDER — BUSPIRONE HYDROCHLORIDE 10 MG/1
10 TABLET ORAL 3 TIMES DAILY
Status: DISCONTINUED | OUTPATIENT
Start: 2024-08-12 | End: 2024-08-15 | Stop reason: HOSPADM

## 2024-08-12 RX ORDER — MIRTAZAPINE 15 MG/1
15 TABLET, FILM COATED ORAL NIGHTLY
Status: DISCONTINUED | OUTPATIENT
Start: 2024-08-12 | End: 2024-08-15 | Stop reason: HOSPADM

## 2024-08-12 RX ORDER — BENZOCAINE/MENTHOL 6 MG-10 MG
LOZENGE MUCOUS MEMBRANE 3 TIMES DAILY
Status: DISCONTINUED | OUTPATIENT
Start: 2024-08-12 | End: 2024-08-15 | Stop reason: HOSPADM

## 2024-08-12 RX ORDER — PALIPERIDONE 3 MG/1
3 TABLET, EXTENDED RELEASE ORAL DAILY
Status: DISCONTINUED | OUTPATIENT
Start: 2024-08-12 | End: 2024-08-15 | Stop reason: HOSPADM

## 2024-08-12 RX ADMIN — HYDROCORTISONE: 0.01 CREAM TOPICAL at 14:00

## 2024-08-12 RX ADMIN — TRAZODONE HYDROCHLORIDE 50 MG: 50 TABLET ORAL at 21:02

## 2024-08-12 RX ADMIN — BUSPIRONE HYDROCHLORIDE 10 MG: 10 TABLET ORAL at 14:00

## 2024-08-12 RX ADMIN — BUSPIRONE HYDROCHLORIDE 10 MG: 10 TABLET ORAL at 21:02

## 2024-08-12 RX ADMIN — CEPHALEXIN 250 MG: 250 CAPSULE ORAL at 11:08

## 2024-08-12 RX ADMIN — CEPHALEXIN 250 MG: 250 CAPSULE ORAL at 18:00

## 2024-08-12 RX ADMIN — MIRTAZAPINE 15 MG: 15 TABLET, FILM COATED ORAL at 21:02

## 2024-08-12 RX ADMIN — HYDROXYZINE HYDROCHLORIDE 50 MG: 50 TABLET, FILM COATED ORAL at 21:02

## 2024-08-12 RX ADMIN — PALIPERIDONE 3 MG: 3 TABLET, EXTENDED RELEASE ORAL at 14:00

## 2024-08-12 RX ADMIN — CEPHALEXIN 250 MG: 250 CAPSULE ORAL at 23:55

## 2024-08-12 RX ADMIN — LISINOPRIL 5 MG: 5 TABLET ORAL at 11:00

## 2024-08-12 RX ADMIN — CEPHALEXIN 250 MG: 250 CAPSULE ORAL at 06:09

## 2024-08-12 ASSESSMENT — LIFESTYLE VARIABLES
HOW MANY STANDARD DRINKS CONTAINING ALCOHOL DO YOU HAVE ON A TYPICAL DAY: 5 OR 6
HOW OFTEN DO YOU HAVE A DRINK CONTAINING ALCOHOL: 4 OR MORE TIMES A WEEK

## 2024-08-12 ASSESSMENT — PAIN SCALES - GENERAL: PAINLEVEL_OUTOF10: 0

## 2024-08-12 NOTE — PROGRESS NOTES
[facility]  Kvng Dumont MD; Juancarlos Peters MD, Neto Quintanilla MD, Екатерина Leach MD, Eduardo Springer MD; Magdaleno Velásquez MD    [facility] Internal Medicine   IN-PATIENT SERVICE   [facility]     HISTORY AND PHYSICAL EXAMINATION            Date:   2024  Patient name:  Sruthi You  Date of admission:  2024  1:13 PM  MRN:   [id number]  Account:  [id number]  YOB: 1975  PCP:    No primary care provider on file.  Room:   [address] [place] [address]  Code Status:    Full Code      Chief Complaint:     Suicidal /Ac Psychosis    History Obtained From:     Patient/EMR/bedside RN     History of Present Illness:   Patient is 48-year-old female with past medical history of fibromyalgia, hypertension, hyperlipidemia hypothyroidism depression and anxiety and has been admitted in U for further management of depression suicidal ideation  Patient currently denies any chest pain shortness of breath    Past Medical History:     Past Medical History:   Diagnosis Date    Fibromyalgia     Headache     migraines    Hyperlipidemia     Hypertension     Hypothyroidism     Lupus (HCC)     Psychiatric problem     Thyroid disease         Past Surgical History:     Past Surgical History:   Procedure Laterality Date     SECTION      FOOT SURGERY      right    HYSTERECTOMY (CERVIX STATUS UNKNOWN)          Medications Prior to Admission:     Prior to Admission medications    Medication Sig Start Date End Date Taking? Authorizing Provider   mirtazapine (REMERON) 15 MG tablet Take 1 tablet by mouth nightly  Patient not taking: Reported on 2024   Terrell Rea MD   busPIRone (BUSPAR) 10 MG tablet Take 1 tablet by mouth 3 times daily  Patient not taking: Reported on 2024   Terrell Rea MD   traZODone (DESYREL) 50 MG tablet Take 1 tablet by mouth nightly as needed for Sleep  Patient not taking: Reported  started  Labs reviewed  Get TSH      Patient seen and examined  Vitals have been stable, TSH checked normal, patient denies any chest pain shortness of breath  On Keflex for UTI.  DVT prophylaxis,  Pt mobile   Full code status       Consultations:   IP CONSULT TO INTERNAL MEDICINE      Екатерина Leach MD  8/12/2024  1:56 PM    Copy sent to Dr. Fernandez primary care provider on file.    Please note that this chart was generated using voice recognition Dragon dictation software.  Although every effort was made to ensure the accuracy of this automated transcription, some errors in transcription may have occurred.

## 2024-08-12 NOTE — PROGRESS NOTES
Pharmacy Medication History Note      List of current medications patient is taking is complete.    Source of information: [facility]IRE[facility] (spoke to Miladys), [facility] (spoke to Renzo)    Changes made to medication list:  Medications removed (include reason, ex. therapy complete or physician discontinued, noncompliance):  None    Medications flagged for provider review:  Buspirone  Mirtazapine  Quetiapine  Trazodone  Lisinopril    Medications added/doses adjusted:  None    Other notes (ex. Recent course of antibiotics, Coumadin dosing):  JASMEETRRS negative  [facility] ([facility]/20-6/24), Renzo from [facility] states last appt patient had was Feb 12,2024  Patient did not refill meds since discharge      Current Home Medication List at Time of Admission:  Prior to Admission medications    Medication Sig   mirtazapine (REMERON) 15 MG tablet Take 1 tablet by mouth nightly  Patient not taking: Reported on 8/12/2024   busPIRone (BUSPAR) 10 MG tablet Take 1 tablet by mouth 3 times daily  Patient not taking: Reported on 8/12/2024   traZODone (DESYREL) 50 MG tablet Take 1 tablet by mouth nightly as needed for Sleep  Patient not taking: Reported on 8/12/2024   lisinopril (PRINIVIL;ZESTRIL) 5 MG tablet Take 1 tablet by mouth daily  Patient not taking: Reported on 8/12/2024   QUEtiapine (SEROQUEL XR) 50 MG extended release tablet Take 1 tablet by mouth nightly  Patient not taking: Reported on 8/12/2024         Please let me know if you have any questions about this encounter. Thank you!    Electronically signed by Coreen Gómez RPH on 8/12/2024 at 10:13 AM

## 2024-08-12 NOTE — H&P
need and frequency of PRN medications.    CONSULT:  [x] Yes [] No  Internal medicine for medical management/medical H&P      Risk Management: close watch per standard protocol      Psychotherapy: participation in milieu and group and individual sessions with Attending Physician,  and Physician Assistant/CNP      Estimated length of stay:  2-14 days      GENERAL PATIENT/FAMILY EDUCATION  Patient will understand basic signs and symptoms, patient will understand benefits/risks and potential side effects from proposed medications, and patient will understand their role in recovery.  Family is not active in patient's care.   Patient assets that may be helpful during treatment include: Intent to participate and engage in treatment, sufficient fund of knowledge and intellect to understand and utilize treatments.    OUTCOME QUESTIONNAIRE (OQ 30.2):  [x] Completed [] Patient too ill to participate    Goals:    1) Remission of depression.  2) Stabilization of symptoms prior to discharge.  3) Establish efficacy and tolerability of medications.       Behavioral Services  Medicare Certification     Admission Day 1  I certify that this patient's inpatient psychiatric hospital admission is medically necessary for:    x (1) treatment which could reasonably be expected to improve this patient's condition, or    x (2) diagnostic study or its equivalent.     Time Spent: 20 minutes     Physicians Signature:  Electronically signed by Jomar Etienne on 8/12/24 at 1:21 PM EDT is a 48 y.o. female being evaluated face to face    --Jomar Etienne, OMS-III on 8/12/2024 at 1:21 PM    An electronic signature was used to authenticate this note.     Please note that this chart was generated using voice recognition Dragon dictation software.  Although every effort was made to ensure the accuracy of this automated transcription, some errors in transcription may have occurred.  I independently saw and evaluated the patient.  I  reviewed the  documentation above    .  Any additional comments or changes to the   documentation are stated below otherwise agree with assessment.      QTc Calculation (Bazett)   Date Value Ref Range Status   07/01/2024 483 ms Final       Vitals:    08/11/24 1355 08/11/24 1952 08/12/24 0900 08/12/24 1100   BP: (!) 148/88 129/86 123/79 123/79   Pulse: 66 61 64    Resp: 14 14 15    Temp: 97.2 °F (36.2 °C)  97.9 °F (36.6 °C)    TempSrc: Temporal  Temporal    SpO2: 100%  99%    Weight: 60.8 kg (134 lb)      Height: 1.626 m (5' 4\")            Current Facility-Administered Medications   Medication Dose Route Frequency Provider Last Rate Last Admin    busPIRone (BUSPAR) tablet 10 mg  10 mg Oral TID Ronan Calzada MD        mirtazapine (REMERON) tablet 15 mg  15 mg Oral Nightly Ronan Calzada MD        paliperidone (INVEGA) extended release tablet 3 mg  3 mg Oral Daily Ronan Calzada MD        hydrocortisone 1 % cream   Topical TID Ronan Calzada MD        acetaminophen (TYLENOL) tablet 650 mg  650 mg Oral Q4H PRN Harmony Payton APRN - CNP        ibuprofen (ADVIL;MOTRIN) tablet 400 mg  400 mg Oral Q6H PRN Harmony Payton APRN - CNP        polyethylene glycol (GLYCOLAX) packet 17 g  17 g Oral Daily PRN Harmony Payton APRN - CNP        aluminum & magnesium hydroxide-simethicone (MAALOX) 200-200-20 MG/5ML suspension 30 mL  30 mL Oral Q6H PRN Harmony Payton APRN - CNP        hydrOXYzine HCl (ATARAX) tablet 50 mg  50 mg Oral TID PRN Harmony Payton APRN - CNP   50 mg at 08/11/24 2043    traZODone (DESYREL) tablet 50 mg  50 mg Oral Nightly PRN Harmony Payton APRN - CNP   50 mg at 08/11/24 2043    nicotine polacrilex (COMMIT) lozenge 2 mg  2 mg Oral Q1H PRN Harmony Payton, APRN - CNP        lisinopril (PRINIVIL;ZESTRIL) tablet 5 mg  5 mg Oral Daily Екатерина Leach MD   5 mg at 08/12/24 1100    cephALEXin (KEFLEX) capsule 250 mg  250 mg Oral 4 times per day Екатерина Leach MD   250 mg at 08/12/24

## 2024-08-12 NOTE — PROGRESS NOTES
08/12/24 1524   Encounter Summary   Encounter Overview/Reason Behavioral Health   Service Provided For Patient   Last Encounter  08/12/24   Behavioral Health    Type  Spirituality Group   Assessment/Intervention/Outcome   Assessment Calm;Passive   Intervention Active listening;Discussed illness injury and it’s impact;Empowerment;Explored/Affirmed feelings, thoughts, concerns;Explored Coping Skills/Resources;Nurtured Hope;Prayer (assurance of)/[place];Sustaining Presence/Ministry of presence   Outcome Receptive

## 2024-08-12 NOTE — PLAN OF CARE
Problem: Self Harm/Suicidality  Goal: Will have no self-injury during hospital stay  Description: INTERVENTIONS:  1.  Ensure constant observer at bedside with Q15M safety checks  2.  Maintain a safe environment  3.  Secure patient belongings  4.  Ensure family/visitors adhere to safety recommendations  5.  Ensure safety tray has been added to patient's diet order  6.  Every shift and PRN: Re-assess suicidal risk via Frequent Screener  Outcome: Progressing     Patient denied thoughts of self-harm/suicide and no self injury occurred during shift. Safe environment and Q15 minute safety checks maintained.    Problem: Risk for Elopement  Goal: Patient will not exit the unit/facility without proper excort  Outcome: Progressing     Patient made no attempts and did not talk about leaving the unit or facility. She was cooperative and friendly, interacting appropriately with staff.

## 2024-08-12 NOTE — PLAN OF CARE
Problem: Self Harm/Suicidality  Goal: Will have no self-injury during hospital stay  Description: INTERVENTIONS:  1.  Ensure constant observer at bedside with Q15M safety checks  2.  Maintain a safe environment  3.  Secure patient belongings  4.  Ensure family/visitors adhere to safety recommendations  5.  Ensure safety tray has been added to patient's diet order  6.  Every shift and PRN: Re-assess suicidal risk via Frequent Screener    8/12/2024 1229 by Amanda Randall, RN  Outcome: Progressing     Problem: Depression  Goal: Will be euthymic at discharge  Description: INTERVENTIONS:  1. Administer medication as ordered  2. Provide emotional support via 1:1 interaction with staff  3. Encourage involvement in milieu/groups/activities  4. Monitor for social isolation  Outcome: Progressing     Problem: Sleep Disturbance  Goal: Will exhibit normal sleeping pattern  Description: INTERVENTIONS:  1. Administer medication as ordered  2. Decrease environmental stimuli, including noise, as appropriate  3. Discourage social isolation and naps during the day  Outcome: Progressing     Problem: Risk for Elopement  Goal: Patient will not exit the unit/facility without proper excort  8/12/2024 0112 by Edil Villegas, RN  Outcome: Progressing

## 2024-08-12 NOTE — PROGRESS NOTES
Behavioral Services  Medicare Certification Upon Admission    I certify that this patient's inpatient psychiatric hospital admission is medically necessary for:    [x] (1) Treatment which could reasonably be expected to improve this patient's condition,       [x] (2) Or for diagnostic study;     AND     [x](2) The inpatient psychiatric services are provided while the individual is under the care of a physician and are included in the individualized plan of care.    Estimated length of stay/service 2-9 days    Plan for post-hospital care -outpatient care    Electronically signed by CARLOS ALBERTO JARA MD on 8/12/2024 at 11:59 AM

## 2024-08-12 NOTE — GROUP NOTE
Group Therapy Note    Date: 8/11/2024    Group Start Time: 1925  Group End Time: 1930  Group Topic: Wrap-Up    Milagros Self        Group Therapy Note    Attendees: 8 out of 12 patient refused to attend wrap up group at 1930 after encouragement from staff.  1:1 talk time provided as alternative to group session, patient declined talk time.        Discipline Responsible: Behavorial Health Tech      Signature:  MILAGROS KIM

## 2024-08-12 NOTE — PROGRESS NOTES
08/12/24 1530   Encounter Summary   Encounter Overview/Reason Loneliness/Social Isolation   Service Provided For Patient   Last Encounter  08/12/24   Assessment/Intervention/Outcome   Assessment Calm;Compromised coping;Loneliness   Intervention Active listening;Discussed meaning/purpose;Explored/Affirmed feelings, thoughts, concerns;Explored Coping Skills/Resources;Nurtured Hope;Sustaining Presence/Ministry of presence   Outcome Receptive;Engaged in conversation

## 2024-08-13 PROCEDURE — 6370000000 HC RX 637 (ALT 250 FOR IP): Performed by: NURSE PRACTITIONER

## 2024-08-13 PROCEDURE — 99232 SBSQ HOSP IP/OBS MODERATE 35: CPT | Performed by: INTERNAL MEDICINE

## 2024-08-13 PROCEDURE — 6370000000 HC RX 637 (ALT 250 FOR IP): Performed by: INTERNAL MEDICINE

## 2024-08-13 PROCEDURE — 1240000000 HC EMOTIONAL WELLNESS R&B

## 2024-08-13 PROCEDURE — 6370000000 HC RX 637 (ALT 250 FOR IP): Performed by: PSYCHIATRY & NEUROLOGY

## 2024-08-13 PROCEDURE — 99232 SBSQ HOSP IP/OBS MODERATE 35: CPT | Performed by: PSYCHIATRY & NEUROLOGY

## 2024-08-13 RX ADMIN — HYDROCORTISONE: 0.01 CREAM TOPICAL at 14:10

## 2024-08-13 RX ADMIN — CEPHALEXIN 250 MG: 250 CAPSULE ORAL at 18:04

## 2024-08-13 RX ADMIN — HYDROCORTISONE: 0.01 CREAM TOPICAL at 08:18

## 2024-08-13 RX ADMIN — ACETAMINOPHEN 650 MG: 325 TABLET ORAL at 08:23

## 2024-08-13 RX ADMIN — CEPHALEXIN 250 MG: 250 CAPSULE ORAL at 06:25

## 2024-08-13 RX ADMIN — BUSPIRONE HYDROCHLORIDE 10 MG: 10 TABLET ORAL at 21:15

## 2024-08-13 RX ADMIN — LISINOPRIL 5 MG: 5 TABLET ORAL at 08:18

## 2024-08-13 RX ADMIN — TRAZODONE HYDROCHLORIDE 50 MG: 50 TABLET ORAL at 21:15

## 2024-08-13 RX ADMIN — HYDROCORTISONE: 0.01 CREAM TOPICAL at 00:00

## 2024-08-13 RX ADMIN — CEPHALEXIN 250 MG: 250 CAPSULE ORAL at 12:46

## 2024-08-13 RX ADMIN — MIRTAZAPINE 15 MG: 15 TABLET, FILM COATED ORAL at 21:15

## 2024-08-13 RX ADMIN — HYDROXYZINE HYDROCHLORIDE 50 MG: 50 TABLET, FILM COATED ORAL at 08:25

## 2024-08-13 RX ADMIN — CEPHALEXIN 250 MG: 250 CAPSULE ORAL at 23:34

## 2024-08-13 RX ADMIN — HYDROXYZINE HYDROCHLORIDE 50 MG: 50 TABLET, FILM COATED ORAL at 19:15

## 2024-08-13 RX ADMIN — PALIPERIDONE 3 MG: 3 TABLET, EXTENDED RELEASE ORAL at 08:19

## 2024-08-13 RX ADMIN — BUSPIRONE HYDROCHLORIDE 10 MG: 10 TABLET ORAL at 08:19

## 2024-08-13 RX ADMIN — BUSPIRONE HYDROCHLORIDE 10 MG: 10 TABLET ORAL at 14:10

## 2024-08-13 ASSESSMENT — PAIN DESCRIPTION - LOCATION
LOCATION: BACK
LOCATION: BACK

## 2024-08-13 ASSESSMENT — PAIN SCALES - WONG BAKER: WONGBAKER_NUMERICALRESPONSE: HURTS A LITTLE BIT

## 2024-08-13 ASSESSMENT — LIFESTYLE VARIABLES
HOW OFTEN DO YOU HAVE A DRINK CONTAINING ALCOHOL: 4 OR MORE TIMES A WEEK
HOW MANY STANDARD DRINKS CONTAINING ALCOHOL DO YOU HAVE ON A TYPICAL DAY: 5 OR 6

## 2024-08-13 ASSESSMENT — PAIN DESCRIPTION - DESCRIPTORS
DESCRIPTORS: ACHING
DESCRIPTORS: ACHING

## 2024-08-13 ASSESSMENT — PAIN SCALES - GENERAL
PAINLEVEL_OUTOF10: 0
PAINLEVEL_OUTOF10: 10
PAINLEVEL_OUTOF10: 0

## 2024-08-13 ASSESSMENT — PAIN - FUNCTIONAL ASSESSMENT: PAIN_FUNCTIONAL_ASSESSMENT: ACTIVITIES ARE NOT PREVENTED

## 2024-08-13 ASSESSMENT — PAIN DESCRIPTION - ORIENTATION: ORIENTATION: LOWER

## 2024-08-13 NOTE — GROUP NOTE
Group Therapy Note    Date: 8/13/2024    Group Start Time: 1100  Group End Time: 1140  Group Topic: Relaxation    Keisha Gannon CTRS      Relaxation Group Note        Date: August 13, 2024 Start Time: 11am  End Time: 1140 am      Number of Participants in Group & Unit Census:  3/10    Topic: relaxation group     Goal of Group:pt will identify benefits of using art for coping and leisure       Comments:     Patient did not participate in Relaxation group, despite staff encouragement and explanation of benefits.  Patient remain seclusive to self.  Q15 minute safety checks maintained for patient safety and will continue to encourage patient to attend unit programming.              Signature:  CINDI CARLSON

## 2024-08-13 NOTE — PROGRESS NOTES
[facility]  Kvng Dumont MD; Juancarlos Peters MD, Neto Quintanilla MD, Екатерина Leach MD, Eduardo Springer MD; Magdaleno Velásquez MD    [facility] Internal Medicine   IN-PATIENT SERVICE   [facility]     HISTORY AND PHYSICAL EXAMINATION            Date:   2024  Patient name:  Sruthi You  Date of admission:  2024  1:13 PM  MRN:   [id number]  Account:  [id number]  YOB: 1975  PCP:    No primary care provider on file.  Room:   [address] [place] [address]  Code Status:    Full Code      Chief Complaint:     Suicidal /Ac Psychosis    History Obtained From:     Patient/EMR/bedside RN     History of Present Illness:   Patient is 48-year-old female with past medical history of fibromyalgia, hypertension, hyperlipidemia hypothyroidism depression and anxiety and has been admitted in U for further management of depression suicidal ideation  Patient currently denies any chest pain shortness of breath    Past Medical History:     Past Medical History:   Diagnosis Date    Fibromyalgia     Headache     migraines    Hyperlipidemia     Hypertension     Hypothyroidism     Lupus (HCC)     Psychiatric problem     Thyroid disease         Past Surgical History:     Past Surgical History:   Procedure Laterality Date     SECTION      FOOT SURGERY      right    HYSTERECTOMY (CERVIX STATUS UNKNOWN)          Medications Prior to Admission:     Prior to Admission medications    Medication Sig Start Date End Date Taking? Authorizing Provider   mirtazapine (REMERON) 15 MG tablet Take 1 tablet by mouth nightly  Patient not taking: Reported on 2024   Terrell Rea MD   busPIRone (BUSPAR) 10 MG tablet Take 1 tablet by mouth 3 times daily  Patient not taking: Reported on 2024   Terrell Rea MD   traZODone (DESYREL) 50 MG tablet Take 1 tablet by mouth nightly as needed for Sleep  Patient not taking: Reported  lightheadedness, numbness, pain, tingling extremities      Physical Exam:     /83   Pulse 63   Temp 97.9 °F (36.6 °C) (Oral)   Resp 14   Ht 1.626 m (5' 4\")   Wt 60.8 kg (134 lb)   SpO2 99%   BMI 23.00 kg/m²   Temp (24hrs), Av.9 °F (36.6 °C), Min:97.9 °F (36.6 °C), Max:97.9 °F (36.6 °C)    No results for input(s): \"POCGLU\" in the last 72 hours.  No intake or output data in the 24 hours ending 24 1557    General Appearance:  alert, well appearing, and in no acute distress  Mental status: oriented to person, place, and time   Head:  normocephalic, atraumatic.  Neck: supple, no carotid bruits, thyroid not palpable  Lungs: Bilateral equal air entry, clear to ausculation, no wheezing, rales or rhonchi, normal effort  Cardiovascular: normal rate, regular rhythm, no murmur, gallop, rub.  Abdomen: Soft, nontender, nondistended, normal bowel sounds, no hepatomegaly or splenomegaly  Neurologic: CN II-XII intact , DTR normal, no new focal motor or sensory deficits, moving all extremities spontaneously.   Skin: No gross lesions, rashes, bruising or bleeding on exposed skin area  Extremities:  peripheral pulses palpable, no pedal edema or calf pain with palpation    Investigations:      Laboratory Testing:  No results found for this or any previous visit (from the past 24 hour(s)).      Imaging/Diagonstics:  No results found.    Assessment :      Hospital Problems             Last Modified POA    * (Principal) Depression with suicidal ideation 2024 Yes       Plan:     Admitted to inpatient psych with depression with suicidal ideation  Polysubstance abuse, patient positive for cannabis and cocaine, currently denies any chest pain shortness of breath strongly encouraged Jael to quit  UTI, patient has been having urinary symptoms, UA marginally positive will start Ceftin  Hypertension, on lisinopril which has been started  Labs reviewed  Get TSH      Patient seen and examined, TSH is normal vitals have

## 2024-08-13 NOTE — PLAN OF CARE
Problem: Self Harm/Suicidality  Goal: Will have no self-injury during hospital stay  Description: INTERVENTIONS:  1.  Ensure constant observer at bedside with Q15M safety checks  2.  Maintain a safe environment  3.  Secure patient belongings  4.  Ensure family/visitors adhere to safety recommendations  5.  Ensure safety tray has been added to patient's diet order  6.  Every shift and PRN: Re-assess suicidal risk via Frequent Screener    Outcome: Progressing     Problem: Depression  Goal: Will be euthymic at discharge  Description: INTERVENTIONS:  1. Administer medication as ordered  2. Provide emotional support via 1:1 interaction with staff  3. Encourage involvement in milieu/groups/activities  4. Monitor for social isolation  Outcome: Progressing     Problem: Anxiety  Goal: Will report anxiety at manageable levels  Description: INTERVENTIONS:  1. Administer medication as ordered  2. Teach and rehearse alternative coping skills  3. Provide emotional support with 1:1 interaction with staff  Outcome: Progressing  Flowsheets (Taken 8/13/2024 1333)  Will report anxiety at manageable levels: Administer medication as ordered     Problem: Sleep Disturbance  Goal: Will exhibit normal sleeping pattern  Description: INTERVENTIONS:  1. Administer medication as ordered  2. Decrease environmental stimuli, including noise, as appropriate  3. Discourage social isolation and naps during the day  Outcome: Progressing     Problem: Risk for Elopement  Goal: Patient will not exit the unit/facility without proper excort  Outcome: Progressing     Problem: Pain  Goal: Verbalizes/displays adequate comfort level or baseline comfort level  Outcome: Progressing  Flowsheets (Taken 8/13/2024 1254)  Verbalizes/displays adequate comfort level or baseline comfort level: Encourage patient to monitor pain and request assistance   Patient alert and oriented x 4. Cooperative but isolative +anxiety/depression, denies harm to self and others.  Denies  suicidal ideations and hallucinations  at this time.  Patient at present sleeping a lot.  Patient states has pain to head tylenol given tolerated well and effective due to no more complaints of pain at this time.  Patient has not tried to elope at this time took medication with no problem.   Continue to monitor.

## 2024-08-13 NOTE — BH NOTE
Patient continues on antibiotics no signs and symptoms of adverse reactions noted at this time. Continue to monitor observe.

## 2024-08-13 NOTE — PROGRESS NOTES
BEHAVIORAL HEALTH FOLLOW-UP NOTE     8/13/2024     Patient was seen and examined in person, Chart reviewed   Patient's case discussed with staff/team    Chief Complaint: depression with SI, acute psychosis likely drug induced     Interim History:   Pt was seen at bedside with pt laying in bed. When medical student approached, pt said \"Domingo Larry\" but then moved to the chair to talk to him.     Pt was alert and oriented x4. Pt indicated eating normally, and sleeping more. Pt described decreased energy. But indicated that she hasn't had any SI or HI since starting her medications. Pt also said that she hasn't been hearing any voices or seeing any shadows, animals, or people. Pt said she only hears auditory hallucinations when she is extremely tired. When asked about drug use, pt said that she does drugs when she is sad because it doesn't make her feel anything. \"Takes away the bad feelings\".     Pt desires to go to \"[facility]\" which is an in-house drug rehab facility. She said she has been there before and it was very helpful for her.     Per nurse: Pt is isolating and sleeping a lot. Medical student was warned that she can become manipulative and can exhibit inappropriate behavior with males.     Vitals: Remain stable 8/13 0800  Labs: No changes since admission HPI  Medications: Buspar 10mg given 8/13 0819. Remeron 15 mg given 8/12 at 2102. Invega 3mg given 8/12 1400 and 8/13 0819. Atarax 50mg given PRN 8/12 2102 and 8/13 0825. Trazodone 50 mg given PRN 8/12 2043 and 2102 8/13.    HPI:  Sruthi You is a 48 y.o. female who has a past medical history of fibromyalgia, headache, hyperlipidemia, HTN, hypothyroidism, lupus, psychiatric problem and thyroid disease. Pt describes having PTSD and manic depression. Patient presented to the ED with suicidal ideation. PT reports tough time since the death of her daughter and that she had just found out her father is terminally ill. She described feeling \"very down\".  Alcohol use: Yes    Drug use: Yes     Types: Marijuana (Weed), Cocaine     Comment: Deysi-puffs    Sexual activity: Yes   Other Topics Concern    Not on file   Social History Narrative    Not on file     Social Determinants of Health     Financial Resource Strain: Low Risk  (11/16/2021)    Overall Financial Resource Strain (CARDIA)     Difficulty of Paying Living Expenses: Not very hard   Food Insecurity: No Food Insecurity (8/11/2024)    Hunger Vital Sign     Worried About Running Out of Food in the Last Year: Never true     Ran Out of Food in the Last Year: Never true   Transportation Needs: No Transportation Needs (8/11/2024)    PRAPARE - Transportation     Lack of Transportation (Medical): No     Lack of Transportation (Non-Medical): No   Physical Activity: Not on file   Stress: Not on file   Social Connections: Somewhat Isolated (8/13/2024)    Social Connections ([facility] HRSN)     If for any reason you need help with day-to-day activities such as bathing, preparing meals, shopping, managing finances, etc., do you get the help you need?: Not on file   Intimate Partner Violence: Not on file   Housing Stability: Low Risk  (8/11/2024)    Housing Stability Vital Sign     Unable to Pay for Housing in the Last Year: No     Number of Times Moved in the Last Year: 0     Homeless in the Last Year: No           ROS:  [x] All negative/unchanged except if checked. Explain positive(checked items) below:  [] Constitutional  [] Eyes  [] Ear/Nose/Mouth/Throat  [] Respiratory  [] CV  [] GI  []   [] Musculoskeletal  [] Skin/Breast  [] Neurological  [] Endocrine  [] Heme/Lymph  [] Allergic/Immunologic    Explanation:     MEDICATIONS:    Current Facility-Administered Medications:     busPIRone (BUSPAR) tablet 10 mg, 10 mg, Oral, TID, Ronan Calzada MD, 10 mg at 08/13/24 0819    mirtazapine (REMERON) tablet 15 mg, 15 mg, Oral, Nightly, Ronan Calzada MD, 15 mg at 08/12/24 2102    paliperidone (INVEGA) extended release tablet 3 mg, 3

## 2024-08-13 NOTE — GROUP NOTE
Group Therapy Note    Date: 8/13/2024    Group Start Time: 1400  Group End Time: 1440  Group Topic: Cognitive Skills    Keisha Gannon CTRS    Cognitive Skills Group Note        Date: August 13, 2024 Start Time: 2pm  End Time:  240 pm      Number of Participants in Group & Unit Census:  3/8    Topic: cognitive skills     Goal of Group: pt will demonstrate improved coping skills and improved decision making skills      Comments:     Patient did not participate in Cognitive Skills group, despite staff encouragement and explanation of benefits.  Patient remain seclusive to self.  Q15 minute safety checks maintained for patient safety and will continue to encourage patient to attend unit programming.              Signature:  CINDI CARLSON

## 2024-08-13 NOTE — GROUP NOTE
Group Therapy Note    Date: 8/13/2024    Group Start Time: 1000  Group End Time: 1030  Group Topic: Psychotherapy    [facility] Marquita Raymundo MSW        Group Therapy Note    Attendees: 4/10       Patient's Goal:  Learn about SMART goals and use that framework to create goals in various aspects of life.    Notes:  Patient sat in group and listened, did not participate verbally unless specifically prompted    Status After Intervention:  Unchanged    Participation Level: Active Listener    Participation Quality: Appropriate and Attentive      Speech:  normal      Thought Process/Content: Logical  Linear      Affective Functioning: Congruent      Mood: euthymic      Level of consciousness:  Alert and Oriented x4      Response to Learning: Able to verbalize current knowledge/experience and Able to verbalize/acknowledge new learning      Endings: None Reported    Modes of Intervention: Education and Support      Discipline Responsible: /Counselor      Signature:  WESLEY Nichols

## 2024-08-13 NOTE — PLAN OF CARE
Problem: Depression  Goal: Will be euthymic at discharge  Description: INTERVENTIONS:  1. Administer medication as ordered  2. Provide emotional support via 1:1 interaction with staff  3. Encourage involvement in milieu/groups/activities  4. Monitor for social isolation  8/12/2024 2204 by Lissette Schwartz, RN  Outcome: Progressing     Problem: Self Harm/Suicidality  Goal: Will have no self-injury during hospital stay  Description: INTERVENTIONS:  1.  Ensure constant observer at bedside with Q15M safety checks  2.  Maintain a safe environment  3.  Secure patient belongings  4.  Ensure family/visitors adhere to safety recommendations  5.  Ensure safety tray has been added to patient's diet order  6.  Every shift and PRN: Re-assess suicidal risk via Frequent Screener    8/12/2024 2204 by Lissette Schwartz, RN  Outcome: Progressing  Note: Pt denies suicidal ideations. Denies depression. Denies thoughts of self harm and is agreeable to seeking out should thoughts of self harm arise. Patient is cooperative, friendly, flat, blunt, withdrawn and guarded. Pt. Out for needs and isolative to self. Safe environment maintained.  Q15 minute checks for safety cont per unit policy.  Will cont to monitor for safety and provides support and reassurance as needed.

## 2024-08-14 PROCEDURE — 6370000000 HC RX 637 (ALT 250 FOR IP): Performed by: PSYCHIATRY & NEUROLOGY

## 2024-08-14 PROCEDURE — 1240000000 HC EMOTIONAL WELLNESS R&B

## 2024-08-14 PROCEDURE — 6370000000 HC RX 637 (ALT 250 FOR IP): Performed by: INTERNAL MEDICINE

## 2024-08-14 PROCEDURE — 6370000000 HC RX 637 (ALT 250 FOR IP): Performed by: NURSE PRACTITIONER

## 2024-08-14 PROCEDURE — 99232 SBSQ HOSP IP/OBS MODERATE 35: CPT | Performed by: PSYCHIATRY & NEUROLOGY

## 2024-08-14 RX ADMIN — PALIPERIDONE 3 MG: 3 TABLET, EXTENDED RELEASE ORAL at 09:15

## 2024-08-14 RX ADMIN — HYDROXYZINE HYDROCHLORIDE 50 MG: 50 TABLET, FILM COATED ORAL at 11:53

## 2024-08-14 RX ADMIN — CEPHALEXIN 250 MG: 250 CAPSULE ORAL at 06:16

## 2024-08-14 RX ADMIN — BUSPIRONE HYDROCHLORIDE 10 MG: 10 TABLET ORAL at 14:36

## 2024-08-14 RX ADMIN — MIRTAZAPINE 15 MG: 15 TABLET, FILM COATED ORAL at 20:55

## 2024-08-14 RX ADMIN — BUSPIRONE HYDROCHLORIDE 10 MG: 10 TABLET ORAL at 20:55

## 2024-08-14 RX ADMIN — HYDROCORTISONE: 0.01 CREAM TOPICAL at 21:12

## 2024-08-14 RX ADMIN — BUSPIRONE HYDROCHLORIDE 10 MG: 10 TABLET ORAL at 09:15

## 2024-08-14 RX ADMIN — CEPHALEXIN 250 MG: 250 CAPSULE ORAL at 11:48

## 2024-08-14 RX ADMIN — TRAZODONE HYDROCHLORIDE 50 MG: 50 TABLET ORAL at 20:55

## 2024-08-14 RX ADMIN — HYDROXYZINE HYDROCHLORIDE 50 MG: 50 TABLET, FILM COATED ORAL at 20:55

## 2024-08-14 RX ADMIN — HYDROCORTISONE: 0.01 CREAM TOPICAL at 09:15

## 2024-08-14 RX ADMIN — HYDROCORTISONE: 0.01 CREAM TOPICAL at 14:36

## 2024-08-14 ASSESSMENT — PAIN SCALES - GENERAL
PAINLEVEL_OUTOF10: 0
PAINLEVEL_OUTOF10: 0

## 2024-08-14 NOTE — GROUP NOTE
Group Therapy Note    Date: 8/14/2024    Group Start Time: 1050  Group End Time: 1120  Group Topic: Cognitive Skills    Keisha Gannon CTRS    Cognitive Skills Group Note        Date: August 14, 2024 Start Time:  1050 am   End Time:  1120 am      Number of Participants in Group & Unit Census:  2/9    Topic: cognitive skills     Goal of Group:pt will demonstrate improved coping skills and improved concentration       Comments:     Patient did not participate in Cognitive Skills group, despite staff encouragement and explanation of benefits.  Patient remain seclusive to self.  Q15 minute safety checks maintained for patient safety and will continue to encourage patient to attend unit programming.              Signature:  CINDI CARLSON

## 2024-08-14 NOTE — PROGRESS NOTES
BEHAVIORAL HEALTH FOLLOW-UP NOTE     8/14/2024     Patient was seen and examined in person, Chart reviewed   Patient's case discussed with staff/team    Chief Complaint: depression with SI, acute psychosis likely drug induced     Interim History:     Pt was seen at bedside with pt laying in her bed. Pt jumped up and sat on bed for the conversation.     Pt indicates great appetite, and getting more sleep than normal. PT describes having no energy. Her only side effects from the medications include sedation and feeling drowsy.     Pt indicates that she hasn't had any auditory hallucinations since admission. Pt also denies SI or HI. Indicates that she thinks her medications are \"definitely working\" and wants to continue them.     When asked about drug and alcohol use, pt indicates no withdrawal and that she has no urges     Pt indicates desire to go to rehab facility following this. Pt indicates [facility]. Pt encouraged to work with social work regarding this.     Per nurse: pt describes as pleasant and not causing any issues.     Vitals: Remain stable 8/14 0838.  Labs: TSH wnl  0627 8/12. CMP shows glucose of 115 8/10 1511. Uds was positive for cocaine and cannabinoids 1519 8/10. Negative pregnancy test. Ketones present in urine 1519 8/10.   Medications: Buspar 10 mg given 8/14 0915. Remeron 15 mg given 2115 8/13. Invega 3 mg given 0915 8/14. Atarax 50 mg given PRN 0825 and 1915 8/13. Trazodone 50 given PRN 2115 8/13.     HPI:  Sruthi You is a 48 y.o. female who has a past medical history of fibromyalgia, headache, hyperlipidemia, HTN, hypothyroidism, lupus, psychiatric problem and thyroid disease. Pt describes having PTSD and manic depression. Patient presented to the ED with suicidal ideation. PT reports tough time since the death of her daughter and that she had just found out her father is terminally ill. She described feeling \"very down\". Originally said that she didn't have any active plans to hurt herself  hydrocortisone 1 % cream, , Topical, TID, Ronan Calzada MD, Given at 08/14/24 0915    acetaminophen (TYLENOL) tablet 650 mg, 650 mg, Oral, Q4H PRN, Harmony Payton APRN - CNP, 650 mg at 08/13/24 0823    ibuprofen (ADVIL;MOTRIN) tablet 400 mg, 400 mg, Oral, Q6H PRN, Harmony Payton APRN - CNP    polyethylene glycol (GLYCOLAX) packet 17 g, 17 g, Oral, Daily PRN, Harmony Payton APRN - CNP    aluminum & magnesium hydroxide-simethicone (MAALOX) 200-200-20 MG/5ML suspension 30 mL, 30 mL, Oral, Q6H PRN, Harmony Payton APRN - CNP    hydrOXYzine HCl (ATARAX) tablet 50 mg, 50 mg, Oral, TID PRN, Harmony Payton APRN - CNP, 50 mg at 08/13/24 1915    traZODone (DESYREL) tablet 50 mg, 50 mg, Oral, Nightly PRN, Harmony Payton APRN - CNP, 50 mg at 08/13/24 2115    nicotine polacrilex (COMMIT) lozenge 2 mg, 2 mg, Oral, Q1H PRN, Harmony Payton APRN - CNP    lisinopril (PRINIVIL;ZESTRIL) tablet 5 mg, 5 mg, Oral, Daily, Екатерина Leach MD, 5 mg at 08/13/24 0818    cephALEXin (KEFLEX) capsule 250 mg, 250 mg, Oral, 4 times per day, Екатерина Leach MD, 250 mg at 08/14/24 0616      Examination:  BP 97/71   Pulse 70   Temp 98.4 °F (36.9 °C) (Oral)   Resp 16   Ht 1.626 m (5' 4\")   Wt 60.8 kg (134 lb)   SpO2 99%   BMI 23.00 kg/m²   Gait - steady  Medication side effects(SE): drowsiness, tired    Mental Status Examination:    Level of consciousness:  within normal limits   Appearance:  fair grooming and fair hygiene  Behavior/Motor:  no abnormalities noted  Attitude toward examiner:  cooperative, attentive, and good eye contact  Speech:  spontaneous, normal rate, normal volume, and well articulated   Mood: euthymic  Affect:  mood congruent  Thought processes:  linear and goal directed   Thought content:  Homicidal ideation - none  Suicidal Ideation:  denies suicidal ideation  Delusions:  no evidence of delusions  Perceptual Disturbance:  denies any perceptual disturbance  Cognition:  oriented to person,

## 2024-08-14 NOTE — PLAN OF CARE
Problem: Self Harm/Suicidality  Goal: Will have no self-injury during hospital stay  Description: INTERVENTIONS:  1.  Ensure constant observer at bedside with Q15M safety checks  2.  Maintain a safe environment  3.  Secure patient belongings  4.  Ensure family/visitors adhere to safety recommendations  5.  Ensure safety tray has been added to patient's diet order  6.  Every shift and PRN: Re-assess suicidal risk via Frequent Screener    8/14/2024 1012 by Yakelin Eden RN  Outcome: Progressing     Problem: Depression  Goal: Will be euthymic at discharge  Description: INTERVENTIONS:  1. Administer medication as ordered  2. Provide emotional support via 1:1 interaction with staff  3. Encourage involvement in milieu/groups/activities  4. Monitor for social isolation  8/14/2024 1012 by Yakelin Eden RN  Outcome: Progressing     Problem: Anxiety  Goal: Will report anxiety at manageable levels  Description: INTERVENTIONS:  1. Administer medication as ordered  2. Teach and rehearse alternative coping skills  3. Provide emotional support with 1:1 interaction with staff  8/14/2024 1012 by Yakelin Eden RN  Outcome: Progressing     Problem: Pain  Goal: Verbalizes/displays adequate comfort level or baseline comfort level  Outcome: Progressing     Patient is pleasant, friendly and brightens upon approach. Patient denies thoughts of harming self or others and remains free from self-inflicted injuries, is agreeable to seek out staff should thoughts arise. Patient denies depression and reports minimal anxiety, stating she feels \"almost 100% better\" this morning. Patient is isolative to her room, she is medication compliant and remains in control of her behaviors. Patient has no complaints of pain and her vitals remain stable.

## 2024-08-14 NOTE — PROGRESS NOTES
08/14/24 1407   Encounter Summary   Encounter Overview/Reason Behavioral Health   Service Provided For Patient   Last Encounter  08/14/24   Behavioral Health    Type  [facility]   Assessment/Intervention/Outcome   Intervention Active listening;Confronted/Challenged;Discussed belief system/Quaker practices/hector;Discussed death, afterlife;Discussed illness injury and it’s impact;Discussed meaning/purpose;Discussed relationship with God;Empowerment;Explored/Affirmed feelings, thoughts, concerns;Explored Coping Skills/Resources;Nurtured Hope;Prayer (assurance of)/[place];Sustaining Presence/Ministry of presence

## 2024-08-14 NOTE — PLAN OF CARE
Problem: Anxiety  Goal: Will report anxiety at manageable levels  Description: INTERVENTIONS:  1. Administer medication as ordered  2. Teach and rehearse alternative coping skills  3. Provide emotional support with 1:1 interaction with staff  8/13/2024 2049 by Lissette Schwartz RN  Outcome: Progressing     Problem: Depression  Goal: Will be euthymic at discharge  Description: INTERVENTIONS:  1. Administer medication as ordered  2. Provide emotional support via 1:1 interaction with staff  3. Encourage involvement in milieu/groups/activities  4. Monitor for social isolation  8/13/2024 2049 by Lissette Schwartz RN  Outcome: Progressing     Problem: Self Harm/Suicidality  Goal: Will have no self-injury during hospital stay  Description: INTERVENTIONS:  1.  Ensure constant observer at bedside with Q15M safety checks  2.  Maintain a safe environment  3.  Secure patient belongings  4.  Ensure family/visitors adhere to safety recommendations  5.  Ensure safety tray has been added to patient's diet order  6.  Every shift and PRN: Re-assess suicidal risk via Frequent Screener    8/13/2024 2049 by Lissette Schwartz RN  Outcome: Progressing  Note: Pt denies suicidal ideations. Denies depression. Denies thoughts of self harm and is agreeable to seeking out should thoughts of self harm arise. Patient cooperative, friendly, anxious, flat, guarded. Safe environment maintained. Every 15 minute checks for safety cont per unit policy. Will cont to monitor for safety and provides support and reassurance as needed.

## 2024-08-14 NOTE — BH NOTE
[facility]  Day 3 Interdisciplinary Treatment Plan NOTE    Review Date & Time: 8/14/2024 1245    Admission Type:   Admission Type: Voluntary    Reason for admission:  Reason for Admission: Depression with suicidal ideation. Patient had plan to walk into traffic after her daughter passed away and found out that her father is terminally ill and passing. Patient stated that she has not slept in two days and is a daily user of drugs and alcohol. Patient reports increased anxiety and depression with feelings of helplesness and hoplesness.  Estimated Length of Stay: 5-7 days  Estimated Discharge Date Update: to be determined by physician    PATIENT STRENGTHS:  Patient Strengths    Patient Strengths and Limitations:Limitations: Tendency to isolate self, Lacks leisure interests, Multiple barriers to leisure interests  Addictive Behavior:Addictive Behavior  In the Past 3 Months, Have You Felt or Has Someone Told You That You Have a Problem With  : None  Medical Problems:  Past Medical History:   Diagnosis Date    Fibromyalgia     Headache     migraines    Hyperlipidemia     Hypertension     Hypothyroidism     Lupus (HCC)     Psychiatric problem     Thyroid disease        Risk:  Fall Risk   Sidney Scale Sidney Scale Score: 22  BVC    Change in scores no Changes to plan of Care no    Status EXAM:   Mental Status and Behavioral Exam  Normal: No  Level of Assistance: Independent/Self  Facial Expression: Brightened, Flat  Affect: Blunt  Level of Consciousness: Alert  Frequency of Checks: 4 times per hour, close  Mood:Normal: No  Mood: Anxious  Motor Activity:Normal: Yes  Motor Activity: Decreased  Eye Contact: Good  Observed Behavior: Cooperative, Friendly, Guarded  Sexual Misconduct History: Current - no  Preception: [place] to person, [place] to time, [place] to place, [place] to situation  Attention:Normal: No  Attention: Distractible  Thought Processes: Unremarkable  Thought Content:Normal: No  Thought Content:  after discharge goals, continue with medication compliance    SHORT-TERM GOALS UPDATE:   Time frame for Short-Term Goals: 5-7 days    LONG-TERM GOALS UPDATE:   Time frame for Long-Term Goals: 6 months  Members Present in Team Meeting: See Signature Sheet    Yakelin Eden RN

## 2024-08-15 VITALS
OXYGEN SATURATION: 99 % | BODY MASS INDEX: 22.88 KG/M2 | RESPIRATION RATE: 15 BRPM | HEIGHT: 64 IN | SYSTOLIC BLOOD PRESSURE: 133 MMHG | WEIGHT: 134 LBS | HEART RATE: 58 BPM | TEMPERATURE: 97.4 F | DIASTOLIC BLOOD PRESSURE: 73 MMHG

## 2024-08-15 PROCEDURE — 6370000000 HC RX 637 (ALT 250 FOR IP): Performed by: INTERNAL MEDICINE

## 2024-08-15 PROCEDURE — 6370000000 HC RX 637 (ALT 250 FOR IP): Performed by: PSYCHIATRY & NEUROLOGY

## 2024-08-15 PROCEDURE — 99239 HOSP IP/OBS DSCHRG MGMT >30: CPT | Performed by: PSYCHIATRY & NEUROLOGY

## 2024-08-15 PROCEDURE — 6370000000 HC RX 637 (ALT 250 FOR IP): Performed by: NURSE PRACTITIONER

## 2024-08-15 RX ORDER — MIRTAZAPINE 15 MG/1
15 TABLET, FILM COATED ORAL NIGHTLY
Qty: 30 TABLET | Refills: 0 | Status: SHIPPED | OUTPATIENT
Start: 2024-08-15

## 2024-08-15 RX ORDER — BUSPIRONE HYDROCHLORIDE 10 MG/1
10 TABLET ORAL 3 TIMES DAILY
Qty: 90 TABLET | Refills: 0 | Status: SHIPPED | OUTPATIENT
Start: 2024-08-15

## 2024-08-15 RX ORDER — LISINOPRIL 5 MG/1
5 TABLET ORAL DAILY
Qty: 30 TABLET | Refills: 0 | Status: SHIPPED | OUTPATIENT
Start: 2024-08-15

## 2024-08-15 RX ORDER — PALIPERIDONE 3 MG/1
3 TABLET, EXTENDED RELEASE ORAL DAILY
Qty: 30 TABLET | Refills: 3 | Status: SHIPPED | OUTPATIENT
Start: 2024-08-16

## 2024-08-15 RX ADMIN — LISINOPRIL 5 MG: 5 TABLET ORAL at 09:33

## 2024-08-15 RX ADMIN — BUSPIRONE HYDROCHLORIDE 10 MG: 10 TABLET ORAL at 09:33

## 2024-08-15 RX ADMIN — PALIPERIDONE 3 MG: 3 TABLET, EXTENDED RELEASE ORAL at 09:33

## 2024-08-15 RX ADMIN — HYDROCORTISONE: 0.01 CREAM TOPICAL at 13:53

## 2024-08-15 RX ADMIN — IBUPROFEN 400 MG: 400 TABLET, FILM COATED ORAL at 10:53

## 2024-08-15 RX ADMIN — HYDROCORTISONE: 0.01 CREAM TOPICAL at 09:36

## 2024-08-15 RX ADMIN — BUSPIRONE HYDROCHLORIDE 10 MG: 10 TABLET ORAL at 13:53

## 2024-08-15 ASSESSMENT — PAIN DESCRIPTION - LOCATION: LOCATION: BACK

## 2024-08-15 ASSESSMENT — PAIN DESCRIPTION - DESCRIPTORS: DESCRIPTORS: ACHING

## 2024-08-15 ASSESSMENT — PAIN SCALES - GENERAL: PAINLEVEL_OUTOF10: 6

## 2024-08-15 ASSESSMENT — PAIN DESCRIPTION - ORIENTATION: ORIENTATION: MID;LOWER

## 2024-08-15 ASSESSMENT — PAIN - FUNCTIONAL ASSESSMENT: PAIN_FUNCTIONAL_ASSESSMENT: ACTIVITIES ARE NOT PREVENTED

## 2024-08-15 NOTE — DISCHARGE SUMMARY
DISCHARGE SUMMARY      Patient ID:  Sruthi You  [id number]  48 y.o.  1975    Admit date: 8/11/2024    Discharge date and time: 8/15/2024    Disposition: Home     Admitting Physician: Ronan Calzada MD     Discharge Physician: Dr EMILI Calzada MD    Admission Diagnoses: Depression with suicidal ideation [F32.A, R45.851]    Admission Condition: poor    Discharged Condition: stable    Admission Circumstance: Sruthi You is a 48 y.o. female who has a past medical history of fibromyalgia, headache, hyperlipidemia, HTN, hypothyroidism, lupus, psychiatric problem and thyroid disease. Pt describes having PTSD and manic depression. Patient presented to the ED with suicidal ideation. PT reports tough time since the death of her daughter and that she had just found out her father is terminally ill. She described feeling \"very down\". Originally said that she didn't have any active plans to hurt herself but later stated that she would \"do anything, probably walk into traffic\". Pt reports not taking her psych medications for about a month. Pt also complained of chronic back pain and states that she has fibromyalgia and the change in weather has caused a flare up of pain. Pt also endorses itchy rash.         Vitals: Remain stable, 8/12 0900      Vitals:     08/12/24 1100   BP: 123/79   Pulse:     Resp:     Temp:     SpO2:        Labs: TSH wnl  0627 8/12. CMP shows glucose of 115 8/10 1511. Uds was positive for cocaine and cannabinoids 1519 8/10. Negative pregnancy test. Ketones present in urine 1519 8/10.      Medications: Atarax 50mg given PRN 2043 8/11. Trazodone 50 mg given PRN 2043 8/11. Buspar and invega scheduled but havent been given as off 8/12 1409.      Pt was seen at bedside in her room. Pt was wearing hospital gown and was sitting up in her bed. Pt did eventually leave room and come out into the common room and sit in the chair. Pt filled out the OQ form but upon looking at it, she checked the same box for all

## 2024-08-15 NOTE — BH NOTE
Patient ambulated to transportation with staff, discharging to home. Patient is alert and oriented x4, calm and cooperative. All belongings from patient locker, bin, and security safe sent home with patient.

## 2024-08-15 NOTE — TRANSITION OF CARE
Behavioral Health Transition Record    Patient Name: Sruthi You  YOB: 1975   Medical Record Number: [id number]  Date of Admission: 8/11/2024  1:13 PM   Date of Discharge: 8/15/2024    Attending Provider: Ronan Calzada MD   Discharging Provider: Elsi  To contact this individual call [phone] and ask the  to page.  If unavailable, ask to be transferred to Behavioral Health Provider on call.  A Behavioral Health Provider will be available on call 24/7 and during holidays.    Primary Care Provider: No primary care provider on file.    Allergies   Allergen Reactions    Bactrim [Sulfamethoxazole-Trimethoprim]        Reason for Admission:     Admission Diagnosis: Depression with suicidal ideation [F32.A, R45.851]    * No surgery found *    Results for orders placed or performed during the hospital encounter of 08/11/24   TSH with Reflex   Result Value Ref Range    TSH 3.61 0.30 - 5.00 uIU/mL       Immunizations administered during this encounter:   Immunization History   Administered Date(s) Administered    TDaP, ADACEL (age 10y-64y), BOOSTRIX (age 10y+), IM, 0.5mL 06/16/2021     Influenza Vaccination Status: Documentation of patient's or caregiver's refusal of influenza vaccine.    Screening for Metabolic Disorders for Patients on Antipsychotic Medications  (Data obtained from the EMR)    Estimated Body Mass Index  Body mass index is 23 kg/m².      Vital Signs/Blood Pressure  /73   Pulse 58   Temp 97.4 °F (36.3 °C) (Temporal)   Resp 15   Ht 1.626 m (5' 4\")   Wt 60.8 kg (134 lb)   SpO2 99%   BMI 23.00 kg/m²      Fasting Blood Glucose or Hemoglobin A1c  No results found for: \"GLU\", \"GLUCPOC\"    Hemoglobin A1C   Date Value Ref Range Status   06/22/2024 5.7 4.0 - 6.0 % Final       Discharge Diagnosis: Depression with suicidal ideation    Discharge Plan/Destination: Home with mental health follow-up appointment    Discharge Medication List and Instructions:      Medication List         START taking these medications      paliperidone 3 MG extended release tablet  Commonly known as: INVEGA  Take 1 tablet by mouth daily  Start taking on: August 16, 2024  Notes to patient: Mood            CONTINUE taking these medications      busPIRone 10 MG tablet  Commonly known as: BUSPAR  Take 1 tablet by mouth 3 times daily  Notes to patient: Mood     lisinopril 5 MG tablet  Commonly known as: PRINIVIL;ZESTRIL  Take 1 tablet by mouth daily  Notes to patient: Blood pressure     mirtazapine 15 MG tablet  Commonly known as: REMERON  Take 1 tablet by mouth nightly  Notes to patient: Mood            STOP taking these medications      QUEtiapine 50 MG extended release tablet  Commonly known as: SEROQUEL XR     traZODone 50 MG tablet  Commonly known as: DESYREL               Where to Get Your Medications        These medications were sent to [id number] - [address] -  [phone] - F [phone]  [address] [place] [address]      Phone: [phone]   busPIRone 10 MG tablet  lisinopril 5 MG tablet  mirtazapine 15 MG tablet  paliperidone 3 MG extended release tablet         Unresulted Labs (24h ago, onward)      None            To obtain results of studies pending at discharge, please contact     Follow-up Information       Follow up With Specialties Details Why Contact Info    [facility]  Follow up on 8/19/2024 You have a scheduled intake assessment on Monday August 19th at 10:30 am at the [place] office to establish services at [facility]. [address] [place], [place] [id number]  [phone]  fax [phone]             Advanced Directive:   Does the patient have an appointed surrogate decision maker? No  Does the patient have a Medical Advance Directive? No  Does the patient have a Psychiatric Advance Directive? No  If the patient does not have a surrogate or Medical Advance Directive AND Psychiatric Advance Directive, the patient was offered information on these

## 2024-08-15 NOTE — BH NOTE
Patient given tobacco quitline number 29521647399 at this time, refusing to call at this time, states \" I just dont want to quit now\"- patient given information as to the dangers of long term tobacco use. Continue to reinforce the importance of tobacco cessation.

## 2024-08-15 NOTE — CARE COORDINATION
Name: Sruthi You    : 1975    Auth number: [id number]     Discharge Date: 8/15/24    Destination: home     Discharge Medications:      Medication List        START taking these medications      paliperidone 3 MG extended release tablet  Commonly known as: INVEGA  Take 1 tablet by mouth daily  Start taking on: 2024  Notes to patient: Mood            CONTINUE taking these medications      busPIRone 10 MG tablet  Commonly known as: BUSPAR  Take 1 tablet by mouth 3 times daily  Notes to patient: Mood     lisinopril 5 MG tablet  Commonly known as: PRINIVIL;ZESTRIL  Take 1 tablet by mouth daily  Notes to patient: Blood pressure     mirtazapine 15 MG tablet  Commonly known as: REMERON  Take 1 tablet by mouth nightly  Notes to patient: Mood            STOP taking these medications      QUEtiapine 50 MG extended release tablet  Commonly known as: SEROQUEL XR     traZODone 50 MG tablet  Commonly known as: DESYREL               Where to Get Your Medications        These medications were sent to [id number] - [address] -  [phone] - F [phone]  [address] [place] [address]      Phone: [phone]   busPIRone 10 MG tablet  lisinopril 5 MG tablet  mirtazapine 15 MG tablet  paliperidone 3 MG extended release tablet         Follow Up Appointment: [place] [address]  [phone]  fax [phone]  Follow up on 2024  You have a scheduled intake assessment on  at 10:30 am at the [facility] office to establish services at [facility].

## 2024-08-15 NOTE — PROGRESS NOTES
CLINICAL PHARMACY NOTE: MEDS TO BEDS    Total # of Prescriptions Filled: 4   The following medications were delivered to the patient:  Paliperidone ER 3mg  Mirtazapine 15mg  Lisinopril 5mg  Buspirone 10mg    Additional Documentation: 8/15/24 12:55pm delivered to WILLIAM Patel in [facility]

## 2024-08-15 NOTE — BH NOTE
[facility]  Discharge Note    Pt discharged with followings belongings:   Dental Appliances: None  Vision - Corrective Lenses: None  Hearing Aid: None  Jewelry: None  Body Piercings Removed: N/A  Clothing: Undergarments, Footwear, Dress  Other Valuables: Money, Purse, Drug Paraphenalia, Lighter/Matches, Wig, At bedside, At home   Valuables returned to patient. Patient educated on aftercare instructions: Yes  Information faxed to [facility] by RN  at 1:45 PM .Patient verbalize understanding of AVS:  Yes.    Status EXAM upon discharge:  Mental Status and Behavioral Exam  Normal: Yes  Level of Assistance: Independent/Self  Facial Expression: Brightened  Affect: Appropriate  Level of Consciousness: Alert  Frequency of Checks: 4 times per hour, close  Mood:Normal: Yes  Mood: Anxious  Motor Activity:Normal: Yes  Motor Activity: Decreased  Eye Contact: Good  Observed Behavior: Friendly, Cooperative  Sexual Misconduct History: Current - no  Preception: [place] to person, [place] to time, [place] to place, [place] to situation  Attention:Normal: Yes  Attention: Distractible  Thought Processes: Unremarkable  Thought Content:Normal: Yes  Thought Content: Poverty of content  Depression Symptoms: Isolative  Anxiety Symptoms: No problems reported or observed.  [place] Symptoms: No problems reported or observed.  Hallucinations: None  Delusions: No  Delusions: Controlled  Memory:Normal: Yes  Memory: Other (comment)  Insight and Judgment: No  Insight and Judgment: Poor judgment    Tobacco Screening:  Practical Counseling, on admission, jayme X, if applicable and completed (first 3 are required if patient doesn't refuse):            ( ) Recognizing danger situations (included triggers and roadblocks)                    ( ) Coping skills (new ways to manage stress,relaxation techniques, changing routine, distraction)                                                           ( ) Basic information about quitting (benefits of  quitting, techniques in how to quit, available resources  ( ) [facility]                                                                                                                   (x) Patient refused counseling  (x) Patient refused referral  (x) Patient refused prescription upon discharge  ( ) Patient has not smoked in the last 30 days    Metabolic Screening:    Lab Results   Component Value Date    LABA1C 5.7 06/22/2024       Lab Results   Component Value Date    CHOL 201 (H) 06/22/2024    CHOL 206 (H) 03/02/2023     Lab Results   Component Value Date    TRIG 30 06/22/2024    TRIG 35 03/02/2023     Lab Results   Component Value Date    HDL 69 06/22/2024    HDL 72 03/02/2023    HDL 67 02/10/2023     No components found for: \"LDLCAL\"  No components found for: \"LABVLDL\"    Amanda Randall RN

## 2024-08-15 NOTE — PLAN OF CARE
Problem: Depression  Goal: Will be euthymic at discharge  Description: INTERVENTIONS:  1. Administer medication as ordered  2. Provide emotional support via 1:1 interaction with staff  3. Encourage involvement in milieu/groups/activities  4. Monitor for social isolation  8/14/2024 2133 by Lissette Schwartz, RN  Outcome: Progressing     Problem: Self Harm/Suicidality  Goal: Will have no self-injury during hospital stay  Description: INTERVENTIONS:  1.  Ensure constant observer at bedside with Q15M safety checks  2.  Maintain a safe environment  3.  Secure patient belongings  4.  Ensure family/visitors adhere to safety recommendations  5.  Ensure safety tray has been added to patient's diet order  6.  Every shift and PRN: Re-assess suicidal risk via Frequent Screener    8/14/2024 2133 by Lissette Schwartz, RN  Outcome: Progressing  Note: Pt denies suicidal ideations. Denies thoughts of self harm and is agreeable to seeking out should thoughts of self harm arise. Denies depression. Patient is cooperative, friendly and guarded. Safe environment maintained. Every 15 minute checks for safety cont per unit policy.  Will cont to monitor for safety and provides support and reassurance as needed.

## 2024-08-15 NOTE — ED PROVIDER NOTES
[facility]   Emergency Department  Emergency Medicine Attending Sign-out   Note started: 3:43 PM EDT    Care of Sruthi You was assumed from previous attending Dr. Crouch at 2 AM and is being seen for Suicidal (Without a plan/\"Ran out of my meds\"), Back Pain, and Rash (\"Itchy\")  .  The patient's initial evaluation and plan have been discussed with the prior provider who initially evaluated the patient.     Attestation  I was available and discussed any additional care issues that arose and coordinated the management plans with the resident(s) caring for the patient during my duty period. Any areas of disagreement with resident's documentation of care or procedures are noted on the chart. I was personally present for the key portions of any/all procedures, during my duty period. I have documented in the chart those procedures where I was not present during the key portions.     BRIEF PATIENT SUMMARY/MDM COURSE PER INITIAL PROVIDER:   RECENT VITALS:     Temp: 97.9 °F (36.6 °C),  Pulse: 58, Respirations: 18, BP: (!) 156/102, SpO2: 100 %    This patient is a 48 y.o. Female with suicidal      OUTSTANDING TASKS / ADDITIONAL COMMENTS   Accepted, medically stable  Awaiting a bed  S/o at the end of shift    Roseline Mccarthy MD  Emergency Medicine Attending  [facility]       Roseline Mccarthy MD  08/15/24 1030     No

## 2024-08-15 NOTE — GROUP NOTE
Group Therapy Note    Date: 8/15/2024    Group Start Time: 1000  Group End Time: 1040  Group Topic: Psychotherapy    [facility] Marquita Damian MSW        Group Therapy Note    Attendees: 6/10       Patient's Goal:  Discuss personal values, their importance, and how to tie-together values and mental health    Notes:     Status After Intervention:  Improved    Participation Level: Active Listener and Interactive    Participation Quality: Appropriate, Attentive, and Sharing      Speech:  normal      Thought Process/Content: Logical  Linear      Affective Functioning: Congruent      Mood: euthymic      Level of consciousness:  Alert and Oriented x4      Response to Learning: Able to verbalize current knowledge/experience and Able to verbalize/acknowledge new learning      Endings: None Reported    Modes of Intervention: Education and Exploration      Discipline Responsible: /Counselor      Signature:  WESLEY Nichols

## 2024-08-15 NOTE — DISCHARGE INSTRUCTIONS
Information:  Medications:   Medication summary provided   I understand that I should take only the medications on my list.     -why and when I need to take each medicine.     -which side effects to watch for.     -that I should carry my medication information at all times in case of     Emergency situations.    I will take all of my medicines to follow up appointments.     -check with my physician or pharmacist before taking any new    Medication, over the counter product or drink alcohol.    -Ask about food, drug or dietary supplement interactions.    -discard old lists and update records with medication providers.    Notify Physician:  Notify physician if you notice:   Always call 911 if you feel your life is in danger  In case of an emergency call 911 immediately!  If 911 is not available call your local emergency medical system for help    Behavioral Health Follow Up:  Original Referral Source: [facility]  Discharge Diagnosis: Depression with suicidal ideation [F32.A, R45.851]  Recommendations for Level of Care: Community mental health follow-up  Patient status at discharge: Alert and oriented x4, calm and cooperative  My hospital  was: [facility]  Aftercare plan faxed: Yes   -faxed by: RN   -date: 8/15/2024   -time: 14:00  Prescriptions: Sent home with patient    Smoking: Quit Smoking.   Call the [employer]'s smoking quitline at 6-885-44V-QUIT  Know the signs of a heart attack   If you have any of the following symptoms call 911 immediately, do not wait more    Than five minutes.    1. Pressure, fullness and/ or squeezing in the center of the chest spreading to    The jaw, neck or shoulder.    2. Chest discomfort with light headedness, fainting, sweating, nausea or    Shortness of breath.   3. Upper abdominal pressure or discomfort.   4. Lower chest pain, back pain, unusual fatigue, shortness of breath, nausea   Or dizziness.     General Information:   Questions regarding your bill: Call HELP program ([phone])

## 2024-11-04 ENCOUNTER — HOSPITAL ENCOUNTER (EMERGENCY)
Age: 49
Discharge: HOME | End: 2024-11-04
Attending: EMERGENCY MEDICINE
Payer: MEDICAID

## 2024-11-04 VITALS
DIASTOLIC BLOOD PRESSURE: 86 MMHG | SYSTOLIC BLOOD PRESSURE: 131 MMHG | RESPIRATION RATE: 18 BRPM | HEART RATE: 87 BPM | TEMPERATURE: 98.1 F | OXYGEN SATURATION: 100 %

## 2024-11-04 DIAGNOSIS — R52 BODY ACHES: Primary | ICD-10-CM

## 2024-11-04 PROCEDURE — 96372 THER/PROPH/DIAG INJ SC/IM: CPT

## 2024-11-04 PROCEDURE — 6360000002 HC RX W HCPCS

## 2024-11-04 PROCEDURE — 99284 EMERGENCY DEPT VISIT MOD MDM: CPT

## 2024-11-04 RX ORDER — KETOROLAC TROMETHAMINE 30 MG/ML
30 INJECTION, SOLUTION INTRAMUSCULAR; INTRAVENOUS ONCE
Status: CANCELLED | OUTPATIENT
Start: 2024-11-04 | End: 2024-11-04

## 2024-11-04 RX ORDER — KETOROLAC TROMETHAMINE 30 MG/ML
30 INJECTION, SOLUTION INTRAMUSCULAR; INTRAVENOUS ONCE
Status: COMPLETED | OUTPATIENT
Start: 2024-11-04 | End: 2024-11-04

## 2024-11-04 RX ADMIN — KETOROLAC TROMETHAMINE 30 MG: 30 INJECTION, SOLUTION INTRAMUSCULAR; INTRAVENOUS at 05:10

## 2024-11-04 ASSESSMENT — PAIN SCALES - GENERAL
PAINLEVEL_OUTOF10: 10
PAINLEVEL_OUTOF10: 9

## 2024-11-04 ASSESSMENT — PAIN DESCRIPTION - LOCATION: LOCATION: FLANK;GENERALIZED

## 2024-11-04 ASSESSMENT — PAIN DESCRIPTION - ORIENTATION: ORIENTATION: RIGHT

## 2024-11-04 ASSESSMENT — PAIN - FUNCTIONAL ASSESSMENT: PAIN_FUNCTIONAL_ASSESSMENT: 0-10

## 2024-11-04 NOTE — ED PROVIDER NOTES
[facility] ED     Emergency Department     Faculty Attestation    I performed a history and physical examination of the patient and discussed management with the resident. I reviewed the resident’s note and agree with the documented findings and plan of care. Any areas of disagreement are noted on the chart. I was personally present for the key portions of any procedures. I have documented in the chart those procedures where I was not present during the key portions. I have reviewed the emergency nurses triage note. I agree with the chief complaint, past medical history, past surgical history, allergies, medications, social and family history as documented unless otherwise noted below. For Physician Assistant/ Nurse Practitioner cases/documentation I have personally evaluated this patient and have completed at least one if not all key elements of the E/M (history, physical exam, and MDM). Additional findings are as noted.    Note Started: 4:56 AM EST    Patient here with diffuse arthralgias myalgias typical fibromyalgia flare.  States she usually receives Toradol with improvement.  No injury no trauma nothing different from prior no specific chest abdominal pain.  Well-appearing on exam will medicate plan discharge      Critical Care     none    Magno Edwards MD, FACEP, FAAEM  Attending Emergency  Physician            Magno Edwards MD  11/04/24 5846

## 2024-11-04 NOTE — ED PROVIDER NOTES
[facility] ED  Emergency Department Encounter  Emergency Medicine Resident     Pt Name:Sruthi You  MRN: [id number]  Birthdate 1975  Date of evaluation: 24  PCP:  No primary care provider on file.  Note Started: 4:37 AM EST      CHIEF COMPLAINT       Chief Complaint   Patient presents with    Generalized Body Aches     Pain all over body       HISTORY OF PRESENT ILLNESS  (Location/Symptom, Timing/Onset, Context/Setting, Quality, Duration, Modifying Factors, Severity.)      Sruthi You is a 49 y.o. female who presents with generalized body pain.  Patient reports an ongoing history of lupus as well as fibromyalgia.  Chart review reveals a psychiatric history, which patient subsequently endorses.  The patient reports that she has also been out of her antipsychiatric medications for some time, however she is not requesting this be filled at this time.  Patient reports that she feels \"pain all over.\"  When asked to qualify a bit further, patient endorses specifically back pain and generalized joint pain.  At this time, patient is requesting that we \"heal her pain\" and \"figure out what is going on.\"  Patient endorses extensive marijuana use, endorsing that she smokes marijuana throughout the day.  Chart review reveals previous substance abuse issues.    PAST MEDICAL / SURGICAL / SOCIAL / FAMILY HISTORY      has a past medical history of Fibromyalgia, Headache, Hyperlipidemia, Hypertension, Hypothyroidism, Lupus (HCC), Psychiatric problem, and Thyroid disease.       has a past surgical history that includes  section; Foot surgery; and Hysterectomy.      Social History     Socioeconomic History    Marital status: Single     Spouse name: Not on file    Number of children: Not on file    Years of education: Not on file    Highest education level: Not on file   Occupational History    Not on file   Tobacco Use    Smoking status: Every Day     Current packs/day: 0.50     Average

## 2024-11-04 NOTE — DISCHARGE INSTRUCTIONS
You are seen in the ED today for generalized bodyaches and pains.  We gave you a 30 IM shot of Toradol, which seem to ease your pain.  As your pain is likely chronic in nature, we recommend that you follow-up with a primary care physician.  We have given you their contact info.  Please return to the ED with any headaches, fever, shortness of breath, chest pain/tightness, nausea/vomiting.

## 2024-11-12 NOTE — PLAN OF CARE
Patient ID: Diane Gomez a 35 year old female that presents to the [facility] at [facility].     C/O 11 day Hx of illness of illness   Started Augmentin 4 days ago & sinus pressure feels improved   Cough continues and is worse in the evening   Has occasional coughing fits   + sternal pain and rib pain with cough.     Delsym, Zyrtec, and Benzonatate 100 mg do not offer relief   Requesting treatment for cough        This visit is being performed virtually via Non-integrated Video Visit. Consent to treat includes permission to submit charges to the patient's insurance. It was shared that without being seen and evaluated in person, there is a risk that the information and/or assessment may be incomplete or inaccurate. This video visit may be discontinued by patient or clinician, if it is felt that the videoconferencing connections are not adequate for her situation.   Clinical Location: [facility]   iDane's location [facility] and is physically present in the [facility] at the time of this visit.         Patient's medications, allergies, and past histories were reviewed   Nursing notes and vital signs have been reviewed.        Review of Systems   Constitutional: Negative.    HENT:  Positive for congestion and rhinorrhea. Negative for sinus pressure, sinus pain and sore throat.    Eyes: Negative.    Respiratory:  Positive for cough and chest tightness. Negative for shortness of breath and wheezing.    Cardiovascular: Negative.          OBJECTIVE:  /83 (BP Location: LUE - Left upper extremity, Patient Position: Sitting, Cuff Size: Regular)   Pulse (!) 105   Temp 98.3 °F (36.8 °C) (Oral)   Ht 5' (1.524 m)   Wt 45.4 kg (100 lb)   LMP 10/17/2024 (Approximate)       Physical Exam  Vitals and nursing note reviewed.   Constitutional:       General: She is not in acute distress.     Appearance: She is not ill-appearing or toxic-appearing.   HENT:       Problem: Self Harm/Suicidality  Goal: Will have no self-injury during hospital stay  Description: INTERVENTIONS:  1.  Ensure constant observer at bedside with Q15M safety checks  2.  Maintain a safe environment  3.  Secure patient belongings  4.  Ensure family/visitors adhere to safety recommendations  5.  Ensure safety tray has been added to patient's diet order  6.  Every shift and PRN: Re-assess suicidal risk via Frequent Screener    2/1/2024 1308 by Aidee Alonso RN  Outcome: Progressing   Patient is calm, controlled and medication compliant. Patient denies suicidal ideations, reports depression and anxiety. Patient reports auditory disturbances, is isolative but polite. Patient reports eating and sleeping adequately with safety checks Q15min and at irregular intervals,   Problem: Anxiety  Goal: Will report anxiety at manageable levels  Description: INTERVENTIONS:  1. Administer medication as ordered  2. Teach and rehearse alternative coping skills  3. Provide emotional support with 1:1 interaction with staff  2/1/2024 1308 by Aidee Alonso RN  Outcome: Progressing   Patient is calm, controlled and medication compliant. Patient denies suicidal ideations, reports depression and anxiety. Patient reports auditory disturbances, is isolative but polite. Patient reports eating and sleeping adequately with safety checks Q15min and at irregular intervals,   Problem: Risk for Elopement  Goal: Patient will not exit the unit/facility without proper excort  2/1/2024 1308 by Aidee Alonso, RN  Outcome: Progressing   Patient is not exit seeking     Right Ear: Tympanic membrane, ear canal and external ear normal.      Left Ear: Tympanic membrane, ear canal and external ear normal.      Nose: Nose normal.      Mouth/Throat:      Pharynx: No oropharyngeal exudate or posterior oropharyngeal erythema.   Cardiovascular:      Rate and Rhythm: Regular rhythm.      Heart sounds: Normal heart sounds.   Pulmonary:      Effort: Pulmonary effort is normal. No respiratory distress.      Breath sounds: Normal breath sounds. No wheezing, rhonchi or rales.   Neurological:      Mental Status: She is alert.            MEDICATIONS THIS ENCOUNTER:    Current Outpatient Medications   Medication Sig Dispense Refill    benzonatate (TESSALON PERLES) 200 MG capsule Take 1 capsule by mouth 3 times daily as needed for Cough. 20 capsule 0    albuterol 108 (90 Base) MCG/ACT inhaler May take 1-2 puffs every 4 - 6 hours as needed.  Do not exceed 12 puffs in 24 hours 8.5 g 0    methylPREDNISolone (MEDROL DOSEPAK) 4 MG tablet Take 1 tablet by mouth as directed. follow package directions 21 tablet 0    amoxicillin-clavulanate (AUGMENTIN) 400-57 MG/5ML suspension Take 11 mLs by mouth every 12 hours for 10 days. 250 mL 0    amoxicillin-clavulanate (AUGMENTIN) 875-125 MG per tablet Take 1 tablet by mouth every 12 hours for 7 days. Take with food. 14 tablet 0    benzonatate (TESSALON PERLES) 100 MG capsule Take 1 capsule by mouth 3 times daily as needed for Cough. 20 capsule 0    Multiple Vitamins-Minerals (MULTIVITAMIN GUMMIES ADULT PO) Take 2 tablets by mouth daily.      acetaminophen (TYLENOL) 325 MG tablet Take 2 tablets by mouth every 4 hours as needed for Pain. (Patient not taking: Reported on 11/8/2024) 30 tablet 0    ibuprofen (MOTRIN) 600 MG tablet Take 1 tablet by mouth every 6 hours as needed for Pain. (Patient not taking: Reported on 11/8/2024) 30 tablet 0     No current facility-administered medications for this visit.          LABS PERFORMED THIS VISIT:    No results found for this  visit on 11/12/24.    ASSESSMENT AND PLAN:       DIAGNOSIS:    Acute upper respiratory infection    Bronchospasm       PLAN:     Medrol   Proair   Benzonatate 200 mg     Instructions provided as documented in the AVS.      Leann Stock, CNP

## 2025-02-14 ENCOUNTER — HOSPITAL ENCOUNTER (INPATIENT)
Age: 50
LOS: 2 days | Discharge: HOME | DRG: 751 | End: 2025-02-16
Attending: PSYCHIATRY & NEUROLOGY | Admitting: PSYCHIATRY & NEUROLOGY
Payer: MEDICAID

## 2025-02-14 ENCOUNTER — HOSPITAL ENCOUNTER (EMERGENCY)
Age: 50
Discharge: ANOTHER HOSPITAL | DRG: 751 | End: 2025-02-14
Attending: EMERGENCY MEDICINE
Payer: MEDICAID

## 2025-02-14 VITALS
WEIGHT: 134.04 LBS | DIASTOLIC BLOOD PRESSURE: 99 MMHG | TEMPERATURE: 97.7 F | BODY MASS INDEX: 22.88 KG/M2 | RESPIRATION RATE: 16 BRPM | HEART RATE: 78 BPM | HEIGHT: 64 IN | SYSTOLIC BLOOD PRESSURE: 124 MMHG | OXYGEN SATURATION: 98 %

## 2025-02-14 DIAGNOSIS — R45.851 SUICIDAL IDEATION: Primary | ICD-10-CM

## 2025-02-14 DIAGNOSIS — N76.0 BV (BACTERIAL VAGINOSIS): ICD-10-CM

## 2025-02-14 DIAGNOSIS — B96.89 BV (BACTERIAL VAGINOSIS): ICD-10-CM

## 2025-02-14 LAB
ALBUMIN SERPL-MCNC: 4.7 G/DL (ref 3.5–5.2)
ALBUMIN/GLOB SERPL: 1.3 {RATIO} (ref 1–2.5)
ALP SERPL-CCNC: 77 U/L (ref 35–104)
ALT SERPL-CCNC: 22 U/L (ref 10–35)
AMPHET UR QL SCN: NEGATIVE
ANION GAP SERPL CALCULATED.3IONS-SCNC: 14 MMOL/L (ref 9–16)
AST SERPL-CCNC: 25 U/L (ref 10–35)
BACTERIA URNS QL MICRO: NORMAL
BARBITURATES UR QL SCN: NEGATIVE
BASOPHILS # BLD: 0.06 K/UL (ref 0–0.2)
BASOPHILS NFR BLD: 1 % (ref 0–2)
BENZODIAZ UR QL: NEGATIVE
BILIRUB SERPL-MCNC: 0.2 MG/DL (ref 0–1.2)
BILIRUB UR QL STRIP: NEGATIVE
BUN SERPL-MCNC: 31 MG/DL (ref 6–20)
C TRACH DNA SPEC QL PROBE+SIG AMP: NEGATIVE
CALCIUM SERPL-MCNC: 9.6 MG/DL (ref 8.6–10.4)
CANDIDA SPECIES: NEGATIVE
CANNABINOIDS UR QL SCN: POSITIVE
CASTS #/AREA URNS LPF: NORMAL /LPF (ref 0–8)
CHLORIDE SERPL-SCNC: 98 MMOL/L (ref 98–107)
CLARITY UR: CLEAR
CO2 SERPL-SCNC: 22 MMOL/L (ref 20–31)
COCAINE UR QL SCN: POSITIVE
COLOR UR: YELLOW
CREAT SERPL-MCNC: 0.8 MG/DL (ref 0.6–0.9)
EOSINOPHIL # BLD: 0.05 K/UL (ref 0–0.44)
EOSINOPHILS RELATIVE PERCENT: 1 % (ref 1–4)
EPI CELLS #/AREA URNS HPF: NORMAL /HPF (ref 0–5)
ERYTHROCYTE [DISTWIDTH] IN BLOOD BY AUTOMATED COUNT: 14.2 % (ref 11.8–14.4)
ETHANOL PERCENT: <0.01 %
ETHANOLAMINE SERPL-MCNC: <10 MG/DL (ref 0–0.08)
FENTANYL UR QL: NEGATIVE
GARDNERELLA VAGINALIS: POSITIVE
GFR, ESTIMATED: 90 ML/MIN/1.73M2
GLUCOSE SERPL-MCNC: 108 MG/DL (ref 74–99)
GLUCOSE UR STRIP-MCNC: NEGATIVE MG/DL
HCG SERPL QL: NEGATIVE
HCT VFR BLD AUTO: 38.3 % (ref 36.3–47.1)
HGB BLD-MCNC: 12.7 G/DL (ref 11.9–15.1)
HGB UR QL STRIP.AUTO: NEGATIVE
IMM GRANULOCYTES # BLD AUTO: <0.03 K/UL (ref 0–0.3)
IMM GRANULOCYTES NFR BLD: 0 %
KETONES UR STRIP-MCNC: NEGATIVE MG/DL
LEUKOCYTE ESTERASE UR QL STRIP: ABNORMAL
LYMPHOCYTES NFR BLD: 1.87 K/UL (ref 1.1–3.7)
LYMPHOCYTES RELATIVE PERCENT: 34 % (ref 24–43)
MCH RBC QN AUTO: 28.7 PG (ref 25.2–33.5)
MCHC RBC AUTO-ENTMCNC: 33.2 G/DL (ref 28.4–34.8)
MCV RBC AUTO: 86.5 FL (ref 82.6–102.9)
METHADONE UR QL: NEGATIVE
MONOCYTES NFR BLD: 0.43 K/UL (ref 0.1–1.2)
MONOCYTES NFR BLD: 8 % (ref 3–12)
N GONORRHOEA DNA SPEC QL PROBE+SIG AMP: NEGATIVE
NEUTROPHILS NFR BLD: 56 % (ref 36–65)
NEUTS SEG NFR BLD: 3.1 K/UL (ref 1.5–8.1)
NITRITE UR QL STRIP: NEGATIVE
NRBC BLD-RTO: 0 PER 100 WBC
OPIATES UR QL SCN: NEGATIVE
OXYCODONE UR QL SCN: NEGATIVE
PCP UR QL SCN: NEGATIVE
PH UR STRIP: 6 [PH] (ref 5–8)
PLATELET # BLD AUTO: 222 K/UL (ref 138–453)
PMV BLD AUTO: 11.9 FL (ref 8.1–13.5)
POTASSIUM SERPL-SCNC: 4.3 MMOL/L (ref 3.7–5.3)
PROT SERPL-MCNC: 8.2 G/DL (ref 6.6–8.7)
PROT UR STRIP-MCNC: NEGATIVE MG/DL
RBC # BLD AUTO: 4.43 M/UL (ref 3.95–5.11)
RBC #/AREA URNS HPF: NORMAL /HPF (ref 0–4)
SODIUM SERPL-SCNC: 134 MMOL/L (ref 136–145)
SOURCE: ABNORMAL
SP GR UR STRIP: 1.02 (ref 1–1.03)
SPECIMEN DESCRIPTION: NORMAL
TEST INFORMATION: ABNORMAL
TRICHOMONAS: POSITIVE
UROBILINOGEN UR STRIP-ACNC: NORMAL EU/DL (ref 0–1)
WBC #/AREA URNS HPF: NORMAL /HPF (ref 0–5)
WBC OTHER # BLD: 5.5 K/UL (ref 3.5–11.3)

## 2025-02-14 PROCEDURE — 99222 1ST HOSP IP/OBS MODERATE 55: CPT | Performed by: INTERNAL MEDICINE

## 2025-02-14 PROCEDURE — 6370000000 HC RX 637 (ALT 250 FOR IP): Performed by: INTERNAL MEDICINE

## 2025-02-14 PROCEDURE — G0480 DRUG TEST DEF 1-7 CLASSES: HCPCS

## 2025-02-14 PROCEDURE — 85025 COMPLETE CBC W/AUTO DIFF WBC: CPT

## 2025-02-14 PROCEDURE — 6370000000 HC RX 637 (ALT 250 FOR IP)

## 2025-02-14 PROCEDURE — 6360000002 HC RX W HCPCS

## 2025-02-14 PROCEDURE — 99223 1ST HOSP IP/OBS HIGH 75: CPT | Performed by: PSYCHIATRY & NEUROLOGY

## 2025-02-14 PROCEDURE — 80307 DRUG TEST PRSMV CHEM ANLYZR: CPT

## 2025-02-14 PROCEDURE — 87660 TRICHOMONAS VAGIN DIR PROBE: CPT

## 2025-02-14 PROCEDURE — 6370000000 HC RX 637 (ALT 250 FOR IP): Performed by: NURSE PRACTITIONER

## 2025-02-14 PROCEDURE — 87510 GARDNER VAG DNA DIR PROBE: CPT

## 2025-02-14 PROCEDURE — 81001 URINALYSIS AUTO W/SCOPE: CPT

## 2025-02-14 PROCEDURE — APPSS60 APP SPLIT SHARED TIME 46-60 MINUTES

## 2025-02-14 PROCEDURE — 80053 COMPREHEN METABOLIC PANEL: CPT

## 2025-02-14 PROCEDURE — 6370000000 HC RX 637 (ALT 250 FOR IP): Performed by: PSYCHIATRY & NEUROLOGY

## 2025-02-14 PROCEDURE — 1240000000 HC EMOTIONAL WELLNESS R&B

## 2025-02-14 PROCEDURE — 84703 CHORIONIC GONADOTROPIN ASSAY: CPT

## 2025-02-14 PROCEDURE — 87491 CHLMYD TRACH DNA AMP PROBE: CPT

## 2025-02-14 PROCEDURE — 87591 N.GONORRHOEAE DNA AMP PROB: CPT

## 2025-02-14 PROCEDURE — 87480 CANDIDA DNA DIR PROBE: CPT

## 2025-02-14 RX ORDER — TRAZODONE HYDROCHLORIDE 50 MG/1
50 TABLET ORAL NIGHTLY PRN
Status: ON HOLD | COMMUNITY
Start: 2025-02-12 | End: 2025-02-16 | Stop reason: HOSPADM

## 2025-02-14 RX ORDER — POLYETHYLENE GLYCOL 3350 17 G/17G
17 POWDER, FOR SOLUTION ORAL DAILY PRN
Status: DISCONTINUED | OUTPATIENT
Start: 2025-02-14 | End: 2025-02-16 | Stop reason: HOSPADM

## 2025-02-14 RX ORDER — METRONIDAZOLE 500 MG/1
500 TABLET ORAL 2 TIMES DAILY
Qty: 14 TABLET | Refills: 0 | Status: SHIPPED | OUTPATIENT
Start: 2025-02-14 | End: 2025-02-21

## 2025-02-14 RX ORDER — LURASIDONE HYDROCHLORIDE 40 MG/1
20 TABLET, FILM COATED ORAL
Status: DISCONTINUED | OUTPATIENT
Start: 2025-02-14 | End: 2025-02-16 | Stop reason: HOSPADM

## 2025-02-14 RX ORDER — ACETAMINOPHEN 325 MG/1
650 TABLET ORAL EVERY 4 HOURS PRN
Status: DISCONTINUED | OUTPATIENT
Start: 2025-02-14 | End: 2025-02-16 | Stop reason: HOSPADM

## 2025-02-14 RX ORDER — DIPHENHYDRAMINE HYDROCHLORIDE 50 MG/ML
50 INJECTION INTRAMUSCULAR; INTRAVENOUS EVERY 6 HOURS PRN
Status: DISCONTINUED | OUTPATIENT
Start: 2025-02-14 | End: 2025-02-16 | Stop reason: HOSPADM

## 2025-02-14 RX ORDER — LORAZEPAM 1 MG/1
2 TABLET ORAL EVERY 6 HOURS PRN
Status: DISCONTINUED | OUTPATIENT
Start: 2025-02-14 | End: 2025-02-16 | Stop reason: HOSPADM

## 2025-02-14 RX ORDER — LURASIDONE HYDROCHLORIDE 20 MG/1
20 TABLET, FILM COATED ORAL
Status: ON HOLD | COMMUNITY
End: 2025-02-16 | Stop reason: HOSPADM

## 2025-02-14 RX ORDER — DULOXETIN HYDROCHLORIDE 60 MG/1
60 CAPSULE, DELAYED RELEASE ORAL DAILY
Status: ON HOLD | COMMUNITY
End: 2025-02-16 | Stop reason: HOSPADM

## 2025-02-14 RX ORDER — MAGNESIUM HYDROXIDE/ALUMINUM HYDROXICE/SIMETHICONE 120; 1200; 1200 MG/30ML; MG/30ML; MG/30ML
30 SUSPENSION ORAL EVERY 6 HOURS PRN
Status: DISCONTINUED | OUTPATIENT
Start: 2025-02-14 | End: 2025-02-16 | Stop reason: HOSPADM

## 2025-02-14 RX ORDER — METRONIDAZOLE 500 MG/1
500 TABLET ORAL EVERY 12 HOURS SCHEDULED
Status: DISCONTINUED | OUTPATIENT
Start: 2025-02-14 | End: 2025-02-16 | Stop reason: HOSPADM

## 2025-02-14 RX ORDER — ORPHENADRINE CITRATE 30 MG/ML
60 INJECTION INTRAMUSCULAR; INTRAVENOUS ONCE
Status: COMPLETED | OUTPATIENT
Start: 2025-02-14 | End: 2025-02-14

## 2025-02-14 RX ORDER — NICOTINE 21 MG/24HR
1 PATCH, TRANSDERMAL 24 HOURS TRANSDERMAL DAILY
Status: ON HOLD | COMMUNITY
Start: 2025-02-13 | End: 2025-02-16 | Stop reason: HOSPADM

## 2025-02-14 RX ORDER — POLYETHYLENE GLYCOL 3350 17 G
2 POWDER IN PACKET (EA) ORAL
Status: DISCONTINUED | OUTPATIENT
Start: 2025-02-14 | End: 2025-02-16 | Stop reason: HOSPADM

## 2025-02-14 RX ORDER — HYDROXYZINE HYDROCHLORIDE 50 MG/1
50 TABLET, FILM COATED ORAL 3 TIMES DAILY PRN
Status: DISCONTINUED | OUTPATIENT
Start: 2025-02-14 | End: 2025-02-16 | Stop reason: HOSPADM

## 2025-02-14 RX ORDER — HALOPERIDOL 5 MG/ML
5 INJECTION INTRAMUSCULAR EVERY 6 HOURS PRN
Status: DISCONTINUED | OUTPATIENT
Start: 2025-02-14 | End: 2025-02-16 | Stop reason: HOSPADM

## 2025-02-14 RX ORDER — LORAZEPAM 0.5 MG/1
2 TABLET ORAL ONCE
Status: DISCONTINUED | OUTPATIENT
Start: 2025-02-14 | End: 2025-02-14

## 2025-02-14 RX ORDER — KETOROLAC TROMETHAMINE 15 MG/ML
15 INJECTION, SOLUTION INTRAMUSCULAR; INTRAVENOUS ONCE
Status: COMPLETED | OUTPATIENT
Start: 2025-02-14 | End: 2025-02-14

## 2025-02-14 RX ORDER — LORAZEPAM 2 MG/ML
2 INJECTION INTRAMUSCULAR EVERY 6 HOURS PRN
Status: DISCONTINUED | OUTPATIENT
Start: 2025-02-14 | End: 2025-02-16 | Stop reason: HOSPADM

## 2025-02-14 RX ORDER — METRONIDAZOLE 500 MG/1
500 TABLET ORAL ONCE
Status: COMPLETED | OUTPATIENT
Start: 2025-02-14 | End: 2025-02-14

## 2025-02-14 RX ORDER — BUSPIRONE HYDROCHLORIDE 10 MG/1
10 TABLET ORAL 3 TIMES DAILY
Status: DISCONTINUED | OUTPATIENT
Start: 2025-02-14 | End: 2025-02-16 | Stop reason: HOSPADM

## 2025-02-14 RX ORDER — HALOPERIDOL 5 MG/1
5 TABLET ORAL EVERY 6 HOURS PRN
Status: DISCONTINUED | OUTPATIENT
Start: 2025-02-14 | End: 2025-02-16 | Stop reason: HOSPADM

## 2025-02-14 RX ORDER — TRAZODONE HYDROCHLORIDE 50 MG/1
50 TABLET ORAL NIGHTLY
Status: DISCONTINUED | OUTPATIENT
Start: 2025-02-14 | End: 2025-02-16 | Stop reason: HOSPADM

## 2025-02-14 RX ORDER — LORAZEPAM 2 MG/ML
0.5 INJECTION INTRAMUSCULAR ONCE
Status: COMPLETED | OUTPATIENT
Start: 2025-02-14 | End: 2025-02-14

## 2025-02-14 RX ORDER — IBUPROFEN 400 MG/1
400 TABLET, FILM COATED ORAL EVERY 6 HOURS PRN
Status: DISCONTINUED | OUTPATIENT
Start: 2025-02-14 | End: 2025-02-16 | Stop reason: HOSPADM

## 2025-02-14 RX ADMIN — HYDROXYZINE HYDROCHLORIDE 50 MG: 50 TABLET, FILM COATED ORAL at 21:11

## 2025-02-14 RX ADMIN — TRAZODONE HYDROCHLORIDE 50 MG: 50 TABLET ORAL at 21:57

## 2025-02-14 RX ADMIN — BUSPIRONE HYDROCHLORIDE 10 MG: 10 TABLET ORAL at 21:57

## 2025-02-14 RX ADMIN — KETOROLAC TROMETHAMINE 15 MG: 15 INJECTION, SOLUTION INTRAMUSCULAR; INTRAVENOUS at 02:36

## 2025-02-14 RX ADMIN — ORPHENADRINE CITRATE 60 MG: 60 INJECTION INTRAMUSCULAR; INTRAVENOUS at 02:35

## 2025-02-14 RX ADMIN — METRONIDAZOLE 500 MG: 500 TABLET ORAL at 21:10

## 2025-02-14 RX ADMIN — METRONIDAZOLE 500 MG: 500 TABLET ORAL at 03:48

## 2025-02-14 RX ADMIN — Medication 3 MG: at 21:11

## 2025-02-14 RX ADMIN — Medication 0.5 MG: at 02:52

## 2025-02-14 ASSESSMENT — PATIENT HEALTH QUESTIONNAIRE - PHQ9
2. FEELING DOWN, DEPRESSED OR HOPELESS: SEVERAL DAYS
SUM OF ALL RESPONSES TO PHQ9 QUESTIONS 1 & 2: 2
SUM OF ALL RESPONSES TO PHQ QUESTIONS 1-9: 2
1. LITTLE INTEREST OR PLEASURE IN DOING THINGS: SEVERAL DAYS

## 2025-02-14 ASSESSMENT — LIFESTYLE VARIABLES
HOW OFTEN DO YOU HAVE A DRINK CONTAINING ALCOHOL: 4 OR MORE TIMES A WEEK
HOW MANY STANDARD DRINKS CONTAINING ALCOHOL DO YOU HAVE ON A TYPICAL DAY: 7 TO 9
HOW MANY STANDARD DRINKS CONTAINING ALCOHOL DO YOU HAVE ON A TYPICAL DAY: 5 OR 6
HOW OFTEN DO YOU HAVE A DRINK CONTAINING ALCOHOL: 2-4 TIMES A MONTH
HOW MANY STANDARD DRINKS CONTAINING ALCOHOL DO YOU HAVE ON A TYPICAL DAY: 7 TO 9
HOW OFTEN DO YOU HAVE A DRINK CONTAINING ALCOHOL: 2-4 TIMES A MONTH

## 2025-02-14 ASSESSMENT — SLEEP AND FATIGUE QUESTIONNAIRES
SLEEP PATTERN: DIFFICULTY FALLING ASLEEP;INSOMNIA
DO YOU HAVE DIFFICULTY SLEEPING: YES
DO YOU USE A SLEEP AID: YES
AVERAGE NUMBER OF SLEEP HOURS: 5

## 2025-02-14 ASSESSMENT — PAIN SCALES - GENERAL
PAINLEVEL_OUTOF10: 10
PAINLEVEL_OUTOF10: 10

## 2025-02-14 NOTE — ED NOTES
[] [facility]    [] [facility]    [x]  [facility]    SUICIDE RISK ASSESSMENT      Y  N     [x] [] In the past two weeks have you had thoughts of hurting yourself in any way?    [x] [] In the past two weeks have you had thoughts that you would be better off dead?   [x] [] Have you made a suicide attempt in the past two months?   [x] [] Do you have a plan for hurting yourself or suicide?   [x] [] Presence of hallucinations/voices related to hurting himself or herself or someone else.    SUICIDE/SECURITY WATCH PRECAUTION CHECKLIST     Orders    [x]  Suicide/Security Watch Precautions initiated as checked below:   2/14/25 2:10 AM EST 22/22    [x] Notified physician:  Eamon Hendricks DO  2/14/25 2:10 AM EST    [x] Orders obtained as appropriate:     [x] 1:1 Observer     [] Psych Consult     [] Psych Consult    Name:  Date:  Time:    [x] 1:1 Observer, Notified by:  Mariangel Ceron RN    Contact Nurse Supervisor    [x] Remove all personal clothes from room and place in snap/paper gown/pants.  Slipper only    [x] Remove all personal belongings from room and secured away from patient.    Documentation    [x] Initiate Suicide/Security Watch Precaution Flow Sheet    [x] Initiate individualized Care Plan/Problem    [x] Document why precautions initiated on flow sheet (Initiate Nursing Care Plan/Problem)    [x] 1:1 Observer in place; instructions provided.  Suicide precautions require observer be within arms length.    [x] Nurse-Observer Communication Hand-off initiated by RN, reviewed with Observer.  Subsequently used as Hand Off between Observers.    [x] Initiate every 15 minute observations per observer as delegated by the RN.    [x] Initiate RN assessment and documentation    Environmental Scan  Search Criteria and Process: OPTIONAL, see Search Policy    [x] Reason for search: SI    [x] Nursing in presence of second person to search patient    [x] Patient notified of reason for body assessment and belongings  Started:2-14-25  Etiological Factors: (related to)  [x] Expressed or implied suicidal ideation/behavior  [x] Depression  [] Suicide attempt      [] Low self-esteem  [] Hallucinations      [] Feeling of Hopelessness  [] Substance abuse or withdrawal    [] Dysfunctional family  [] Major traumatic event, eg., divorce, etc   [] Excessive stress/anxiety    2/14/25    Expected Outcomes    Patient will:   [x] Patient will remain safe for the duration of their stay   [x] Patient's environment will be safe, eg. Free of potential suicide weapons   [x] Verbalize Recovery from suicidal episode and improvement in self-worth   [x] Discuss feeling that precipitated suicide attempt/thoughts/behavior   [x] Will describe available resources for crisis prevention and management   [x] Will verbalize positive coping skills     Nursing Intervention   [x] Assessment and Observations hourly   [x] Suicide Precautions implemented with patient, should be 1:1 observation   [x] Document observation [id number] and RN assessment hourly   [] Consult physician for:    [] Psychiatric consult    [] Pharmacological therapy    [] Other:    [x] Patient search completed by security   [x] Initiated appropriate safety protocols by removing from the patient's environment anything that could be used to inflict self injury, eg. Order safe tray, snap gown, etc   [x] Maintain open, warm, caring, non-judgmental attitude/manner towards patient   [] Discuss advantages and disadvantages of existing coping methods/skills   [x] Assist and educate patient with identifying present strengths and coping skills   [x] Keep patient informed regarding plan of care and provide clear concise explanations.  Provide the patient/family education information as well as telephone numbers and other information about crisis centers, hot lines, and counselors.    Discharge Planning:   [] Referral  [] Groups [] Health agencies  [] Other:
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
## 2025-02-14 NOTE — BH NOTE
[facility] [place]  Admission Note     Admission Type:   Voluntary     Reason for admission:  Reason for Admission: Patient recently discharged from [facility]. PAtient does not believe medications are working. She is having auditory hallucinations at times. She is having increased suicidal thoughts and cannot contract for safety while in community      Addictive Behavior:   Addictive Behavior  In the Past 3 Months, Have You Felt or Has Someone Told You That You Have a Problem With  : None    Medical Problems:   Past Medical History:   Diagnosis Date    Fibromyalgia     Headache     migraines    Hyperlipidemia     Hypertension     Hypothyroidism     Lupus     Psychiatric problem     Thyroid disease        Status EXAM:  Mental Status and Behavioral Exam  Normal: No  Level of Assistance: Independent/Self  Facial Expression: Avoids Gaze, Expressionless  Affect: Blunt  Level of Consciousness: Lethargic  Frequency of Checks: 4 times per hour, close  Mood:Normal: No  Mood: Depressed, Empty  Motor Activity:Normal: No  Motor Activity: Decreased  Eye Contact: Poor  Observed Behavior: Preoccupied, Guarded  Sexual Misconduct History: Current - no  Preception: [place] to person, [place] to time  Attention:Normal: No  Attention: Unable to concentrate  Thought Processes: Circumstantial  Thought Content:Normal: No  Thought Content: Preoccupations, Poverty of content  Depression Symptoms: Feelings of hopelessess, Feelings of helplessness  Anxiety Symptoms: No problems reported or observed.  Maria Teresa Symptoms: No problems reported or observed.  Hallucinations: Auditory (comment) (intermittent)  Delusions: No  Memory:Normal: No  Memory: Poor recent  Insight and Judgment: No  Insight and Judgment: Poor judgment, Poor insight    Tobacco Screening:  Practical Counseling, on admission, jayme X, if applicable and completed (first 3 are required if patient doesn't refuse)         ( x) Recognizing danger situations (included

## 2025-02-14 NOTE — ED NOTES
..Transfer Center Handoff for Behavioral Health Transfers      Patient's Current Location: [facility] EMERGENCY DEPARTMENT     Chief Complaint   Patient presents with    Suicidal       Current or History of Violent Behavior: No    Currently in Restraints Now or During this Encounter: No  (Specify if Agitation or self harm is noted in ED?)  If yes, please describe behaviors requiring restraint:             Medical Clearance Documented and Verified in the Chart: No    Allergies   Allergen Reactions    Bactrim [Sulfamethoxazole-Trimethoprim]         Can Patient Tolerate Lying Flat: Yes    Able to Perform ADLs:  Yes  (Specify if able to ambulate or uses any mobility devices such as cane or walker)  Activity:    Level of Assistance:    Assistive Device:    Miscellaneous Devices:      LABS    CBC:   Lab Results   Component Value Date/Time    WBC 5.5 02/14/2025 02:21 AM    RBC 4.43 02/14/2025 02:21 AM    RBC 4.27 05/30/2012 12:19 AM    HGB 12.7 02/14/2025 02:21 AM    HCT 38.3 02/14/2025 02:21 AM    MCV 86.5 02/14/2025 02:21 AM    MCH 28.7 02/14/2025 02:21 AM    MCHC 33.2 02/14/2025 02:21 AM    RDW 14.2 02/14/2025 02:21 AM     02/14/2025 02:21 AM     05/30/2012 12:19 AM    MPV 11.9 02/14/2025 02:21 AM     CMP:   Lab Results   Component Value Date/Time     02/14/2025 02:21 AM    K 4.3 02/14/2025 02:21 AM    CL 98 02/14/2025 02:21 AM    CO2 22 02/14/2025 02:21 AM    BUN 31 02/14/2025 02:21 AM    CREATININE 0.8 02/14/2025 02:21 AM    GFRAA >60 08/05/2022 04:30 AM    LABGLOM 90 02/14/2025 02:21 AM    LABGLOM >60 01/31/2024 02:16 PM    GLUCOSE 108 02/14/2025 02:21 AM    GLUCOSE 83 05/30/2012 12:19 AM    CALCIUM 9.6 02/14/2025 02:21 AM    BILITOT 0.2 02/14/2025 02:21 AM    ALKPHOS 77 02/14/2025 02:21 AM    AST 25 02/14/2025 02:21 AM    ALT 22 02/14/2025 02:21 AM     Drug Panel:   Lab Results   Component Value Date/Time    OPIAU NEGATIVE 02/14/2025 02:26 AM     UA:  Lab Results   Component Value Date/Time     COLORU Yellow 02/14/2025 02:26 AM    PROTEINU NEGATIVE 02/14/2025 02:26 AM    GLUCOSEU NEGATIVE 02/14/2025 02:26 AM    GLUCOSEU NEGATIVE 05/29/2012 12:21 AM    KETUA NEGATIVE 02/14/2025 02:26 AM    BILIRUBINUR NEGATIVE 02/14/2025 02:26 AM    UROBILINOGEN Normal 02/14/2025 02:26 AM    NITRU NEGATIVE 02/14/2025 02:26 AM    LEUKOCYTESUR SMALL 02/14/2025 02:26 AM    WBCUA 0 TO 2 02/14/2025 02:26 AM    RBCUA None 02/14/2025 02:26 AM    BACTERIA None 02/14/2025 02:26 AM     PREGNANCY TEST:   Lab Results   Component Value Date/Time    PREGTESTUR NEGATIVE 08/10/2024 03:19 PM    PREGTESTUR NEGATIVE 02/24/2023 03:55 PM       Dialysis: No    Target Destination: [facility]    Insurance: Payor: [facility] MEDICAID /  /  /      Current Psychotic Symptoms  Harmful Actions Towards Others: None Observed  Orientation Level: Oriented X4  Speech Pattern: Within Defined Limits  General Attitude: Within Defined Limits  Emotions: Depressed  Delusions: Within Defined Limits  Hallucination:  Auditory    Any Suicidal Ideations: High Risk    Homicidal Ideations/Violence Risk:   Observed Violent Behavior:    Homicidal Ideations:      Past Psychiatric History:       Diagnosis Date    Fibromyalgia     Headache     migraines    Hyperlipidemia     Hypertension     Hypothyroidism     Lupus     Psychiatric problem     Thyroid disease        Hold (TDO/Involuntary Hold): No    The patient is currently receiving care for the above psychiatric illness.     Home Medications  Does Patient Have Medications to Bring to the Facility: No  Medications Prior to Admission:   Prior to Admission Medications   Prescriptions Last Dose Informant Patient Reported? Taking?   busPIRone (BUSPAR) 10 MG tablet   No No   Sig: Take 1 tablet by mouth 3 times daily   lisinopril (PRINIVIL;ZESTRIL) 5 MG tablet   No No   Sig: Take 1 tablet by mouth daily   mirtazapine (REMERON) 15 MG tablet   No No   Sig: Take 1 tablet by mouth nightly   paliperidone (INVEGA) 3 MG extended

## 2025-02-14 NOTE — BH NOTE
On call provider notified of best practice advisory suggesting to place patient on suicide precautions. Provider to discontinue the order as patient does not meet criteria for suicide precautions at this time. Continue to observe patient on every 15 minute checks.

## 2025-02-14 NOTE — H&P
[facility] Internal Medicine  Kvng Dumont MD; Juancarlos Peters MD, Neto Quintanilla MD, Екатерина Leach MD, Dr Vicki Springer MD; Magdaleno Velásquez MD    [facility] Internal Medicine   IN-PATIENT SERVICE   [place]     HISTORY AND PHYSICAL EXAMINATION            Date:   2025  Patient name:  Sruthi You  Date of admission:  2025  6:21 AM  MRN:   [id number]  Account:  [id number]  YOB: 1975  PCP:    No primary care provider on file.  Room:   [address] [place] [address]  Code Status:    Full Code      Chief Complaint:     Suicidal /Ac Psychosis    History Obtained From:     Patient/EMR/bedside RN     History of Present Illness:   Patient is 49-year-old female with past medical history of fibromyalgia hypertension hypothyroidism hyperlipidemia depression and anxiety , polysubstance abuse is been admitted at [facility] for further management of depression suicidal ideation.  Patient currently denies any chest pain shortness of breath    Past Medical History:     Past Medical History:   Diagnosis Date    Fibromyalgia     Headache     migraines    Hyperlipidemia     Hypertension     Hypothyroidism     Lupus     Psychiatric problem     Thyroid disease         Past Surgical History:     Past Surgical History:   Procedure Laterality Date     SECTION      FOOT SURGERY      right    HYSTERECTOMY (CERVIX STATUS UNKNOWN)          Medications Prior to Admission:     Prior to Admission medications    Medication Sig Start Date End Date Taking? Authorizing Provider   metroNIDAZOLE (FLAGYL) 500 MG tablet Take 1 tablet by mouth 2 times daily for 7 days 25  Trish Barillas MD   DULoxetine (CYMBALTA) 60 MG extended release capsule Take 1 capsule by mouth daily    ProviderBoris MD   lurasidone (LATUDA) 20 MG TABS tablet Take 1 tablet by mouth Daily with supper    ProviderBoris MD   traZODone (DESYREL) 50 MG tablet Take 1 tablet by 
LORazepam (ATIVAN) injection 2 mg  2 mg IntraMUSCular Q6H PRN Dunwald, Rhonda, APRN - CNP        And    diphenhydrAMINE (BENADRYL) injection 50 mg  50 mg IntraMUSCular Q6H PRN Dunwald, Rhonda, APRN - CNP        haloperidol (HALDOL) tablet 5 mg  5 mg Oral Q6H PRN Dunwald, Rhonda, APRN - CNP        And    LORazepam (ATIVAN) tablet 2 mg  2 mg Oral Q6H PRN Dunwald, Rhonda, APRN - CNP        metroNIDAZOLE (FLAGYL) tablet 500 mg  500 mg Oral 2 times per day Екатерина Leach MD        lurasidone (LATUDA) tablet 20 mg  20 mg Oral Dinner Ronan Jara MD            The patient was seen face-to-face.  He is known to me from previous admission.  She has been using cocaine.  She is very hesitant to try medication that would make her gain weight.  She has been on a number of antipsychotics in the past and has responded well to them.  Most recently she has been on Latuda and she would prefer to continue with that.  Latuda 20 mg daily has been ordered for her.     PLAN  Medications as noted above  Attempt to develop insight  Psycho-education conducted.  Supportive Therapy conducted.  Follow-up daily while on inpatient unit    Electronically signed by RONAN JARA MD on 2/14/25 at 6:06 PM EST

## 2025-02-14 NOTE — BH NOTE
Recreation Group Note        Date: February 14, 2025 Start Time: 10am  End Time:  1050      Number of Participants in Group & Unit Census:  4/13    Topic: Activity    Goal of Group:Socialization and support      Comments:     Patient did not participate in Recreation group, despite staff encouragement and explanation of benefits.  Patient remain seclusive to self.  Q15 minute safety checks maintained for patient safety and will continue to encourage patient to attend unit programming.

## 2025-02-14 NOTE — ED PROVIDER NOTES
[facility]     Emergency Department     Faculty Attestation    I performed a history and physical examination of the patient and discussed management with the resident. I have reviewed and agree with the resident’s findings including all diagnostic interpretations, and treatment plans as written. Any areas of disagreement are noted on the chart. I was personally present for the key portions of any procedures. I have documented in the chart those procedures where I was not present during the key portions. I have reviewed the emergency nurses triage note. I agree with the chief complaint, past medical history, past surgical history, allergies, medications, social and family history as documented unless otherwise noted below. Documentation of the HPI, Physical Exam and Medical Decision Making performed by archieibyana is based on my personal performance of the HPI, PE and MDM. For Physician Assistant/ Nurse Practitioner cases/documentation I have personally evaluated this patient and have completed at least one if not all key elements of the E/M (history, physical exam, and MDM). Additional findings are as noted.    Note Started: 1:58 AM EST     50 yo F suicidal with plan, no fever, no vomit, no injury, admits to using alcohol, cocaine, & marijuana,   PE gcs 15 IWONA, tearful, neck supple, no meningeal finding  No pressured speech, no indication of acute psychosis  ---accepted to [facility]  EKG Interpretation    Interpreted by me      CRITICAL CARE: There was a high probability of clinically significant/life threatening deterioration in this patient's condition which required my urgent intervention.  Total critical care time was 0 minutes.  This excludes any time for separately reportable procedures.       Eamon Gonzalez DO  02/14/25 0159       Eamon Hendricks DO  02/14/25 0557    
and Perception: Attention normal. She perceives auditory hallucinations.         Mood and Affect: Mood normal.         Behavior: Behavior normal. Behavior is cooperative.         Thought Content: Thought content includes suicidal ideation. Thought content includes suicidal plan.       DDX/DIAGNOSTIC RESULTS / EMERGENCY DEPARTMENT COURSE / MDM     Medical Decision Making  49-year-old female presenting with suicidal ideations with plan to walk in front of traffic as well as the auditory hallucinations.  Patient also has been experiencing some vaginal discharge but no abdominal pain or urinary symptoms. Is asking for something for fibromyalgia and pain all over. On physical exam vital signs are stable and patient is afebrile.  Alert and oriented x 3.  Patient is cooperative.  GCS of 15.  Heart regular rate and rhythm, lungs are clear to auscultation.  Abdomen soft nontender to palpation.  Pupils are equal reactive to light.  Will plan for BHI labs as well as a vaginitis GCS swabs given patient's vaginal discharge.  Differential diagnose include but not limited to suicidal ideations with a plan, BV, STD    Amount and/or Complexity of Data Reviewed  Labs: ordered. Decision-making details documented in ED Course.    Risk  Prescription drug management.        EKG  None    All EKG's are interpreted by the Emergency Department Physician who either signs or Co-signs this chart in the absence of a cardiologist.    EMERGENCY DEPARTMENT COURSE:    ED Course as of 02/14/25 0706   Fri Feb 14, 2025   0302 Nitrite, Urine: NEGATIVE [NS]   0302 Leukocyte Esterase, Urine(!): SMALL [NS]   0302 WBC, UA: 0 TO 2 [NS]   0302 Bacteria, UA: None [NS]   0302 Sodium(!): 134 [NS]   0302 Potassium: 4.3 [NS]   0302 Glucose(!): 108 [NS]   0302 BUN,BUNPL(!): 31 [NS]   0302 Creatinine: 0.8 [NS]   0302 Preg, Serum: NEGATIVE [NS]   0302 WBC: 5.5 [NS]   0302 Hemoglobin Quant: 12.7 [NS]   0303 Platelet Count: 222 [NS]   0341 Trichomonas(!): POSITIVE [NS]

## 2025-02-14 NOTE — GROUP NOTE
Group Therapy Note    Date: 2/14/2025    Group Start Time: 1430  Group End Time: 1535  Group Topic: Cognitive Skills    Alexandra Cleary CTRS        Group Therapy Note    Attendees: 7/14     Topic: To increase socialization, practice self expression, explore positive coping and   resources, activities ,and thoughts using creative expression and discussion.      Comments:   Patient did not participate in Cognitive Skills Group, at 1430, despite staff encouragement.     Pt remained resting in room and was seclusive to self.    Q15 minute safety checks maintained for patient safety and will continue to encourage   patient to attend unit programming.       Discipline Responsible: Psychoeducational Specialist   Signature: CINDI SCHAEFFER

## 2025-02-14 NOTE — ED NOTES
Patient to ED via triage with complaints of suicidal ideation. Patient states she always feels suicidal but it has worsened today. Patient states her daughter  a couple years ago causing her to be depressed. Patient reports auditory hallucinations. Patient admits to using cocaine and marijuana today. Patient has a plan to walk in front of a car. Patient is alert and oriented x4 and RR even and unlabored. Sitter at bedside.

## 2025-02-14 NOTE — PLAN OF CARE
[facility]  Initial Interdisciplinary Treatment Plan Note      Original treatment plan Date & Time: 2/14/2025   3780    Admission Type:  Admission Type: Voluntary (Simultaneous filing. User may not have seen previous data.)    Reason for admission:   Reason for Admission: Patient recently discharged from [facility]. PAtient does not believe medications are working. She is having auditory hallucinations at times. She is having increased suicidal thoughts and cannot contract for safety while in community    Estimated Length of Stay:  5-7days  Estimated Discharge Date: To be determined by physician.    PATIENT STRENGTHS:  Patient Strengths:   Patient Strengths and Limitations:Limitations: Difficult relationships / poor social skills, Inappropriate/potentially harmful leisure interests, Difficulty problem solving/relies on others to help solve problems  Addictive Behavior: Addictive Behavior  In the Past 3 Months, Have You Felt or Has Someone Told You That You Have a Problem With  : None  Medical Problems:  Past Medical History:   Diagnosis Date    Fibromyalgia     Headache     migraines    Hyperlipidemia     Hypertension     Hypothyroidism     Lupus     Psychiatric problem     Thyroid disease      Status EXAM:Mental Status and Behavioral Exam  Normal: No  Level of Assistance: Independent/Self  Facial Expression: Flat, Expressionless  Affect: Blunt  Level of Consciousness: Alert  Frequency of Checks: 4 times per hour, close  Mood:Normal: No  Mood: Depressed, Anxious, Sad, Helpless, Irritable  Motor Activity:Normal: No  Motor Activity: Decreased  Eye Contact: Fair  Observed Behavior: Preoccupied, Cooperative  Sexual Misconduct History: Current - no  Preception: [place] to person, [place] to time, [place] to place, [place] to situation  Attention:Normal: No  Attention: Distractible  Thought Processes: Circumstantial  Thought Content:Normal: No  Thought Content: Poverty of content,

## 2025-02-14 NOTE — GROUP NOTE
Group Therapy Note    Date: 2/14/2025    Group Start Time: 1105  Group End Time: 1150  Group Topic: Cognitive Skills    Alexandra Cleary CTRS        Group Therapy Note    Attendees: 5/13     Topic: To increase socialization, practice problem solving skills, and communication skills.    Comments:   Patient did not participate in Cognitive Skills Group, at 1105, despite staff encouragement.     Pt remained resting in room and was seclusive to self.    Q15 minute safety checks maintained for patient safety and will continue to encourage   patient to attend unit programming.       Discipline Responsible: Psychoeducational Specialist   Signature: CINDI SCHAEFFER

## 2025-02-14 NOTE — BH NOTE
Community Meeting Group Note        Date: February 14, 2025 Start Time: 9am  End Time:  0945      Number of Participants in Group & Unit Census:  6/13    Topic: Goals    Goal of Group:Goal setting      Comments:     Patient did not participate in Community Meeting group, despite staff encouragement and explanation of benefits.  Patient remain seclusive to self.  Q15 minute safety checks maintained for patient safety and will continue to encourage patient to attend unit programming.

## 2025-02-14 NOTE — BH NOTE
Patient given tobacco quitline number 96150162957 patient given information as to the dangers of long term tobacco use. Continue to reinforce the importance of tobacco cessation.

## 2025-02-14 NOTE — PLAN OF CARE
Problem: Self Harm/Suicidality  Goal: Will have no self-injury during hospital stay  Description: INTERVENTIONS:  1.  Ensure constant observer at bedside with Q15M safety checks  2.  Maintain a safe environment  3.  Secure patient belongings  4.  Ensure family/visitors adhere to safety recommendations  5.  Ensure safety tray has been added to patient's diet order  6.  Every shift and PRN: Re-assess suicidal risk via Frequent Screener    Outcome: Progressing     Problem: Depression  Goal: Will be euthymic at discharge  Description: INTERVENTIONS:  1. Administer medication as ordered  2. Provide emotional support via 1:1 interaction with staff  3. Encourage involvement in milieu/groups/activities  4. Monitor for social isolation  Outcome: Progressing     Problem: Safety - Adult  Goal: Free from fall injury  Outcome: Progressing     Problem: Pain  Goal: Verbalizes/displays adequate comfort level or baseline comfort level  Outcome: Progressing     Patient reports anxiety, depression and fleeting suicidal ideation but does contract for safety while on the unit. Patient is encouraged to notify staff if thought or mood changes throughout shift.  Patient denies pain , seems to be in no distress and is educated to notify staff if any changes in pain scale occur. Patient is free form falls at this time , will continue to monitor throughout shift. Patient is sad, hopeless, helpless, out for needs only, isolative, irritable to times, blunt affect , medication compliant and behavior controled. Patient reports normal appetite and adequate amount of sleep . 15 minute rounding continues throughout shift for patient safety while on the unit.

## 2025-02-15 LAB — TSH SERPL DL<=0.05 MIU/L-ACNC: 3.53 UIU/ML (ref 0.27–4.2)

## 2025-02-15 PROCEDURE — 6370000000 HC RX 637 (ALT 250 FOR IP): Performed by: INTERNAL MEDICINE

## 2025-02-15 PROCEDURE — 1240000000 HC EMOTIONAL WELLNESS R&B

## 2025-02-15 PROCEDURE — 36415 COLL VENOUS BLD VENIPUNCTURE: CPT

## 2025-02-15 PROCEDURE — 99232 SBSQ HOSP IP/OBS MODERATE 35: CPT | Performed by: PSYCHIATRY & NEUROLOGY

## 2025-02-15 PROCEDURE — 6370000000 HC RX 637 (ALT 250 FOR IP): Performed by: PSYCHIATRY & NEUROLOGY

## 2025-02-15 PROCEDURE — 84443 ASSAY THYROID STIM HORMONE: CPT

## 2025-02-15 PROCEDURE — 6370000000 HC RX 637 (ALT 250 FOR IP): Performed by: NURSE PRACTITIONER

## 2025-02-15 PROCEDURE — 99232 SBSQ HOSP IP/OBS MODERATE 35: CPT | Performed by: INTERNAL MEDICINE

## 2025-02-15 RX ORDER — FAMOTIDINE 20 MG/1
20 TABLET, FILM COATED ORAL 2 TIMES DAILY
Status: DISCONTINUED | OUTPATIENT
Start: 2025-02-15 | End: 2025-02-16 | Stop reason: HOSPADM

## 2025-02-15 RX ADMIN — METRONIDAZOLE 500 MG: 500 TABLET ORAL at 08:29

## 2025-02-15 RX ADMIN — LURASIDONE HYDROCHLORIDE 20 MG: 40 TABLET, FILM COATED ORAL at 17:39

## 2025-02-15 RX ADMIN — Medication 3 MG: at 21:17

## 2025-02-15 RX ADMIN — HYDROXYZINE HYDROCHLORIDE 50 MG: 50 TABLET, FILM COATED ORAL at 08:28

## 2025-02-15 RX ADMIN — FAMOTIDINE 20 MG: 20 TABLET, FILM COATED ORAL at 14:20

## 2025-02-15 RX ADMIN — BUSPIRONE HYDROCHLORIDE 10 MG: 10 TABLET ORAL at 08:29

## 2025-02-15 RX ADMIN — HYDROXYZINE HYDROCHLORIDE 50 MG: 50 TABLET, FILM COATED ORAL at 17:39

## 2025-02-15 RX ADMIN — METRONIDAZOLE 500 MG: 500 TABLET ORAL at 21:17

## 2025-02-15 RX ADMIN — IBUPROFEN 400 MG: 400 TABLET, FILM COATED ORAL at 07:07

## 2025-02-15 RX ADMIN — BUSPIRONE HYDROCHLORIDE 10 MG: 10 TABLET ORAL at 14:20

## 2025-02-15 RX ADMIN — IBUPROFEN 400 MG: 400 TABLET, FILM COATED ORAL at 17:40

## 2025-02-15 RX ADMIN — BUSPIRONE HYDROCHLORIDE 10 MG: 10 TABLET ORAL at 21:17

## 2025-02-15 RX ADMIN — FAMOTIDINE 20 MG: 20 TABLET, FILM COATED ORAL at 21:17

## 2025-02-15 RX ADMIN — ACETAMINOPHEN 650 MG: 325 TABLET ORAL at 07:07

## 2025-02-15 RX ADMIN — TRAZODONE HYDROCHLORIDE 50 MG: 50 TABLET ORAL at 21:49

## 2025-02-15 ASSESSMENT — PAIN DESCRIPTION - ORIENTATION: ORIENTATION: MID

## 2025-02-15 ASSESSMENT — PAIN DESCRIPTION - LOCATION
LOCATION: BACK
LOCATION: HEAD

## 2025-02-15 ASSESSMENT — PAIN DESCRIPTION - DESCRIPTORS
DESCRIPTORS: ACHING
DESCRIPTORS: ACHING

## 2025-02-15 ASSESSMENT — LIFESTYLE VARIABLES
HOW OFTEN DO YOU HAVE A DRINK CONTAINING ALCOHOL: 2-4 TIMES A MONTH
HOW MANY STANDARD DRINKS CONTAINING ALCOHOL DO YOU HAVE ON A TYPICAL DAY: 7 TO 9

## 2025-02-15 ASSESSMENT — PAIN - FUNCTIONAL ASSESSMENT: PAIN_FUNCTIONAL_ASSESSMENT: ACTIVITIES ARE NOT PREVENTED

## 2025-02-15 ASSESSMENT — PAIN SCALES - GENERAL
PAINLEVEL_OUTOF10: 2
PAINLEVEL_OUTOF10: 10
PAINLEVEL_OUTOF10: 10

## 2025-02-15 NOTE — PLAN OF CARE
Problem: Self Harm/Suicidality  Goal: Will have no self-injury during hospital stay  Description: INTERVENTIONS:  1.  Ensure constant observer at bedside with Q15M safety checks  2.  Maintain a safe environment  3.  Secure patient belongings  4.  Ensure family/visitors adhere to safety recommendations  5.  Ensure safety tray has been added to patient's diet order  6.  Every shift and PRN: Re-assess suicidal risk via Frequent Screener    Outcome: Progressing     Problem: Depression  Goal: Will be euthymic at discharge  Description: INTERVENTIONS:  1. Administer medication as ordered  2. Provide emotional support via 1:1 interaction with staff  3. Encourage involvement in milieu/groups/activities  4. Monitor for social isolation  Outcome: Progressing, Patient affect flat but cooperative during 1:1 talk time. Patient voiced depression with anxiety due to life stressors. Patient encouraged to seek staff for needs while on the unit and continue her medication regimen. 100 % meal and fluid intake.

## 2025-02-15 NOTE — GROUP NOTE
Group Therapy Note    Date: 2/15/2025    Group Start Time: 0915  Group End Time: 0930  Group Topic: Community Meeting    Lani Schrader        Group Therapy Note    Attendees: 5/16     Community Meeting Group Note        Date: February 15, 2025 Start Time:  0915   End Time:  0930      Number of Participants in Group & Unit Census:  5/16    Topic: Goals    Goal of Group: set short term goal for the day      Comments:     Patient did not participate in Community Meeting group, despite staff encouragement and explanation of benefits.  Patient remain seclusive to self.  Q15 minute safety checks maintained for patient safety and will continue to encourage patient to attend unit programming.

## 2025-02-15 NOTE — GROUP NOTE
Group Therapy Note    Date: 2/15/2025    Group Start Time: 1430  Group End Time: 1515  Group Topic: Recreational    [facility] Lani Roth        Group Therapy Note    Attendees: 6/16     Open Rec  Group Note        Date: February 15, 2025 Start Time: 2:30pm  End Time:  1515      Number of Participants in Group & Unit Census:  6/16    Topic: Open Rec    Goal of Group:movie or cards      Comments:     Patient did not participate in  Open Rec  group, despite staff encouragement and explanation of benefits.  Patient remain seclusive to self.  Q15 minute safety checks maintained for patient safety and will continue to encourage patient to attend unit programming.

## 2025-02-15 NOTE — PLAN OF CARE
Problem: Self Harm/Suicidality  Goal: Will have no self-injury during hospital stay  Description: INTERVENTIONS:  1.  Ensure constant observer at bedside with Q15M safety checks  2.  Maintain a safe environment  3.  Secure patient belongings  4.  Ensure family/visitors adhere to safety recommendations  5.  Ensure safety tray has been added to patient's diet order  6.  Every shift and PRN: Re-assess suicidal risk via Frequent Screener    2/14/2025 2340 by Terence Chen LPN  Note: Patient denies self harm this shift, however, patient is tearful. Patient educated on groups and coping skills. Will continue to monitor.     Problem: Pain  Goal: Verbalizes/displays adequate comfort level or baseline comfort level  2/14/2025 2340 by Terence Chen LPN  Outcome: Progressing  Note: Patient denies pain this shift. Patient aware that she has pain medication if needed. Will continue to monitor.

## 2025-02-15 NOTE — PROGRESS NOTES
Behavioral Services  Medicare Certification Upon Admission    I certify that this patient's inpatient psychiatric hospital admission is medically necessary for:    [x] (1) Treatment which could reasonably be expected to improve this patient's condition,       [x] (2) Or for diagnostic study;     AND     [x](2) The inpatient psychiatric services are provided while the individual is under the care of a physician and are included in the individualized plan of care.    Estimated length of stay/service 2-9 days    Plan for post-hospital care -outpatient care    Electronically signed by CARLOS ALBERTO JARA MD on 2/14/2025 at 9:12 AM      
BEHAVIORAL HEALTH FOLLOW-UP NOTE     2/15/2025     Patient was seen and examined in person, Chart reviewed   Patient's case discussed with staff/team    Chief Complaint: Psychosis    Interim History:     The patient was seen face-to-face.  She is reporting improvement in hallucination.  She has been taking her medications.  She is somewhat discharge focused.  She has been isolative to her room.  I encouraged her to participate in groups.  She has no side effects from her medications      /74   Pulse 60   Temp 97.5 °F (36.4 °C) (Temporal)   Resp 14   Ht 1.626 m (5' 4\")   Wt 60.8 kg (134 lb)   SpO2 100%   BMI 23.00 kg/m²   Appetite:   [x] Normal/Unchanged  [] Increased  [] Decreased      Sleep:       [] Normal/Unchanged  [x] Fair       [] Poor              Energy:    [] Normal/Unchanged  [] Increased  [x] Decreased        Aggression:  [] yes  [x] no    Patient is [] able  [] unable to CONTRACT FOR SAFETY ON THE UNIT    PAST MEDICAL/PSYCHIATRIC HISTORY:   Past Medical History:   Diagnosis Date    Fibromyalgia     Headache     migraines    Hyperlipidemia     Hypertension     Hypothyroidism     Lupus     Psychiatric problem     Thyroid disease        FAMILY/SOCIAL HISTORY:  Family History   Problem Relation Age of Onset    High Blood Pressure Mother     Heart Disease Father     Stroke Maternal Grandmother     High Blood Pressure Maternal Grandfather     Heart Disease Paternal Grandmother     Heart Disease Paternal Grandfather      Social History     Socioeconomic History    Marital status: Single     Spouse name: Not on file    Number of children: Not on file    Years of education: Not on file    Highest education level: Not on file   Occupational History    Not on file   Tobacco Use    Smoking status: Every Day     Current packs/day: 0.50     Average packs/day: 0.5 packs/day for 23.0 years (11.5 ttl pk-yrs)     Types: Cigarettes    Smokeless tobacco: Never   Vaping Use    Vaping status: Never Used 
Pharmacy Medication History Note      List of current medications patient is taking is complete.    Source of information: Epic, PDMP, [employer] dispense report    Changes made to medication list:  Medications removed (include reason, ex. therapy complete or physician discontinued, noncompliance):  none    Medications flagged for provider review:  Invega - patient on Lurasidone  Mirtazapine - not continued by providers at [facility]  Lisinopril - has not filled since meds to beds on discharge from [facility] in August 2024    Medications added/doses adjusted:  Added Duloxetine 60 mg daily  Added Lurasidone 20 mg daily with dinner  Added Trazodone 50 mg nightly as needed  Added Nictoine 21 mg patch daily    Other notes (ex. Recent course of antibiotics, Coumadin dosing):  The patient was discharged from [facility] on 2/12/25.      Current Home Medication List at Time of Admission:  Prior to Admission medications    Medication Sig   metroNIDAZOLE (FLAGYL) 500 MG tablet Take 1 tablet by mouth 2 times daily for 7 days   DULoxetine (CYMBALTA) 60 MG extended release capsule Take 1 capsule by mouth daily   lurasidone (LATUDA) 20 MG TABS tablet Take 1 tablet by mouth Daily with supper   traZODone (DESYREL) 50 MG tablet Take 1 tablet by mouth nightly as needed   nicotine (NICODERM CQ) 21 MG/24HR Place 1 patch onto the skin daily   busPIRone (BUSPAR) 10 MG tablet Take 1 tablet by mouth 3 times daily         Please let me know if you have any questions about this encounter. Thank you!    Electronically signed by Armando Muller RPH on 2/14/2025 at 9:19 AM     
RT ASSESSMENT TREATMENT GOALS    [x]Pt Goal: Pt will identify 1-2 positive coping skills by time of discharge.    []Pt Goal: Pt will identify 1-2 positive aspects of self by time of discharge.    []Pt Goal: Pt will remain on task/topic for 15-30 minutes during group by time of discharge.    [x]Pt Goal: Pt will identify 1-2 aspects of relapse prevention plan by time of discharge.    []Pt Goal: Pt will join in conversation with peers 1-2 times per group by time of discharge.    []Pt Goal: Pt will identify 1-2 new leisure interests by time of discharge.    []Pt Goal: Pt will not voice any delusional content by time of discharge.    
   Depression with suicidal ideation 2/14/2025 Yes       Plan:     Admitted to inpatient psych with depression with suicidal ideation  Bacterial vaginosis give Flagyl   Polysubstance abuse positive for cocaine and cannabis strongly advised to quit,  Mild hyponatremia encourage p.o. hydration and  Get TSH    Labs and medications reviewed.     DVT prophylaxis,  Pt mobile   Full code status   2/15  TSH normal  Patient positive for cocaine strongly advised to quit  Denies any chest pain shortness of breath, started on Flagyl for vaginosis patient complaining of acid reflux start Pepcid.  Blood pressure has been stable without lisinopril, continue to hold    Consultations:   IP CONSULT TO INTERNAL MEDICINE      Екатерина Leach MD  2/15/2025  12:36 PM    Copy sent to Dr. Fernandez primary care provider on file.    Please note that this chart was generated using voice recognition Dragon dictation software.  Although every effort was made to ensure the accuracy of this automated transcription, some errors in transcription may have occurred.

## 2025-02-15 NOTE — GROUP NOTE
Group Therapy Note    Date: 2/15/2025    Group Start Time: 1100  Group End Time: 1132  Group Topic: Healthy Living/Wellness    CZ Lani Roth        Group Therapy Note    Attendees: 6/17     Health/Wellness Group Note        Date: February 15, 2025 Start Time: 11am  End Time: 11:30am      Number of Participants in Group & Unit Census:  6/17    Topic: Positive thoughts and affirmations    Goal of Group: List 10 and share with group      Comments:     Patient did not participate in Health/Wellness group, despite staff encouragement and explanation of benefits.  Patient remain seclusive to self.  Q15 minute safety checks maintained for patient safety and will continue to encourage patient to attend unit programming.

## 2025-02-16 VITALS
WEIGHT: 134 LBS | OXYGEN SATURATION: 100 % | HEART RATE: 82 BPM | BODY MASS INDEX: 22.88 KG/M2 | HEIGHT: 64 IN | SYSTOLIC BLOOD PRESSURE: 110 MMHG | DIASTOLIC BLOOD PRESSURE: 80 MMHG | RESPIRATION RATE: 16 BRPM | TEMPERATURE: 96.9 F

## 2025-02-16 PROCEDURE — 6360000002 HC RX W HCPCS: Performed by: NURSE PRACTITIONER

## 2025-02-16 PROCEDURE — 6370000000 HC RX 637 (ALT 250 FOR IP): Performed by: PSYCHIATRY & NEUROLOGY

## 2025-02-16 PROCEDURE — 99239 HOSP IP/OBS DSCHRG MGMT >30: CPT | Performed by: PSYCHIATRY & NEUROLOGY

## 2025-02-16 PROCEDURE — 6370000000 HC RX 637 (ALT 250 FOR IP): Performed by: NURSE PRACTITIONER

## 2025-02-16 PROCEDURE — 6370000000 HC RX 637 (ALT 250 FOR IP): Performed by: INTERNAL MEDICINE

## 2025-02-16 RX ORDER — LURASIDONE HYDROCHLORIDE 20 MG/1
20 TABLET, FILM COATED ORAL
Qty: 30 TABLET | Refills: 0 | Status: SHIPPED | OUTPATIENT
Start: 2025-02-16

## 2025-02-16 RX ORDER — BUSPIRONE HYDROCHLORIDE 10 MG/1
10 TABLET ORAL 3 TIMES DAILY
Qty: 90 TABLET | Refills: 0 | Status: SHIPPED | OUTPATIENT
Start: 2025-02-16

## 2025-02-16 RX ORDER — TRAZODONE HYDROCHLORIDE 50 MG/1
50 TABLET ORAL NIGHTLY
Qty: 30 TABLET | Refills: 0 | Status: SHIPPED | OUTPATIENT
Start: 2025-02-16

## 2025-02-16 RX ADMIN — HYDROXYZINE HYDROCHLORIDE 50 MG: 50 TABLET, FILM COATED ORAL at 03:46

## 2025-02-16 RX ADMIN — FAMOTIDINE 20 MG: 20 TABLET, FILM COATED ORAL at 08:00

## 2025-02-16 RX ADMIN — BUSPIRONE HYDROCHLORIDE 10 MG: 10 TABLET ORAL at 08:00

## 2025-02-16 RX ADMIN — LORAZEPAM 2 MG: 2 INJECTION INTRAMUSCULAR; INTRAVENOUS at 09:01

## 2025-02-16 RX ADMIN — HALOPERIDOL LACTATE 5 MG: 5 INJECTION, SOLUTION INTRAMUSCULAR at 09:01

## 2025-02-16 RX ADMIN — IBUPROFEN 400 MG: 400 TABLET, FILM COATED ORAL at 08:00

## 2025-02-16 RX ADMIN — DIPHENHYDRAMINE HYDROCHLORIDE 50 MG: 50 INJECTION INTRAMUSCULAR; INTRAVENOUS at 09:01

## 2025-02-16 RX ADMIN — BUSPIRONE HYDROCHLORIDE 10 MG: 10 TABLET ORAL at 14:02

## 2025-02-16 RX ADMIN — METRONIDAZOLE 500 MG: 500 TABLET ORAL at 07:59

## 2025-02-16 ASSESSMENT — PAIN DESCRIPTION - ORIENTATION: ORIENTATION: MID;LOWER

## 2025-02-16 ASSESSMENT — PAIN SCALES - GENERAL: PAINLEVEL_OUTOF10: 10

## 2025-02-16 ASSESSMENT — PAIN DESCRIPTION - LOCATION: LOCATION: BACK

## 2025-02-16 ASSESSMENT — PAIN - FUNCTIONAL ASSESSMENT: PAIN_FUNCTIONAL_ASSESSMENT: ACTIVITIES ARE NOT PREVENTED

## 2025-02-16 ASSESSMENT — PAIN DESCRIPTION - DESCRIPTORS: DESCRIPTORS: ACHING

## 2025-02-16 NOTE — TRANSITION OF CARE
CQ     paliperidone 3 MG extended release tablet  Commonly known as: INVEGA               Where to Get Your Medications        These medications were sent to [facility] [address] [place] - [address] - P [phone] - F [phone]  [address] [place] [address]      Phone: [phone]   busPIRone 10 MG tablet  lurasidone 20 MG Tabs tablet  traZODone 50 MG tablet         Unresulted Labs (24h ago, onward)      None            To obtain results of studies pending at discharge, please contact [phone]    Follow-up Information       Follow up With Specialties Details Why Contact [facility]  Follow up on 2/17/2025 Please attend a walk-in appointment at [facility] Monday-Friday 8:00am-4:00pm to re-establish services at [address] [id number] [phone]  fax [phone]             Advanced Directive:   Does the patient have an appointed surrogate decision maker? No  Does the patient have a Medical Advance Directive? No  Does the patient have a Psychiatric Advance Directive? No  If the patient does not have a surrogate or Medical Advance Directive AND Psychiatric Advance Directive, the patient was offered information on these advance directives Patient declined to complete    Patient Instructions: Please continue all medications until otherwise directed by physician.      Tobacco Cessation Discharge Plan:   Is the patient a tobacco user  and needs referral for tobacco cessation? Yes  Patient referred to the following for tobacco cessation with an appointment? Patient refused  Patient was offered medication to assist with tobacco cessation at discharge? Patient refused    Alcohol/Substance Abuse Discharge Plan:   Does the patient have a history of substance/alcohol abuse and requires a referral for treatment? Yes  Patient referred to the following for substance/alcohol abuse treatment with an appointment? Patient refused  Patient was offered

## 2025-02-16 NOTE — BH NOTE
Patient given tobacco quitline number 63265033795 at this time, refusing to call at this time, states \" I just dont want to quit now\"- patient given information as to the dangers of long term tobacco use. Continue to reinforce the importance of tobacco cessation.

## 2025-02-16 NOTE — DISCHARGE INSTR - DIET

## 2025-02-16 NOTE — GROUP NOTE
Group Therapy Note    Date: 2/16/2025    Group Start Time: 1000  Group End Time: 1030  Group Topic: Psychoeducation    Francisco Wharton        Group Therapy Note    Attendees: 4/18   Patient was offered group therapy today but declined to participate despite encouragement from staff. 1:1 was offered.    Signature:  Francisco Garcia

## 2025-02-16 NOTE — DISCHARGE SUMMARY
DISCHARGE SUMMARY      Patient ID:  Sruthi You  [id number]  49 y.o.  1975    Admit date: 2/14/2025    Discharge date and time: 2/16/2025    Disposition: Home     Admitting Physician: Ronan Calzada MD     Discharge Physician: Dr EMILI Calzada MD    Admission Diagnoses: Depression with suicidal ideation [F32.A, R45.851]    Admission Condition: poor    Discharged Condition: stable    Admission Circumstance: Sruthi You is a 49 y.o. female who has a past medical history of depression, polysubstance use, suicide attempt. Medical history of fibromyalgia, headache, hyperlipidemia, hypertension, hypothyroidism, lupus, and thyroid disease.  She presented to [facility] ED endorsing suicidal ideations with plan to walk into traffic. She reported walking into traffic a few times en route to ED. She endorses commanding auditory hallucinations to harm herself. Admits to recent use of cocaine and marijuana. Patient was recently discharged from [facility] and expresses concerns that her mental health medications are not working. She is admitted to [facility] on voluntary status. Previous admission to [facility] 8/11/2024-8/15/2024 taking Invega, Remeron, and Buspar.      Patient is seen today for initial assessment.  She is found lying in bed awake. She is initially unwilling to engage but agrees to conduct interview in private room with door open. She is somewhat avoidant in behavior. Speech is slow and low in volume. She reports depressed mood and suicidal ideations with thoughts of walking into traffic. Admits to walking into the street without regard of using the crosswalk.  identifies multiple psychosocial stressors contributing to her depression and suicidal thoughts. She endorses complicated grief since the passing of her daughter two years ago. She vaguely discusses financial issues and relationship issues contributing to her declining mental health. She reports living with her mother and denies having adequate social

## 2025-02-16 NOTE — BH NOTE
Emergency Medication Follow-Up Note:    PRN medication of Haldol 5mg IM, Ativan 2mg IM, Benadryl 50mg IM.  was effective as evidence by socializing with peers. Patient denies medication side effects. Will continue to monitor and provide support as needed.

## 2025-02-16 NOTE — PLAN OF CARE
Problem: Self Harm/Suicidality  Goal: Will have no self-injury during hospital stay  Description: INTERVENTIONS:  1.  Ensure constant observer at bedside with Q15M safety checks  2.  Maintain a safe environment  3.  Secure patient belongings  4.  Ensure family/visitors adhere to safety recommendations  5.  Ensure safety tray has been added to patient's diet order  6.  Every shift and PRN: Re-assess suicidal risk via Frequent Screener    2/15/2025 2200 by Leidy Pretty LPN  Outcome: Progressing  Note: Patient denies thoughts of self-harm or harm to others during this shift. Staff encouraged patient to notify staff if thoughts of self-harm or harm to others occur. Staff ensure patient safety by intermediate checks for every 15 minutes.          Problem: Depression  Goal: Will be euthymic at discharge  Description: INTERVENTIONS:  1. Administer medication as ordered  2. Provide emotional support via 1:1 interaction with staff  3. Encourage involvement in milieu/groups/activities  4. Monitor for social isolation  2/15/2025 2200 by Leidy Pretty LPN  Outcome: Progressing  Note: Patient expresses feelings of depression & anxiety 10/10. Staff ensure safety by providing safety checks on the unit intermittently and every 15 minutes. Patient is encouraged to notify staff if anxiety and depression occurs.          Problem: Safety - Adult  Goal: Free from fall injury  Outcome: Progressing  Note: Patient remains free from falls. Staff ensure safety by providing safety checks on the unit intermittently and every 15 minutes.       Problem: Pain  Goal: Verbalizes/displays adequate comfort level or baseline comfort level  Outcome: Progressing  Note: Patient denies pain. Patient is aware medications can be given upon request. Patient is oriented to notify staff of changes regarding pain. Staff continues to monitor patient.

## 2025-02-16 NOTE — BH NOTE
[facility]  Discharge Note    Pt discharged with followings belongings:   Dental Appliances: None  Vision - Corrective Lenses: Eyeglasses  Hearing Aid: None  Jewelry: None  Body Piercings Removed: N/A  Clothing: Pants, Shirt, Undergarments, Jacket/Coat  Other Valuables: Purse, Keys, Cigarettes (joint,, 8 pill bottles 60 zofran pills, scissors, 5 cents)   Valuables returned to patient. Patient educated on aftercare instructions: yes, medication education and follow up at [facility].  Information faxed to [facility] by nursing staff  at 2:50 PM .Patient verbalize understanding of AVS:  yes. Patient discharged to home. .    Status EXAM upon discharge:  Mental Status and Behavioral Exam  Normal: No  Level of Assistance: Independent/Self  Facial Expression: Flat  Affect: Blunt  Level of Consciousness: Alert  Frequency of Checks: 4 times per hour, close  Mood:Normal: No  Mood: Anxious, Labile, Irritable, Angry  Motor Activity:Normal: Yes  Motor Activity: Other (comment)  Eye Contact: Fair  Observed Behavior: Agitated, Hostile, Preoccupied  Sexual Misconduct History: Current - no  Preception: [place] to person, [place] to time, [place] to place, [place] to situation  Attention:Normal: No  Attention: Distractible  Thought Processes: Circumstantial  Thought Content:Normal: No  Thought Content: Preoccupations  Depression Symptoms: Increased irritability  Anxiety Symptoms: Generalized  Maria Teresa Symptoms: No problems reported or observed.  Hallucinations: None  Delusions: No  Memory:Normal: No  Memory: Poor recent  Insight and Judgment: No  Insight and Judgment: Poor judgment, Poor insight, Unmotivated    Tobacco Screening:  Practical Counseling, on admission, jayme X, if applicable and completed (first 3 are required if patient doesn't refuse):            ( ) Recognizing danger situations (included triggers and roadblocks)                    ( ) Coping skills (new ways to manage stress,relaxation techniques, changing

## 2025-02-16 NOTE — GROUP NOTE
Group Therapy Note    Date: 2/15/2025    Group Start Time: 2105  Group End Time: 2130  Group Topic: Relaxation    CZ Ledy Rios, RN      Group Therapy Note    Attendees: 10/16      Pt refused to attend Relaxation group this evening after encouragement from staff.  1:1 talk time offered as alternative to group session but pt declined. Will continue to encourage patient to attend unit group programming.        Signature:  Ledy Byrne, RN

## 2025-02-16 NOTE — DISCHARGE INSTRUCTIONS
[id number]  [address] [facility] [address]  Phone: [phone]      Phone: [phone]    [facility]      [facility]  [address] [place] [address]    [address] [facility] [address]  Phone: [phone]      Phone: [phone]    [facility]     [facility] Department of Psychiatry  [address] [facility] [address]   [address] [place], [place] [address]  Phone: [phone]      Phone: [phone]    [facility]       [facility]  [address] [facility] [address]     [address] [facility] [address]  Phone: [phone]      Phone: [phone]    [facility]   [address] [facility] [address]    [address] [facility] [address] [facility] [address]  Phone: [phone]      Phone: [phone] Ext: 204    [facility]  [address] [facility] [address] / [address] [facility] [address] / [address] [facility] [address]  Phone: [phone]    [facility]   [facility]  [address] [facility] [address]  [address] [place] [address]  Phone: [phone]      Phone: [phone]    [facility]  [address] [facility] [address]    [address] [facility] [address]  Phone: [phone]      Phone: [phone]

## 2025-02-16 NOTE — BH NOTE
[facility]  Day 3 Interdisciplinary Treatment Plan NOTE    Review Date & Time: 2/16/25 3144    Admission Type:   Admission Type: Voluntary (Simultaneous filing. User may not have seen previous data.)    Reason for admission:  Reason for Admission: Patient recently discharged from [facility]. PAtient does not believe medications are working. She is having auditory hallucinations at times. She is having increased suicidal thoughts and cannot contract for safety while in community  Estimated Length of Stay: 5-7 days  Estimated Discharge Date Update: to be determined by physician    PATIENT STRENGTHS:  Patient Strengths    Patient Strengths and Limitations:Limitations: Difficult relationships / poor social skills, Inappropriate/potentially harmful leisure interests, Difficulty problem solving/relies on others to help solve problems  Addictive Behavior:Addictive Behavior  In the Past 3 Months, Have You Felt or Has Someone Told You That You Have a Problem With  : None  Medical Problems:  Past Medical History:   Diagnosis Date    Fibromyalgia     Headache     migraines    Hyperlipidemia     Hypertension     Hypothyroidism     Lupus     Psychiatric problem     Thyroid disease        Risk:  Fall Risk   Sidney Scale Sidney Scale Score: 23  BVC    Change in scores no Changes to plan of Care no    Status EXAM:   Mental Status and Behavioral Exam  Normal: No  Level of Assistance: Independent/Self  Facial Expression: Flat  Affect: Blunt  Level of Consciousness: Alert  Frequency of Checks: 4 times per hour, close  Mood:Normal: No  Mood: Anxious, Labile, Irritable, Angry  Motor Activity:Normal: Yes  Motor Activity: Other (comment)  Eye Contact: Fair  Observed Behavior: Agitated, Hostile, Preoccupied  Sexual Misconduct History: Current - no  Preception: [place] to person, [place] to time, [place] to place, [place] to situation  Attention:Normal: No  Attention: Distractible  Thought Processes:

## 2025-02-16 NOTE — CARE COORDINATION
Discharge Arrangements:  [    Guardian notified: N/A    Discharge destination/address: Home  [address] [place] [address]    Transported by:  [facility] [employer] cab [phone]    Patient was not accepting of referral.  Follow up appointment is scheduled for [place] [id number] [place] [address] [phone] fax [phone]   Please attend a walk-in appointment at [facility] Monday-Friday 8:00am-4:00pm to re-establish services at [facility]   *PAMELA resources were offered to patient throughout admission and at time of discharge. This list of [facility] PAMELA providers was provided to patient:     [facility]  [address] [facility] [address]   [address] [facility] [address]  Phone: [phone]     Phone: [phone]    [facility]   [address] [facility] [address]   [address] [facility] [address]  Phone: [phone]     Phone: [phone]    [facility]  [address] [facility] [address]    [address] [facility] [facility] [address]  Phone: [phone]     Phone: [phone]    [facility]     [facility]   [address] [address]  [place] [address]      Phone: [phone]  Phone: [phone]        [facility]  [address] [facility] [facility] [address]   [address] [facility] [address]  Phone: [phone]     Phone:  [phone]    [facility]      [facility]  [address] [facility] [address]   [address] [facility] [address]  Phone: [phone]     Phone: [phone]    [facility]  [address] [facility] [address]   [address] [facility] [address]  Phone: [phone]     Phone: [phone]    [facility]  [facility]  [address] [facility] [id number] [address] [facility]

## 2025-02-18 NOTE — CARE COORDINATION
Name: Sruthi You    : 1975    Auth number: [id number]     Discharge Date: 2025    Destination: home    *If you have any specific discharge questions, please contact the assigned /discharge planner: Francisco [phone]      Discharge Medications:      Medication List        CHANGE how you take these medications      traZODone 50 MG tablet  Commonly known as: DESYREL  Take 1 tablet by mouth nightly  What changed:   when to take this  reasons to take this  Notes to patient: For sleep            CONTINUE taking these medications      busPIRone 10 MG tablet  Commonly known as: BUSPAR  Take 1 tablet by mouth 3 times daily     lurasidone 20 MG Tabs tablet  Commonly known as: LATUDA  Take 1 tablet by mouth Daily with supper  Notes to patient: For mood     metroNIDAZOLE 500 MG tablet  Commonly known as: FLAGYL  Take 1 tablet by mouth 2 times daily for 7 days  Notes to patient: For bacterial infection            STOP taking these medications      DULoxetine 60 MG extended release capsule  Commonly known as: CYMBALTA     lisinopril 5 MG tablet  Commonly known as: PRINIVIL;ZESTRIL     mirtazapine 15 MG tablet  Commonly known as: REMERON     nicotine 21 MG/24HR  Commonly known as: NICODERM CQ     paliperidone 3 MG extended release tablet  Commonly known as: INVEGA               Where to Get Your Medications        These medications were sent to [id number]  [place], [place] - [address] [place] [address] [phone] - [phone]  [address] [facility] [address]      Phone: [phone]   busPIRone 10 MG tablet  lurasidone 20 MG Tabs tablet  traZODone 50 MG tablet         Follow [place] App[place]ment: [address] [place] [address]  [phone]  fax [phone]  Follow up on 2025  Please attend a walk-in appointment at [facility] Monday-Friday 8:00am-4:00pm to re-establish services at [facility]